# Patient Record
Sex: FEMALE | Race: WHITE | Employment: OTHER | URBAN - NONMETROPOLITAN AREA
[De-identification: names, ages, dates, MRNs, and addresses within clinical notes are randomized per-mention and may not be internally consistent; named-entity substitution may affect disease eponyms.]

---

## 2017-02-14 ENCOUNTER — OFFICE VISIT (OUTPATIENT)
Dept: FAMILY MEDICINE CLINIC | Age: 62
End: 2017-02-14

## 2017-02-14 VITALS
RESPIRATION RATE: 14 BRPM | TEMPERATURE: 97.7 F | WEIGHT: 157 LBS | OXYGEN SATURATION: 98 % | HEIGHT: 64 IN | BODY MASS INDEX: 26.8 KG/M2 | HEART RATE: 67 BPM | SYSTOLIC BLOOD PRESSURE: 100 MMHG | DIASTOLIC BLOOD PRESSURE: 64 MMHG

## 2017-02-14 DIAGNOSIS — R55 VASOVAGAL EPISODE: Primary | ICD-10-CM

## 2017-02-14 DIAGNOSIS — G43.909 MIGRAINE WITHOUT STATUS MIGRAINOSUS, NOT INTRACTABLE, UNSPECIFIED MIGRAINE TYPE: ICD-10-CM

## 2017-02-14 DIAGNOSIS — K59.03 DRUG-INDUCED CONSTIPATION: ICD-10-CM

## 2017-02-14 PROCEDURE — G8484 FLU IMMUNIZE NO ADMIN: HCPCS | Performed by: NURSE PRACTITIONER

## 2017-02-14 PROCEDURE — G8427 DOCREV CUR MEDS BY ELIG CLIN: HCPCS | Performed by: NURSE PRACTITIONER

## 2017-02-14 PROCEDURE — G8419 CALC BMI OUT NRM PARAM NOF/U: HCPCS | Performed by: NURSE PRACTITIONER

## 2017-02-14 PROCEDURE — 1036F TOBACCO NON-USER: CPT | Performed by: NURSE PRACTITIONER

## 2017-02-14 PROCEDURE — 3014F SCREEN MAMMO DOC REV: CPT | Performed by: NURSE PRACTITIONER

## 2017-02-14 PROCEDURE — 99213 OFFICE O/P EST LOW 20 MIN: CPT | Performed by: NURSE PRACTITIONER

## 2017-02-14 PROCEDURE — 3017F COLORECTAL CA SCREEN DOC REV: CPT | Performed by: NURSE PRACTITIONER

## 2017-02-14 RX ORDER — MAGNESIUM OXIDE 400 MG/1
400 TABLET ORAL DAILY
COMMUNITY
End: 2017-10-11

## 2017-02-14 RX ORDER — ACETAMINOPHEN 160 MG
TABLET,DISINTEGRATING ORAL
COMMUNITY
End: 2017-10-11

## 2017-02-14 RX ORDER — MEPERIDINE HYDROCHLORIDE 50 MG/1
TABLET ORAL
Refills: 0 | COMMUNITY
Start: 2017-02-01 | End: 2017-02-14

## 2017-02-14 ASSESSMENT — ENCOUNTER SYMPTOMS
ABDOMINAL PAIN: 1
CONSTIPATION: 1
BLOATING: 1

## 2017-04-03 ENCOUNTER — OFFICE VISIT (OUTPATIENT)
Dept: FAMILY MEDICINE CLINIC | Age: 62
End: 2017-04-03

## 2017-04-03 VITALS
WEIGHT: 158.4 LBS | HEART RATE: 76 BPM | HEIGHT: 64 IN | TEMPERATURE: 97.3 F | OXYGEN SATURATION: 98 % | SYSTOLIC BLOOD PRESSURE: 108 MMHG | DIASTOLIC BLOOD PRESSURE: 78 MMHG | BODY MASS INDEX: 27.04 KG/M2

## 2017-04-03 DIAGNOSIS — N30.01 ACUTE CYSTITIS WITH HEMATURIA: Primary | ICD-10-CM

## 2017-04-03 DIAGNOSIS — N30.01 ACUTE CYSTITIS WITH HEMATURIA: ICD-10-CM

## 2017-04-03 LAB
BILIRUBIN, POC: NORMAL
BLOOD URINE, POC: NORMAL
CLARITY, POC: CLEAR
COLOR, POC: NORMAL
GLUCOSE URINE, POC: NORMAL
KETONES, POC: NORMAL
LEUKOCYTE EST, POC: NORMAL
NITRITE, POC: NORMAL
PH, POC: 7.5
PROTEIN, POC: NORMAL
SPECIFIC GRAVITY, POC: 1
UROBILINOGEN, POC: 0.2

## 2017-04-03 PROCEDURE — 3014F SCREEN MAMMO DOC REV: CPT | Performed by: NURSE PRACTITIONER

## 2017-04-03 PROCEDURE — 1036F TOBACCO NON-USER: CPT | Performed by: NURSE PRACTITIONER

## 2017-04-03 PROCEDURE — 99213 OFFICE O/P EST LOW 20 MIN: CPT | Performed by: NURSE PRACTITIONER

## 2017-04-03 PROCEDURE — 81002 URINALYSIS NONAUTO W/O SCOPE: CPT | Performed by: NURSE PRACTITIONER

## 2017-04-03 PROCEDURE — G8428 CUR MEDS NOT DOCUMENT: HCPCS | Performed by: NURSE PRACTITIONER

## 2017-04-03 PROCEDURE — G8419 CALC BMI OUT NRM PARAM NOF/U: HCPCS | Performed by: NURSE PRACTITIONER

## 2017-04-03 PROCEDURE — 3017F COLORECTAL CA SCREEN DOC REV: CPT | Performed by: NURSE PRACTITIONER

## 2017-04-03 RX ORDER — SULFAMETHOXAZOLE AND TRIMETHOPRIM 800; 160 MG/1; MG/1
1 TABLET ORAL 2 TIMES DAILY
Qty: 20 TABLET | Refills: 0 | Status: SHIPPED | OUTPATIENT
Start: 2017-04-03 | End: 2017-04-13

## 2017-04-03 RX ORDER — PHENAZOPYRIDINE HYDROCHLORIDE 100 MG/1
100 TABLET, FILM COATED ORAL 3 TIMES DAILY PRN
Qty: 21 TABLET | Refills: 0 | Status: SHIPPED | OUTPATIENT
Start: 2017-04-03 | End: 2017-04-10

## 2017-04-03 ASSESSMENT — ENCOUNTER SYMPTOMS
VOMITING: 0
ALLERGIC/IMMUNOLOGIC NEGATIVE: 1
NAUSEA: 0
RESPIRATORY NEGATIVE: 1

## 2017-04-05 LAB
ORGANISM: ABNORMAL
URINE CULTURE, ROUTINE: ABNORMAL

## 2017-10-11 ENCOUNTER — TELEPHONE (OUTPATIENT)
Dept: OBGYN | Age: 62
End: 2017-10-11

## 2017-10-11 ENCOUNTER — OFFICE VISIT (OUTPATIENT)
Dept: OBGYN | Age: 62
End: 2017-10-11

## 2017-10-11 VITALS
HEIGHT: 64 IN | DIASTOLIC BLOOD PRESSURE: 84 MMHG | SYSTOLIC BLOOD PRESSURE: 124 MMHG | BODY MASS INDEX: 26.98 KG/M2 | WEIGHT: 158 LBS

## 2017-10-11 DIAGNOSIS — N76.1 CHRONIC VAGINITIS: ICD-10-CM

## 2017-10-11 DIAGNOSIS — N89.8 VAGINAL ITCHING: ICD-10-CM

## 2017-10-11 DIAGNOSIS — N89.8 VAGINAL DISCHARGE: Primary | ICD-10-CM

## 2017-10-11 PROCEDURE — 99203 OFFICE O/P NEW LOW 30 MIN: CPT | Performed by: OBSTETRICS & GYNECOLOGY

## 2017-10-11 PROCEDURE — 1036F TOBACCO NON-USER: CPT | Performed by: OBSTETRICS & GYNECOLOGY

## 2017-10-11 PROCEDURE — G8419 CALC BMI OUT NRM PARAM NOF/U: HCPCS | Performed by: OBSTETRICS & GYNECOLOGY

## 2017-10-11 PROCEDURE — G8484 FLU IMMUNIZE NO ADMIN: HCPCS | Performed by: OBSTETRICS & GYNECOLOGY

## 2017-10-11 PROCEDURE — 3014F SCREEN MAMMO DOC REV: CPT | Performed by: OBSTETRICS & GYNECOLOGY

## 2017-10-11 PROCEDURE — G8427 DOCREV CUR MEDS BY ELIG CLIN: HCPCS | Performed by: OBSTETRICS & GYNECOLOGY

## 2017-10-11 PROCEDURE — 3017F COLORECTAL CA SCREEN DOC REV: CPT | Performed by: OBSTETRICS & GYNECOLOGY

## 2017-10-11 RX ORDER — TOPIRAMATE 100 MG/1
300 TABLET, FILM COATED ORAL DAILY
COMMUNITY
End: 2020-04-07 | Stop reason: SDUPTHER

## 2017-10-11 RX ORDER — CLOBETASOL PROPIONATE 0.5 MG/G
CREAM TOPICAL
Refills: 1 | COMMUNITY
Start: 2017-08-30 | End: 2018-03-19 | Stop reason: ALTCHOICE

## 2017-10-11 RX ORDER — NITROFURANTOIN 25; 75 MG/1; MG/1
CAPSULE ORAL
Refills: 2 | COMMUNITY
Start: 2017-09-21 | End: 2018-03-19 | Stop reason: ALTCHOICE

## 2017-10-11 RX ORDER — ESTRADIOL 1 MG/1
TABLET ORAL
COMMUNITY
Start: 2017-10-05 | End: 2017-10-12

## 2017-10-11 RX ORDER — MEPERIDINE HYDROCHLORIDE 50 MG/1
TABLET ORAL
Refills: 0 | COMMUNITY
Start: 2017-08-30 | End: 2020-08-31 | Stop reason: SDUPTHER

## 2017-10-11 RX ORDER — OXYCODONE HYDROCHLORIDE AND ACETAMINOPHEN 5; 325 MG/1; MG/1
1 TABLET ORAL PRN
COMMUNITY
Start: 2017-05-03 | End: 2019-11-19

## 2017-10-11 RX ORDER — CLINDAMYCIN PHOSPHATE 20 MG/G
CREAM VAGINAL
Qty: 1 TUBE | Refills: 0 | Status: SHIPPED | OUTPATIENT
Start: 2017-10-11 | End: 2018-03-19 | Stop reason: ALTCHOICE

## 2017-10-11 NOTE — TELEPHONE ENCOUNTER
Patient said Dr. Linn Horvath wanted to know what hormone medication she was on.  Patient said it is Estradiol-Norethindron  0.5-0.1 mg   The label said it is the generic for Estrace

## 2017-10-11 NOTE — PROGRESS NOTES
SUBJECTIVE:   63 y.o.  female complains of vaginal discharge and itching on an off for the last 4 months after intercourse. She was given ABX by Dr. Mercado to take prior and after intercourse, pt states that only seems to help. Review of Systems:  General ROS: negative  Psychological ROS: negative  ENT ROS: negative  Endocrine ROS: negative  Respiratory ROS: no cough, shortness of breath, or wheezing  Cardiovascular ROS: no chest pain or dyspnea on exertion  Gastrointestinal ROS: no abdominal pain, change in bowel habits, or black or bloody stools  Genito-Urinary ROS: no dysuria, trouble voiding, or hematuria  Musculoskeletal ROS: negative  Neurological ROS: no TIA or stroke symptoms  Dermatological ROS: negative    OBJECTIVE:   /84   Ht 5' 4\" (1.626 m)   Wt 158 lb (71.7 kg)   BMI 27.12 kg/m²     Physical Exam:  CONSTITUTIONAL: She appears well nourished and developed   NEUROLOGIC: Alert and oriented to time, place and person  NECK: no thyroidmegaly  LUNGS: Clear to ascultation bilaterally  CVS: regular rate and rhythm  LYMPHATIC: No palpable lymph nodes  ABDOMEN: benign, soft, nontender, no masses. No liver or splenic organomegaly. No evidence of abdominal or inguinal hernia. No indication for occult blood testing  SKIN: normal texture and tone, no lesions  NEURO: normal tone, no hyperreflexia, 1+DTRs throughout    Pelvic Exam:   EFG: normal external genitalia  URETHRAL MEATUS: normal size, no diverticula   URETHRA: normal appearing without diverticula or lesions  BLADDER:  No masses or tenderness  VAGINA: normal rugae, no discharge   CERVIX: parous, no lesions  UTERUS: uterus is normal size, shape, consistency and nontender   ADNEXA: normal adnexa in size, nontender and no masses. PERINEUM: normal appearing without lesions or masses    ASSESSMENT:   1. Vaginal discharge  Miscellaneous sendout 3   2. Vaginal itching  Miscellaneous sendout 3   3.  Chronic vaginitis           PLAN:   Past medical,

## 2017-10-12 RX ORDER — ESTRADIOL AND NORETHINDRONE ACETATE .5; .1 MG/1; MG/1
TABLET ORAL DAILY
Qty: 28 TABLET | COMMUNITY
Start: 2017-10-12 | End: 2019-09-25

## 2017-10-18 ENCOUNTER — TELEPHONE (OUTPATIENT)
Dept: OBGYN | Age: 62
End: 2017-10-18

## 2017-10-18 DIAGNOSIS — N89.8 VAGINAL DISCHARGE: ICD-10-CM

## 2017-10-18 DIAGNOSIS — N89.8 VAGINAL ITCHING: ICD-10-CM

## 2017-10-18 NOTE — TELEPHONE ENCOUNTER
----- Message from Ariella Luong MD sent at 10/18/2017 11:49 AM EDT -----  PLEASE NOTIFY PATIENT OF RESULTS/PLEASE CALL CLINDESSE 2% TO PT PHARMACY

## 2018-01-09 ENCOUNTER — OFFICE VISIT (OUTPATIENT)
Dept: FAMILY MEDICINE CLINIC | Age: 63
End: 2018-01-09

## 2018-01-09 VITALS
HEIGHT: 64 IN | WEIGHT: 162 LBS | OXYGEN SATURATION: 98 % | TEMPERATURE: 98.2 F | BODY MASS INDEX: 27.66 KG/M2 | HEART RATE: 68 BPM | SYSTOLIC BLOOD PRESSURE: 122 MMHG | DIASTOLIC BLOOD PRESSURE: 78 MMHG

## 2018-01-09 DIAGNOSIS — R53.83 OTHER FATIGUE: ICD-10-CM

## 2018-01-09 DIAGNOSIS — J01.00 ACUTE MAXILLARY SINUSITIS, RECURRENCE NOT SPECIFIED: Primary | ICD-10-CM

## 2018-01-09 DIAGNOSIS — Z12.11 COLON CANCER SCREENING: ICD-10-CM

## 2018-01-09 LAB
ALBUMIN SERPL-MCNC: 4.3 G/DL (ref 3.9–4.9)
ALP BLD-CCNC: 72 U/L (ref 40–130)
ALT SERPL-CCNC: 10 U/L (ref 0–33)
ANION GAP SERPL CALCULATED.3IONS-SCNC: 13 MEQ/L (ref 7–13)
AST SERPL-CCNC: 17 U/L (ref 0–35)
BASOPHILS ABSOLUTE: 0.1 K/UL (ref 0–0.2)
BASOPHILS RELATIVE PERCENT: 1.2 %
BILIRUB SERPL-MCNC: 0.2 MG/DL (ref 0–1.2)
BUN BLDV-MCNC: 13 MG/DL (ref 8–23)
CALCIUM SERPL-MCNC: 9 MG/DL (ref 8.6–10.2)
CHLORIDE BLD-SCNC: 107 MEQ/L (ref 98–107)
CO2: 23 MEQ/L (ref 22–29)
CREAT SERPL-MCNC: 0.96 MG/DL (ref 0.5–0.9)
EOSINOPHILS ABSOLUTE: 0.2 K/UL (ref 0–0.7)
EOSINOPHILS RELATIVE PERCENT: 3.2 %
GFR AFRICAN AMERICAN: >60
GFR NON-AFRICAN AMERICAN: 59.2
GLOBULIN: 2.6 G/DL (ref 2.3–3.5)
GLUCOSE BLD-MCNC: 78 MG/DL (ref 74–109)
HCT VFR BLD CALC: 42.2 % (ref 37–47)
HEMOGLOBIN: 13.8 G/DL (ref 12–16)
LYMPHOCYTES ABSOLUTE: 1.2 K/UL (ref 1–4.8)
LYMPHOCYTES RELATIVE PERCENT: 22.8 %
MCH RBC QN AUTO: 32.1 PG (ref 27–31.3)
MCHC RBC AUTO-ENTMCNC: 32.7 % (ref 33–37)
MCV RBC AUTO: 98.3 FL (ref 82–100)
MONOCYTES ABSOLUTE: 0.6 K/UL (ref 0.2–0.8)
MONOCYTES RELATIVE PERCENT: 11.1 %
NEUTROPHILS ABSOLUTE: 3.2 K/UL (ref 1.4–6.5)
NEUTROPHILS RELATIVE PERCENT: 61.7 %
PDW BLD-RTO: 14.2 % (ref 11.5–14.5)
PLATELET # BLD: 282 K/UL (ref 130–400)
POTASSIUM SERPL-SCNC: 4.3 MEQ/L (ref 3.5–5.1)
RBC # BLD: 4.3 M/UL (ref 4.2–5.4)
SODIUM BLD-SCNC: 143 MEQ/L (ref 132–144)
TOTAL PROTEIN: 6.9 G/DL (ref 6.4–8.1)
WBC # BLD: 5.3 K/UL (ref 4.8–10.8)

## 2018-01-09 PROCEDURE — 3014F SCREEN MAMMO DOC REV: CPT | Performed by: NURSE PRACTITIONER

## 2018-01-09 PROCEDURE — 1036F TOBACCO NON-USER: CPT | Performed by: NURSE PRACTITIONER

## 2018-01-09 PROCEDURE — 3017F COLORECTAL CA SCREEN DOC REV: CPT | Performed by: NURSE PRACTITIONER

## 2018-01-09 PROCEDURE — G8484 FLU IMMUNIZE NO ADMIN: HCPCS | Performed by: NURSE PRACTITIONER

## 2018-01-09 PROCEDURE — G8419 CALC BMI OUT NRM PARAM NOF/U: HCPCS | Performed by: NURSE PRACTITIONER

## 2018-01-09 PROCEDURE — G8427 DOCREV CUR MEDS BY ELIG CLIN: HCPCS | Performed by: NURSE PRACTITIONER

## 2018-01-09 PROCEDURE — 99213 OFFICE O/P EST LOW 20 MIN: CPT | Performed by: NURSE PRACTITIONER

## 2018-01-09 RX ORDER — AMOXICILLIN AND CLAVULANATE POTASSIUM 875; 125 MG/1; MG/1
1 TABLET, FILM COATED ORAL 2 TIMES DAILY
Qty: 20 TABLET | Refills: 0 | Status: SHIPPED | OUTPATIENT
Start: 2018-01-09 | End: 2018-01-19

## 2018-01-09 ASSESSMENT — ENCOUNTER SYMPTOMS
RHINORRHEA: 1
VOMITING: 0
DIARRHEA: 1
SORE THROAT: 1
NAUSEA: 0
WHEEZING: 0
SINUS PAIN: 0
COUGH: 1

## 2018-03-19 ENCOUNTER — OFFICE VISIT (OUTPATIENT)
Dept: FAMILY MEDICINE CLINIC | Age: 63
End: 2018-03-19
Payer: COMMERCIAL

## 2018-03-19 VITALS
SYSTOLIC BLOOD PRESSURE: 120 MMHG | TEMPERATURE: 99 F | HEIGHT: 64 IN | DIASTOLIC BLOOD PRESSURE: 70 MMHG | OXYGEN SATURATION: 99 % | HEART RATE: 89 BPM | WEIGHT: 163.4 LBS | BODY MASS INDEX: 27.9 KG/M2

## 2018-03-19 DIAGNOSIS — K64.9 HEMORRHOIDS, UNSPECIFIED HEMORRHOID TYPE: ICD-10-CM

## 2018-03-19 DIAGNOSIS — K57.92 ACUTE DIVERTICULITIS: Primary | ICD-10-CM

## 2018-03-19 PROCEDURE — 99213 OFFICE O/P EST LOW 20 MIN: CPT | Performed by: NURSE PRACTITIONER

## 2018-03-19 PROCEDURE — G8484 FLU IMMUNIZE NO ADMIN: HCPCS | Performed by: NURSE PRACTITIONER

## 2018-03-19 PROCEDURE — G8419 CALC BMI OUT NRM PARAM NOF/U: HCPCS | Performed by: NURSE PRACTITIONER

## 2018-03-19 PROCEDURE — 3014F SCREEN MAMMO DOC REV: CPT | Performed by: NURSE PRACTITIONER

## 2018-03-19 PROCEDURE — 3017F COLORECTAL CA SCREEN DOC REV: CPT | Performed by: NURSE PRACTITIONER

## 2018-03-19 PROCEDURE — 1036F TOBACCO NON-USER: CPT | Performed by: NURSE PRACTITIONER

## 2018-03-19 PROCEDURE — G8427 DOCREV CUR MEDS BY ELIG CLIN: HCPCS | Performed by: NURSE PRACTITIONER

## 2018-03-19 RX ORDER — METRONIDAZOLE 500 MG/1
500 TABLET ORAL
COMMUNITY
Start: 2018-03-15 | End: 2018-03-23

## 2018-03-19 RX ORDER — HYOSCYAMINE SULFATE 0.12 MG/1
0.25 TABLET SUBLINGUAL
COMMUNITY
Start: 2018-03-15 | End: 2018-09-05 | Stop reason: ALTCHOICE

## 2018-03-19 RX ORDER — CIPROFLOXACIN 500 MG/1
500 TABLET, FILM COATED ORAL
COMMUNITY
Start: 2018-03-15 | End: 2018-03-26

## 2018-03-19 ASSESSMENT — ENCOUNTER SYMPTOMS
NAUSEA: 0
VOMITING: 0
ABDOMINAL PAIN: 1
ABDOMINAL DISTENTION: 1
BLOOD IN STOOL: 1
DIARRHEA: 1

## 2018-03-19 ASSESSMENT — PATIENT HEALTH QUESTIONNAIRE - PHQ9
2. FEELING DOWN, DEPRESSED OR HOPELESS: 0
SUM OF ALL RESPONSES TO PHQ9 QUESTIONS 1 & 2: 0
1. LITTLE INTEREST OR PLEASURE IN DOING THINGS: 0
SUM OF ALL RESPONSES TO PHQ QUESTIONS 1-9: 0

## 2018-03-19 NOTE — PROGRESS NOTES
Subjective  Chief Complaint   Patient presents with    Follow-Up from Hospital     pt states that she was seen in [facility] and was in ER for diverticulitis.  Discuss Medications     pt would like to further discuss medications given to her. one causing metal taste in mouth     Diverticulitis    Sinusitis     since 3/11/18       HPI     While in [address] started having fever, chills, diarrhea and blood with cramps for one day 3/14. Tried imodium. No success. Went to ER. Had CT that showed probable diverticulitis. Given cipro, flagyl, and hycosamine. Still having diarrheal urgency. Definitely doing better than previously. Denies recent fevers. Patient Active Problem List    Diagnosis Date Noted    Renal insufficiency     Migraine     Allergic reaction     Tinea unguium     Cervical spondylosis 03/04/2015    Cervical spinal stenosis 03/04/2015    Degenerative disc disease, cervical 03/04/2015    Radiculopathy affecting upper extremity 03/04/2015     Past Medical History:   Diagnosis Date    Allergic reaction     GERD (gastroesophageal reflux disease)     Migraine     Renal insufficiency     Tinea unguium      Past Surgical History:   Procedure Laterality Date    HEMORRHOID SURGERY      SHOULDER SURGERY Left 2000     No family history on file.   Social History     Social History    Marital status:      Spouse name: N/A    Number of children: N/A    Years of education: N/A     Social History Main Topics    Smoking status: Never Smoker    Smokeless tobacco: Never Used    Alcohol use No    Drug use: No    Sexual activity: Yes     Other Topics Concern    None     Social History Narrative    None     Current Outpatient Prescriptions on File Prior to Visit   Medication Sig Dispense Refill    Estradiol-Norethindrone Acet 0.5-0.1 MG TABS Take by mouth 28 tablet     SUMAtriptan Succinate 3 MG/0.5ML SOAJ Inject into the skin as needed       oxyCODONE-acetaminophen (PERCOCET) 5-325

## 2018-03-19 NOTE — PATIENT INSTRUCTIONS
Patient Education        Diverticulitis: Care Instructions  Your Care Instructions    Diverticulitis occurs when pouches form in the wall of the colon and become inflamed or infected. It can be very painful. Doctors aren't sure what causes diverticulitis. There is no proof that foods such as nuts, seeds, or berries cause it or make it worse. A low-fiber diet may cause the colon to work harder to push stool forward. Pouches may form because of this extra work. It may be hard to think about healthy eating while you're in pain. But as you recover, you might think about how you can use healthy eating for overall better health. Healthy eating may help you avoid future attacks. Follow-up care is a key part of your treatment and safety. Be sure to make and go to all appointments, and call your doctor if you are having problems. It's also a good idea to know your test results and keep a list of the medicines you take. How can you care for yourself at home? · Drink plenty of fluids, enough so that your urine is light yellow or clear like water. If you have kidney, heart, or liver disease and have to limit fluids, talk with your doctor before you increase the amount of fluids you drink. · Stick to liquids or a bland diet (plain rice, bananas, dry toast or crackers, applesauce) until you are feeling better. Then you can return to regular foods and gradually increase the amount of fiber in your diet. · Use a heating pad set on low on your belly to relieve mild cramps and pain. · Get extra rest until you are feeling better. · Be safe with medicines. Read and follow all instructions on the label. ¨ If the doctor gave you a prescription medicine for pain, take it as prescribed. ¨ If you are not taking a prescription pain medicine, ask your doctor if you can take an over-the-counter medicine. · If your doctor prescribed antibiotics, take them as directed. Do not stop taking them just because you feel better.  You need to take the full course of antibiotics. To prevent future attacks of diverticulitis  · Avoid constipation:  ¨ Include fruits, vegetables, beans, and whole grains in your diet each day. These foods are high in fiber. ¨ Drink plenty of fluids, enough so that your urine is light yellow or clear like water. If you have kidney, heart, or liver disease and have to limit fluids, talk with your doctor before you increase the amount of fluids you drink. ¨ Get some exercise every day. Build up slowly to 30 to 60 minutes a day on 5 or more days of the week. ¨ Take a fiber supplement, such as Citrucel or Metamucil, every day if needed. Read and follow all instructions on the label. ¨ Schedule time each day for a bowel movement. Having a daily routine may help. Take your time and do not strain when having a bowel movement. When should you call for help? Call your doctor now or seek immediate medical care if:  ? · You have a fever. ? · You are vomiting. ? · You have new or worse belly pain. ? · You cannot pass stools or gas. ? Watch closely for changes in your health, and be sure to contact your doctor if you have any problems. Where can you learn more? Go to https://[employer]. org and sign in to your [employer] account. Enter H901 in the [employer] box to learn more about \"Diverticulitis: Care Instructions. \"     If you do not have an account, please click on the \"Sign Up Now\" link. Current as of: May 12, 2017  Content Version: 11.5  © 9543-8146 [employer]wise, [employer]. Care instructions adapted under license by [facility] ([facility]). If you have questions about a medical condition or this instruction, always ask your healthcare professional. [place] [address] any warranty or liability for your use of this information.

## 2018-03-22 ENCOUNTER — TELEPHONE (OUTPATIENT)
Dept: FAMILY MEDICINE CLINIC | Age: 63
End: 2018-03-22

## 2018-03-22 NOTE — TELEPHONE ENCOUNTER
I called pt this morning about her FIT test not being returned yet and she stated that she is having a bout of her diverticulitis. Is it ok to complete it at this time or should she wait?

## 2018-03-28 ENCOUNTER — TELEPHONE (OUTPATIENT)
Dept: FAMILY MEDICINE CLINIC | Age: 63
End: 2018-03-28

## 2018-03-28 ENCOUNTER — HOSPITAL ENCOUNTER (EMERGENCY)
Age: 63
Discharge: HOME | End: 2018-03-28
Payer: COMMERCIAL

## 2018-03-28 ENCOUNTER — APPOINTMENT (OUTPATIENT)
Dept: CT IMAGING | Age: 63
End: 2018-03-28
Payer: COMMERCIAL

## 2018-03-28 VITALS
RESPIRATION RATE: 14 BRPM | DIASTOLIC BLOOD PRESSURE: 80 MMHG | HEIGHT: 64 IN | BODY MASS INDEX: 28.17 KG/M2 | OXYGEN SATURATION: 100 % | WEIGHT: 165 LBS | TEMPERATURE: 98.1 F | SYSTOLIC BLOOD PRESSURE: 119 MMHG | HEART RATE: 78 BPM

## 2018-03-28 DIAGNOSIS — K59.00 CONSTIPATION, UNSPECIFIED CONSTIPATION TYPE: Primary | ICD-10-CM

## 2018-03-28 LAB
ALBUMIN SERPL-MCNC: 4.5 G/DL (ref 3.9–4.9)
ALP BLD-CCNC: 83 U/L (ref 40–130)
ALT SERPL-CCNC: 43 U/L (ref 0–33)
ANION GAP SERPL CALCULATED.3IONS-SCNC: 15 MEQ/L (ref 7–13)
AST SERPL-CCNC: 36 U/L (ref 0–35)
BACTERIA: ABNORMAL /HPF
BASOPHILS ABSOLUTE: 0.1 K/UL (ref 0–0.2)
BASOPHILS RELATIVE PERCENT: 1.1 %
BILIRUB SERPL-MCNC: 0.2 MG/DL (ref 0–1.2)
BILIRUBIN URINE: NEGATIVE
BLOOD, URINE: NEGATIVE
BUN BLDV-MCNC: 14 MG/DL (ref 8–23)
CALCIUM SERPL-MCNC: 9.1 MG/DL (ref 8.6–10.2)
CHLORIDE BLD-SCNC: 105 MEQ/L (ref 98–107)
CLARITY: CLEAR
CO2: 21 MEQ/L (ref 22–29)
COLOR: YELLOW
CREAT SERPL-MCNC: 1.06 MG/DL (ref 0.5–0.9)
CRYSTALS, UA: ABNORMAL
EOSINOPHILS ABSOLUTE: 0.2 K/UL (ref 0–0.7)
EOSINOPHILS RELATIVE PERCENT: 2.1 %
EPITHELIAL CELLS, UA: ABNORMAL /HPF
GFR AFRICAN AMERICAN: >60
GFR NON-AFRICAN AMERICAN: 52.8
GLOBULIN: 2.8 G/DL (ref 2.3–3.5)
GLUCOSE BLD-MCNC: 91 MG/DL (ref 74–109)
GLUCOSE URINE: NEGATIVE MG/DL
HCT VFR BLD CALC: 41.6 % (ref 37–47)
HEMOGLOBIN: 14 G/DL (ref 12–16)
KETONES, URINE: NEGATIVE MG/DL
LEUKOCYTE ESTERASE, URINE: ABNORMAL
LYMPHOCYTES ABSOLUTE: 1.3 K/UL (ref 1–4.8)
LYMPHOCYTES RELATIVE PERCENT: 16 %
MCH RBC QN AUTO: 32.4 PG (ref 27–31.3)
MCHC RBC AUTO-ENTMCNC: 33.6 % (ref 33–37)
MCV RBC AUTO: 96.4 FL (ref 82–100)
MONOCYTES ABSOLUTE: 0.6 K/UL (ref 0.2–0.8)
MONOCYTES RELATIVE PERCENT: 8 %
NEUTROPHILS ABSOLUTE: 5.7 K/UL (ref 1.4–6.5)
NEUTROPHILS RELATIVE PERCENT: 72.8 %
NITRITE, URINE: NEGATIVE
PDW BLD-RTO: 13.4 % (ref 11.5–14.5)
PH UA: 5.5 (ref 5–9)
PLATELET # BLD: 349 K/UL (ref 130–400)
POC CREATININE WHOLE BLOOD: 1.2
POC CREATININE WHOLE BLOOD: NORMAL
POTASSIUM SERPL-SCNC: 4.3 MEQ/L (ref 3.5–5.1)
PROTEIN UA: NEGATIVE MG/DL
RBC # BLD: 4.32 M/UL (ref 4.2–5.4)
RBC UA: ABNORMAL /HPF (ref 0–2)
SODIUM BLD-SCNC: 141 MEQ/L (ref 132–144)
SPECIFIC GRAVITY UA: 1.03 (ref 1–1.03)
TOTAL PROTEIN: 7.3 G/DL (ref 6.4–8.1)
URINE REFLEX TO CULTURE: YES
UROBILINOGEN, URINE: 0.2 E.U./DL
WBC # BLD: 7.8 K/UL (ref 4.8–10.8)
WBC UA: ABNORMAL /HPF (ref 0–5)
YEAST: PRESENT

## 2018-03-28 PROCEDURE — 80053 COMPREHEN METABOLIC PANEL: CPT

## 2018-03-28 PROCEDURE — 96374 THER/PROPH/DIAG INJ IV PUSH: CPT

## 2018-03-28 PROCEDURE — 2580000003 HC RX 258: Performed by: NURSE PRACTITIONER

## 2018-03-28 PROCEDURE — 36415 COLL VENOUS BLD VENIPUNCTURE: CPT

## 2018-03-28 PROCEDURE — 85025 COMPLETE CBC W/AUTO DIFF WBC: CPT

## 2018-03-28 PROCEDURE — 6360000004 HC RX CONTRAST MEDICATION: Performed by: RADIOLOGY

## 2018-03-28 PROCEDURE — 6360000002 HC RX W HCPCS: Performed by: NURSE PRACTITIONER

## 2018-03-28 PROCEDURE — 74177 CT ABD & PELVIS W/CONTRAST: CPT

## 2018-03-28 PROCEDURE — 87086 URINE CULTURE/COLONY COUNT: CPT

## 2018-03-28 PROCEDURE — 99284 EMERGENCY DEPT VISIT MOD MDM: CPT

## 2018-03-28 PROCEDURE — 81001 URINALYSIS AUTO W/SCOPE: CPT

## 2018-03-28 PROCEDURE — 6370000000 HC RX 637 (ALT 250 FOR IP): Performed by: PHYSICIAN ASSISTANT

## 2018-03-28 RX ORDER — 0.9 % SODIUM CHLORIDE 0.9 %
1000 INTRAVENOUS SOLUTION INTRAVENOUS ONCE
Status: COMPLETED | OUTPATIENT
Start: 2018-03-28 | End: 2018-03-28

## 2018-03-28 RX ORDER — SODIUM CHLORIDE 0.9 % (FLUSH) 0.9 %
10 SYRINGE (ML) INJECTION ONCE
Status: DISCONTINUED | OUTPATIENT
Start: 2018-03-28 | End: 2018-03-28 | Stop reason: HOSPADM

## 2018-03-28 RX ORDER — KETOROLAC TROMETHAMINE 30 MG/ML
30 INJECTION, SOLUTION INTRAMUSCULAR; INTRAVENOUS ONCE
Status: COMPLETED | OUTPATIENT
Start: 2018-03-28 | End: 2018-03-28

## 2018-03-28 RX ORDER — ONDANSETRON 2 MG/ML
4 INJECTION INTRAMUSCULAR; INTRAVENOUS ONCE
Status: DISCONTINUED | OUTPATIENT
Start: 2018-03-28 | End: 2018-03-28 | Stop reason: HOSPADM

## 2018-03-28 RX ADMIN — KETOROLAC TROMETHAMINE 30 MG: 30 INJECTION, SOLUTION INTRAMUSCULAR; INTRAVENOUS at 18:38

## 2018-03-28 RX ADMIN — MAGESIUM CITRATE 296 ML: 1.75 LIQUID ORAL at 20:01

## 2018-03-28 RX ADMIN — IOPAMIDOL 100 ML: 755 INJECTION, SOLUTION INTRAVENOUS at 18:59

## 2018-03-28 RX ADMIN — SODIUM CHLORIDE 1000 ML: 9 INJECTION, SOLUTION INTRAVENOUS at 18:38

## 2018-03-28 ASSESSMENT — ENCOUNTER SYMPTOMS
ABDOMINAL PAIN: 1
NAUSEA: 0
VOMITING: 0
DIARRHEA: 1
COUGH: 0
SHORTNESS OF BREATH: 0

## 2018-03-28 ASSESSMENT — PAIN DESCRIPTION - DESCRIPTORS
DESCRIPTORS: ACHING
DESCRIPTORS: ACHING
DESCRIPTORS: CRAMPING

## 2018-03-28 ASSESSMENT — PAIN DESCRIPTION - PAIN TYPE
TYPE: ACUTE PAIN

## 2018-03-28 ASSESSMENT — PAIN DESCRIPTION - FREQUENCY
FREQUENCY: CONTINUOUS

## 2018-03-28 ASSESSMENT — PAIN SCALES - GENERAL
PAINLEVEL_OUTOF10: 6
PAINLEVEL_OUTOF10: 6
PAINLEVEL_OUTOF10: 3
PAINLEVEL_OUTOF10: 3

## 2018-03-28 ASSESSMENT — PAIN DESCRIPTION - ORIENTATION: ORIENTATION: LEFT

## 2018-03-28 ASSESSMENT — PAIN DESCRIPTION - LOCATION
LOCATION: ABDOMEN

## 2018-03-28 NOTE — TELEPHONE ENCOUNTER
Pt called she has finished her medications   Still having sx of diarrhea, cramping at times. The cramping is not as severe and its only on the one side  She is going about 6 times today. Any suggestions? Advised walk in if gets worse    [facility].

## 2018-03-30 LAB — URINE CULTURE, ROUTINE: NORMAL

## 2018-04-09 DIAGNOSIS — Z12.11 COLON CANCER SCREENING: ICD-10-CM

## 2018-04-09 LAB
CONTROL: NORMAL
HEMOCCULT STL QL: NORMAL

## 2018-04-09 PROCEDURE — 82274 ASSAY TEST FOR BLOOD FECAL: CPT | Performed by: NURSE PRACTITIONER

## 2018-04-19 ENCOUNTER — ANESTHESIA (OUTPATIENT)
Dept: ENDOSCOPY | Age: 63
End: 2018-04-19
Payer: COMMERCIAL

## 2018-04-19 ENCOUNTER — HOSPITAL ENCOUNTER (OUTPATIENT)
Age: 63
Setting detail: OUTPATIENT SURGERY
Discharge: HOME | End: 2018-04-19
Attending: SPECIALIST | Admitting: SPECIALIST
Payer: COMMERCIAL

## 2018-04-19 ENCOUNTER — ANESTHESIA EVENT (OUTPATIENT)
Dept: ENDOSCOPY | Age: 63
End: 2018-04-19
Payer: COMMERCIAL

## 2018-04-19 VITALS
DIASTOLIC BLOOD PRESSURE: 65 MMHG | HEIGHT: 64 IN | WEIGHT: 162 LBS | HEART RATE: 73 BPM | TEMPERATURE: 97.9 F | RESPIRATION RATE: 16 BRPM | OXYGEN SATURATION: 98 % | BODY MASS INDEX: 27.66 KG/M2 | SYSTOLIC BLOOD PRESSURE: 127 MMHG

## 2018-04-19 VITALS
DIASTOLIC BLOOD PRESSURE: 70 MMHG | OXYGEN SATURATION: 97 % | SYSTOLIC BLOOD PRESSURE: 102 MMHG | RESPIRATION RATE: 8 BRPM

## 2018-04-19 PROCEDURE — 6360000002 HC RX W HCPCS: Performed by: NURSE ANESTHETIST, CERTIFIED REGISTERED

## 2018-04-19 PROCEDURE — 3609027000 HC COLONOSCOPY: Performed by: SPECIALIST

## 2018-04-19 PROCEDURE — 7100000010 HC PHASE II RECOVERY - FIRST 15 MIN: Performed by: SPECIALIST

## 2018-04-19 PROCEDURE — 3700000001 HC ADD 15 MINUTES (ANESTHESIA): Performed by: SPECIALIST

## 2018-04-19 PROCEDURE — 3700000000 HC ANESTHESIA ATTENDED CARE: Performed by: SPECIALIST

## 2018-04-19 RX ORDER — SODIUM CHLORIDE 0.9 % (FLUSH) 0.9 %
10 SYRINGE (ML) INJECTION PRN
Status: CANCELLED | OUTPATIENT
Start: 2018-04-19

## 2018-04-19 RX ORDER — LIDOCAINE HYDROCHLORIDE 10 MG/ML
1 INJECTION, SOLUTION EPIDURAL; INFILTRATION; INTRACAUDAL; PERINEURAL
Status: CANCELLED | OUTPATIENT
Start: 2018-04-19 | End: 2018-04-19

## 2018-04-19 RX ORDER — SODIUM CHLORIDE 0.9 % (FLUSH) 0.9 %
10 SYRINGE (ML) INJECTION EVERY 12 HOURS SCHEDULED
Status: CANCELLED | OUTPATIENT
Start: 2018-04-19

## 2018-04-19 RX ORDER — SODIUM CHLORIDE 9 MG/ML
INJECTION, SOLUTION INTRAVENOUS CONTINUOUS
Status: CANCELLED | OUTPATIENT
Start: 2018-04-19

## 2018-04-19 RX ORDER — ONDANSETRON 2 MG/ML
4 INJECTION INTRAMUSCULAR; INTRAVENOUS
Status: DISCONTINUED | OUTPATIENT
Start: 2018-04-19 | End: 2018-04-19 | Stop reason: HOSPADM

## 2018-04-19 RX ORDER — PROPOFOL 10 MG/ML
INJECTION, EMULSION INTRAVENOUS PRN
Status: DISCONTINUED | OUTPATIENT
Start: 2018-04-19 | End: 2018-04-19 | Stop reason: SDUPTHER

## 2018-04-19 RX ADMIN — PROPOFOL 30 MG: 10 INJECTION, EMULSION INTRAVENOUS at 09:12

## 2018-04-19 RX ADMIN — PROPOFOL 20 MG: 10 INJECTION, EMULSION INTRAVENOUS at 09:16

## 2018-04-19 RX ADMIN — PROPOFOL 20 MG: 10 INJECTION, EMULSION INTRAVENOUS at 09:08

## 2018-04-19 RX ADMIN — PROPOFOL 20 MG: 10 INJECTION, EMULSION INTRAVENOUS at 09:20

## 2018-04-19 RX ADMIN — PROPOFOL 100 MG: 10 INJECTION, EMULSION INTRAVENOUS at 09:06

## 2018-04-19 RX ADMIN — PROPOFOL 20 MG: 10 INJECTION, EMULSION INTRAVENOUS at 09:18

## 2018-04-19 RX ADMIN — PROPOFOL 20 MG: 10 INJECTION, EMULSION INTRAVENOUS at 09:14

## 2018-04-19 RX ADMIN — PROPOFOL 20 MG: 10 INJECTION, EMULSION INTRAVENOUS at 09:10

## 2018-07-26 ENCOUNTER — OFFICE VISIT (OUTPATIENT)
Dept: OBGYN CLINIC | Age: 63
End: 2018-07-26
Payer: COMMERCIAL

## 2018-07-26 VITALS
HEIGHT: 64 IN | WEIGHT: 163 LBS | SYSTOLIC BLOOD PRESSURE: 120 MMHG | DIASTOLIC BLOOD PRESSURE: 70 MMHG | BODY MASS INDEX: 27.83 KG/M2

## 2018-07-26 DIAGNOSIS — N95.0 PMB (POSTMENOPAUSAL BLEEDING): Primary | ICD-10-CM

## 2018-07-26 DIAGNOSIS — Z12.31 SCREENING MAMMOGRAM, ENCOUNTER FOR: ICD-10-CM

## 2018-07-26 PROCEDURE — 99214 OFFICE O/P EST MOD 30 MIN: CPT | Performed by: OBSTETRICS & GYNECOLOGY

## 2018-07-26 PROCEDURE — 1036F TOBACCO NON-USER: CPT | Performed by: OBSTETRICS & GYNECOLOGY

## 2018-07-26 PROCEDURE — G8427 DOCREV CUR MEDS BY ELIG CLIN: HCPCS | Performed by: OBSTETRICS & GYNECOLOGY

## 2018-07-26 PROCEDURE — 3017F COLORECTAL CA SCREEN DOC REV: CPT | Performed by: OBSTETRICS & GYNECOLOGY

## 2018-07-26 PROCEDURE — G8419 CALC BMI OUT NRM PARAM NOF/U: HCPCS | Performed by: OBSTETRICS & GYNECOLOGY

## 2018-07-26 RX ORDER — DOCUSATE CALCIUM 240 MG
240 CAPSULE ORAL
Status: ON HOLD | COMMUNITY
End: 2018-09-26 | Stop reason: ALTCHOICE

## 2018-07-26 RX ORDER — TOPIRAMATE 100 MG/1
100 TABLET, FILM COATED ORAL
COMMUNITY
End: 2018-07-26

## 2018-07-26 ASSESSMENT — ENCOUNTER SYMPTOMS
WHEEZING: 0
ABDOMINAL PAIN: 0
CONSTIPATION: 0
COUGH: 0
DIARRHEA: 0
SHORTNESS OF BREATH: 0
BLOOD IN STOOL: 0

## 2018-07-26 NOTE — PROGRESS NOTES
History and Physical  [facility] [address]  P: [phone] / F: [phone]  Rm Tatum  2018              63 y.o. Chief Complaint   Patient presents with    Vaginal Bleeding     pt reports on  she had cramping and dark brown spotting that lasted 14 days. states bleeding stopped but she still had cramping. pt without cycle 3/2007. /70   Ht 5' 4\" (1.626 m)   Wt 163 lb (73.9 kg)   BMI 27.98 kg/m²   Alllergies:  Erythromycin and Sporanox [itraconazole]               Primary Care Physician: LISA Mcmanus - CNP    HPI : Rm Tatum 63 y.o.  female  Pt here today with c/o PMB for the last 14 days, pt states she has been having abdominal pain and cramping, bleeding its dark brown. Pt states that the bleeding has stopped as of today but is still having the cramping. Pt states she has not had a period in over 11 years. Risks, benefits and alternative therapies for treatment discussed. Pt elects pelvic sono, endosee/embx for further evaluation.  ALL?s answered     ________________________________________________________________________  Obstetric History       T0      L0     SAB0   TAB0   Ectopic0   Molar0   Multiple0   Live Births0       # Outcome Date GA Lbr Santiago/2nd Weight Sex Delivery Anes PTL Lv   2 Para            1 Para                 Past Medical History:   Diagnosis Date    Allergic reaction     Cancer ([address]     Cervical    Cerebral artery occlusion with cerebral infarction ([address]     at age 52    Diverticulitis     GERD (gastroesophageal reflux disease)     Migraine     Renal insufficiency     Tinea unguium                                                                    Past Surgical History:   Procedure Laterality Date    CARDIAC SURGERY      PFO surgery    HEMORRHOID SURGERY      [place] COLONOSCOPY FLX DX W/COLLJ SPEC WHEN PFRMD N/A 2018    COLONOSCOPY Negative for cough, shortness of breath and wheezing. Cardiovascular: Negative for chest pain, palpitations and leg swelling. Gastrointestinal: Negative for abdominal pain, blood in stool, constipation and diarrhea. Genitourinary: Positive for pelvic pain and vaginal bleeding. Negative for difficulty urinating, dysuria, flank pain and hematuria. Skin: Negative. Psychiatric/Behavioral: Negative. PHYSICAL Exam:    Constitutional:  Vitals:    07/26/18 0915   BP: 120/70   Weight: 163 lb (73.9 kg)   Height: 5' 4\" (1.626 m)       Physical Exam   Constitutional: She is oriented to person, place, and time. She appears well-developed and well-nourished. HENT:   Head: Normocephalic and atraumatic. Cardiovascular: Normal rate and regular rhythm. Abdominal: Soft. Normal appearance. There is no tenderness. Musculoskeletal: Normal range of motion. Neurological: She is alert and oriented to person, place, and time. Skin: Skin is warm and intact. No lesion noted. No erythema. Psychiatric: She has a normal mood and affect. Her behavior is normal. Judgment and thought content normal.     Pelvic Exam:   EFG: normal external genitalia  URETHRAL MEATUS: normal size, no diverticula   URETHRA: normal appearing without diverticula or lesions  BLADDER:  No masses or tenderness  VAGINA: normal rugae, no discharge   CERVIX: parous, no lesions  UTERUS: uterus is normal size, shape, consistency and nontender   ADNEXA: normal adnexa in size, nontender and no masses. PERINEUM: normal appearing without lesions or masses  ANUS: normal appearing without lesions or masses  RECTUM: UNREMARKABLE      ASSESSMENT:      63 y.o. Annual   Diagnosis Orders   1. PMB (postmenopausal bleeding)  Miscellaneous lab test #3    US PELVIS COMPLETE    US Non OB Transvaginal   2.  Screening mammogram, encounter for  KATARINA DIGITAL SCREEN W OR WO CAD BILATERAL                    Counseling Completed:          PLAN:      Orders Placed This Encounter   Procedures    US PELVIS COMPLETE     Standing Status:   Future     Standing Expiration Date:   7/26/2019    US Non OB Transvaginal     Standing Status:   Future     Standing Expiration Date:   7/26/2019    KATARINA DIGITAL SCREEN W OR WO CAD BILATERAL     Standing Status:   Future     Standing Expiration Date:   9/26/2019    Miscellaneous lab test #3     Genital cultures     Standing Status:   Future     Standing Expiration Date:   7/26/2019     No orders of the defined types were placed in this encounter. IOlvin, am scribing for, and in the presence of, Dr Dede Dewey. Electronically signed by: Olvin Dacosta 7/26/18 10:03 AM    I, Dr Dede Dewey, personally performed the services described in this documentation, as scribed by Olvin Dacosta in my presence, and it is both accurate and complete.  Electronically signed by: Dede Dewey MD

## 2018-07-31 ENCOUNTER — HOSPITAL ENCOUNTER (OUTPATIENT)
Dept: WOMENS IMAGING | Age: 63
Discharge: HOME | End: 2018-08-02
Payer: COMMERCIAL

## 2018-07-31 ENCOUNTER — HOSPITAL ENCOUNTER (OUTPATIENT)
Dept: ULTRASOUND IMAGING | Age: 63
Discharge: HOME | End: 2018-08-02
Payer: COMMERCIAL

## 2018-07-31 DIAGNOSIS — N95.0 PMB (POSTMENOPAUSAL BLEEDING): ICD-10-CM

## 2018-07-31 DIAGNOSIS — Z12.31 SCREENING MAMMOGRAM, ENCOUNTER FOR: ICD-10-CM

## 2018-07-31 PROCEDURE — 77067 SCR MAMMO BI INCL CAD: CPT

## 2018-07-31 PROCEDURE — 76856 US EXAM PELVIC COMPLETE: CPT

## 2018-07-31 PROCEDURE — 76830 TRANSVAGINAL US NON-OB: CPT

## 2018-08-02 DIAGNOSIS — N95.0 PMB (POSTMENOPAUSAL BLEEDING): ICD-10-CM

## 2018-08-08 ENCOUNTER — OFFICE VISIT (OUTPATIENT)
Dept: FAMILY MEDICINE CLINIC | Age: 63
End: 2018-08-08
Payer: COMMERCIAL

## 2018-08-08 VITALS
HEART RATE: 86 BPM | BODY MASS INDEX: 27.49 KG/M2 | DIASTOLIC BLOOD PRESSURE: 74 MMHG | WEIGHT: 161 LBS | HEIGHT: 64 IN | SYSTOLIC BLOOD PRESSURE: 112 MMHG | OXYGEN SATURATION: 99 % | TEMPERATURE: 98.3 F

## 2018-08-08 DIAGNOSIS — N30.01 ACUTE CYSTITIS WITH HEMATURIA: Primary | ICD-10-CM

## 2018-08-08 DIAGNOSIS — N30.01 ACUTE CYSTITIS WITH HEMATURIA: ICD-10-CM

## 2018-08-08 LAB
BILIRUBIN, POC: NORMAL
BLOOD URINE, POC: NORMAL
CLARITY, POC: CLEAR
COLOR, POC: YELLOW
GLUCOSE URINE, POC: NORMAL
KETONES, POC: NORMAL
LEUKOCYTE EST, POC: NORMAL
NITRITE, POC: NORMAL
PH, POC: 6
PROTEIN, POC: NORMAL
SPECIFIC GRAVITY, POC: 1.01
UROBILINOGEN, POC: 0.2

## 2018-08-08 PROCEDURE — 3017F COLORECTAL CA SCREEN DOC REV: CPT | Performed by: NURSE PRACTITIONER

## 2018-08-08 PROCEDURE — G8427 DOCREV CUR MEDS BY ELIG CLIN: HCPCS | Performed by: NURSE PRACTITIONER

## 2018-08-08 PROCEDURE — G8419 CALC BMI OUT NRM PARAM NOF/U: HCPCS | Performed by: NURSE PRACTITIONER

## 2018-08-08 PROCEDURE — 81002 URINALYSIS NONAUTO W/O SCOPE: CPT | Performed by: NURSE PRACTITIONER

## 2018-08-08 PROCEDURE — 99213 OFFICE O/P EST LOW 20 MIN: CPT | Performed by: NURSE PRACTITIONER

## 2018-08-08 PROCEDURE — 1036F TOBACCO NON-USER: CPT | Performed by: NURSE PRACTITIONER

## 2018-08-08 RX ORDER — NITROFURANTOIN 25; 75 MG/1; MG/1
100 CAPSULE ORAL 2 TIMES DAILY
Qty: 14 CAPSULE | Refills: 0 | Status: SHIPPED | OUTPATIENT
Start: 2018-08-08 | End: 2018-08-10 | Stop reason: SINTOL

## 2018-08-08 ASSESSMENT — ENCOUNTER SYMPTOMS
GASTROINTESTINAL NEGATIVE: 1
RESPIRATORY NEGATIVE: 1

## 2018-08-08 NOTE — PROGRESS NOTES
Cancer Father     Cancer Brother      Social History     Social History    Marital status:      Spouse name: N/A    Number of children: N/A    Years of education: N/A     Social History Main Topics    Smoking status: Never Smoker    Smokeless tobacco: Never Used    Alcohol use Yes      Comment: socially    Drug use: No    Sexual activity: Yes     Other Topics Concern    None     Social History Narrative    None     Current Outpatient Prescriptions on File Prior to Visit   Medication Sig Dispense Refill    docusate calcium (SURFAK) 240 MG capsule Take 240 mg by mouth      topiramate (TOPAMAX) 200 MG tablet Take 200 mg by mouth      Estradiol-Norethindrone Acet 0.5-0.1 MG TABS Take by mouth 28 tablet     SUMAtriptan Succinate 3 MG/0.5ML SOAJ Inject into the skin as needed       oxyCODONE-acetaminophen (PERCOCET) 5-325 MG per tablet Take 1 tablet by mouth as needed .  topiramate (TOPAMAX) 100 MG tablet Take 100 mg by mouth      meperidine (DEMEROL) 50 MG tablet TAKE 1 TABLET BY MOUTH DAILY AT TIME OF HEADACHE FOR 7 DAYS  0    Multiple Vitamins-Minerals (JONO-DAY 1000) TABS Take by mouth      Omeprazole-Sodium Bicarbonate (ZEGERID OTC PO) Take by mouth      ZOMIG 5 MG nasal solution once as needed       aspirin 81 MG EC tablet Take 81 mg by mouth daily.  Hyoscyamine Sulfate SL (LEVSIN/SL) 0.125 MG SUBL Take 0.25 mg by mouth       No current facility-administered medications on file prior to visit. Allergies   Allergen Reactions    Erythromycin Hives    Sporanox [Itraconazole] Hives       Review of Systems   Constitutional: Positive for chills. HENT: Negative. Respiratory: Negative. Cardiovascular: Negative. Gastrointestinal: Negative. Genitourinary: Positive for frequency, hematuria (one incidence) and urgency. Negative for flank pain. Musculoskeletal: Negative. Skin: Negative. Neurological: Negative. Psychiatric/Behavioral: Negative.

## 2018-08-10 ENCOUNTER — TELEPHONE (OUTPATIENT)
Dept: FAMILY MEDICINE CLINIC | Age: 63
End: 2018-08-10

## 2018-08-10 DIAGNOSIS — N30.00 ACUTE CYSTITIS WITHOUT HEMATURIA: Primary | ICD-10-CM

## 2018-08-10 LAB — URINE CULTURE, ROUTINE: NORMAL

## 2018-08-10 RX ORDER — SULFAMETHOXAZOLE AND TRIMETHOPRIM 800; 160 MG/1; MG/1
1 TABLET ORAL 2 TIMES DAILY
Qty: 14 TABLET | Refills: 0 | Status: SHIPPED | OUTPATIENT
Start: 2018-08-10 | End: 2018-08-17

## 2018-08-10 NOTE — TELEPHONE ENCOUNTER
Bactrim ordered to pharmacy. Please add macrobid to her allergy list.  The culture has not resulted yet so I don't know the exact sensitivity, but if the fever continues she may need to go into the walk in clinic over the weekend to be checked out again.

## 2018-08-10 NOTE — TELEPHONE ENCOUNTER
Pt calling states that the  Macrobid is making her nauseous, extremely bad headache dizzy . Still running a slight temp 100.9. Pt asking for this to be switched to something else? Pt uses [facility]. She is not taking anymore of the Macrobid.  Pt can be reached at [facility]

## 2018-08-15 ENCOUNTER — TELEPHONE (OUTPATIENT)
Dept: OBGYN CLINIC | Age: 63
End: 2018-08-15

## 2018-08-15 ENCOUNTER — PROCEDURE VISIT (OUTPATIENT)
Dept: OBGYN CLINIC | Age: 63
End: 2018-08-15
Payer: COMMERCIAL

## 2018-08-15 VITALS
DIASTOLIC BLOOD PRESSURE: 72 MMHG | WEIGHT: 162 LBS | HEIGHT: 64 IN | SYSTOLIC BLOOD PRESSURE: 124 MMHG | BODY MASS INDEX: 27.66 KG/M2

## 2018-08-15 DIAGNOSIS — R93.89 THICKENED ENDOMETRIUM: ICD-10-CM

## 2018-08-15 DIAGNOSIS — Z01.419 WELL WOMAN EXAM WITH ROUTINE GYNECOLOGICAL EXAM: ICD-10-CM

## 2018-08-15 DIAGNOSIS — N95.0 PMB (POSTMENOPAUSAL BLEEDING): Primary | ICD-10-CM

## 2018-08-15 DIAGNOSIS — Z11.51 SCREENING FOR HPV (HUMAN PAPILLOMAVIRUS): ICD-10-CM

## 2018-08-15 DIAGNOSIS — Z78.0 POSTMENOPAUSAL: ICD-10-CM

## 2018-08-15 PROCEDURE — G8427 DOCREV CUR MEDS BY ELIG CLIN: HCPCS | Performed by: OBSTETRICS & GYNECOLOGY

## 2018-08-15 PROCEDURE — 1036F TOBACCO NON-USER: CPT | Performed by: OBSTETRICS & GYNECOLOGY

## 2018-08-15 PROCEDURE — G8419 CALC BMI OUT NRM PARAM NOF/U: HCPCS | Performed by: OBSTETRICS & GYNECOLOGY

## 2018-08-15 PROCEDURE — 3017F COLORECTAL CA SCREEN DOC REV: CPT | Performed by: OBSTETRICS & GYNECOLOGY

## 2018-08-15 PROCEDURE — 58558 HYSTEROSCOPY BIOPSY: CPT | Performed by: OBSTETRICS & GYNECOLOGY

## 2018-08-15 PROCEDURE — 99213 OFFICE O/P EST LOW 20 MIN: CPT | Performed by: OBSTETRICS & GYNECOLOGY

## 2018-08-15 PROCEDURE — 99396 PREV VISIT EST AGE 40-64: CPT | Performed by: OBSTETRICS & GYNECOLOGY

## 2018-08-15 ASSESSMENT — PATIENT HEALTH QUESTIONNAIRE - PHQ9
SUM OF ALL RESPONSES TO PHQ QUESTIONS 1-9: 0
SUM OF ALL RESPONSES TO PHQ9 QUESTIONS 1 & 2: 0
1. LITTLE INTEREST OR PLEASURE IN DOING THINGS: 0
SUM OF ALL RESPONSES TO PHQ QUESTIONS 1-9: 0
2. FEELING DOWN, DEPRESSED OR HOPELESS: 0

## 2018-08-15 ASSESSMENT — ENCOUNTER SYMPTOMS
CONSTIPATION: 0
WHEEZING: 0
SHORTNESS OF BREATH: 0
DIARRHEA: 0
ABDOMINAL PAIN: 0
COUGH: 0
BLOOD IN STOOL: 0

## 2018-08-15 NOTE — PROGRESS NOTES
History and Physical  [facility] [address] [place]  P: [phone] / F: [phone]  Lizandro Rouse  8/15/2018              63 y.o. Chief Complaint   Patient presents with    Procedure     pt here for endosee/embx. consent signed. mortin taken. no allergies to betadine.  Student     ok     Annual Exam     last pap pt unsure        /64   Ht 5' 4\" (1.626 m)   Wt 162 lb (73.5 kg)   BMI 27.81 kg/m²   Alllergies:  Erythromycin; Macrobid [nitrofurantoin macrocrystal]; and Sporanox [itraconazole]               Primary Care Physician: LISA Lisa - CNP    HPI : Lizandro Rouse 63 y.o.  female  Pt here for results of pelvic SONO. Sono results reviewed:  NORMAL SIZE UTERUS. ENDOMETRIUM MEASURES 6.4 MM IN AP DIAMETER, MILDLY THICKENED, QUESTION ENDOMETRIAL NEOPLASM OR HYPERPLASIA.       2 FOCAL LESIONS WITHIN THE UTERUS, PROBABLE FIBROIDS.       NO FREE FLUID OR ABNORMAL ADNEXAL MASS. Pt with mildly thickened endometrium, Risks, benefits and alternative therapies for treatment discussed. Pt elects endosee/embx today in office. PROCEDURE NOTE:  Risks, benefits and alternative therapies for vaginal bleeding evaluation discussed. Pt agrees to [facility] with EMB and consent obtained. Pt advised of associated risks and complications. Pt gives verbal agreement to proceed with procedure.      Speculum was placed and the cervix was visualized. Betadine is applied if not allergic. The cervix was grasped with tenaculum. The uterus sounded to 9cm and the uterine Pipelle was used. Three passes were performed. Moderate tissue noted. The tissue was sent to pathology. Os finders utilized as needed for gentle dilation. The endosee hysteroscopic device is was not easily passed through the cervical os.  Pt with stenotic cervix, unable to visualize the uterine cavity.     Hysteroscopy is inadequate.      Findings: unable to completely pass into the uterus due to stenoic cervix. Plan: D&C, Hysteroscopy, pending results of EMBX. RTO in 2-3 weeks for results                  ________________________________________________________________________  Obstetric History       T0      L0     SAB0   TAB0   Ectopic0   Molar0   Multiple0   Live Births0       # Outcome Date GA Lbr Santiago/2nd Weight Sex Delivery Anes PTL Lv   2 Para            1 Para                 Past Medical History:   Diagnosis Date    Allergic reaction     [address]     Cervical    Cerebral artery occlusion with cerebral infarction ([facility]     at age 52    Diverticulitis     GERD (gastroesophageal reflux disease)     Migraine     Renal insufficiency     Tinea unguium                                                                    Past Surgical History:   Procedure Laterality Date    CARDIAC SURGERY      PFO surgery    HEMORRHOID SURGERY      [place] COLONOSCOPY FLX DX W/COLLJ SPEC WHEN PFRMD N/A 2018    COLONOSCOPY performed by Toni Ramey MD at [address] [place] [address]     Family History   Problem Relation Age of Onset    Cancer Father     Cancer Brother      Social History     Social History    Marital status:      Spouse name: N/A    Number of children: N/A    Years of education: N/A     Occupational History    Not on file.      Social History Main Topics    Smoking status: Never Smoker    Smokeless tobacco: Never Used    Alcohol use Yes      Comment: socially    Drug use: No    Sexual activity: Yes     Other Topics Concern    Not on file     Social History Narrative    No narrative on file         MEDICATIONS:  Current Outpatient Prescriptions on File Prior to Visit   Medication Sig Dispense Refill    sulfamethoxazole-trimethoprim (BACTRIM DS) 800-160 MG per tablet Take 1 tablet by mouth 2 times daily for 7 days 14 tablet 0    docusate calcium (SURFAK) 240 MG capsule Take 240 mg by mouth     

## 2018-08-21 DIAGNOSIS — Z01.419 WELL WOMAN EXAM WITH ROUTINE GYNECOLOGICAL EXAM: ICD-10-CM

## 2018-08-21 DIAGNOSIS — Z11.51 SCREENING FOR HPV (HUMAN PAPILLOMAVIRUS): ICD-10-CM

## 2018-09-05 ENCOUNTER — OFFICE VISIT (OUTPATIENT)
Dept: FAMILY MEDICINE CLINIC | Age: 63
End: 2018-09-05
Payer: COMMERCIAL

## 2018-09-05 VITALS
BODY MASS INDEX: 27.62 KG/M2 | WEIGHT: 161.8 LBS | HEART RATE: 69 BPM | DIASTOLIC BLOOD PRESSURE: 70 MMHG | HEIGHT: 64 IN | SYSTOLIC BLOOD PRESSURE: 115 MMHG | OXYGEN SATURATION: 98 %

## 2018-09-05 DIAGNOSIS — Z01.818 PRE-OP EXAM: ICD-10-CM

## 2018-09-05 DIAGNOSIS — N95.0 POST-MENOPAUSAL BLEEDING: ICD-10-CM

## 2018-09-05 DIAGNOSIS — R93.89 THICKENED ENDOMETRIUM: Primary | ICD-10-CM

## 2018-09-05 PROCEDURE — G8427 DOCREV CUR MEDS BY ELIG CLIN: HCPCS | Performed by: FAMILY MEDICINE

## 2018-09-05 PROCEDURE — 1036F TOBACCO NON-USER: CPT | Performed by: FAMILY MEDICINE

## 2018-09-05 PROCEDURE — G8419 CALC BMI OUT NRM PARAM NOF/U: HCPCS | Performed by: FAMILY MEDICINE

## 2018-09-05 PROCEDURE — 99213 OFFICE O/P EST LOW 20 MIN: CPT | Performed by: FAMILY MEDICINE

## 2018-09-05 PROCEDURE — 3017F COLORECTAL CA SCREEN DOC REV: CPT | Performed by: FAMILY MEDICINE

## 2018-09-05 NOTE — PROGRESS NOTES
Laterality Date    CARDIAC SURGERY      PFO surgery    HEMORRHOID SURGERY      [place] COLONOSCOPY FLX DX W/COLLJ SPEC WHEN PFRMD N/A 4/19/2018    COLONOSCOPY performed by Grecia Monk MD at [address] [address]     Family History   Problem Relation Age of Onset    Cancer Father     Cancer Brother      Social History     Social History    Marital status:      Spouse name: N/A    Number of children: N/A    Years of education: N/A     Social History Main Topics    Smoking status: Never Smoker    Smokeless tobacco: Never Used    Alcohol use Yes      Comment: socially    Drug use: No    Sexual activity: Yes     Other Topics Concern    None     Social History Narrative    None     Allergies   Allergen Reactions    Erythromycin Hives    Macrobid [Nitrofurantoin Macrocrystal]     Sporanox [Itraconazole] Hives       Review of Systems:   General ROS: negative for - chills, fatigue, fever, malaise, weight gain or weight loss  Respiratory ROS: no cough, shortness of breath, or wheezing  Cardiovascular ROS: no chest pain or dyspnea on exertion  Gastrointestinal ROS: no abdominal pain, change in bowel habits, or black or bloody stools  Genito-Urinary ROS:Recent spotting (PMB) no dysuria, trouble voiding  Musculoskeletal ROS: negative for - gait disturbance, joint pain or joint stiffness  Neurological ROS: history of migraines    In general patient otherwise reports feeling well. Physical Exam:  /70 (Site: Left Arm, Position: Sitting)   Pulse 69   Ht 5' 4\" (1.626 m)   Wt 161 lb 12.8 oz (73.4 kg)   SpO2 98%   Breastfeeding? No   BMI 27.77 kg/m²     Gen: Well, NAD, Alert, Oriented x 3   HEENT: EOMI, eyes clear, MMM  Skin: without rash or jaundice  Neck: no significant lymphadenopathy or thyromegaly  Lungs: CTA B w/out Rales/Wheezes/Rhonchi, Good respiratory effort   Heart: RRR, S1S2, w/out M/R/G, non-displaced PMI   Ext: No C/C/E Bilaterally.    Neuro: Neurovascularly intact w/ Sensory/Motor intact UE/LE Bilaterally. Lab Results   Component Value Date    WBC 7.8 03/28/2018    HGB 14.0 03/28/2018    HCT 41.6 03/28/2018     03/28/2018    CHOL 243 (H) 05/13/2016    TRIG 82 05/13/2016    HDL 81 (H) 05/13/2016    ALT 43 (H) 03/28/2018    AST 36 (H) 03/28/2018     03/28/2018    K 4.3 03/28/2018     03/28/2018    CREATININE 1.06 (H) 03/28/2018    BUN 14 03/28/2018    CO2 21 (L) 03/28/2018         A&P   Diagnosis Orders   1. Thickened endometrium     2. Pre-op exam     3.  Post-menopausal bleeding       Clear for surgery  PAT at [facility]  Avoid nsaids week of surgery    F/u as needed      Yaakov Landon MD

## 2018-09-07 ENCOUNTER — TELEPHONE (OUTPATIENT)
Dept: OBGYN CLINIC | Age: 63
End: 2018-09-07

## 2018-09-07 NOTE — TELEPHONE ENCOUNTER
lmom for patient to call office back. Please schedule with Dr. Lewis Ballesteros to discuss pap results.

## 2018-09-19 ENCOUNTER — OFFICE VISIT (OUTPATIENT)
Dept: OBGYN CLINIC | Age: 63
End: 2018-09-19
Payer: COMMERCIAL

## 2018-09-19 ENCOUNTER — HOSPITAL ENCOUNTER (OUTPATIENT)
Dept: PREADMISSION TESTING | Age: 63
Discharge: HOME | End: 2018-09-23
Payer: COMMERCIAL

## 2018-09-19 VITALS
BODY MASS INDEX: 28.24 KG/M2 | HEIGHT: 64 IN | DIASTOLIC BLOOD PRESSURE: 60 MMHG | SYSTOLIC BLOOD PRESSURE: 110 MMHG | WEIGHT: 165.4 LBS

## 2018-09-19 VITALS
HEART RATE: 66 BPM | TEMPERATURE: 98.1 F | SYSTOLIC BLOOD PRESSURE: 128 MMHG | BODY MASS INDEX: 28.07 KG/M2 | OXYGEN SATURATION: 96 % | WEIGHT: 164.4 LBS | HEIGHT: 64 IN | RESPIRATION RATE: 18 BRPM | DIASTOLIC BLOOD PRESSURE: 70 MMHG

## 2018-09-19 DIAGNOSIS — R93.89 THICKENED ENDOMETRIUM: ICD-10-CM

## 2018-09-19 DIAGNOSIS — N93.9 VAGINAL BLEEDING: ICD-10-CM

## 2018-09-19 DIAGNOSIS — N95.0 PMB (POSTMENOPAUSAL BLEEDING): ICD-10-CM

## 2018-09-19 DIAGNOSIS — N88.2 STENOSIS OF CERVIX: ICD-10-CM

## 2018-09-19 DIAGNOSIS — Z01.818 PREOPERATIVE EXAM FOR GYNECOLOGIC SURGERY: Primary | ICD-10-CM

## 2018-09-19 DIAGNOSIS — N88.2 CERVICAL STENOSIS (UTERINE CERVIX): ICD-10-CM

## 2018-09-19 LAB
ABO/RH: NORMAL
ALBUMIN SERPL-MCNC: 4.4 G/DL (ref 3.9–4.9)
ALP BLD-CCNC: 75 U/L (ref 40–130)
ALT SERPL-CCNC: 16 U/L (ref 0–33)
ANION GAP SERPL CALCULATED.3IONS-SCNC: 11 MEQ/L (ref 7–13)
ANTIBODY SCREEN: NORMAL
APTT: 25.4 SEC (ref 21.6–35.4)
AST SERPL-CCNC: 17 U/L (ref 0–35)
BASOPHILS ABSOLUTE: 0 K/UL (ref 0–0.2)
BASOPHILS RELATIVE PERCENT: 0.4 %
BILIRUB SERPL-MCNC: <0.2 MG/DL (ref 0–1.2)
BILIRUBIN URINE: NEGATIVE
BLOOD, URINE: NEGATIVE
BUN BLDV-MCNC: 15 MG/DL (ref 8–23)
CALCIUM SERPL-MCNC: 8.9 MG/DL (ref 8.6–10.2)
CHLORIDE BLD-SCNC: 111 MEQ/L (ref 98–107)
CLARITY: CLEAR
CO2: 21 MEQ/L (ref 22–29)
COLOR: YELLOW
CREAT SERPL-MCNC: 1.02 MG/DL (ref 0.5–0.9)
EKG ATRIAL RATE: 59 BPM
EKG P AXIS: 8 DEGREES
EKG P-R INTERVAL: 168 MS
EKG Q-T INTERVAL: 408 MS
EKG QRS DURATION: 88 MS
EKG QTC CALCULATION (BAZETT): 403 MS
EKG R AXIS: 31 DEGREES
EKG T AXIS: 28 DEGREES
EKG VENTRICULAR RATE: 59 BPM
EOSINOPHILS ABSOLUTE: 0.1 K/UL (ref 0–0.7)
EOSINOPHILS RELATIVE PERCENT: 1.9 %
GFR AFRICAN AMERICAN: >60
GFR NON-AFRICAN AMERICAN: 55.1
GLOBULIN: 2.5 G/DL (ref 2.3–3.5)
GLUCOSE BLD-MCNC: 97 MG/DL (ref 74–109)
GLUCOSE URINE: NEGATIVE MG/DL
HCT VFR BLD CALC: 40.1 % (ref 37–47)
HEMOGLOBIN: 13.3 G/DL (ref 12–16)
INR BLD: 1
KETONES, URINE: NEGATIVE MG/DL
LEUKOCYTE ESTERASE, URINE: NEGATIVE
LYMPHOCYTES ABSOLUTE: 1 K/UL (ref 1–4.8)
LYMPHOCYTES RELATIVE PERCENT: 20.3 %
MCH RBC QN AUTO: 32.7 PG (ref 27–31.3)
MCHC RBC AUTO-ENTMCNC: 33.2 % (ref 33–37)
MCV RBC AUTO: 98.3 FL (ref 82–100)
MONOCYTES ABSOLUTE: 0.3 K/UL (ref 0.2–0.8)
MONOCYTES RELATIVE PERCENT: 6.9 %
NEUTROPHILS ABSOLUTE: 3.3 K/UL (ref 1.4–6.5)
NEUTROPHILS RELATIVE PERCENT: 70.5 %
NITRITE, URINE: NEGATIVE
PDW BLD-RTO: 13.5 % (ref 11.5–14.5)
PH UA: 5.5 (ref 5–9)
PLATELET # BLD: 258 K/UL (ref 130–400)
POTASSIUM SERPL-SCNC: 3.7 MEQ/L (ref 3.5–5.1)
PROTEIN UA: NEGATIVE MG/DL
PROTHROMBIN TIME: 10.2 SEC (ref 9.6–12.3)
RBC # BLD: 4.08 M/UL (ref 4.2–5.4)
SODIUM BLD-SCNC: 143 MEQ/L (ref 132–144)
SPECIFIC GRAVITY UA: 1.02 (ref 1–1.03)
TOTAL PROTEIN: 6.9 G/DL (ref 6.4–8.1)
URINE REFLEX TO CULTURE: NORMAL
UROBILINOGEN, URINE: 0.2 E.U./DL
WBC # BLD: 4.7 K/UL (ref 4.8–10.8)

## 2018-09-19 PROCEDURE — 81003 URINALYSIS AUTO W/O SCOPE: CPT

## 2018-09-19 PROCEDURE — 99213 OFFICE O/P EST LOW 20 MIN: CPT | Performed by: OBSTETRICS & GYNECOLOGY

## 2018-09-19 PROCEDURE — 3017F COLORECTAL CA SCREEN DOC REV: CPT | Performed by: OBSTETRICS & GYNECOLOGY

## 2018-09-19 PROCEDURE — 85025 COMPLETE CBC W/AUTO DIFF WBC: CPT

## 2018-09-19 PROCEDURE — 80053 COMPREHEN METABOLIC PANEL: CPT

## 2018-09-19 PROCEDURE — 1036F TOBACCO NON-USER: CPT | Performed by: OBSTETRICS & GYNECOLOGY

## 2018-09-19 PROCEDURE — 86901 BLOOD TYPING SEROLOGIC RH(D): CPT

## 2018-09-19 PROCEDURE — 85610 PROTHROMBIN TIME: CPT

## 2018-09-19 PROCEDURE — G8419 CALC BMI OUT NRM PARAM NOF/U: HCPCS | Performed by: OBSTETRICS & GYNECOLOGY

## 2018-09-19 PROCEDURE — G8427 DOCREV CUR MEDS BY ELIG CLIN: HCPCS | Performed by: OBSTETRICS & GYNECOLOGY

## 2018-09-19 PROCEDURE — 86900 BLOOD TYPING SEROLOGIC ABO: CPT

## 2018-09-19 PROCEDURE — 85730 THROMBOPLASTIN TIME PARTIAL: CPT

## 2018-09-19 PROCEDURE — 86850 RBC ANTIBODY SCREEN: CPT

## 2018-09-19 PROCEDURE — 93005 ELECTROCARDIOGRAM TRACING: CPT

## 2018-09-19 RX ORDER — SODIUM CHLORIDE, SODIUM LACTATE, POTASSIUM CHLORIDE, CALCIUM CHLORIDE 600; 310; 30; 20 MG/100ML; MG/100ML; MG/100ML; MG/100ML
INJECTION, SOLUTION INTRAVENOUS CONTINUOUS
Status: CANCELLED | OUTPATIENT
Start: 2018-09-26

## 2018-09-19 RX ORDER — SODIUM CHLORIDE, SODIUM LACTATE, POTASSIUM CHLORIDE, CALCIUM CHLORIDE 600; 310; 30; 20 MG/100ML; MG/100ML; MG/100ML; MG/100ML
INJECTION, SOLUTION INTRAVENOUS ONCE
Status: CANCELLED | OUTPATIENT
Start: 2018-09-26 | End: 2018-09-26

## 2018-09-19 RX ORDER — SODIUM CHLORIDE 0.9 % (FLUSH) 0.9 %
10 SYRINGE (ML) INJECTION EVERY 12 HOURS SCHEDULED
Status: CANCELLED | OUTPATIENT
Start: 2018-09-26

## 2018-09-19 RX ORDER — LIDOCAINE HYDROCHLORIDE 10 MG/ML
1 INJECTION, SOLUTION EPIDURAL; INFILTRATION; INTRACAUDAL; PERINEURAL
Status: CANCELLED | OUTPATIENT
Start: 2018-09-26 | End: 2018-09-26

## 2018-09-19 RX ORDER — SODIUM CHLORIDE 0.9 % (FLUSH) 0.9 %
10 SYRINGE (ML) INJECTION PRN
Status: CANCELLED | OUTPATIENT
Start: 2018-09-26

## 2018-09-19 NOTE — PROGRESS NOTES
Platelets 605 847 - 124 K/uL    Neutrophils % 70.5 %    Lymphocytes % 20.3 %    Monocytes % 6.9 %    Eosinophils % 1.9 %    Basophils % 0.4 %    Neutrophils # 3.3 1.4 - 6.5 K/uL    Lymphocytes # 1.0 1.0 - 4.8 K/uL    Monocytes # 0.3 0.2 - 0.8 K/uL    Eosinophils # 0.1 0.0 - 0.7 K/uL    Basophils # 0.0 0.0 - 0.2 K/uL   Protime-INR   Result Value Ref Range    Protime 10.2 9.6 - 12.3 sec    INR 1.0    APTT   Result Value Ref Range    aPTT 25.4 21.6 - 35.4 sec   Comprehensive Metabolic Panel   Result Value Ref Range    Sodium 143 132 - 144 mEq/L    Potassium 3.7 3.5 - 5.1 mEq/L    Chloride 111 (H) 98 - 107 mEq/L    CO2 21 (L) 22 - 29 mEq/L    Anion Gap 11 7 - 13 mEq/L    Glucose 97 74 - 109 mg/dL    BUN 15 8 - 23 mg/dL    CREATININE 1.02 (H) 0.50 - 0.90 mg/dL    GFR Non-African American 55.1 (L) >60    GFR  >60.0 >60    Calcium 8.9 8.6 - 10.2 mg/dL    Total Protein 6.9 6.4 - 8.1 g/dL    Alb 4.4 3.9 - 4.9 g/dL    Total Bilirubin <0.2 0.0 - 1.2 mg/dL    Alkaline Phosphatase 75 40 - 130 U/L    ALT 16 0 - 33 U/L    AST 17 0 - 35 U/L    Globulin 2.5 2.3 - 3.5 g/dL   Urinalysis Reflex to Culture   Result Value Ref Range    Color, UA Yellow Straw/Yellow    Clarity, UA Clear Clear    Glucose, Ur Negative Negative mg/dL    Bilirubin Urine Negative Negative    Ketones, Urine Negative Negative mg/dL    Specific Gravity, UA 1.017 1.005 - 1.030    Blood, Urine Negative Negative    pH, UA 5.5 5.0 - 9.0    Protein, UA Negative Negative mg/dL    Urobilinogen, Urine 0.2 <2.0 E.U./dL    Nitrite, Urine Negative Negative    Leukocyte Esterase, Urine Negative Negative    Urine Reflex to Culture Not Indicated    EKG 12 Lead   Result Value Ref Range    Ventricular Rate 59 BPM    Atrial Rate 59 BPM    P-R Interval 168 ms    QRS Duration 88 ms    Q-T Interval 408 ms    QTc Calculation (Bazett) 403 ms    P Axis 8 degrees    R Axis 31 degrees    T Axis 28 degrees   TYPE AND SCREEN   Result Value Ref Range    ABO/Rh A POS described in this documentation, as scribed by Alisha Negrete in my presence, and it is both accurate and complete.  Electronically signed by: Arainna Peng MD 9/28/18 8:52 AM

## 2018-09-19 NOTE — PROGRESS NOTES
H&P completed on 9/5/2018 per Dr. Trino Sellers PCP, in [facility]  EKG on chart

## 2018-09-21 ENCOUNTER — TELEPHONE (OUTPATIENT)
Dept: OBGYN CLINIC | Age: 63
End: 2018-09-21

## 2018-09-21 PROCEDURE — 93010 ELECTROCARDIOGRAM REPORT: CPT | Performed by: INTERNAL MEDICINE

## 2018-09-21 NOTE — TELEPHONE ENCOUNTER
Pt's EKG came back abnormal \"Sinus bradycardia Otherwise normal ECG No previous ECGs available\". Please advise if pt needs cardiac clearance. Pt is scheduled on 9-26-18 for D&C, ablation and removal of IUL. Pt has medical clearance signed by Dr Meg Huff.

## 2018-09-26 ENCOUNTER — ANESTHESIA (OUTPATIENT)
Dept: OPERATING ROOM | Age: 63
End: 2018-09-26
Payer: COMMERCIAL

## 2018-09-26 ENCOUNTER — HOSPITAL ENCOUNTER (OUTPATIENT)
Age: 63
Setting detail: OUTPATIENT SURGERY
Discharge: HOME | End: 2018-09-26
Attending: OBSTETRICS & GYNECOLOGY | Admitting: OBSTETRICS & GYNECOLOGY
Payer: COMMERCIAL

## 2018-09-26 ENCOUNTER — ANESTHESIA EVENT (OUTPATIENT)
Dept: OPERATING ROOM | Age: 63
End: 2018-09-26
Payer: COMMERCIAL

## 2018-09-26 VITALS
TEMPERATURE: 97.7 F | DIASTOLIC BLOOD PRESSURE: 57 MMHG | OXYGEN SATURATION: 100 % | HEART RATE: 60 BPM | RESPIRATION RATE: 16 BRPM | SYSTOLIC BLOOD PRESSURE: 110 MMHG

## 2018-09-26 VITALS — SYSTOLIC BLOOD PRESSURE: 86 MMHG | DIASTOLIC BLOOD PRESSURE: 54 MMHG | TEMPERATURE: 95.4 F | OXYGEN SATURATION: 100 %

## 2018-09-26 PROCEDURE — 3700000001 HC ADD 15 MINUTES (ANESTHESIA): Performed by: OBSTETRICS & GYNECOLOGY

## 2018-09-26 PROCEDURE — 3600000014 HC SURGERY LEVEL 4 ADDTL 15MIN: Performed by: OBSTETRICS & GYNECOLOGY

## 2018-09-26 PROCEDURE — 2500000003 HC RX 250 WO HCPCS: Performed by: NURSE ANESTHETIST, CERTIFIED REGISTERED

## 2018-09-26 PROCEDURE — 6360000002 HC RX W HCPCS: Performed by: NURSE ANESTHETIST, CERTIFIED REGISTERED

## 2018-09-26 PROCEDURE — 6360000002 HC RX W HCPCS: Performed by: NURSE PRACTITIONER

## 2018-09-26 PROCEDURE — 7100000011 HC PHASE II RECOVERY - ADDTL 15 MIN: Performed by: OBSTETRICS & GYNECOLOGY

## 2018-09-26 PROCEDURE — 7100000000 HC PACU RECOVERY - FIRST 15 MIN: Performed by: OBSTETRICS & GYNECOLOGY

## 2018-09-26 PROCEDURE — 57522 CONIZATION OF CERVIX: CPT | Performed by: OBSTETRICS & GYNECOLOGY

## 2018-09-26 PROCEDURE — 88305 TISSUE EXAM BY PATHOLOGIST: CPT

## 2018-09-26 PROCEDURE — 88342 IMHCHEM/IMCYTCHM 1ST ANTB: CPT

## 2018-09-26 PROCEDURE — 2709999900 HC NON-CHARGEABLE SUPPLY: Performed by: OBSTETRICS & GYNECOLOGY

## 2018-09-26 PROCEDURE — 2580000003 HC RX 258: Performed by: ANESTHESIOLOGY

## 2018-09-26 PROCEDURE — 2580000003 HC RX 258: Performed by: OBSTETRICS & GYNECOLOGY

## 2018-09-26 PROCEDURE — 7100000001 HC PACU RECOVERY - ADDTL 15 MIN: Performed by: OBSTETRICS & GYNECOLOGY

## 2018-09-26 PROCEDURE — 99999 PR OFFICE/OUTPT VISIT,PROCEDURE ONLY: CPT | Performed by: OBSTETRICS & GYNECOLOGY

## 2018-09-26 PROCEDURE — 3700000000 HC ANESTHESIA ATTENDED CARE: Performed by: OBSTETRICS & GYNECOLOGY

## 2018-09-26 PROCEDURE — 3600000004 HC SURGERY LEVEL 4 BASE: Performed by: OBSTETRICS & GYNECOLOGY

## 2018-09-26 PROCEDURE — 6370000000 HC RX 637 (ALT 250 FOR IP): Performed by: ANESTHESIOLOGY

## 2018-09-26 PROCEDURE — 2580000003 HC RX 258: Performed by: NURSE ANESTHETIST, CERTIFIED REGISTERED

## 2018-09-26 PROCEDURE — 2500000003 HC RX 250 WO HCPCS: Performed by: OBSTETRICS & GYNECOLOGY

## 2018-09-26 PROCEDURE — 6370000000 HC RX 637 (ALT 250 FOR IP): Performed by: OBSTETRICS & GYNECOLOGY

## 2018-09-26 PROCEDURE — 7100000010 HC PHASE II RECOVERY - FIRST 15 MIN: Performed by: OBSTETRICS & GYNECOLOGY

## 2018-09-26 PROCEDURE — 6360000002 HC RX W HCPCS: Performed by: ANESTHESIOLOGY

## 2018-09-26 PROCEDURE — 2500000003 HC RX 250 WO HCPCS: Performed by: ANESTHESIOLOGY

## 2018-09-26 RX ORDER — SODIUM CHLORIDE 0.9 % (FLUSH) 0.9 %
10 SYRINGE (ML) INJECTION EVERY 12 HOURS SCHEDULED
Status: DISCONTINUED | OUTPATIENT
Start: 2018-09-26 | End: 2018-09-26 | Stop reason: HOSPADM

## 2018-09-26 RX ORDER — SODIUM CHLORIDE 0.9 % (FLUSH) 0.9 %
10 SYRINGE (ML) INJECTION EVERY 12 HOURS SCHEDULED
Status: CANCELLED | OUTPATIENT
Start: 2018-09-26

## 2018-09-26 RX ORDER — HYDROCODONE BITARTRATE AND ACETAMINOPHEN 5; 325 MG/1; MG/1
1 TABLET ORAL EVERY 4 HOURS PRN
Status: CANCELLED | OUTPATIENT
Start: 2018-09-26

## 2018-09-26 RX ORDER — IBUPROFEN 800 MG/1
800 TABLET ORAL EVERY 6 HOURS PRN
Qty: 40 TABLET | Refills: 1 | Status: SHIPPED | OUTPATIENT
Start: 2018-09-26 | End: 2019-11-19

## 2018-09-26 RX ORDER — METOCLOPRAMIDE HYDROCHLORIDE 5 MG/ML
10 INJECTION INTRAMUSCULAR; INTRAVENOUS
Status: DISCONTINUED | OUTPATIENT
Start: 2018-09-26 | End: 2018-09-26 | Stop reason: HOSPADM

## 2018-09-26 RX ORDER — SODIUM CHLORIDE, SODIUM LACTATE, POTASSIUM CHLORIDE, CALCIUM CHLORIDE 600; 310; 30; 20 MG/100ML; MG/100ML; MG/100ML; MG/100ML
INJECTION, SOLUTION INTRAVENOUS ONCE
Status: DISCONTINUED | OUTPATIENT
Start: 2018-09-26 | End: 2018-09-26 | Stop reason: HOSPADM

## 2018-09-26 RX ORDER — LIDOCAINE HYDROCHLORIDE AND EPINEPHRINE 10; 10 MG/ML; UG/ML
INJECTION, SOLUTION INFILTRATION; PERINEURAL PRN
Status: DISCONTINUED | OUTPATIENT
Start: 2018-09-26 | End: 2018-09-26 | Stop reason: HOSPADM

## 2018-09-26 RX ORDER — SODIUM CHLORIDE, SODIUM LACTATE, POTASSIUM CHLORIDE, CALCIUM CHLORIDE 600; 310; 30; 20 MG/100ML; MG/100ML; MG/100ML; MG/100ML
INJECTION, SOLUTION INTRAVENOUS CONTINUOUS
Status: CANCELLED | OUTPATIENT
Start: 2018-09-26

## 2018-09-26 RX ORDER — FERRIC SUBSULFATE 0.21 G/G
LIQUID TOPICAL PRN
Status: DISCONTINUED | OUTPATIENT
Start: 2018-09-26 | End: 2018-09-26 | Stop reason: HOSPADM

## 2018-09-26 RX ORDER — HYDROCODONE BITARTRATE AND ACETAMINOPHEN 5; 325 MG/1; MG/1
2 TABLET ORAL PRN
Status: COMPLETED | OUTPATIENT
Start: 2018-09-26 | End: 2018-09-26

## 2018-09-26 RX ORDER — MIDAZOLAM HYDROCHLORIDE 1 MG/ML
INJECTION INTRAMUSCULAR; INTRAVENOUS PRN
Status: DISCONTINUED | OUTPATIENT
Start: 2018-09-26 | End: 2018-09-26 | Stop reason: SDUPTHER

## 2018-09-26 RX ORDER — KETOROLAC TROMETHAMINE 30 MG/ML
30 INJECTION, SOLUTION INTRAMUSCULAR; INTRAVENOUS EVERY 6 HOURS
Status: CANCELLED | OUTPATIENT
Start: 2018-09-26 | End: 2018-09-28

## 2018-09-26 RX ORDER — LIDOCAINE HYDROCHLORIDE 10 MG/ML
1 INJECTION, SOLUTION EPIDURAL; INFILTRATION; INTRACAUDAL; PERINEURAL
Status: COMPLETED | OUTPATIENT
Start: 2018-09-26 | End: 2018-09-26

## 2018-09-26 RX ORDER — SODIUM CHLORIDE 0.9 % (FLUSH) 0.9 %
10 SYRINGE (ML) INJECTION PRN
Status: CANCELLED | OUTPATIENT
Start: 2018-09-26

## 2018-09-26 RX ORDER — FENTANYL CITRATE 50 UG/ML
INJECTION, SOLUTION INTRAMUSCULAR; INTRAVENOUS PRN
Status: DISCONTINUED | OUTPATIENT
Start: 2018-09-26 | End: 2018-09-26 | Stop reason: SDUPTHER

## 2018-09-26 RX ORDER — HYDROCODONE BITARTRATE AND ACETAMINOPHEN 5; 325 MG/1; MG/1
2 TABLET ORAL EVERY 4 HOURS PRN
Status: CANCELLED | OUTPATIENT
Start: 2018-09-26

## 2018-09-26 RX ORDER — ONDANSETRON 2 MG/ML
4 INJECTION INTRAMUSCULAR; INTRAVENOUS
Status: DISCONTINUED | OUTPATIENT
Start: 2018-09-26 | End: 2018-09-26 | Stop reason: HOSPADM

## 2018-09-26 RX ORDER — MEPERIDINE HYDROCHLORIDE 25 MG/ML
12.5 INJECTION INTRAMUSCULAR; INTRAVENOUS; SUBCUTANEOUS EVERY 5 MIN PRN
Status: DISCONTINUED | OUTPATIENT
Start: 2018-09-26 | End: 2018-09-26 | Stop reason: HOSPADM

## 2018-09-26 RX ORDER — DIPHENHYDRAMINE HYDROCHLORIDE 50 MG/ML
12.5 INJECTION INTRAMUSCULAR; INTRAVENOUS
Status: DISCONTINUED | OUTPATIENT
Start: 2018-09-26 | End: 2018-09-26 | Stop reason: HOSPADM

## 2018-09-26 RX ORDER — ONDANSETRON 2 MG/ML
4 INJECTION INTRAMUSCULAR; INTRAVENOUS EVERY 6 HOURS PRN
Status: CANCELLED | OUTPATIENT
Start: 2018-09-26

## 2018-09-26 RX ORDER — ACETIC ACID 5 %
LIQUID (ML) MISCELLANEOUS PRN
Status: DISCONTINUED | OUTPATIENT
Start: 2018-09-26 | End: 2018-09-26 | Stop reason: HOSPADM

## 2018-09-26 RX ORDER — FENTANYL CITRATE 50 UG/ML
50 INJECTION, SOLUTION INTRAMUSCULAR; INTRAVENOUS EVERY 10 MIN PRN
Status: DISCONTINUED | OUTPATIENT
Start: 2018-09-26 | End: 2018-09-26 | Stop reason: HOSPADM

## 2018-09-26 RX ORDER — MAGNESIUM HYDROXIDE 1200 MG/15ML
LIQUID ORAL CONTINUOUS PRN
Status: DISCONTINUED | OUTPATIENT
Start: 2018-09-26 | End: 2018-09-26 | Stop reason: HOSPADM

## 2018-09-26 RX ORDER — GLYCOPYRROLATE 1 MG/5 ML
SYRINGE (ML) INTRAVENOUS PRN
Status: DISCONTINUED | OUTPATIENT
Start: 2018-09-26 | End: 2018-09-26 | Stop reason: SDUPTHER

## 2018-09-26 RX ORDER — LIDOCAINE HYDROCHLORIDE 10 MG/ML
1 INJECTION, SOLUTION EPIDURAL; INFILTRATION; INTRACAUDAL; PERINEURAL
Status: CANCELLED | OUTPATIENT
Start: 2018-09-26 | End: 2018-09-26

## 2018-09-26 RX ORDER — HYDROCODONE BITARTRATE AND ACETAMINOPHEN 5; 325 MG/1; MG/1
1 TABLET ORAL PRN
Status: COMPLETED | OUTPATIENT
Start: 2018-09-26 | End: 2018-09-26

## 2018-09-26 RX ORDER — DEXAMETHASONE SODIUM PHOSPHATE 10 MG/ML
INJECTION INTRAMUSCULAR; INTRAVENOUS PRN
Status: DISCONTINUED | OUTPATIENT
Start: 2018-09-26 | End: 2018-09-26 | Stop reason: SDUPTHER

## 2018-09-26 RX ORDER — SODIUM CHLORIDE 0.9 % (FLUSH) 0.9 %
10 SYRINGE (ML) INJECTION PRN
Status: DISCONTINUED | OUTPATIENT
Start: 2018-09-26 | End: 2018-09-26 | Stop reason: HOSPADM

## 2018-09-26 RX ORDER — ONDANSETRON 2 MG/ML
INJECTION INTRAMUSCULAR; INTRAVENOUS PRN
Status: DISCONTINUED | OUTPATIENT
Start: 2018-09-26 | End: 2018-09-26 | Stop reason: SDUPTHER

## 2018-09-26 RX ORDER — DIPHENHYDRAMINE HYDROCHLORIDE 50 MG/ML
INJECTION INTRAMUSCULAR; INTRAVENOUS PRN
Status: DISCONTINUED | OUTPATIENT
Start: 2018-09-26 | End: 2018-09-26 | Stop reason: SDUPTHER

## 2018-09-26 RX ORDER — IBUPROFEN 400 MG/1
400 TABLET ORAL EVERY 6 HOURS PRN
Status: CANCELLED | OUTPATIENT
Start: 2018-09-26

## 2018-09-26 RX ORDER — SODIUM CHLORIDE, SODIUM LACTATE, POTASSIUM CHLORIDE, CALCIUM CHLORIDE 600; 310; 30; 20 MG/100ML; MG/100ML; MG/100ML; MG/100ML
INJECTION, SOLUTION INTRAVENOUS CONTINUOUS PRN
Status: DISCONTINUED | OUTPATIENT
Start: 2018-09-26 | End: 2018-09-26 | Stop reason: SDUPTHER

## 2018-09-26 RX ORDER — PROPOFOL 10 MG/ML
INJECTION, EMULSION INTRAVENOUS PRN
Status: DISCONTINUED | OUTPATIENT
Start: 2018-09-26 | End: 2018-09-26 | Stop reason: SDUPTHER

## 2018-09-26 RX ORDER — SODIUM CHLORIDE, SODIUM LACTATE, POTASSIUM CHLORIDE, CALCIUM CHLORIDE 600; 310; 30; 20 MG/100ML; MG/100ML; MG/100ML; MG/100ML
INJECTION, SOLUTION INTRAVENOUS CONTINUOUS
Status: DISCONTINUED | OUTPATIENT
Start: 2018-09-26 | End: 2018-09-26 | Stop reason: HOSPADM

## 2018-09-26 RX ORDER — LIDOCAINE HYDROCHLORIDE 20 MG/ML
INJECTION, SOLUTION INFILTRATION; PERINEURAL PRN
Status: DISCONTINUED | OUTPATIENT
Start: 2018-09-26 | End: 2018-09-26 | Stop reason: SDUPTHER

## 2018-09-26 RX ADMIN — FENTANYL CITRATE 25 MCG: 50 INJECTION, SOLUTION INTRAMUSCULAR; INTRAVENOUS at 12:08

## 2018-09-26 RX ADMIN — Medication 2 G: at 12:05

## 2018-09-26 RX ADMIN — PROPOFOL 100 MG: 10 INJECTION, EMULSION INTRAVENOUS at 12:08

## 2018-09-26 RX ADMIN — FENTANYL CITRATE 50 MCG: 50 INJECTION, SOLUTION INTRAMUSCULAR; INTRAVENOUS at 13:03

## 2018-09-26 RX ADMIN — DIPHENHYDRAMINE HYDROCHLORIDE 10 MG: 50 INJECTION, SOLUTION INTRAMUSCULAR; INTRAVENOUS at 12:33

## 2018-09-26 RX ADMIN — SODIUM CHLORIDE, POTASSIUM CHLORIDE, SODIUM LACTATE AND CALCIUM CHLORIDE: 600; 310; 30; 20 INJECTION, SOLUTION INTRAVENOUS at 11:10

## 2018-09-26 RX ADMIN — FENTANYL CITRATE 25 MCG: 50 INJECTION, SOLUTION INTRAMUSCULAR; INTRAVENOUS at 12:35

## 2018-09-26 RX ADMIN — DEXAMETHASONE SODIUM PHOSPHATE 5 MG: 10 INJECTION INTRAMUSCULAR; INTRAVENOUS at 12:19

## 2018-09-26 RX ADMIN — LIDOCAINE HYDROCHLORIDE 0.1 ML: 10 INJECTION, SOLUTION EPIDURAL; INFILTRATION; INTRACAUDAL; PERINEURAL at 11:45

## 2018-09-26 RX ADMIN — Medication 0.2 MG: at 12:29

## 2018-09-26 RX ADMIN — LIDOCAINE HYDROCHLORIDE 50 MG: 20 INJECTION, SOLUTION INFILTRATION; PERINEURAL at 12:08

## 2018-09-26 RX ADMIN — SODIUM CHLORIDE, POTASSIUM CHLORIDE, SODIUM LACTATE AND CALCIUM CHLORIDE: 600; 310; 30; 20 INJECTION, SOLUTION INTRAVENOUS at 12:40

## 2018-09-26 RX ADMIN — MIDAZOLAM HYDROCHLORIDE 2 MG: 1 INJECTION, SOLUTION INTRAMUSCULAR; INTRAVENOUS at 12:03

## 2018-09-26 RX ADMIN — HYDROMORPHONE HYDROCHLORIDE 0.5 MG: 1 INJECTION, SOLUTION INTRAMUSCULAR; INTRAVENOUS; SUBCUTANEOUS at 13:47

## 2018-09-26 RX ADMIN — ONDANSETRON HYDROCHLORIDE 4 MG: 2 INJECTION, SOLUTION INTRAMUSCULAR; INTRAVENOUS at 12:58

## 2018-09-26 RX ADMIN — HYDROMORPHONE HYDROCHLORIDE 0.5 MG: 1 INJECTION, SOLUTION INTRAMUSCULAR; INTRAVENOUS; SUBCUTANEOUS at 13:27

## 2018-09-26 RX ADMIN — HYDROMORPHONE HYDROCHLORIDE 0.5 MG: 1 INJECTION, SOLUTION INTRAMUSCULAR; INTRAVENOUS; SUBCUTANEOUS at 13:17

## 2018-09-26 RX ADMIN — SODIUM CHLORIDE, POTASSIUM CHLORIDE, SODIUM LACTATE AND CALCIUM CHLORIDE: 600; 310; 30; 20 INJECTION, SOLUTION INTRAVENOUS at 11:45

## 2018-09-26 RX ADMIN — HYDROMORPHONE HYDROCHLORIDE 0.5 MG: 1 INJECTION, SOLUTION INTRAMUSCULAR; INTRAVENOUS; SUBCUTANEOUS at 13:37

## 2018-09-26 RX ADMIN — HYDROCODONE BITARTRATE AND ACETAMINOPHEN 2 TABLET: 5; 325 TABLET ORAL at 14:58

## 2018-09-26 ASSESSMENT — PULMONARY FUNCTION TESTS
PIF_VALUE: 2
PIF_VALUE: 0
PIF_VALUE: 2
PIF_VALUE: 3
PIF_VALUE: 2
PIF_VALUE: 2
PIF_VALUE: 1
PIF_VALUE: 2
PIF_VALUE: 3
PIF_VALUE: 2
PIF_VALUE: 3
PIF_VALUE: 3
PIF_VALUE: 2
PIF_VALUE: 2
PIF_VALUE: 3
PIF_VALUE: 2
PIF_VALUE: 3
PIF_VALUE: 2
PIF_VALUE: 3
PIF_VALUE: 2
PIF_VALUE: 3
PIF_VALUE: 2
PIF_VALUE: 4
PIF_VALUE: 21
PIF_VALUE: 3
PIF_VALUE: 3
PIF_VALUE: 19
PIF_VALUE: 2
PIF_VALUE: 3
PIF_VALUE: 24
PIF_VALUE: 3
PIF_VALUE: 2
PIF_VALUE: 3
PIF_VALUE: 2
PIF_VALUE: 3
PIF_VALUE: 2
PIF_VALUE: 0
PIF_VALUE: 2
PIF_VALUE: 3
PIF_VALUE: 2
PIF_VALUE: 3
PIF_VALUE: 3
PIF_VALUE: 4
PIF_VALUE: 2
PIF_VALUE: 1
PIF_VALUE: 1
PIF_VALUE: 2
PIF_VALUE: 3

## 2018-09-26 ASSESSMENT — PAIN DESCRIPTION - DESCRIPTORS
DESCRIPTORS: CRAMPING

## 2018-09-26 ASSESSMENT — PAIN SCALES - GENERAL
PAINLEVEL_OUTOF10: 6
PAINLEVEL_OUTOF10: 9
PAINLEVEL_OUTOF10: 10
PAINLEVEL_OUTOF10: 7
PAINLEVEL_OUTOF10: 8
PAINLEVEL_OUTOF10: 9
PAINLEVEL_OUTOF10: 6
PAINLEVEL_OUTOF10: 6

## 2018-09-26 ASSESSMENT — PAIN DESCRIPTION - PAIN TYPE
TYPE: SURGICAL PAIN

## 2018-09-26 ASSESSMENT — PAIN DESCRIPTION - ONSET
ONSET: ON-GOING
ONSET: ON-GOING

## 2018-09-26 ASSESSMENT — PAIN DESCRIPTION - FREQUENCY
FREQUENCY: CONTINUOUS
FREQUENCY: CONTINUOUS

## 2018-09-26 ASSESSMENT — PAIN DESCRIPTION - LOCATION
LOCATION: PELVIS
LOCATION: PELVIS

## 2018-09-26 ASSESSMENT — PAIN DESCRIPTION - PROGRESSION
CLINICAL_PROGRESSION: GRADUALLY IMPROVING
CLINICAL_PROGRESSION: GRADUALLY WORSENING

## 2018-09-26 ASSESSMENT — PAIN - FUNCTIONAL ASSESSMENT: PAIN_FUNCTIONAL_ASSESSMENT: 0-10

## 2018-09-26 NOTE — ANESTHESIA PRE PROCEDURE
Department of Anesthesiology  Preprocedure Note       Name:  Radha Canales   Age:  66 y.o.  :  1955                                          MRN:  [id number]         Date:  2018      Surgeon: Devonte Fernandez):  Beauty Soulier, MD    Procedure: Procedure(s):  DILATATION AND CURETTAGE HYSTEROSCOPY REMOVAL OF IUL POSSIBLE ABLATION  LEEP    Medications prior to admission:   Prior to Admission medications    Medication Sig Start Date End Date Taking? Authorizing Provider   ibuprofen (ADVIL;MOTRIN) 800 MG tablet Take 1 tablet by mouth every 6 hours as needed for Pain 18  Yes Beauty Soulier, MD   docusate calcium (SURFAK) 240 MG capsule Take 240 mg by mouth    Historical Provider, MD   topiramate (TOPAMAX) 200 MG tablet Take 200 mg by mouth    Historical Provider, MD   Estradiol-Norethindrone Acet 0.5-0.1 MG TABS Take by mouth 10/12/17   Danielle Qureshi MD   SUMAtriptan Succinate 3 MG/0.5ML SOAJ Inject into the skin as needed     Historical Provider, MD   oxyCODONE-acetaminophen (PERCOCET) 5-325 MG per tablet Take 1 tablet by mouth as needed . 5/3/17   Historical Provider, MD   topiramate (TOPAMAX) 100 MG tablet Take 100 mg by mouth    Historical Provider, MD   meperidine (DEMEROL) 50 MG tablet TAKE 1 TABLET BY MOUTH DAILY AT TIME OF HEADACHE FOR 7 DAYS 17   Historical Provider, MD   Multiple Vitamins-Minerals (JONO-DAY 1000) TABS Take by mouth    Historical Provider, MD   Omeprazole-Sodium Bicarbonate (ZEGERID OTC PO) Take by mouth    Historical Provider, MD   ZOMIG 5 MG nasal solution once as needed  8/24/15   Historical Provider, MD   aspirin 81 MG EC tablet Take 81 mg by mouth daily.       Historical Provider, MD       Current medications:    Current Facility-Administered Medications   Medication Dose Route Frequency Provider Last Rate Last Dose    fentaNYL (SUBLIMAZE) injection 50 mcg  50 mcg Intravenous Q10 Min PRN Salo Mares MD        HYDROmorphone (DILAUDID) injection 0.5 mg  0.5 mg COLONOSCOPY performed by Fabi Montgomery MD at [facility] 2000       Social History:    Social History   Substance Use Topics    Smoking status: Never Smoker    Smokeless tobacco: Never Used    Alcohol use Yes      Comment: socially                                Counseling given: Not Answered      Vital Signs (Current): There were no vitals filed for this visit. BP Readings from Last 3 Encounters:   09/19/18 128/70   09/19/18 110/60   09/05/18 115/70       NPO Status:                                                                                 BMI:   Wt Readings from Last 3 Encounters:   09/19/18 164 lb 6.4 oz (74.6 kg)   09/19/18 165 lb 6.4 oz (75 kg)   09/05/18 161 lb 12.8 oz (73.4 kg)     There is no height or weight on file to calculate BMI.    CBC:   Lab Results   Component Value Date    WBC 4.7 09/19/2018    RBC 4.08 09/19/2018    HGB 13.3 09/19/2018    HCT 40.1 09/19/2018    MCV 98.3 09/19/2018    RDW 13.5 09/19/2018     09/19/2018       CMP:   Lab Results   Component Value Date     09/19/2018    K 3.7 09/19/2018     09/19/2018    CO2 21 09/19/2018    BUN 15 09/19/2018    CREATININE 1.02 09/19/2018    GFRAA >60.0 09/19/2018    LABGLOM 55.1 09/19/2018    GLUCOSE 97 09/19/2018    PROT 6.9 09/19/2018    CALCIUM 8.9 09/19/2018    BILITOT <0.2 09/19/2018    ALKPHOS 75 09/19/2018    AST 17 09/19/2018    ALT 16 09/19/2018       POC Tests: No results for input(s): POCGLU, POCNA, POCK, POCCL, POCBUN, POCHEMO, POCHCT in the last 72 hours.     Coags:   Lab Results   Component Value Date    PROTIME 10.2 09/19/2018    INR 1.0 09/19/2018    APTT 25.4 09/19/2018       HCG (If Applicable): No results found for: PREGTESTUR, PREGSERUM, HCG, HCGQUANT     ABGs: No results found for: PHART, PO2ART, UUW2STE, IST2RDQ, BEART, H3LGYQQY     Type & Screen (If Applicable):  No results found for: LABABO, [address]    Anesthesia

## 2018-10-02 NOTE — OP NOTE
needle and instrument counts  were correct x3. Recommendations: The patient will return for follow-up in 2-4 weeks. She will refrain from  intercourse, lifting, douching or tampons. She will be able to shower in 24  hours but will not bathe or submerge in any type of pool or hot tub. She  was instructed to report any increase in vaginal bleeding, odor, discharge  or temperatures. Disposition:  If pathology is depicts a non-invasive lesion. Recommendation will be for  cytologic evaluations every 6 months until two consecutive screens are  negative. Once two consecutive screens depict no abnormality, the patient  may return to annual evaluations and screening.         Jose Lee MD    D: 09/28/2018 11:30:05       T: 09/28/2018 19:44:03     [email or web address]  Job#: [id number]     Doc#: [id number]    CC:  MD Sarah Nugent NP

## 2018-10-15 ENCOUNTER — OFFICE VISIT (OUTPATIENT)
Dept: OBGYN CLINIC | Age: 63
End: 2018-10-15
Payer: COMMERCIAL

## 2018-10-15 VITALS
HEIGHT: 64 IN | OXYGEN SATURATION: 99 % | SYSTOLIC BLOOD PRESSURE: 120 MMHG | HEART RATE: 66 BPM | WEIGHT: 166.4 LBS | DIASTOLIC BLOOD PRESSURE: 78 MMHG | BODY MASS INDEX: 28.41 KG/M2 | TEMPERATURE: 97.5 F

## 2018-10-15 DIAGNOSIS — Z98.890 S/P LEEP: ICD-10-CM

## 2018-10-15 DIAGNOSIS — Z09 POSTOPERATIVE EXAMINATION: Primary | ICD-10-CM

## 2018-10-15 DIAGNOSIS — Z98.890 S/P D&C (STATUS POST DILATION AND CURETTAGE): ICD-10-CM

## 2018-10-15 PROCEDURE — G8427 DOCREV CUR MEDS BY ELIG CLIN: HCPCS | Performed by: OBSTETRICS & GYNECOLOGY

## 2018-10-15 PROCEDURE — 99214 OFFICE O/P EST MOD 30 MIN: CPT | Performed by: OBSTETRICS & GYNECOLOGY

## 2018-10-15 PROCEDURE — G8419 CALC BMI OUT NRM PARAM NOF/U: HCPCS | Performed by: OBSTETRICS & GYNECOLOGY

## 2018-10-15 PROCEDURE — 1036F TOBACCO NON-USER: CPT | Performed by: OBSTETRICS & GYNECOLOGY

## 2018-10-15 PROCEDURE — 3017F COLORECTAL CA SCREEN DOC REV: CPT | Performed by: OBSTETRICS & GYNECOLOGY

## 2018-10-15 PROCEDURE — G8484 FLU IMMUNIZE NO ADMIN: HCPCS | Performed by: OBSTETRICS & GYNECOLOGY

## 2018-10-15 ASSESSMENT — ENCOUNTER SYMPTOMS
COUGH: 0
DIARRHEA: 0
SHORTNESS OF BREATH: 0
WHEEZING: 0
ABDOMINAL PAIN: 0
BLOOD IN STOOL: 0
CONSTIPATION: 0

## 2018-10-15 NOTE — PROGRESS NOTES
PFO surgery    COLONOSCOPY  2018    HEMORRHOID SURGERY      MD COLONOSCOPY FLX DX W/COLLJ SPEC WHEN PFRMD N/A 4/19/2018    COLONOSCOPY performed by Kenzie Caputo MD at [place] [address] W/ADJ VAG,W/LOOP BX N/A 9/26/2018    LEEP performed by Ruddy Mayer MD at [address] [place] [address] N/A 9/26/2018    DILATATION AND CURETTAGE HYSTEROSCOPY performed by Ruddy Mayer MD at [facility] 2000     Family History   Problem Relation Age of Onset    Cancer Father         ear and head    High Blood Pressure Father     Cancer Brother     Other Mother         dementia    Other Sister         gout    Diabetes Brother     No Known Problems Son     No Known Problems Daughter      Social History     Social History    Marital status:      Spouse name: N/A    Number of children: N/A    Years of education: N/A     Occupational History    Not on file. Social History Main Topics    Smoking status: Never Smoker    Smokeless tobacco: Never Used    Alcohol use Yes      Comment: socially    Drug use: No    Sexual activity: Yes     Other Topics Concern    Not on file     Social History Narrative    No narrative on file         MEDICATIONS:  Current Outpatient Prescriptions on File Prior to Visit   Medication Sig Dispense Refill    ibuprofen (ADVIL;MOTRIN) 800 MG tablet Take 1 tablet by mouth every 6 hours as needed for Pain 40 tablet 1    topiramate (TOPAMAX) 200 MG tablet Take 200 mg by mouth      Estradiol-Norethindrone Acet 0.5-0.1 MG TABS Take by mouth 28 tablet     SUMAtriptan Succinate 3 MG/0.5ML SOAJ Inject into the skin as needed       oxyCODONE-acetaminophen (PERCOCET) 5-325 MG per tablet Take 1 tablet by mouth as needed .       topiramate (TOPAMAX) 100 MG tablet Take 100 mg by mouth      meperidine (DEMEROL) 50 MG tablet TAKE 1 TABLET BY MOUTH DAILY AT TIME OF HEADACHE FOR 7 DAYS  0    Multiple Vitamins-Minerals No erythema. Psychiatric: She has a normal mood and affect. Her behavior is normal. Judgment and thought content normal.       Incision: Healing well    Pelvic exam: Vulva and vagina appear normal. Bimanual exam reveals normal uterus and adnexa.       Results for orders placed or performed during the hospital encounter of 09/19/18   CBC Auto Differential   Result Value Ref Range    WBC 4.7 (L) 4.8 - 10.8 K/uL    RBC 4.08 (L) 4.20 - 5.40 M/uL    Hemoglobin 13.3 12.0 - 16.0 g/dL    Hematocrit 40.1 37.0 - 47.0 %    MCV 98.3 82.0 - 100.0 fL    MCH 32.7 (H) 27.0 - 31.3 pg    MCHC 33.2 33.0 - 37.0 %    RDW 13.5 11.5 - 14.5 %    Platelets 569 652 - 296 K/uL    Neutrophils % 70.5 %    Lymphocytes % 20.3 %    Monocytes % 6.9 %    Eosinophils % 1.9 %    Basophils % 0.4 %    Neutrophils # 3.3 1.4 - 6.5 K/uL    Lymphocytes # 1.0 1.0 - 4.8 K/uL    Monocytes # 0.3 0.2 - 0.8 K/uL    Eosinophils # 0.1 0.0 - 0.7 K/uL    Basophils # 0.0 0.0 - 0.2 K/uL   Protime-INR   Result Value Ref Range    Protime 10.2 9.6 - 12.3 sec    INR 1.0    APTT   Result Value Ref Range    aPTT 25.4 21.6 - 35.4 sec   Comprehensive Metabolic Panel   Result Value Ref Range    Sodium 143 132 - 144 mEq/L    Potassium 3.7 3.5 - 5.1 mEq/L    Chloride 111 (H) 98 - 107 mEq/L    CO2 21 (L) 22 - 29 mEq/L    Anion Gap 11 7 - 13 mEq/L    Glucose 97 74 - 109 mg/dL    BUN 15 8 - 23 mg/dL    CREATININE 1.02 (H) 0.50 - 0.90 mg/dL    GFR Non-African American 55.1 (L) >60    GFR  >60.0 >60    Calcium 8.9 8.6 - 10.2 mg/dL    Total Protein 6.9 6.4 - 8.1 g/dL    Alb 4.4 3.9 - 4.9 g/dL    Total Bilirubin <0.2 0.0 - 1.2 mg/dL    Alkaline Phosphatase 75 40 - 130 U/L    ALT 16 0 - 33 U/L    AST 17 0 - 35 U/L    Globulin 2.5 2.3 - 3.5 g/dL   Urinalysis Reflex to Culture   Result Value Ref Range    Color, UA Yellow Straw/Yellow    Clarity, UA Clear Clear    Glucose, Ur Negative Negative mg/dL    Bilirubin Urine Negative Negative    Ketones, Urine Negative Negative

## 2018-10-22 ENCOUNTER — OFFICE VISIT (OUTPATIENT)
Dept: FAMILY MEDICINE CLINIC | Age: 63
End: 2018-10-22
Payer: COMMERCIAL

## 2018-10-22 VITALS
BODY MASS INDEX: 27.96 KG/M2 | DIASTOLIC BLOOD PRESSURE: 80 MMHG | OXYGEN SATURATION: 96 % | WEIGHT: 163.8 LBS | HEART RATE: 64 BPM | HEIGHT: 64 IN | SYSTOLIC BLOOD PRESSURE: 118 MMHG

## 2018-10-22 DIAGNOSIS — N88.2 STENOSIS OF CERVIX: ICD-10-CM

## 2018-10-22 DIAGNOSIS — Z01.818 PRE-OP EXAM: ICD-10-CM

## 2018-10-22 DIAGNOSIS — N93.9 VAGINAL BLEEDING: ICD-10-CM

## 2018-10-22 DIAGNOSIS — N93.8 DUB (DYSFUNCTIONAL UTERINE BLEEDING): Primary | ICD-10-CM

## 2018-10-22 PROCEDURE — G8427 DOCREV CUR MEDS BY ELIG CLIN: HCPCS | Performed by: FAMILY MEDICINE

## 2018-10-22 PROCEDURE — G8419 CALC BMI OUT NRM PARAM NOF/U: HCPCS | Performed by: FAMILY MEDICINE

## 2018-10-22 PROCEDURE — 3017F COLORECTAL CA SCREEN DOC REV: CPT | Performed by: FAMILY MEDICINE

## 2018-10-22 PROCEDURE — 1036F TOBACCO NON-USER: CPT | Performed by: FAMILY MEDICINE

## 2018-10-22 PROCEDURE — G8484 FLU IMMUNIZE NO ADMIN: HCPCS | Performed by: FAMILY MEDICINE

## 2018-10-22 PROCEDURE — 99213 OFFICE O/P EST LOW 20 MIN: CPT | Performed by: FAMILY MEDICINE

## 2018-11-02 ENCOUNTER — HOSPITAL ENCOUNTER (OUTPATIENT)
Dept: PREADMISSION TESTING | Age: 63
Discharge: HOME | End: 2018-11-06
Payer: COMMERCIAL

## 2018-11-02 VITALS
DIASTOLIC BLOOD PRESSURE: 69 MMHG | TEMPERATURE: 98.4 F | WEIGHT: 163.6 LBS | SYSTOLIC BLOOD PRESSURE: 132 MMHG | HEART RATE: 68 BPM | HEIGHT: 64 IN | RESPIRATION RATE: 16 BRPM | OXYGEN SATURATION: 99 % | BODY MASS INDEX: 27.93 KG/M2

## 2018-11-02 LAB
ABO/RH: NORMAL
ANION GAP SERPL CALCULATED.3IONS-SCNC: 14 MEQ/L (ref 7–13)
ANTIBODY SCREEN: NORMAL
BUN BLDV-MCNC: 15 MG/DL (ref 8–23)
CALCIUM SERPL-MCNC: 9.2 MG/DL (ref 8.6–10.2)
CHLORIDE BLD-SCNC: 107 MEQ/L (ref 98–107)
CO2: 21 MEQ/L (ref 22–29)
CREAT SERPL-MCNC: 0.88 MG/DL (ref 0.5–0.9)
GFR AFRICAN AMERICAN: >60
GFR NON-AFRICAN AMERICAN: >60
GLUCOSE BLD-MCNC: 98 MG/DL (ref 74–109)
HCT VFR BLD CALC: 39.9 % (ref 37–47)
HEMOGLOBIN: 13.6 G/DL (ref 12–16)
MCH RBC QN AUTO: 33.2 PG (ref 27–31.3)
MCHC RBC AUTO-ENTMCNC: 34 % (ref 33–37)
MCV RBC AUTO: 97.4 FL (ref 82–100)
PDW BLD-RTO: 13.8 % (ref 11.5–14.5)
PLATELET # BLD: 309 K/UL (ref 130–400)
POTASSIUM SERPL-SCNC: 3.9 MEQ/L (ref 3.5–5.1)
RBC # BLD: 4.1 M/UL (ref 4.2–5.4)
SODIUM BLD-SCNC: 142 MEQ/L (ref 132–144)
WBC # BLD: 5.2 K/UL (ref 4.8–10.8)

## 2018-11-02 PROCEDURE — 80048 BASIC METABOLIC PNL TOTAL CA: CPT

## 2018-11-02 PROCEDURE — 86901 BLOOD TYPING SEROLOGIC RH(D): CPT

## 2018-11-02 PROCEDURE — 86850 RBC ANTIBODY SCREEN: CPT

## 2018-11-02 PROCEDURE — 86900 BLOOD TYPING SEROLOGIC ABO: CPT

## 2018-11-02 PROCEDURE — 85027 COMPLETE CBC AUTOMATED: CPT

## 2018-11-02 RX ORDER — SODIUM CHLORIDE, SODIUM LACTATE, POTASSIUM CHLORIDE, CALCIUM CHLORIDE 600; 310; 30; 20 MG/100ML; MG/100ML; MG/100ML; MG/100ML
INJECTION, SOLUTION INTRAVENOUS CONTINUOUS
Status: CANCELLED | OUTPATIENT
Start: 2018-11-07

## 2018-11-02 RX ORDER — CEFAZOLIN SODIUM 2 G/50ML
2 SOLUTION INTRAVENOUS ONCE
Status: CANCELLED | OUTPATIENT
Start: 2018-11-07

## 2018-11-02 RX ORDER — LIDOCAINE HYDROCHLORIDE 10 MG/ML
1 INJECTION, SOLUTION EPIDURAL; INFILTRATION; INTRACAUDAL; PERINEURAL
Status: CANCELLED | OUTPATIENT
Start: 2018-11-07 | End: 2018-11-07

## 2018-11-02 RX ORDER — SODIUM CHLORIDE 0.9 % (FLUSH) 0.9 %
10 SYRINGE (ML) INJECTION EVERY 12 HOURS SCHEDULED
Status: CANCELLED | OUTPATIENT
Start: 2018-11-07

## 2018-11-02 RX ORDER — SODIUM CHLORIDE 0.9 % (FLUSH) 0.9 %
10 SYRINGE (ML) INJECTION PRN
Status: CANCELLED | OUTPATIENT
Start: 2018-11-07

## 2018-11-02 NOTE — PROGRESS NOTES
EKG done 9/19/2018 & on Epic. Last appt with [facility] 2007.   H & PE done pre-op by PCP dated 10/22/2018 & on Epic / addendum : abdomen soft, non tender, BS +

## 2018-11-06 ENCOUNTER — OFFICE VISIT (OUTPATIENT)
Dept: OBGYN CLINIC | Age: 63
End: 2018-11-06
Payer: COMMERCIAL

## 2018-11-06 VITALS
SYSTOLIC BLOOD PRESSURE: 104 MMHG | BODY MASS INDEX: 28.17 KG/M2 | DIASTOLIC BLOOD PRESSURE: 62 MMHG | WEIGHT: 165 LBS | HEART RATE: 72 BPM | HEIGHT: 64 IN

## 2018-11-06 DIAGNOSIS — N88.2 CERVICAL STENOSIS (UTERINE CERVIX): ICD-10-CM

## 2018-11-06 DIAGNOSIS — Z98.890 S/P LEEP: Primary | ICD-10-CM

## 2018-11-06 DIAGNOSIS — N95.0 PMB (POSTMENOPAUSAL BLEEDING): ICD-10-CM

## 2018-11-06 DIAGNOSIS — R93.89 THICKENED ENDOMETRIUM: ICD-10-CM

## 2018-11-06 PROCEDURE — 3017F COLORECTAL CA SCREEN DOC REV: CPT | Performed by: OBSTETRICS & GYNECOLOGY

## 2018-11-06 PROCEDURE — G8484 FLU IMMUNIZE NO ADMIN: HCPCS | Performed by: OBSTETRICS & GYNECOLOGY

## 2018-11-06 PROCEDURE — G8419 CALC BMI OUT NRM PARAM NOF/U: HCPCS | Performed by: OBSTETRICS & GYNECOLOGY

## 2018-11-06 PROCEDURE — G8427 DOCREV CUR MEDS BY ELIG CLIN: HCPCS | Performed by: OBSTETRICS & GYNECOLOGY

## 2018-11-06 PROCEDURE — 1036F TOBACCO NON-USER: CPT | Performed by: OBSTETRICS & GYNECOLOGY

## 2018-11-06 PROCEDURE — 99213 OFFICE O/P EST LOW 20 MIN: CPT | Performed by: OBSTETRICS & GYNECOLOGY

## 2018-11-06 RX ORDER — POLYETHYLENE GLYCOL 3350 17 G/17G
17 POWDER, FOR SOLUTION ORAL 2 TIMES DAILY PRN
Qty: 1020 G | Refills: 1 | Status: SHIPPED | OUTPATIENT
Start: 2018-11-06 | End: 2018-12-06

## 2018-11-06 NOTE — PROGRESS NOTES
Jorge Gates is a 66 y.o. female who presents here today for complaints of PMB. Since 2018. Cannot assess cavity adequately. Abnormal cervical pathology. Transfer from Dr Emily Crews.        Vitals:  /62 (Site: Right Upper Arm, Position: Sitting, Cuff Size: Medium Adult)   Pulse 72   Ht 5' 4\" (1.626 m)   Wt 165 lb (74.8 kg)   LMP 2008   BMI 28.32 kg/m²   Allergies:  Erythromycin; Seasonal; Sporanox [itraconazole]; and Macrobid [nitrofurantoin macrocrystal]  Past Medical History:   Diagnosis Date    Allergic reaction     Arthritis     Cancer ([facility]     Cervical, dx by Dr. Sonia Rolon    Cerebral artery occlusion with cerebral infarction ([facility]     at age 52 / sx as a migraine with slurred speech & visual disturbance    Diverticulitis     GERD (gastroesophageal reflux disease)     History of conization of cervix     Migraine     Renal insufficiency     Tinea unguium      Past Surgical History:   Procedure Laterality Date    CARDIAC SURGERY      PFO surgery in age 48s    COLONOSCOPY  2018    HEMORRHOID SURGERY  2017    [place] COLONOSCOPY FLX DX W/COLLJ SPEC WHEN PFRMD N/A 2018    COLONOSCOPY performed by Rhonda Pearson MD at [place] [address] W/ADJ VAG,W/LOOP BX N/A 2018    LEEP performed by Lili Hoffmann MD at [address] [place] [address] N/A 2018    DILATATION AND CURETTAGE HYSTEROSCOPY performed by Lili Hoffmann MD at [facility] 2000    RCR     OB History      Para Term  AB Living    2 2            SAB TAB Ectopic Molar Multiple Live Births                       Family History   Problem Relation Age of Onset    Cancer Father         ear and head    High Blood Pressure Father     Cancer Brother         gall bladder cancer    Other Mother         dementia    High Blood Pressure Mother     Other Sister         gout    Diabetes Brother     No Known Problems Son     No Known Problems

## 2018-11-07 ENCOUNTER — ANESTHESIA (OUTPATIENT)
Dept: OPERATING ROOM | Age: 63
End: 2018-11-07
Payer: COMMERCIAL

## 2018-11-07 ENCOUNTER — APPOINTMENT (OUTPATIENT)
Dept: GENERAL RADIOLOGY | Age: 63
End: 2018-11-07
Payer: COMMERCIAL

## 2018-11-07 ENCOUNTER — ANESTHESIA EVENT (OUTPATIENT)
Dept: OPERATING ROOM | Age: 63
End: 2018-11-07
Payer: COMMERCIAL

## 2018-11-07 ENCOUNTER — HOSPITAL ENCOUNTER (EMERGENCY)
Age: 63
Discharge: HOME | End: 2018-11-07
Attending: STUDENT IN AN ORGANIZED HEALTH CARE EDUCATION/TRAINING PROGRAM
Payer: COMMERCIAL

## 2018-11-07 ENCOUNTER — APPOINTMENT (OUTPATIENT)
Dept: CT IMAGING | Age: 63
End: 2018-11-07
Payer: COMMERCIAL

## 2018-11-07 ENCOUNTER — HOSPITAL ENCOUNTER (OUTPATIENT)
Age: 63
Setting detail: SURGERY ADMIT
Discharge: HOME | End: 2018-11-07
Attending: OBSTETRICS & GYNECOLOGY | Admitting: OBSTETRICS & GYNECOLOGY
Payer: COMMERCIAL

## 2018-11-07 VITALS
RESPIRATION RATE: 16 BRPM | HEIGHT: 64 IN | BODY MASS INDEX: 27.66 KG/M2 | OXYGEN SATURATION: 99 % | TEMPERATURE: 97.9 F | SYSTOLIC BLOOD PRESSURE: 119 MMHG | DIASTOLIC BLOOD PRESSURE: 92 MMHG | HEART RATE: 72 BPM | WEIGHT: 162 LBS

## 2018-11-07 VITALS
BODY MASS INDEX: 27.83 KG/M2 | OXYGEN SATURATION: 99 % | HEART RATE: 70 BPM | WEIGHT: 163 LBS | HEIGHT: 64 IN | SYSTOLIC BLOOD PRESSURE: 132 MMHG | DIASTOLIC BLOOD PRESSURE: 71 MMHG | TEMPERATURE: 98.1 F | RESPIRATION RATE: 16 BRPM

## 2018-11-07 DIAGNOSIS — S01.81XA FACIAL LACERATION, INITIAL ENCOUNTER: ICD-10-CM

## 2018-11-07 DIAGNOSIS — S00.81XA FACIAL ABRASION, INITIAL ENCOUNTER: ICD-10-CM

## 2018-11-07 DIAGNOSIS — R55 SYNCOPE AND COLLAPSE: Primary | ICD-10-CM

## 2018-11-07 LAB
ALBUMIN SERPL-MCNC: 4.3 G/DL (ref 3.9–4.9)
ALP BLD-CCNC: 112 U/L (ref 40–130)
ALT SERPL-CCNC: 19 U/L (ref 0–33)
ANION GAP SERPL CALCULATED.3IONS-SCNC: 13 MEQ/L (ref 7–13)
APTT: 25.9 SEC (ref 21.6–35.4)
AST SERPL-CCNC: 29 U/L (ref 0–35)
BASOPHILS ABSOLUTE: 0 K/UL (ref 0–0.2)
BASOPHILS RELATIVE PERCENT: 0.4 %
BILIRUB SERPL-MCNC: 0.3 MG/DL (ref 0–1.2)
BUN BLDV-MCNC: 14 MG/DL (ref 8–23)
CALCIUM SERPL-MCNC: 9.2 MG/DL (ref 8.6–10.2)
CHLORIDE BLD-SCNC: 107 MEQ/L (ref 98–107)
CK MB: 6 NG/ML (ref 0–3.8)
CO2: 18 MEQ/L (ref 22–29)
CREAT SERPL-MCNC: 0.92 MG/DL (ref 0.5–0.9)
CREATINE KINASE-MB INDEX: 1.5 % (ref 0–3.5)
D DIMER: 0.6 MG/L FEU (ref 0–0.5)
EKG ATRIAL RATE: 64 BPM
EKG P AXIS: 12 DEGREES
EKG P-R INTERVAL: 168 MS
EKG Q-T INTERVAL: 406 MS
EKG QRS DURATION: 74 MS
EKG QTC CALCULATION (BAZETT): 418 MS
EKG R AXIS: 24 DEGREES
EKG T AXIS: 34 DEGREES
EKG VENTRICULAR RATE: 64 BPM
EOSINOPHILS ABSOLUTE: 0 K/UL (ref 0–0.7)
EOSINOPHILS RELATIVE PERCENT: 0.4 %
ETHANOL PERCENT: NORMAL G/DL
ETHANOL: <10 MG/DL (ref 0–0.08)
GFR AFRICAN AMERICAN: >60
GFR NON-AFRICAN AMERICAN: >60
GLOBULIN: 3 G/DL (ref 2.3–3.5)
GLUCOSE BLD-MCNC: 76 MG/DL (ref 60–115)
GLUCOSE BLD-MCNC: 96 MG/DL (ref 74–109)
HCT VFR BLD CALC: 42.9 % (ref 37–47)
HEMOGLOBIN: 14.8 G/DL (ref 12–16)
INR BLD: 1
LACTIC ACID: 1.2 MMOL/L (ref 0.5–2.2)
LYMPHOCYTES ABSOLUTE: 0.5 K/UL (ref 1–4.8)
LYMPHOCYTES RELATIVE PERCENT: 6.5 %
MAGNESIUM: 2 MG/DL (ref 1.7–2.3)
MCH RBC QN AUTO: 33.2 PG (ref 27–31.3)
MCHC RBC AUTO-ENTMCNC: 34.4 % (ref 33–37)
MCV RBC AUTO: 96.4 FL (ref 82–100)
MONOCYTES ABSOLUTE: 0.7 K/UL (ref 0.2–0.8)
MONOCYTES RELATIVE PERCENT: 8.7 %
NEUTROPHILS ABSOLUTE: 7 K/UL (ref 1.4–6.5)
NEUTROPHILS RELATIVE PERCENT: 84 %
PDW BLD-RTO: 13.4 % (ref 11.5–14.5)
PERFORMED ON: NORMAL
PLATELET # BLD: 269 K/UL (ref 130–400)
POTASSIUM SERPL-SCNC: 4.2 MEQ/L (ref 3.5–5.1)
PROTHROMBIN TIME: 10.7 SEC (ref 9.6–12.3)
RBC # BLD: 4.45 M/UL (ref 4.2–5.4)
SODIUM BLD-SCNC: 138 MEQ/L (ref 132–144)
TOTAL CK: 396 U/L (ref 0–170)
TOTAL PROTEIN: 7.3 G/DL (ref 6.4–8.1)
TROPONIN: <0.01 NG/ML (ref 0–0.01)
TSH SERPL DL<=0.05 MIU/L-ACNC: 3.03 UIU/ML (ref 0.27–4.2)
WBC # BLD: 8.3 K/UL (ref 4.8–10.8)

## 2018-11-07 PROCEDURE — 6370000000 HC RX 637 (ALT 250 FOR IP): Performed by: STUDENT IN AN ORGANIZED HEALTH CARE EDUCATION/TRAINING PROGRAM

## 2018-11-07 PROCEDURE — 83735 ASSAY OF MAGNESIUM: CPT

## 2018-11-07 PROCEDURE — 36415 COLL VENOUS BLD VENIPUNCTURE: CPT

## 2018-11-07 PROCEDURE — 82550 ASSAY OF CK (CPK): CPT

## 2018-11-07 PROCEDURE — 70450 CT HEAD/BRAIN W/O DYE: CPT

## 2018-11-07 PROCEDURE — 82553 CREATINE MB FRACTION: CPT

## 2018-11-07 PROCEDURE — 12011 RPR F/E/E/N/L/M 2.5 CM/<: CPT

## 2018-11-07 PROCEDURE — 72125 CT NECK SPINE W/O DYE: CPT

## 2018-11-07 PROCEDURE — 85730 THROMBOPLASTIN TIME PARTIAL: CPT

## 2018-11-07 PROCEDURE — 85379 FIBRIN DEGRADATION QUANT: CPT

## 2018-11-07 PROCEDURE — 93005 ELECTROCARDIOGRAM TRACING: CPT

## 2018-11-07 PROCEDURE — 90715 TDAP VACCINE 7 YRS/> IM: CPT | Performed by: STUDENT IN AN ORGANIZED HEALTH CARE EDUCATION/TRAINING PROGRAM

## 2018-11-07 PROCEDURE — 84443 ASSAY THYROID STIM HORMONE: CPT

## 2018-11-07 PROCEDURE — 6360000004 HC RX CONTRAST MEDICATION: Performed by: STUDENT IN AN ORGANIZED HEALTH CARE EDUCATION/TRAINING PROGRAM

## 2018-11-07 PROCEDURE — 2720000010 HC SURG SUPPLY STERILE: Performed by: OBSTETRICS & GYNECOLOGY

## 2018-11-07 PROCEDURE — 85025 COMPLETE CBC W/AUTO DIFF WBC: CPT

## 2018-11-07 PROCEDURE — 71275 CT ANGIOGRAPHY CHEST: CPT

## 2018-11-07 PROCEDURE — G0480 DRUG TEST DEF 1-7 CLASSES: HCPCS

## 2018-11-07 PROCEDURE — 80053 COMPREHEN METABOLIC PANEL: CPT

## 2018-11-07 PROCEDURE — 99284 EMERGENCY DEPT VISIT MOD MDM: CPT

## 2018-11-07 PROCEDURE — 6360000002 HC RX W HCPCS: Performed by: STUDENT IN AN ORGANIZED HEALTH CARE EDUCATION/TRAINING PROGRAM

## 2018-11-07 PROCEDURE — 71046 X-RAY EXAM CHEST 2 VIEWS: CPT

## 2018-11-07 PROCEDURE — 90471 IMMUNIZATION ADMIN: CPT | Performed by: STUDENT IN AN ORGANIZED HEALTH CARE EDUCATION/TRAINING PROGRAM

## 2018-11-07 PROCEDURE — 84484 ASSAY OF TROPONIN QUANT: CPT

## 2018-11-07 PROCEDURE — 2709999900 HC NON-CHARGEABLE SUPPLY: Performed by: OBSTETRICS & GYNECOLOGY

## 2018-11-07 PROCEDURE — 83605 ASSAY OF LACTIC ACID: CPT

## 2018-11-07 PROCEDURE — 85610 PROTHROMBIN TIME: CPT

## 2018-11-07 RX ORDER — DIAPER,BRIEF,INFANT-TODD,DISP
EACH MISCELLANEOUS ONCE
Status: COMPLETED | OUTPATIENT
Start: 2018-11-07 | End: 2018-11-07

## 2018-11-07 RX ORDER — MELOXICAM 7.5 MG/1
7.5 TABLET ORAL DAILY
Qty: 7 TABLET | Refills: 3 | Status: ON HOLD | OUTPATIENT
Start: 2018-11-07 | End: 2018-12-19

## 2018-11-07 RX ORDER — SODIUM CHLORIDE 0.9 % (FLUSH) 0.9 %
10 SYRINGE (ML) INJECTION EVERY 12 HOURS SCHEDULED
Status: DISCONTINUED | OUTPATIENT
Start: 2018-11-07 | End: 2018-11-07 | Stop reason: HOSPADM

## 2018-11-07 RX ORDER — LIDOCAINE HYDROCHLORIDE 10 MG/ML
1 INJECTION, SOLUTION EPIDURAL; INFILTRATION; INTRACAUDAL; PERINEURAL
Status: DISCONTINUED | OUTPATIENT
Start: 2018-11-07 | End: 2018-11-07 | Stop reason: HOSPADM

## 2018-11-07 RX ORDER — CEFAZOLIN SODIUM 2 G/50ML
2 SOLUTION INTRAVENOUS ONCE
Status: DISCONTINUED | OUTPATIENT
Start: 2018-11-07 | End: 2018-11-07 | Stop reason: HOSPADM

## 2018-11-07 RX ORDER — SODIUM CHLORIDE 0.9 % (FLUSH) 0.9 %
10 SYRINGE (ML) INJECTION PRN
Status: DISCONTINUED | OUTPATIENT
Start: 2018-11-07 | End: 2018-11-07 | Stop reason: HOSPADM

## 2018-11-07 RX ORDER — SODIUM CHLORIDE, SODIUM LACTATE, POTASSIUM CHLORIDE, CALCIUM CHLORIDE 600; 310; 30; 20 MG/100ML; MG/100ML; MG/100ML; MG/100ML
INJECTION, SOLUTION INTRAVENOUS CONTINUOUS
Status: DISCONTINUED | OUTPATIENT
Start: 2018-11-07 | End: 2018-11-07 | Stop reason: HOSPADM

## 2018-11-07 RX ADMIN — Medication 3 ML: at 10:59

## 2018-11-07 RX ADMIN — IOPAMIDOL 100 ML: 755 INJECTION, SOLUTION INTRAVENOUS at 12:35

## 2018-11-07 RX ADMIN — BACITRACIN ZINC 1 G: 500 OINTMENT TOPICAL at 14:15

## 2018-11-07 RX ADMIN — TETANUS TOXOID, REDUCED DIPHTHERIA TOXOID AND ACELLULAR PERTUSSIS VACCINE, ADSORBED 0.5 ML: 5; 2.5; 8; 8; 2.5 SUSPENSION INTRAMUSCULAR at 14:15

## 2018-11-07 RX ADMIN — LIDOCAINE HYDROCHLORIDE: 20 JELLY TOPICAL at 11:02

## 2018-11-07 ASSESSMENT — ENCOUNTER SYMPTOMS
SINUS PRESSURE: 0
SHORTNESS OF BREATH: 0
DIARRHEA: 0
BACK PAIN: 0
ABDOMINAL PAIN: 0
CHEST TIGHTNESS: 0
VOMITING: 0
TROUBLE SWALLOWING: 0
COUGH: 0

## 2018-11-07 ASSESSMENT — PAIN - FUNCTIONAL ASSESSMENT: PAIN_FUNCTIONAL_ASSESSMENT: 0-10

## 2018-11-07 NOTE — ED TRIAGE NOTES
Pt was on the toilet when she passed out pt reports that she was having a BM when this event took place pt has notable abrasion to her face denies headache at this time pt alert x4 RR even non labored denies any vision loss at this time
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
## 2018-11-07 NOTE — ANESTHESIA PRE PROCEDURE
0.9 % injection 10 mL  10 mL Intravenous PRN LISA Delgadillo CNP        ceFAZolin (ANCEF) 2 g in dextrose 3 % 50 mL IVPB (duplex)  2 g Intravenous Once LISA Delgadillo CNP        lactated ringers infusion   Intravenous Continuous LISA Delgadillo CNP           Allergies:     Allergies   Allergen Reactions    Erythromycin Hives    Seasonal Other (See Comments)     Pollen causes sinus sx    Sporanox [Itraconazole] Hives    Macrobid [Nitrofurantoin Macrocrystal] Other (See Comments)     Migraine, vomiting, fever/chills       Problem List:    Patient Active Problem List   Diagnosis Code    Cervical spondylosis M47.812    Cervical spinal stenosis M48.02    Degenerative disc disease, cervical M50.30    Radiculopathy affecting upper extremity M54.10    Tinea unguium B35.1    Migraine G43.909    Allergic reaction T78.40XA    Renal insufficiency N28.9    Vaginal bleeding N93.9    Stenosis of cervix N88.2    DUB (dysfunctional uterine bleeding) N93.8    S/P LEEP Z98.890    PMB (postmenopausal bleeding) N95.0    Thickened endometrium R93.89    Cervical stenosis (uterine cervix) N88.2       Past Medical History:        Diagnosis Date    Allergic reaction     Arthritis     Cancer (HCC)     Cervical, dx by Dr. Yanez Class    Cerebral artery occlusion with cerebral infarction ([facility]     at age 52 / sx as a migraine with slurred speech & visual disturbance    Diverticulitis     GERD (gastroesophageal reflux disease)     History of conization of cervix     Migraine     Renal insufficiency     Tinea unguium        Past Surgical History:        Procedure Laterality Date    CARDIAC SURGERY      PFO surgery in age 48s    COLONOSCOPY  2018    HEMORRHOID SURGERY  2017    [place] COLONOSCOPY FLX DX W/COLLJ SPEC WHEN PFRMD N/A 4/19/2018    COLONOSCOPY performed by Mary Valentin MD at [facility] W/ADJ VAG,W/LOOP BX N/A 9/26/2018    LEEP performed by Eliezer Parks MD at [address] [place] [address] N/A 9/26/2018    DILATATION AND CURETTAGE HYSTEROSCOPY performed by Eliezer Parks MD at [facility] 2000    R       Social History:    Social History   Substance Use Topics    Smoking status: Never Smoker    Smokeless tobacco: Never Used    Alcohol use Yes      Comment: socially                                Counseling given: Not Answered      Vital Signs (Current):   Vitals:    11/07/18 0847   BP: 132/71   Pulse: 70   Resp: 16   Temp: 98.1 °F (36.7 °C)   TempSrc: Temporal   SpO2: 99%   Weight: 163 lb (73.9 kg)   Height: 5' 4\" (1.626 m)                                              BP Readings from Last 3 Encounters:   11/07/18 132/71   11/02/18 132/69   11/06/18 104/62       NPO Status: Time of last liquid consumption: 2200                        Time of last solid consumption: 1930                        Date of last liquid consumption: 11/06/18                        Date of last solid food consumption: 11/05/18    BMI:   Wt Readings from Last 3 Encounters:   11/07/18 163 lb (73.9 kg)   11/02/18 163 lb 9.6 oz (74.2 kg)   11/06/18 165 lb (74.8 kg)     Body mass index is 27.98 kg/m². CBC:   Lab Results   Component Value Date    WBC 5.2 11/02/2018    RBC 4.10 11/02/2018    HGB 13.6 11/02/2018    HCT 39.9 11/02/2018    MCV 97.4 11/02/2018    RDW 13.8 11/02/2018     11/02/2018       CMP:   Lab Results   Component Value Date     11/02/2018    K 3.9 11/02/2018     11/02/2018    CO2 21 11/02/2018    BUN 15 11/02/2018    CREATININE 0.88 11/02/2018    GFRAA >60.0 11/02/2018    LABGLOM >60.0 11/02/2018    GLUCOSE 98 11/02/2018    PROT 6.9 09/19/2018    CALCIUM 9.2 11/02/2018    BILITOT <0.2 09/19/2018    ALKPHOS 75 09/19/2018    AST 17 09/19/2018    ALT 16 09/19/2018       POC Tests: No results for input(s): POCGLU, POCNA, POCK, POCCL, POCBUN, POCHEMO, POCHCT in the last 72 hours.     Coags:   Lab

## 2018-11-08 PROCEDURE — 93010 ELECTROCARDIOGRAM REPORT: CPT | Performed by: INTERNAL MEDICINE

## 2018-11-16 ENCOUNTER — OFFICE VISIT (OUTPATIENT)
Dept: OBGYN CLINIC | Age: 63
End: 2018-11-16
Payer: COMMERCIAL

## 2018-11-16 VITALS
WEIGHT: 163 LBS | HEART RATE: 68 BPM | SYSTOLIC BLOOD PRESSURE: 112 MMHG | BODY MASS INDEX: 27.83 KG/M2 | DIASTOLIC BLOOD PRESSURE: 64 MMHG | HEIGHT: 64 IN

## 2018-11-16 DIAGNOSIS — R93.89 THICKENED ENDOMETRIUM: ICD-10-CM

## 2018-11-16 DIAGNOSIS — Z98.890 S/P LEEP: Primary | ICD-10-CM

## 2018-11-16 DIAGNOSIS — N95.0 PMB (POSTMENOPAUSAL BLEEDING): ICD-10-CM

## 2018-11-16 PROCEDURE — G8419 CALC BMI OUT NRM PARAM NOF/U: HCPCS | Performed by: OBSTETRICS & GYNECOLOGY

## 2018-11-16 PROCEDURE — G8484 FLU IMMUNIZE NO ADMIN: HCPCS | Performed by: OBSTETRICS & GYNECOLOGY

## 2018-11-16 PROCEDURE — 1036F TOBACCO NON-USER: CPT | Performed by: OBSTETRICS & GYNECOLOGY

## 2018-11-16 PROCEDURE — 99213 OFFICE O/P EST LOW 20 MIN: CPT | Performed by: OBSTETRICS & GYNECOLOGY

## 2018-11-16 PROCEDURE — G8427 DOCREV CUR MEDS BY ELIG CLIN: HCPCS | Performed by: OBSTETRICS & GYNECOLOGY

## 2018-11-16 PROCEDURE — 3017F COLORECTAL CA SCREEN DOC REV: CPT | Performed by: OBSTETRICS & GYNECOLOGY

## 2018-11-16 NOTE — PROGRESS NOTES
Ela Lopez is a 66 y.o. female who presents here today for complaints of PMB. Since 2018. Cannot assess cavity adequately. Abnormal cervical pathology. Transfer from Dr Han Sales.        Vitals:  /64 (Site: Right Upper Arm, Position: Sitting, Cuff Size: Medium Adult)   Pulse 68   Ht 5' 4\" (1.626 m)   Wt 163 lb (73.9 kg)   LMP 2008   BMI 27.98 kg/m²   Allergies:  Erythromycin; Seasonal; Sporanox [itraconazole]; and Macrobid [nitrofurantoin macrocrystal]  Past Medical History:   Diagnosis Date    Allergic reaction     Arthritis     Cancer ([facility]     Cervical, dx by Dr. Anni Law Cerebral artery occlusion with cerebral infarction ([facility] [facility]     at age 52 / sx as a migraine with slurred speech & visual disturbance    Diverticulitis     GERD (gastroesophageal reflux disease)     History of conization of cervix     Migraine     Renal insufficiency     Tinea unguium      Past Surgical History:   Procedure Laterality Date    CARDIAC SURGERY      PFO surgery in age 48s    COLONOSCOPY  2018    HEMORRHOID SURGERY  2017    VA COLONOSCOPY FLX DX W/COLLJ SPEC WHEN PFRMD N/A 2018    COLONOSCOPY performed by Alona Dey MD at [place] [address] W/ADJ VAG,W/LOOP BX N/A 2018    LEEP performed by Jared Thurman MD at [address] [place] [address] N/A 2018    DILATATION AND CURETTAGE HYSTEROSCOPY performed by Jared Thurman MD at [facility]    RCR     OB History      Para Term  AB Living    2 2            SAB TAB Ectopic Molar Multiple Live Births                       Family History   Problem Relation Age of Onset    Cancer Father         ear and head    High Blood Pressure Father     Cancer Brother         gall bladder cancer    Other Mother         dementia    High Blood Pressure Mother     Other Sister         gout    Diabetes Brother     No Known Problems Son     No Known Problems Daughter      Social History     Social History    Marital status:      Spouse name: N/A    Number of children: N/A    Years of education: N/A     Occupational History    Not on file. Social History Main Topics    Smoking status: Never Smoker    Smokeless tobacco: Never Used    Alcohol use Yes      Comment: socially    Drug use: No    Sexual activity: Yes     Other Topics Concern    Not on file     Social History Narrative    No narrative on file       Contraceptive method:  none    Patient's medications, allergies, past medical, surgical, social and family histories were reviewed and updated as appropriate. Review of Systems  As per chief complaint   All other systems reviewed and are negative. Physical Exam:  Vitals:  /64 (Site: Right Upper Arm, Position: Sitting, Cuff Size: Medium Adult)   Pulse 68   Ht 5' 4\" (1.626 m)   Wt 163 lb (73.9 kg)   LMP 09/19/2008   BMI 27.98 kg/m²   Lungs: CTAB   Heart : Regular S1/S2, no M/R/G  Abdomen: Soft , NT, ND , + BS   Pelvic exam : deferred    Assessment:      Diagnosis Orders   1. S/P LEEP     2. PMB (postmenopausal bleeding)     3. Thickened endometrium         Plan:     TLH with BSO . Discussed infection , bleeding, injury of internal organs. No orders of the defined types were placed in this encounter. No orders of the defined types were placed in this encounter. Follow Up:  Return for scheduled for surgery.         Bryn Gill MD

## 2018-12-17 ENCOUNTER — HOSPITAL ENCOUNTER (OUTPATIENT)
Dept: PREADMISSION TESTING | Age: 63
Discharge: HOME | End: 2018-12-21
Payer: COMMERCIAL

## 2018-12-17 VITALS
SYSTOLIC BLOOD PRESSURE: 131 MMHG | TEMPERATURE: 98.2 F | HEART RATE: 61 BPM | DIASTOLIC BLOOD PRESSURE: 74 MMHG | BODY MASS INDEX: 27.66 KG/M2 | HEIGHT: 64 IN | RESPIRATION RATE: 16 BRPM | OXYGEN SATURATION: 100 % | WEIGHT: 162 LBS

## 2018-12-17 LAB
ANION GAP SERPL CALCULATED.3IONS-SCNC: 13 MEQ/L (ref 7–13)
BUN BLDV-MCNC: 18 MG/DL (ref 8–23)
CALCIUM SERPL-MCNC: 9.3 MG/DL (ref 8.6–10.2)
CHLORIDE BLD-SCNC: 104 MEQ/L (ref 98–107)
CO2: 22 MEQ/L (ref 22–29)
CREAT SERPL-MCNC: 1.01 MG/DL (ref 0.5–0.9)
GFR AFRICAN AMERICAN: >60
GFR NON-AFRICAN AMERICAN: 55.7
GLUCOSE BLD-MCNC: 89 MG/DL (ref 74–109)
HCT VFR BLD CALC: 40.6 % (ref 37–47)
HEMOGLOBIN: 13.8 G/DL (ref 12–16)
MCH RBC QN AUTO: 32.7 PG (ref 27–31.3)
MCHC RBC AUTO-ENTMCNC: 34.1 % (ref 33–37)
MCV RBC AUTO: 95.9 FL (ref 82–100)
PDW BLD-RTO: 12.9 % (ref 11.5–14.5)
PLATELET # BLD: 288 K/UL (ref 130–400)
POTASSIUM SERPL-SCNC: 3.7 MEQ/L (ref 3.5–5.1)
RBC # BLD: 4.23 M/UL (ref 4.2–5.4)
SODIUM BLD-SCNC: 139 MEQ/L (ref 132–144)
WBC # BLD: 4.7 K/UL (ref 4.8–10.8)

## 2018-12-17 PROCEDURE — 80048 BASIC METABOLIC PNL TOTAL CA: CPT

## 2018-12-17 PROCEDURE — 86901 BLOOD TYPING SEROLOGIC RH(D): CPT

## 2018-12-17 PROCEDURE — 86900 BLOOD TYPING SEROLOGIC ABO: CPT

## 2018-12-17 PROCEDURE — 86850 RBC ANTIBODY SCREEN: CPT

## 2018-12-17 PROCEDURE — 85027 COMPLETE CBC AUTOMATED: CPT

## 2018-12-17 RX ORDER — SODIUM CHLORIDE, SODIUM LACTATE, POTASSIUM CHLORIDE, CALCIUM CHLORIDE 600; 310; 30; 20 MG/100ML; MG/100ML; MG/100ML; MG/100ML
INJECTION, SOLUTION INTRAVENOUS CONTINUOUS
Status: CANCELLED | OUTPATIENT
Start: 2018-12-19

## 2018-12-17 RX ORDER — CEFAZOLIN SODIUM 2 G/50ML
2 SOLUTION INTRAVENOUS ONCE
Status: CANCELLED | OUTPATIENT
Start: 2018-12-19

## 2018-12-17 RX ORDER — SODIUM CHLORIDE 0.9 % (FLUSH) 0.9 %
10 SYRINGE (ML) INJECTION PRN
Status: CANCELLED | OUTPATIENT
Start: 2018-12-19

## 2018-12-17 RX ORDER — SODIUM CHLORIDE 0.9 % (FLUSH) 0.9 %
10 SYRINGE (ML) INJECTION EVERY 12 HOURS SCHEDULED
Status: CANCELLED | OUTPATIENT
Start: 2018-12-19

## 2018-12-17 RX ORDER — LIDOCAINE HYDROCHLORIDE 10 MG/ML
1 INJECTION, SOLUTION EPIDURAL; INFILTRATION; INTRACAUDAL; PERINEURAL
Status: CANCELLED | OUTPATIENT
Start: 2018-12-19 | End: 2018-12-19

## 2018-12-17 ASSESSMENT — ENCOUNTER SYMPTOMS
SHORTNESS OF BREATH: 0
SORE THROAT: 0
NAUSEA: 0
COUGH: 0
WHEEZING: 0
STRIDOR: 0
BACK PAIN: 0
CONSTIPATION: 0
EYES NEGATIVE: 1
DIARRHEA: 0

## 2018-12-18 LAB
ABO/RH: NORMAL
ANTIBODY SCREEN: NORMAL

## 2018-12-19 ENCOUNTER — ANESTHESIA EVENT (OUTPATIENT)
Dept: OPERATING ROOM | Age: 63
End: 2018-12-19
Payer: COMMERCIAL

## 2018-12-19 ENCOUNTER — ANESTHESIA (OUTPATIENT)
Dept: OPERATING ROOM | Age: 63
End: 2018-12-19
Payer: COMMERCIAL

## 2018-12-19 ENCOUNTER — HOSPITAL ENCOUNTER (OUTPATIENT)
Age: 63
Setting detail: OUTPATIENT SURGERY
Discharge: HOME | End: 2018-12-19
Attending: OBSTETRICS & GYNECOLOGY | Admitting: OBSTETRICS & GYNECOLOGY
Payer: COMMERCIAL

## 2018-12-19 VITALS
HEART RATE: 72 BPM | TEMPERATURE: 97 F | SYSTOLIC BLOOD PRESSURE: 113 MMHG | DIASTOLIC BLOOD PRESSURE: 63 MMHG | HEIGHT: 64 IN | RESPIRATION RATE: 16 BRPM | BODY MASS INDEX: 27.66 KG/M2 | WEIGHT: 162 LBS | OXYGEN SATURATION: 100 %

## 2018-12-19 VITALS — DIASTOLIC BLOOD PRESSURE: 65 MMHG | OXYGEN SATURATION: 100 % | SYSTOLIC BLOOD PRESSURE: 118 MMHG | TEMPERATURE: 95 F

## 2018-12-19 DIAGNOSIS — G89.18 POSTOPERATIVE PAIN: Primary | ICD-10-CM

## 2018-12-19 PROCEDURE — 6360000002 HC RX W HCPCS: Performed by: NURSE PRACTITIONER

## 2018-12-19 PROCEDURE — 88307 TISSUE EXAM BY PATHOLOGIST: CPT

## 2018-12-19 PROCEDURE — 58571 TLH W/T/O 250 G OR LESS: CPT | Performed by: OBSTETRICS & GYNECOLOGY

## 2018-12-19 PROCEDURE — 2580000003 HC RX 258: Performed by: NURSE PRACTITIONER

## 2018-12-19 PROCEDURE — 6360000002 HC RX W HCPCS: Performed by: NURSE ANESTHETIST, CERTIFIED REGISTERED

## 2018-12-19 PROCEDURE — 6360000002 HC RX W HCPCS: Performed by: OBSTETRICS & GYNECOLOGY

## 2018-12-19 PROCEDURE — 2500000003 HC RX 250 WO HCPCS: Performed by: NURSE ANESTHETIST, CERTIFIED REGISTERED

## 2018-12-19 PROCEDURE — 2500000003 HC RX 250 WO HCPCS: Performed by: OBSTETRICS & GYNECOLOGY

## 2018-12-19 PROCEDURE — 3600000014 HC SURGERY LEVEL 4 ADDTL 15MIN: Performed by: OBSTETRICS & GYNECOLOGY

## 2018-12-19 PROCEDURE — 6360000002 HC RX W HCPCS: Performed by: ANESTHESIOLOGY

## 2018-12-19 PROCEDURE — S0028 INJECTION, FAMOTIDINE, 20 MG: HCPCS | Performed by: OBSTETRICS & GYNECOLOGY

## 2018-12-19 PROCEDURE — 3700000001 HC ADD 15 MINUTES (ANESTHESIA): Performed by: OBSTETRICS & GYNECOLOGY

## 2018-12-19 PROCEDURE — 2580000003 HC RX 258: Performed by: OBSTETRICS & GYNECOLOGY

## 2018-12-19 PROCEDURE — 3700000000 HC ANESTHESIA ATTENDED CARE: Performed by: OBSTETRICS & GYNECOLOGY

## 2018-12-19 PROCEDURE — 3600000004 HC SURGERY LEVEL 4 BASE: Performed by: OBSTETRICS & GYNECOLOGY

## 2018-12-19 PROCEDURE — 6370000000 HC RX 637 (ALT 250 FOR IP)

## 2018-12-19 PROCEDURE — 7100000000 HC PACU RECOVERY - FIRST 15 MIN: Performed by: OBSTETRICS & GYNECOLOGY

## 2018-12-19 PROCEDURE — 2709999900 HC NON-CHARGEABLE SUPPLY: Performed by: OBSTETRICS & GYNECOLOGY

## 2018-12-19 PROCEDURE — 7100000001 HC PACU RECOVERY - ADDTL 15 MIN: Performed by: OBSTETRICS & GYNECOLOGY

## 2018-12-19 PROCEDURE — 7100000011 HC PHASE II RECOVERY - ADDTL 15 MIN: Performed by: OBSTETRICS & GYNECOLOGY

## 2018-12-19 PROCEDURE — 6370000000 HC RX 637 (ALT 250 FOR IP): Performed by: OBSTETRICS & GYNECOLOGY

## 2018-12-19 PROCEDURE — 7100000010 HC PHASE II RECOVERY - FIRST 15 MIN: Performed by: OBSTETRICS & GYNECOLOGY

## 2018-12-19 PROCEDURE — 2720000010 HC SURG SUPPLY STERILE: Performed by: OBSTETRICS & GYNECOLOGY

## 2018-12-19 RX ORDER — DIPHENHYDRAMINE HYDROCHLORIDE 50 MG/ML
12.5 INJECTION INTRAMUSCULAR; INTRAVENOUS
Status: DISCONTINUED | OUTPATIENT
Start: 2018-12-19 | End: 2018-12-19 | Stop reason: HOSPADM

## 2018-12-19 RX ORDER — BUPIVACAINE HYDROCHLORIDE AND EPINEPHRINE 5; 5 MG/ML; UG/ML
INJECTION, SOLUTION EPIDURAL; INTRACAUDAL; PERINEURAL PRN
Status: DISCONTINUED | OUTPATIENT
Start: 2018-12-19 | End: 2018-12-19 | Stop reason: HOSPADM

## 2018-12-19 RX ORDER — OXYCODONE HYDROCHLORIDE AND ACETAMINOPHEN 5; 325 MG/1; MG/1
1 TABLET ORAL EVERY 6 HOURS PRN
Qty: 20 TABLET | Refills: 0 | Status: SHIPPED | OUTPATIENT
Start: 2018-12-19 | End: 2018-12-26

## 2018-12-19 RX ORDER — HYDROCODONE BITARTRATE AND ACETAMINOPHEN 5; 325 MG/1; MG/1
1 TABLET ORAL PRN
Status: DISCONTINUED | OUTPATIENT
Start: 2018-12-19 | End: 2018-12-19 | Stop reason: HOSPADM

## 2018-12-19 RX ORDER — OXYCODONE HYDROCHLORIDE AND ACETAMINOPHEN 5; 325 MG/1; MG/1
1 TABLET ORAL EVERY 4 HOURS PRN
Status: DISCONTINUED | OUTPATIENT
Start: 2018-12-19 | End: 2018-12-19 | Stop reason: HOSPADM

## 2018-12-19 RX ORDER — HYDROCODONE BITARTRATE AND ACETAMINOPHEN 5; 325 MG/1; MG/1
2 TABLET ORAL PRN
Status: DISCONTINUED | OUTPATIENT
Start: 2018-12-19 | End: 2018-12-19 | Stop reason: HOSPADM

## 2018-12-19 RX ORDER — SODIUM CHLORIDE 0.9 % (FLUSH) 0.9 %
10 SYRINGE (ML) INJECTION PRN
Status: DISCONTINUED | OUTPATIENT
Start: 2018-12-19 | End: 2018-12-19 | Stop reason: HOSPADM

## 2018-12-19 RX ORDER — PROPOFOL 10 MG/ML
INJECTION, EMULSION INTRAVENOUS PRN
Status: DISCONTINUED | OUTPATIENT
Start: 2018-12-19 | End: 2018-12-19 | Stop reason: SDUPTHER

## 2018-12-19 RX ORDER — ROCURONIUM BROMIDE 10 MG/ML
INJECTION, SOLUTION INTRAVENOUS PRN
Status: DISCONTINUED | OUTPATIENT
Start: 2018-12-19 | End: 2018-12-19 | Stop reason: SDUPTHER

## 2018-12-19 RX ORDER — ACETAMINOPHEN 500 MG
1000 TABLET ORAL EVERY 6 HOURS PRN
Qty: 60 TABLET | Refills: 1 | Status: SHIPPED | OUTPATIENT
Start: 2018-12-19 | End: 2021-11-30

## 2018-12-19 RX ORDER — SODIUM CHLORIDE, SODIUM LACTATE, POTASSIUM CHLORIDE, CALCIUM CHLORIDE 600; 310; 30; 20 MG/100ML; MG/100ML; MG/100ML; MG/100ML
INJECTION, SOLUTION INTRAVENOUS CONTINUOUS
Status: DISCONTINUED | OUTPATIENT
Start: 2018-12-19 | End: 2018-12-19 | Stop reason: HOSPADM

## 2018-12-19 RX ORDER — ONDANSETRON 2 MG/ML
4 INJECTION INTRAMUSCULAR; INTRAVENOUS EVERY 6 HOURS PRN
Status: DISCONTINUED | OUTPATIENT
Start: 2018-12-19 | End: 2018-12-19 | Stop reason: HOSPADM

## 2018-12-19 RX ORDER — MAGNESIUM HYDROXIDE 1200 MG/15ML
LIQUID ORAL PRN
Status: DISCONTINUED | OUTPATIENT
Start: 2018-12-19 | End: 2018-12-19 | Stop reason: HOSPADM

## 2018-12-19 RX ORDER — ONDANSETRON 2 MG/ML
4 INJECTION INTRAMUSCULAR; INTRAVENOUS
Status: COMPLETED | OUTPATIENT
Start: 2018-12-19 | End: 2018-12-19

## 2018-12-19 RX ORDER — ACETAMINOPHEN 325 MG/1
650 TABLET ORAL EVERY 4 HOURS PRN
Status: DISCONTINUED | OUTPATIENT
Start: 2018-12-19 | End: 2018-12-19 | Stop reason: HOSPADM

## 2018-12-19 RX ORDER — CEFAZOLIN SODIUM 2 G/50ML
2 SOLUTION INTRAVENOUS ONCE
Status: COMPLETED | OUTPATIENT
Start: 2018-12-19 | End: 2018-12-19

## 2018-12-19 RX ORDER — KETOROLAC TROMETHAMINE 30 MG/ML
30 INJECTION, SOLUTION INTRAMUSCULAR; INTRAVENOUS EVERY 6 HOURS
Status: DISCONTINUED | OUTPATIENT
Start: 2018-12-19 | End: 2018-12-19 | Stop reason: HOSPADM

## 2018-12-19 RX ORDER — SIMETHICONE 80 MG
80 TABLET,CHEWABLE ORAL 4 TIMES DAILY PRN
Qty: 180 TABLET | Refills: 1 | Status: SHIPPED | OUTPATIENT
Start: 2018-12-19 | End: 2019-11-19

## 2018-12-19 RX ORDER — DOCUSATE SODIUM 100 MG/1
100 CAPSULE, LIQUID FILLED ORAL 2 TIMES DAILY
Status: DISCONTINUED | OUTPATIENT
Start: 2018-12-19 | End: 2018-12-19 | Stop reason: HOSPADM

## 2018-12-19 RX ORDER — FENTANYL CITRATE 50 UG/ML
INJECTION, SOLUTION INTRAMUSCULAR; INTRAVENOUS PRN
Status: DISCONTINUED | OUTPATIENT
Start: 2018-12-19 | End: 2018-12-19 | Stop reason: SDUPTHER

## 2018-12-19 RX ORDER — SODIUM CHLORIDE 0.9 % (FLUSH) 0.9 %
10 SYRINGE (ML) INJECTION EVERY 12 HOURS SCHEDULED
Status: DISCONTINUED | OUTPATIENT
Start: 2018-12-19 | End: 2018-12-19 | Stop reason: HOSPADM

## 2018-12-19 RX ORDER — FENTANYL CITRATE 50 UG/ML
50 INJECTION, SOLUTION INTRAMUSCULAR; INTRAVENOUS EVERY 10 MIN PRN
Status: DISCONTINUED | OUTPATIENT
Start: 2018-12-19 | End: 2018-12-19 | Stop reason: HOSPADM

## 2018-12-19 RX ORDER — LIDOCAINE HYDROCHLORIDE 10 MG/ML
1 INJECTION, SOLUTION EPIDURAL; INFILTRATION; INTRACAUDAL; PERINEURAL
Status: DISCONTINUED | OUTPATIENT
Start: 2018-12-19 | End: 2018-12-19 | Stop reason: HOSPADM

## 2018-12-19 RX ORDER — MAGNESIUM HYDROXIDE 1200 MG/15ML
LIQUID ORAL CONTINUOUS PRN
Status: DISCONTINUED | OUTPATIENT
Start: 2018-12-19 | End: 2018-12-19 | Stop reason: HOSPADM

## 2018-12-19 RX ORDER — IBUPROFEN 600 MG/1
600 TABLET ORAL EVERY 6 HOURS PRN
Qty: 60 TABLET | Refills: 1 | Status: SHIPPED | OUTPATIENT
Start: 2018-12-19 | End: 2019-11-19

## 2018-12-19 RX ORDER — DOCUSATE SODIUM 100 MG/1
100 CAPSULE, LIQUID FILLED ORAL 2 TIMES DAILY PRN
Qty: 60 CAPSULE | Refills: 2 | Status: SHIPPED | OUTPATIENT
Start: 2018-12-19 | End: 2019-11-19

## 2018-12-19 RX ORDER — ONDANSETRON 2 MG/ML
INJECTION INTRAMUSCULAR; INTRAVENOUS PRN
Status: DISCONTINUED | OUTPATIENT
Start: 2018-12-19 | End: 2018-12-19 | Stop reason: SDUPTHER

## 2018-12-19 RX ORDER — DEXAMETHASONE SODIUM PHOSPHATE 10 MG/ML
INJECTION INTRAMUSCULAR; INTRAVENOUS PRN
Status: DISCONTINUED | OUTPATIENT
Start: 2018-12-19 | End: 2018-12-19 | Stop reason: SDUPTHER

## 2018-12-19 RX ORDER — METOCLOPRAMIDE HYDROCHLORIDE 5 MG/ML
10 INJECTION INTRAMUSCULAR; INTRAVENOUS
Status: COMPLETED | OUTPATIENT
Start: 2018-12-19 | End: 2018-12-19

## 2018-12-19 RX ORDER — LIDOCAINE HYDROCHLORIDE 20 MG/ML
INJECTION, SOLUTION INFILTRATION; PERINEURAL PRN
Status: DISCONTINUED | OUTPATIENT
Start: 2018-12-19 | End: 2018-12-19 | Stop reason: SDUPTHER

## 2018-12-19 RX ORDER — OXYCODONE HYDROCHLORIDE AND ACETAMINOPHEN 5; 325 MG/1; MG/1
2 TABLET ORAL EVERY 4 HOURS PRN
Status: DISCONTINUED | OUTPATIENT
Start: 2018-12-19 | End: 2018-12-19 | Stop reason: HOSPADM

## 2018-12-19 RX ORDER — POLYETHYLENE GLYCOL 3350 17 G/17G
17 POWDER, FOR SOLUTION ORAL 2 TIMES DAILY PRN
Qty: 1020 G | Refills: 1 | Status: SHIPPED | OUTPATIENT
Start: 2018-12-19 | End: 2019-01-18

## 2018-12-19 RX ORDER — MIDAZOLAM HYDROCHLORIDE 1 MG/ML
INJECTION INTRAMUSCULAR; INTRAVENOUS PRN
Status: DISCONTINUED | OUTPATIENT
Start: 2018-12-19 | End: 2018-12-19 | Stop reason: SDUPTHER

## 2018-12-19 RX ORDER — MEPERIDINE HYDROCHLORIDE 25 MG/ML
12.5 INJECTION INTRAMUSCULAR; INTRAVENOUS; SUBCUTANEOUS EVERY 5 MIN PRN
Status: DISCONTINUED | OUTPATIENT
Start: 2018-12-19 | End: 2018-12-19 | Stop reason: HOSPADM

## 2018-12-19 RX ADMIN — KETOROLAC TROMETHAMINE 30 MG: 30 INJECTION, SOLUTION INTRAMUSCULAR at 13:10

## 2018-12-19 RX ADMIN — ROCURONIUM BROMIDE 10 MG: 10 SOLUTION INTRAVENOUS at 12:04

## 2018-12-19 RX ADMIN — SUGAMMADEX 150 MG: 100 INJECTION, SOLUTION INTRAVENOUS at 12:22

## 2018-12-19 RX ADMIN — ROCURONIUM BROMIDE 20 MG: 10 SOLUTION INTRAVENOUS at 11:30

## 2018-12-19 RX ADMIN — HYDROMORPHONE HYDROCHLORIDE 0.4 MG: 1 INJECTION, SOLUTION INTRAMUSCULAR; INTRAVENOUS; SUBCUTANEOUS at 12:21

## 2018-12-19 RX ADMIN — FENTANYL CITRATE 50 MCG: 50 INJECTION, SOLUTION INTRAMUSCULAR; INTRAVENOUS at 11:53

## 2018-12-19 RX ADMIN — METOCLOPRAMIDE 10 MG: 5 INJECTION, SOLUTION INTRAMUSCULAR; INTRAVENOUS at 16:44

## 2018-12-19 RX ADMIN — FLUORESCEIN SODIUM 200 MG: 100 INJECTION INTRAVENOUS at 12:14

## 2018-12-19 RX ADMIN — ROCURONIUM BROMIDE 50 MG: 10 SOLUTION INTRAVENOUS at 10:55

## 2018-12-19 RX ADMIN — HYDROMORPHONE HYDROCHLORIDE 0.4 MG: 1 INJECTION, SOLUTION INTRAMUSCULAR; INTRAVENOUS; SUBCUTANEOUS at 12:31

## 2018-12-19 RX ADMIN — ONDANSETRON 4 MG: 2 INJECTION INTRAMUSCULAR; INTRAVENOUS at 14:30

## 2018-12-19 RX ADMIN — HYDROMORPHONE HYDROCHLORIDE 0.5 MG: 1 INJECTION, SOLUTION INTRAMUSCULAR; INTRAVENOUS; SUBCUTANEOUS at 13:37

## 2018-12-19 RX ADMIN — LIDOCAINE HYDROCHLORIDE 60 MG: 20 INJECTION, SOLUTION INFILTRATION; PERINEURAL at 10:55

## 2018-12-19 RX ADMIN — HYDROMORPHONE HYDROCHLORIDE 0.2 MG: 1 INJECTION, SOLUTION INTRAMUSCULAR; INTRAVENOUS; SUBCUTANEOUS at 12:22

## 2018-12-19 RX ADMIN — FENTANYL CITRATE 50 MCG: 50 INJECTION, SOLUTION INTRAMUSCULAR; INTRAVENOUS at 12:55

## 2018-12-19 RX ADMIN — FAMOTIDINE 20 MG: 10 INJECTION, SOLUTION INTRAVENOUS at 13:10

## 2018-12-19 RX ADMIN — DEXAMETHASONE SODIUM PHOSPHATE 10 MG: 10 INJECTION INTRAMUSCULAR; INTRAVENOUS at 11:08

## 2018-12-19 RX ADMIN — MIDAZOLAM HYDROCHLORIDE 2 MG: 1 INJECTION, SOLUTION INTRAMUSCULAR; INTRAVENOUS at 10:49

## 2018-12-19 RX ADMIN — CEFAZOLIN SODIUM 2 G: 2 SOLUTION INTRAVENOUS at 10:59

## 2018-12-19 RX ADMIN — SODIUM CHLORIDE, POTASSIUM CHLORIDE, SODIUM LACTATE AND CALCIUM CHLORIDE 1000 ML: 600; 310; 30; 20 INJECTION, SOLUTION INTRAVENOUS at 10:20

## 2018-12-19 RX ADMIN — SODIUM CHLORIDE, POTASSIUM CHLORIDE, SODIUM LACTATE AND CALCIUM CHLORIDE: 600; 310; 30; 20 INJECTION, SOLUTION INTRAVENOUS at 13:39

## 2018-12-19 RX ADMIN — ONDANSETRON 4 MG: 2 INJECTION INTRAMUSCULAR; INTRAVENOUS at 12:18

## 2018-12-19 RX ADMIN — FENTANYL CITRATE 50 MCG: 50 INJECTION, SOLUTION INTRAMUSCULAR; INTRAVENOUS at 13:06

## 2018-12-19 RX ADMIN — SODIUM CHLORIDE, POTASSIUM CHLORIDE, SODIUM LACTATE AND CALCIUM CHLORIDE: 600; 310; 30; 20 INJECTION, SOLUTION INTRAVENOUS at 11:41

## 2018-12-19 RX ADMIN — FENTANYL CITRATE 50 MCG: 50 INJECTION, SOLUTION INTRAMUSCULAR; INTRAVENOUS at 10:55

## 2018-12-19 RX ADMIN — PROPOFOL 150 MG: 10 INJECTION, EMULSION INTRAVENOUS at 10:55

## 2018-12-19 ASSESSMENT — PULMONARY FUNCTION TESTS
PIF_VALUE: 11
PIF_VALUE: 2
PIF_VALUE: 0
PIF_VALUE: 19
PIF_VALUE: 21
PIF_VALUE: 17
PIF_VALUE: 17
PIF_VALUE: 18
PIF_VALUE: 13
PIF_VALUE: 12
PIF_VALUE: 17
PIF_VALUE: 15
PIF_VALUE: 2
PIF_VALUE: 15
PIF_VALUE: 12
PIF_VALUE: 2
PIF_VALUE: 13
PIF_VALUE: 12
PIF_VALUE: 17
PIF_VALUE: 2
PIF_VALUE: 16
PIF_VALUE: 2
PIF_VALUE: 17
PIF_VALUE: 2
PIF_VALUE: 21
PIF_VALUE: 18
PIF_VALUE: 22
PIF_VALUE: 2
PIF_VALUE: 12
PIF_VALUE: 12
PIF_VALUE: 21
PIF_VALUE: 2
PIF_VALUE: 17
PIF_VALUE: 17
PIF_VALUE: 13
PIF_VALUE: 12
PIF_VALUE: 12
PIF_VALUE: 44
PIF_VALUE: 1
PIF_VALUE: 17
PIF_VALUE: 11
PIF_VALUE: 11
PIF_VALUE: 16
PIF_VALUE: 0
PIF_VALUE: 2
PIF_VALUE: 18
PIF_VALUE: 21
PIF_VALUE: 16
PIF_VALUE: 18
PIF_VALUE: 12
PIF_VALUE: 17
PIF_VALUE: 14
PIF_VALUE: 12
PIF_VALUE: 17
PIF_VALUE: 19
PIF_VALUE: 17
PIF_VALUE: 19
PIF_VALUE: 1
PIF_VALUE: 12
PIF_VALUE: 2
PIF_VALUE: 17
PIF_VALUE: 2
PIF_VALUE: 36
PIF_VALUE: 12
PIF_VALUE: 18
PIF_VALUE: 19
PIF_VALUE: 19
PIF_VALUE: 18
PIF_VALUE: 12
PIF_VALUE: 12
PIF_VALUE: 17
PIF_VALUE: 17
PIF_VALUE: 13
PIF_VALUE: 12
PIF_VALUE: 21
PIF_VALUE: 20
PIF_VALUE: 16
PIF_VALUE: 18
PIF_VALUE: 2
PIF_VALUE: 15
PIF_VALUE: 17
PIF_VALUE: 19
PIF_VALUE: 16
PIF_VALUE: 13
PIF_VALUE: 14
PIF_VALUE: 14
PIF_VALUE: 17
PIF_VALUE: 12
PIF_VALUE: 12
PIF_VALUE: 6
PIF_VALUE: 18
PIF_VALUE: 12
PIF_VALUE: 1
PIF_VALUE: 17
PIF_VALUE: 19
PIF_VALUE: 12
PIF_VALUE: 16
PIF_VALUE: 12
PIF_VALUE: 12
PIF_VALUE: 20
PIF_VALUE: 1
PIF_VALUE: 20
PIF_VALUE: 17

## 2018-12-19 ASSESSMENT — PAIN DESCRIPTION - PAIN TYPE: TYPE: SURGICAL PAIN

## 2018-12-19 ASSESSMENT — PAIN SCALES - GENERAL
PAINLEVEL_OUTOF10: 7
PAINLEVEL_OUTOF10: 8
PAINLEVEL_OUTOF10: 10
PAINLEVEL_OUTOF10: 6
PAINLEVEL_OUTOF10: 5
PAINLEVEL_OUTOF10: 8

## 2018-12-19 ASSESSMENT — PAIN DESCRIPTION - LOCATION: LOCATION: ABDOMEN

## 2018-12-19 NOTE — H&P (VIEW-ONLY)
Nurse Practitioner History and Physical      CHIEF COMPLAINT:  \"Post menopausal bleeding with cramping\"    HISTORY OF PRESENT ILLNESS:      The patient is a 66 y.o. female with significant past medical history of post menopausal bleeding with cramping. Sx began in June 2018 with bleeding for a couple weeks, subsided, then bled x 2 months, and has been intermittent since. Menarche age 17. LMP 2008  P 2 G 2 A 0. Both vaginal deliveries with no complications. Pt has a constant left lower quad pain.   Has had a sonogram.    Past Medical History:        Diagnosis Date    Allergic reaction     Arthritis     Cancer ([facility] [address]     Cervical, dx by Dr. Jay Garnica Cerebral artery occlusion with cerebral infarction ([facility] [address] [facility]     at age 52 / sx as a migraine with slurred speech & visual disturbance    Diverticulitis     GERD (gastroesophageal reflux disease)     History of conization of cervix     Migraine     Renal insufficiency     Tinea unguium      Past Surgical History:    Past Surgical History:   Procedure Laterality Date    CARDIAC SURGERY      PFO surgery in age 48s    COLONOSCOPY  2018    HEMORRHOID SURGERY  2017    UT COLONOSCOPY FLX DX W/COLLJ SPEC WHEN PFRMD N/A 4/19/2018    COLONOSCOPY performed by Briana Wren MD at [place] [address] W/ADJ VAG,W/LOOP BX N/A 9/26/2018    LEEP performed by Pretty Jay MD at [address] [place] [address] N/A 9/26/2018    DILATATION AND CURETTAGE [address]OSCOPY [address]         Medications Prior to Admission:    Current Outpatient Prescriptions   Medication Sig Dispense Refill    meloxicam (MOBIC) 7.5 MG tablet Take 1 tablet by mouth daily 7 tablet 3    ibuprofen (ADVIL;MOTRIN) 800 MG tablet Take 1 tablet by mouth every 6 hours as needed for Pain 40 tablet 1    Estradiol-Norethindrone Acet 0.5-0.1 MG TABS Take by mouth daily  28 tablet     SUMAtriptan Succinate 3 MG/0.5ML SOAJ Inject into the skin as needed       oxyCODONE-acetaminophen (PERCOCET) 5-325 MG per tablet Take 1 tablet by mouth as needed .  topiramate (TOPAMAX) 100 MG tablet Take 100 mg by mouth      meperidine (DEMEROL) 50 MG tablet TAKE 1 TABLET BY MOUTH DAILY AT TIME OF HEADACHE FOR 7 DAYS  0    Multiple Vitamins-Minerals (JONO-DAY 1000) TABS Take by mouth      ZOMIG 5 MG nasal solution once as needed       aspirin 81 MG EC tablet Take 81 mg by mouth daily. No current facility-administered medications for this encounter. Allergies:  Erythromycin; Seasonal; Sporanox [itraconazole]; and Macrobid [nitrofurantoin macrocrystal]    Social History:   Social History     Social History    Marital status:      Spouse name: N/A    Number of children: N/A    Years of education: N/A     Occupational History    Not on file. Social History Main Topics    Smoking status: Never Smoker    Smokeless tobacco: Never Used    Alcohol use Yes      Comment: socially    Drug use: No    Sexual activity: Yes     Other Topics Concern    Not on file     Social History Narrative    No narrative on file       Family History:   Family History   Problem Relation Age of Onset    Cancer Father         ear and head    High Blood Pressure Father     Cancer Brother         gall bladder cancer    Other Mother         dementia    High Blood Pressure Mother     Other Sister         gout    Diabetes Brother     No Known Problems Son     No Known Problems Daughter        Review of Systems   Constitutional: Negative. HENT: Positive for tinnitus. Negative for sore throat. Permanent bridges x 2  Constant tinnitus   Eyes: Negative. Respiratory: Negative for cough, shortness of breath, wheezing and stridor. Cardiovascular: Negative for chest pain and palpitations. Gastrointestinal: Negative for constipation, diarrhea and nausea.    Genitourinary: Negative for dysuria and

## 2018-12-19 NOTE — DISCHARGE INSTR - COC
Continuity of Care Form    Patient Name: Sherita Roblero   :  1955  MRN:  [id number]    Admit date:  2018  Discharge date:  ***    Code Status Order: No Order   Advance Directives:   [facility] Documentation     Date/Time Healthcare Directive Type of Healthcare Directive Copy in [facility] [address] Agent's Name Healthcare Agent's Phone Number    [phone]  No, patient does not have an advance directive for healthcare treatment -- -- -- -- --          Admitting Physician:  Monika Ndiaye MD  PCP: Cloyde Ahumada, APRN - CNP    Discharging Nurse: [facility] Unit/Room#: [facility] [address] Unit Phone Number: ***    Emergency Contact:   Extended Emergency Contact Information  Primary Emergency Contact: Maksim Dwyer  Address: [address] [place] [address] Phone: [phone]  Work Phone: [phone]  Mobile Phone: [phone]  Relation: Spouse  Secondary Emergency Contact: [address] Phone: [phone]  Work Phone: [phone]  Mobile Phone: [phone]  Relation: Child    Past Surgical History:  Past Surgical History:   Procedure Laterality Date    CARDIAC SURGERY      PFO surgery in age 48s    COLONOSCOPY  2018    HEMORRHOID SURGERY  2017    ME COLONOSCOPY FLX DX W/COLLJ SPEC WHEN PFRMD N/A 2018    COLONOSCOPY performed by Remington Meza MD at [place] [address] W/ADJ VAG,W/LOOP BX N/A 2018    LEEP performed by Baldemar Bro MD at [address] [place] [address] 2018    DILATATION AND CURETTAGE HYSTEROSCOPY performed by Baldemar Bro MD at [address]       Immunization History:   Immunization History   Administered Date(s) Administered    Influenza Virus Vaccine 11/10/2015    Tdap (Boostrix, Adacel) 2018       Active Problems:  Patient Active Problem List   Diagnosis Code    Cervical spondylosis M47.812    Cervical spinal stenosis M48.02    Degenerative disc disease, cervical M50.30    Radiculopathy affecting upper extremity M54.10    Tinea unguium B35.1    Migraine G43.909    Allergic reaction T78.40XA    Renal insufficiency N28.9    Vaginal bleeding N93.9    Stenosis of cervix N88.2    DUB (dysfunctional uterine bleeding) N93.8    S/P LEEP Z98.890    PMB (postmenopausal bleeding) N95.0    Thickened endometrium R93.89    Cervical stenosis (uterine cervix) N88.2       Isolation/Infection:   Isolation          No Isolation            Nurse Assessment:  Last Vital Signs: /65   Pulse 62   Temp 97.6 °F (36.4 °C) (Temporal)   Resp 16   Ht 5' 4\" (1.626 m)   Wt 162 lb (73.5 kg)   LMP 2008   SpO2 100%   BMI 27.81 kg/m²     Last documented pain score (0-10 scale):    Last Weight:   Wt Readings from Last 1 Encounters:   18 162 lb (73.5 kg)     Mental Status:  {IP PT MENTAL STATUS:}    IV Access:  { SIMONE IV ACCESS:367819630}    Nursing Mobility/ADLs:  Walking   {P DME AOSH:897617633}  Transfer  {P DME MLBO:398939022}  Bathing  {P DME LJEY:302906867}  Dressing  {CHP DME KHLL:468772129}  Toileting  {P DME TWJE:444360661}  Feeding  {[facility] DME GHFQ:706586386}  Med Admin  {P DME RUVB:123502505}  Med Delivery   { SIMONE MED Delivery:747145074}    Wound Care Documentation and Therapy:  Incision 18 Vagina (Active)   Number of days: 84        Elimination:  Continence:   · Bowel: {YES / TK:16729}  · Bladder: {YES / KAYLA:58143}  Urinary Catheter: {Urinary Catheter:241032093}   Colostomy/Ileostomy/Ileal Conduit: {YES / Y}       Date of Last BM: ***    Intake/Output Summary (Last 24 hours) at 18 1226  Last data filed at 18 1222   Gross per 24 hour   Intake             1700 ml   Output              250 ml   Net             1450 ml     No intake/output data recorded.     Safety Concerns:     Hunter Rashid Update Admission H&P: {CHP DME [facility] in [facility]:330137603}    PHYSICIAN SIGNATURE:  {Esignature:924235301}

## 2018-12-22 NOTE — OP NOTE
[address]                      [address], [address] [facility]                                OPERATIVE REPORT    PATIENT NAME: Filomena Alves                  :        1955  MED REC NO:   [id number]                            ROOM:  ACCOUNT NO:   [de-identified]                           ADMIT DATE: 2018  PROVIDER:     Oma Rosales MD    DATE OF PROCEDURE:  2018    PREOPERATIVE DIAGNOSIS:  Postmenopausal bleeding. POSTOPERATIVE DIAGNOSIS:  Postmenopausal bleeding. OPERATION PERFORMED:  Total laparoscopic hysterectomy with bilateral  salpingo-oophorectomy. SURGEON:  Oma Rosales MD.    ASSISTANT:  Surgical staff. ANESTHESIA:  General.    ESTIMATED BLOOD LOSS:  Less than 10 mL. COMPLICATIONS:  None. SPECIMEN:  Uterus and bilateral tubes and ovaries. FINDINGS:  Normal ovaries. OPERATIVE PROCEDURE:  The patient was taken to the operating room; and  after adequate general anesthesia had been established, she was scrubbed  and draped in the usual manner for laparoscopic procedure. Attention first was towards the vagina. A weighted vaginal retractor  was placed. The anterior lip of the cervix was grasped with a  single-tooth tenaculum. The cervix was dilated to approximately 21 mm  of dilation after which the [facility] manipulator was inserted into the  uterus. The VCare balloon was inflated with 7 mL of air. Then, the  VCare cup was applied snug to the cervix. After fixing the [facility] manipulator in place, the attention was towards  the abdomen. A Veress needle was inserted through the umbilicus after  which the abdomen was inflated with 15 mmHg of CO2 gas. Then, a  subumbilical 5-mm incision was made through which a 5-mm trocar was  inserted. Then, the patient was placed in the steep Trendelenburg  position.   On the left side, 12 cm lateral to midline and slightly below  the level of the umbilicus, a 22-FU incision was made

## 2019-01-09 ENCOUNTER — OFFICE VISIT (OUTPATIENT)
Dept: OBGYN CLINIC | Age: 64
End: 2019-01-09

## 2019-01-09 VITALS
HEIGHT: 64 IN | BODY MASS INDEX: 27.81 KG/M2 | DIASTOLIC BLOOD PRESSURE: 68 MMHG | HEART RATE: 84 BPM | SYSTOLIC BLOOD PRESSURE: 110 MMHG

## 2019-01-09 DIAGNOSIS — Z09 POSTOP CHECK: Primary | ICD-10-CM

## 2019-01-09 PROCEDURE — 99024 POSTOP FOLLOW-UP VISIT: CPT | Performed by: OBSTETRICS & GYNECOLOGY

## 2019-01-18 ENCOUNTER — TELEPHONE (OUTPATIENT)
Dept: OBGYN CLINIC | Age: 64
End: 2019-01-18

## 2019-01-18 DIAGNOSIS — Z90.79 S/P TOTAL HYSTERECTOMY AND BSO (BILATERAL SALPINGO-OOPHORECTOMY): ICD-10-CM

## 2019-01-18 DIAGNOSIS — Z90.710 S/P TOTAL HYSTERECTOMY AND BSO (BILATERAL SALPINGO-OOPHORECTOMY): ICD-10-CM

## 2019-01-18 DIAGNOSIS — R10.2 PELVIC PAIN: Primary | ICD-10-CM

## 2019-01-18 DIAGNOSIS — Z90.722 S/P TOTAL HYSTERECTOMY AND BSO (BILATERAL SALPINGO-OOPHORECTOMY): ICD-10-CM

## 2019-01-31 ENCOUNTER — HOSPITAL ENCOUNTER (OUTPATIENT)
Dept: ULTRASOUND IMAGING | Age: 64
Discharge: HOME | End: 2019-02-02
Payer: COMMERCIAL

## 2019-01-31 DIAGNOSIS — Z90.722 S/P TOTAL HYSTERECTOMY AND BSO (BILATERAL SALPINGO-OOPHORECTOMY): ICD-10-CM

## 2019-01-31 DIAGNOSIS — Z90.710 S/P TOTAL HYSTERECTOMY AND BSO (BILATERAL SALPINGO-OOPHORECTOMY): ICD-10-CM

## 2019-01-31 DIAGNOSIS — R10.2 PELVIC PAIN: ICD-10-CM

## 2019-01-31 DIAGNOSIS — Z90.79 S/P TOTAL HYSTERECTOMY AND BSO (BILATERAL SALPINGO-OOPHORECTOMY): ICD-10-CM

## 2019-01-31 PROCEDURE — 76830 TRANSVAGINAL US NON-OB: CPT

## 2019-01-31 PROCEDURE — 76856 US EXAM PELVIC COMPLETE: CPT

## 2019-02-08 PROBLEM — Z09 POSTOP CHECK: Status: RESOLVED | Noted: 2019-01-09 | Resolved: 2019-02-08

## 2019-02-13 ENCOUNTER — OFFICE VISIT (OUTPATIENT)
Dept: OBGYN CLINIC | Age: 64
End: 2019-02-13

## 2019-02-13 VITALS
SYSTOLIC BLOOD PRESSURE: 104 MMHG | WEIGHT: 164 LBS | BODY MASS INDEX: 28 KG/M2 | HEIGHT: 64 IN | HEART RATE: 68 BPM | DIASTOLIC BLOOD PRESSURE: 76 MMHG

## 2019-02-13 DIAGNOSIS — Z09 POSTOP CHECK: Primary | ICD-10-CM

## 2019-02-13 PROCEDURE — 1036F TOBACCO NON-USER: CPT | Performed by: OBSTETRICS & GYNECOLOGY

## 2019-02-13 PROCEDURE — G8427 DOCREV CUR MEDS BY ELIG CLIN: HCPCS | Performed by: OBSTETRICS & GYNECOLOGY

## 2019-02-13 PROCEDURE — G8484 FLU IMMUNIZE NO ADMIN: HCPCS | Performed by: OBSTETRICS & GYNECOLOGY

## 2019-02-13 PROCEDURE — 99024 POSTOP FOLLOW-UP VISIT: CPT | Performed by: OBSTETRICS & GYNECOLOGY

## 2019-02-13 PROCEDURE — G8419 CALC BMI OUT NRM PARAM NOF/U: HCPCS | Performed by: OBSTETRICS & GYNECOLOGY

## 2019-02-13 PROCEDURE — 3017F COLORECTAL CA SCREEN DOC REV: CPT | Performed by: OBSTETRICS & GYNECOLOGY

## 2019-03-18 PROBLEM — Z09 POSTOP CHECK: Status: ACTIVE | Noted: 2019-03-18

## 2019-04-17 PROBLEM — Z09 POSTOP CHECK: Status: RESOLVED | Noted: 2019-03-18 | Resolved: 2019-04-17

## 2019-08-20 ENCOUNTER — TELEPHONE (OUTPATIENT)
Dept: OBGYN CLINIC | Age: 64
End: 2019-08-20

## 2019-08-30 ENCOUNTER — OFFICE VISIT (OUTPATIENT)
Dept: OBGYN CLINIC | Age: 64
End: 2019-08-30
Payer: COMMERCIAL

## 2019-08-30 VITALS
HEART RATE: 84 BPM | WEIGHT: 164 LBS | DIASTOLIC BLOOD PRESSURE: 78 MMHG | BODY MASS INDEX: 28 KG/M2 | HEIGHT: 64 IN | SYSTOLIC BLOOD PRESSURE: 104 MMHG

## 2019-08-30 DIAGNOSIS — F32.0 CURRENT MILD EPISODE OF MAJOR DEPRESSIVE DISORDER WITHOUT PRIOR EPISODE (HCC): Primary | ICD-10-CM

## 2019-08-30 DIAGNOSIS — R23.2 HOT FLASHES: ICD-10-CM

## 2019-08-30 PROCEDURE — 1036F TOBACCO NON-USER: CPT | Performed by: OBSTETRICS & GYNECOLOGY

## 2019-08-30 PROCEDURE — G8419 CALC BMI OUT NRM PARAM NOF/U: HCPCS | Performed by: OBSTETRICS & GYNECOLOGY

## 2019-08-30 PROCEDURE — G8427 DOCREV CUR MEDS BY ELIG CLIN: HCPCS | Performed by: OBSTETRICS & GYNECOLOGY

## 2019-08-30 PROCEDURE — 99214 OFFICE O/P EST MOD 30 MIN: CPT | Performed by: OBSTETRICS & GYNECOLOGY

## 2019-08-30 PROCEDURE — 3017F COLORECTAL CA SCREEN DOC REV: CPT | Performed by: OBSTETRICS & GYNECOLOGY

## 2019-08-30 RX ORDER — PAROXETINE HYDROCHLORIDE 20 MG/1
20 TABLET, FILM COATED ORAL EVERY MORNING
Qty: 30 TABLET | Refills: 3 | Status: SHIPPED | OUTPATIENT
Start: 2019-08-30 | End: 2019-10-30 | Stop reason: SDUPTHER

## 2019-09-25 ENCOUNTER — OFFICE VISIT (OUTPATIENT)
Dept: OBGYN CLINIC | Age: 64
End: 2019-09-25
Payer: COMMERCIAL

## 2019-09-25 VITALS
HEIGHT: 64 IN | BODY MASS INDEX: 28 KG/M2 | WEIGHT: 164 LBS | SYSTOLIC BLOOD PRESSURE: 106 MMHG | HEART RATE: 84 BPM | DIASTOLIC BLOOD PRESSURE: 70 MMHG

## 2019-09-25 DIAGNOSIS — N95.1 HOT FLASHES DUE TO MENOPAUSE: ICD-10-CM

## 2019-09-25 DIAGNOSIS — Z78.0 MENOPAUSE: Primary | ICD-10-CM

## 2019-09-25 PROCEDURE — G8419 CALC BMI OUT NRM PARAM NOF/U: HCPCS | Performed by: OBSTETRICS & GYNECOLOGY

## 2019-09-25 PROCEDURE — G8427 DOCREV CUR MEDS BY ELIG CLIN: HCPCS | Performed by: OBSTETRICS & GYNECOLOGY

## 2019-09-25 PROCEDURE — 99214 OFFICE O/P EST MOD 30 MIN: CPT | Performed by: OBSTETRICS & GYNECOLOGY

## 2019-09-25 PROCEDURE — 3017F COLORECTAL CA SCREEN DOC REV: CPT | Performed by: OBSTETRICS & GYNECOLOGY

## 2019-09-25 PROCEDURE — 1036F TOBACCO NON-USER: CPT | Performed by: OBSTETRICS & GYNECOLOGY

## 2019-09-25 RX ORDER — CLONIDINE 0.1 MG/24H
1 PATCH, EXTENDED RELEASE TRANSDERMAL
Qty: 4 PATCH | Refills: 1 | Status: SHIPPED | OUTPATIENT
Start: 2019-09-25 | End: 2019-11-19

## 2019-09-25 NOTE — PROGRESS NOTES
Medications:     cloNIDine (CATAPRES-TTS-1) 0.1 MG/24HR PTWK, Place 1 patch onto the skin every 7 days, Disp: 4 patch, Rfl: 1    estradiol (CLIMARA) 0.1 MG/24HR, Place 1 patch onto the skin once a week, Disp: 12 patch, Rfl: 4    PARoxetine (PAXIL) 20 MG tablet, Take 1 tablet by mouth every morning, Disp: 30 tablet, Rfl: 3    ibuprofen (ADVIL;MOTRIN) 600 MG tablet, Take 1 tablet by mouth every 6 hours as needed for Pain, Disp: 60 tablet, Rfl: 1    acetaminophen (APAP EXTRA STRENGTH) 500 MG tablet, Take 2 tablets by mouth every 6 hours as needed for Pain, Disp: 60 tablet, Rfl: 1    simethicone (MYLICON) 80 MG chewable tablet, Take 1 tablet by mouth 4 times daily as needed for Flatulence, Disp: 180 tablet, Rfl: 1    docusate sodium (COLACE) 100 MG capsule, Take 1 capsule by mouth 2 times daily as needed for Constipation, Disp: 60 capsule, Rfl: 2    Erenumab-aooe (AIMOVIG SC), Inject into the skin every 30 days, Disp: , Rfl:     ibuprofen (ADVIL;MOTRIN) 800 MG tablet, Take 1 tablet by mouth every 6 hours as needed for Pain, Disp: 40 tablet, Rfl: 1    SUMAtriptan Succinate 3 MG/0.5ML SOAJ, Inject into the skin as needed , Disp: , Rfl:     oxyCODONE-acetaminophen (PERCOCET) 5-325 MG per tablet, Take 1 tablet by mouth as needed . , Disp: , Rfl:     topiramate (TOPAMAX) 100 MG tablet, Take 300 mg by mouth daily , Disp: , Rfl:     meperidine (DEMEROL) 50 MG tablet, TAKE 1 TABLET BY MOUTH DAILY AT TIME OF HEADACHE FOR 7 DAYS, Disp: , Rfl: 0    ZOMIG 5 MG nasal solution, once as needed , Disp: , Rfl:     aspirin 81 MG EC tablet, Take 81 mg by mouth daily. , Disp: , Rfl:     Review of Systems  Review of Systems    All other systems reviewed and are negative.   Hot flashes , mood swings, anxiety   Physical exam :   General Appearance: alert and oriented to person, placeand time, well-developed and well-nourished, in no acute distress  Pulmonary/Chest:clear to auscultation bilaterally- no wheezes, rales or rhonchi,

## 2019-09-29 NOTE — PROGRESS NOTES
2    Para   2    Term                AB        Living           SAB        TAB        Ectopic        Molar        Multiple        Live Births                  Family History   Problem Relation Age of Onset   Jennifer Pham Cancer Father         ear and head    High Blood Pressure Father     Cancer Brother         gall bladder cancer    Other Mother         dementia    High Blood Pressure Mother     Other Sister         gout    Diabetes Brother     No Known Problems Son     No Known Problems Daughter      Social History     Socioeconomic History    Marital status:      Spouse name: Not on file    Number of children: Not on file    Years of education: Not on file    Highest education level: Not on file   Occupational History    Not on file   Social Needs    Financial resource strain: Not on file    Food insecurity:     Worry: Not on file     Inability: Not on file    Transportation needs:     Medical: Not on file     Non-medical: Not on file   Tobacco Use    Smoking status: Never Smoker    Smokeless tobacco: Never Used   Substance and Sexual Activity    Alcohol use: Yes     Comment: socially    Drug use: No    Sexual activity: Yes   Lifestyle    Physical activity:     Days per week: Not on file     Minutes per session: Not on file    Stress: Not on file   Relationships    Social connections:     Talks on phone: Not on file     Gets together: Not on file     Attends [employer] service: Not on file     Active member of club or organization: Not on file     Attends meetings of clubs or organizations: Not on file     Relationship status: Not on file    Intimate partner violence:     Fear of current or ex partner: Not on file     Emotionally abused: Not on file     Physically abused: Not on file     Forced sexual activity: Not on file   Other Topics Concern    Not on file   Social History Narrative    Not on file         Patient's medications,allergies, past medical, surgical, social distress  Pulmonary/Chest:clear to auscultation bilaterally- no wheezes, rales or rhonchi, normal air movement,no respiratory distress  Cardiovascular: normal rate, normal S1 and S2, no gallops,intact distal pulses and no carotid bruits  Abdomen: soft, non-tender  Pelvic:deferred    Assessment:      Diagnosis Orders   1. Current mild episode of major depressive disorder without prior episode ([address]     2. Hot flashes         Was medication effective in resolving symptomsfor patient ? yes - not satisfied  Plan: To start antidepressant   foloow up in 2 weeks   Continue HRT   Counseling on Hormonal Based Menopausal Management:    The patient was seen and counseled on all forms of hormonal and non-hormonal based medical management of her menopausal symptoms. She is aware that hormonal based medications may increase her risk of developing a blood clot which may increase her morbidity and or mortality. She was counseled on alternate non hormonal based medication options. We discussed that smoking and any hormonal based medication may increase the patients risks of developing these life threatening blood clots. All patients are encouraged to stop smoking at the time of hormonal-based medication counseling. Cessation programs were reviewed. The life threatening side effect profile was reviewed in detail this includes but is not limited to shortness of breath, chest pain, severe or persistent headaches, or calf pain. If any of these occur the patient has been instructed to stop using her hormonal based medication, notify the office, and go to the emergency department or call 911. The patient denied any personal history of blood clots in her leg, lung, or heart and denied any family history of stroke, TIA, sudden cardiac death < 36 y.o.,pulmonary embolism, or deep venous thrombosis. No orders of the defined types were placed in this encounter.     Orders Placed This Encounter   Medications    PARoxetine (PAXIL) 20 MG tablet     Sig: Take 1 tablet by mouth every morning     Dispense:  30 tablet     Refill:  3     Patient was seen with total face to face time of 15 minutes. More than 50% of this visit was counseling and education regarding The primary encounter diagnosis was Current mild episode of major depressive disorder without prior episode ([facility] A diagnosis of Hot flashes was also pertinent to this visit. and Discuss Medications (Climara patch. )   as well as  counseling on preventative health maintenance follow-up. Follow up:  Return in about 4 weeks (around 9/27/2019) for medication assessment.         Liane Ngo MD

## 2019-10-21 ENCOUNTER — OFFICE VISIT (OUTPATIENT)
Dept: NEUROLOGY | Age: 64
End: 2019-10-21
Payer: COMMERCIAL

## 2019-10-21 VITALS
HEART RATE: 76 BPM | WEIGHT: 164.5 LBS | SYSTOLIC BLOOD PRESSURE: 104 MMHG | BODY MASS INDEX: 28.09 KG/M2 | DIASTOLIC BLOOD PRESSURE: 69 MMHG | HEIGHT: 64 IN

## 2019-10-21 DIAGNOSIS — M54.2 CERVICALGIA: ICD-10-CM

## 2019-10-21 DIAGNOSIS — G43.111 INTRACTABLE MIGRAINE WITH AURA WITH STATUS MIGRAINOSUS: Primary | ICD-10-CM

## 2019-10-21 PROCEDURE — 99214 OFFICE O/P EST MOD 30 MIN: CPT | Performed by: PSYCHIATRY & NEUROLOGY

## 2019-10-21 RX ORDER — ERENUMAB-AOOE 140 MG/ML
INJECTION, SOLUTION SUBCUTANEOUS
Refills: 3 | COMMUNITY
Start: 2019-10-15 | End: 2019-10-21

## 2019-10-21 ASSESSMENT — ENCOUNTER SYMPTOMS
BACK PAIN: 0
TROUBLE SWALLOWING: 0
SHORTNESS OF BREATH: 0
NAUSEA: 0
PHOTOPHOBIA: 0
CHOKING: 0
VOMITING: 0

## 2019-10-23 ENCOUNTER — OFFICE VISIT (OUTPATIENT)
Dept: OBGYN CLINIC | Age: 64
End: 2019-10-23
Payer: COMMERCIAL

## 2019-10-23 VITALS
BODY MASS INDEX: 28 KG/M2 | HEIGHT: 64 IN | SYSTOLIC BLOOD PRESSURE: 102 MMHG | HEART RATE: 84 BPM | WEIGHT: 164 LBS | DIASTOLIC BLOOD PRESSURE: 64 MMHG

## 2019-10-23 DIAGNOSIS — R42 DIZZINESS: Primary | ICD-10-CM

## 2019-10-23 DIAGNOSIS — R42 DIZZINESS: ICD-10-CM

## 2019-10-23 LAB
ALBUMIN SERPL-MCNC: 4.2 G/DL (ref 3.5–4.6)
ALP BLD-CCNC: 92 U/L (ref 40–130)
ALT SERPL-CCNC: 16 U/L (ref 0–33)
ANION GAP SERPL CALCULATED.3IONS-SCNC: 15 MEQ/L (ref 9–15)
AST SERPL-CCNC: 18 U/L (ref 0–35)
BASOPHILS ABSOLUTE: 0 K/UL (ref 0–0.2)
BASOPHILS RELATIVE PERCENT: 1 %
BILIRUB SERPL-MCNC: <0.2 MG/DL (ref 0.2–0.7)
BUN BLDV-MCNC: 16 MG/DL (ref 8–23)
CALCIUM SERPL-MCNC: 9 MG/DL (ref 8.5–9.9)
CHLORIDE BLD-SCNC: 106 MEQ/L (ref 95–107)
CO2: 23 MEQ/L (ref 20–31)
CREAT SERPL-MCNC: 1.09 MG/DL (ref 0.5–0.9)
EOSINOPHILS ABSOLUTE: 0.1 K/UL (ref 0–0.7)
EOSINOPHILS RELATIVE PERCENT: 3.4 %
GFR AFRICAN AMERICAN: >60
GFR NON-AFRICAN AMERICAN: 50.8
GLOBULIN: 3 G/DL (ref 2.3–3.5)
GLUCOSE BLD-MCNC: 84 MG/DL (ref 70–99)
HCT VFR BLD CALC: 42.2 % (ref 37–47)
HEMOGLOBIN: 13.7 G/DL (ref 12–16)
LYMPHOCYTES ABSOLUTE: 1 K/UL (ref 1–4.8)
LYMPHOCYTES RELATIVE PERCENT: 22.8 %
MCH RBC QN AUTO: 32.4 PG (ref 27–31.3)
MCHC RBC AUTO-ENTMCNC: 32.5 % (ref 33–37)
MCV RBC AUTO: 99.8 FL (ref 82–100)
MONOCYTES ABSOLUTE: 0.4 K/UL (ref 0.2–0.8)
MONOCYTES RELATIVE PERCENT: 8.1 %
NEUTROPHILS ABSOLUTE: 2.8 K/UL (ref 1.4–6.5)
NEUTROPHILS RELATIVE PERCENT: 64.7 %
PDW BLD-RTO: 13.5 % (ref 11.5–14.5)
PLATELET # BLD: 281 K/UL (ref 130–400)
POTASSIUM SERPL-SCNC: 4.1 MEQ/L (ref 3.4–4.9)
RBC # BLD: 4.23 M/UL (ref 4.2–5.4)
SODIUM BLD-SCNC: 144 MEQ/L (ref 135–144)
TOTAL PROTEIN: 7.2 G/DL (ref 6.3–8)
WBC # BLD: 4.3 K/UL (ref 4.8–10.8)

## 2019-10-23 PROCEDURE — 99213 OFFICE O/P EST LOW 20 MIN: CPT | Performed by: OBSTETRICS & GYNECOLOGY

## 2019-10-23 PROCEDURE — 3017F COLORECTAL CA SCREEN DOC REV: CPT | Performed by: OBSTETRICS & GYNECOLOGY

## 2019-10-23 PROCEDURE — 1036F TOBACCO NON-USER: CPT | Performed by: OBSTETRICS & GYNECOLOGY

## 2019-10-23 PROCEDURE — G8484 FLU IMMUNIZE NO ADMIN: HCPCS | Performed by: OBSTETRICS & GYNECOLOGY

## 2019-10-23 PROCEDURE — G8427 DOCREV CUR MEDS BY ELIG CLIN: HCPCS | Performed by: OBSTETRICS & GYNECOLOGY

## 2019-10-23 PROCEDURE — G8419 CALC BMI OUT NRM PARAM NOF/U: HCPCS | Performed by: OBSTETRICS & GYNECOLOGY

## 2019-10-30 ENCOUNTER — OFFICE VISIT (OUTPATIENT)
Dept: OBGYN CLINIC | Age: 64
End: 2019-10-30
Payer: COMMERCIAL

## 2019-10-30 VITALS
BODY MASS INDEX: 28.68 KG/M2 | HEIGHT: 64 IN | HEART RATE: 76 BPM | SYSTOLIC BLOOD PRESSURE: 94 MMHG | DIASTOLIC BLOOD PRESSURE: 64 MMHG | WEIGHT: 168 LBS

## 2019-10-30 DIAGNOSIS — N39.41 URGE URINARY INCONTINENCE: Primary | ICD-10-CM

## 2019-10-30 DIAGNOSIS — T88.7XXA MEDICATION SIDE EFFECT: ICD-10-CM

## 2019-10-30 PROCEDURE — G8484 FLU IMMUNIZE NO ADMIN: HCPCS | Performed by: OBSTETRICS & GYNECOLOGY

## 2019-10-30 PROCEDURE — 3017F COLORECTAL CA SCREEN DOC REV: CPT | Performed by: OBSTETRICS & GYNECOLOGY

## 2019-10-30 PROCEDURE — 1036F TOBACCO NON-USER: CPT | Performed by: OBSTETRICS & GYNECOLOGY

## 2019-10-30 PROCEDURE — 99214 OFFICE O/P EST MOD 30 MIN: CPT | Performed by: OBSTETRICS & GYNECOLOGY

## 2019-10-30 PROCEDURE — G8428 CUR MEDS NOT DOCUMENT: HCPCS | Performed by: OBSTETRICS & GYNECOLOGY

## 2019-10-30 PROCEDURE — G8419 CALC BMI OUT NRM PARAM NOF/U: HCPCS | Performed by: OBSTETRICS & GYNECOLOGY

## 2019-10-30 RX ORDER — PAROXETINE HYDROCHLORIDE 20 MG/1
20 TABLET, FILM COATED ORAL EVERY MORNING
Qty: 30 TABLET | Refills: 3 | Status: SHIPPED | OUTPATIENT
Start: 2019-10-30 | End: 2020-03-02 | Stop reason: SDUPTHER

## 2019-10-30 RX ORDER — OXYBUTYNIN CHLORIDE 5 MG/1
5 TABLET, EXTENDED RELEASE ORAL DAILY
Qty: 15 TABLET | Refills: 0 | Status: SHIPPED | OUTPATIENT
Start: 2019-10-30 | End: 2019-11-19 | Stop reason: SDUPTHER

## 2019-11-19 ENCOUNTER — OFFICE VISIT (OUTPATIENT)
Dept: OBGYN CLINIC | Age: 64
End: 2019-11-19
Payer: COMMERCIAL

## 2019-11-19 VITALS
SYSTOLIC BLOOD PRESSURE: 104 MMHG | WEIGHT: 169 LBS | HEIGHT: 64 IN | HEART RATE: 64 BPM | BODY MASS INDEX: 28.85 KG/M2 | DIASTOLIC BLOOD PRESSURE: 68 MMHG

## 2019-11-19 DIAGNOSIS — N39.41 URGE URINARY INCONTINENCE: Primary | ICD-10-CM

## 2019-11-19 PROCEDURE — 1036F TOBACCO NON-USER: CPT | Performed by: OBSTETRICS & GYNECOLOGY

## 2019-11-19 PROCEDURE — 99213 OFFICE O/P EST LOW 20 MIN: CPT | Performed by: OBSTETRICS & GYNECOLOGY

## 2019-11-19 PROCEDURE — 3017F COLORECTAL CA SCREEN DOC REV: CPT | Performed by: OBSTETRICS & GYNECOLOGY

## 2019-11-19 PROCEDURE — G8484 FLU IMMUNIZE NO ADMIN: HCPCS | Performed by: OBSTETRICS & GYNECOLOGY

## 2019-11-19 PROCEDURE — G8427 DOCREV CUR MEDS BY ELIG CLIN: HCPCS | Performed by: OBSTETRICS & GYNECOLOGY

## 2019-11-19 PROCEDURE — G8419 CALC BMI OUT NRM PARAM NOF/U: HCPCS | Performed by: OBSTETRICS & GYNECOLOGY

## 2019-11-19 RX ORDER — OXYBUTYNIN CHLORIDE 5 MG/1
5 TABLET, EXTENDED RELEASE ORAL DAILY
Qty: 30 TABLET | Refills: 6 | Status: SHIPPED | OUTPATIENT
Start: 2019-11-19 | End: 2020-02-20 | Stop reason: ALTCHOICE

## 2020-01-07 ENCOUNTER — OFFICE VISIT (OUTPATIENT)
Dept: FAMILY MEDICINE CLINIC | Age: 65
End: 2020-01-07
Payer: COMMERCIAL

## 2020-01-07 VITALS
TEMPERATURE: 97.4 F | OXYGEN SATURATION: 98 % | HEART RATE: 65 BPM | WEIGHT: 170.2 LBS | DIASTOLIC BLOOD PRESSURE: 64 MMHG | HEIGHT: 64 IN | SYSTOLIC BLOOD PRESSURE: 112 MMHG | BODY MASS INDEX: 29.06 KG/M2

## 2020-01-07 DIAGNOSIS — N30.01 ACUTE CYSTITIS WITH HEMATURIA: ICD-10-CM

## 2020-01-07 LAB
BILIRUBIN, POC: NORMAL
BLOOD URINE, POC: NORMAL
CLARITY, POC: NORMAL
COLOR, POC: NORMAL
GLUCOSE URINE, POC: NORMAL
KETONES, POC: NORMAL
LEUKOCYTE EST, POC: NORMAL
NITRITE, POC: NORMAL
PH, POC: 6
PROTEIN, POC: NORMAL
SPECIFIC GRAVITY, POC: 1.01
UROBILINOGEN, POC: 0.2

## 2020-01-07 PROCEDURE — 3017F COLORECTAL CA SCREEN DOC REV: CPT | Performed by: NURSE PRACTITIONER

## 2020-01-07 PROCEDURE — 99213 OFFICE O/P EST LOW 20 MIN: CPT | Performed by: NURSE PRACTITIONER

## 2020-01-07 PROCEDURE — 81002 URINALYSIS NONAUTO W/O SCOPE: CPT | Performed by: NURSE PRACTITIONER

## 2020-01-07 PROCEDURE — G8427 DOCREV CUR MEDS BY ELIG CLIN: HCPCS | Performed by: NURSE PRACTITIONER

## 2020-01-07 PROCEDURE — G8419 CALC BMI OUT NRM PARAM NOF/U: HCPCS | Performed by: NURSE PRACTITIONER

## 2020-01-07 PROCEDURE — 1036F TOBACCO NON-USER: CPT | Performed by: NURSE PRACTITIONER

## 2020-01-07 PROCEDURE — G8484 FLU IMMUNIZE NO ADMIN: HCPCS | Performed by: NURSE PRACTITIONER

## 2020-01-07 RX ORDER — SULFAMETHOXAZOLE AND TRIMETHOPRIM 800; 160 MG/1; MG/1
1 TABLET ORAL 2 TIMES DAILY
Qty: 14 TABLET | Refills: 0 | Status: SHIPPED | OUTPATIENT
Start: 2020-01-07 | End: 2020-01-14

## 2020-01-07 SDOH — ECONOMIC STABILITY: FOOD INSECURITY: WITHIN THE PAST 12 MONTHS, YOU WORRIED THAT YOUR FOOD WOULD RUN OUT BEFORE YOU GOT MONEY TO BUY MORE.: NEVER TRUE

## 2020-01-07 SDOH — ECONOMIC STABILITY: INCOME INSECURITY: HOW HARD IS IT FOR YOU TO PAY FOR THE VERY BASICS LIKE FOOD, HOUSING, MEDICAL CARE, AND HEATING?: NOT HARD AT ALL

## 2020-01-07 SDOH — ECONOMIC STABILITY: FOOD INSECURITY: WITHIN THE PAST 12 MONTHS, THE FOOD YOU BOUGHT JUST DIDN'T LAST AND YOU DIDN'T HAVE MONEY TO GET MORE.: NEVER TRUE

## 2020-01-07 SDOH — ECONOMIC STABILITY: TRANSPORTATION INSECURITY
IN THE PAST 12 MONTHS, HAS THE LACK OF TRANSPORTATION KEPT YOU FROM MEDICAL APPOINTMENTS OR FROM GETTING MEDICATIONS?: NO

## 2020-01-07 SDOH — ECONOMIC STABILITY: TRANSPORTATION INSECURITY
IN THE PAST 12 MONTHS, HAS LACK OF TRANSPORTATION KEPT YOU FROM MEETINGS, WORK, OR FROM GETTING THINGS NEEDED FOR DAILY LIVING?: NO

## 2020-01-07 ASSESSMENT — PATIENT HEALTH QUESTIONNAIRE - PHQ9
SUM OF ALL RESPONSES TO PHQ QUESTIONS 1-9: 0
SUM OF ALL RESPONSES TO PHQ QUESTIONS 1-9: 0
1. LITTLE INTEREST OR PLEASURE IN DOING THINGS: 0
SUM OF ALL RESPONSES TO PHQ9 QUESTIONS 1 & 2: 0
2. FEELING DOWN, DEPRESSED OR HOPELESS: 0

## 2020-01-07 ASSESSMENT — ENCOUNTER SYMPTOMS: BACK PAIN: 1

## 2020-01-07 NOTE — PROGRESS NOTES
COLONOSCOPY performed by Boni Lopez MD at [facility] 9 W/ADJ VAG,W/LOOP BX N/A 9/26/2018    LEEP performed by Chucky Singh MD at [address] [place] [address] N/A 9/26/2018    DILATATION AND CURETTAGE HYSTEROSCOPY performed by Chucky Singh MD at [facility] 2000    RCR     Family History   Problem Relation Age of Onset    Cancer Father         ear and head    High Blood Pressure Father     Cancer Brother         gall bladder cancer    Other Mother         dementia    High Blood Pressure Mother     Other Sister         gout    Diabetes Brother     No Known Problems Son     No Known Problems Daughter      Social History     Socioeconomic History    Marital status:      Spouse name: None    Number of children: None    Years of education: None    Highest education level: None   Occupational History    None   Social Needs    Financial resource strain: Not hard at all   Chacha-Raghu insecurity:     Worry: Never true     Inability: Never true    Transportation needs:     Medical: No     Non-medical: No   Tobacco Use    Smoking status: Never Smoker    Smokeless tobacco: Never Used   Substance and Sexual Activity    Alcohol use: Yes     Comment: socially    Drug use: No    Sexual activity: Yes   Lifestyle    Physical activity:     Days per week: None     Minutes per session: None    Stress: None   Relationships    Social connections:     Talks on phone: None     Gets together: None     Attends Adventism service: None     Active member of club or organization: None     Attends meetings of clubs or organizations: None     Relationship status: None    Intimate partner violence:     Fear of current or ex partner: None     Emotionally abused: None     Physically abused: None     Forced sexual activity: None   Other Topics Concern    None   Social History Narrative    None     Current Outpatient Medications on File Prior to Visit   Medication Sig Dispense Refill    PARoxetine (PAXIL) 20 MG tablet Take 1 tablet by mouth every morning 30 tablet 3    estradiol (CLIMARA) 0.1 MG/24HR Place 1 patch onto the skin once a week 12 patch 4    Erenumab-aooe (AIMOVIG SC) Inject into the skin every 30 days      SUMAtriptan Succinate 3 MG/0.5ML SOAJ Inject into the skin as needed       topiramate (TOPAMAX) 100 MG tablet Take 300 mg by mouth daily       ZOMIG 5 MG nasal solution once as needed       aspirin 81 MG EC tablet Take 81 mg by mouth daily.  oxybutynin (DITROPAN-XL) 5 MG extended release tablet Take 1 tablet by mouth daily (Patient not taking: Reported on 1/7/2020) 30 tablet 6    acetaminophen (APAP EXTRA STRENGTH) 500 MG tablet Take 2 tablets by mouth every 6 hours as needed for Pain (Patient not taking: Reported on 1/7/2020) 60 tablet 1    meperidine (DEMEROL) 50 MG tablet TAKE 1 TABLET BY MOUTH DAILY AT TIME OF HEADACHE FOR 7 DAYS  0     No current facility-administered medications on file prior to visit. Allergies   Allergen Reactions    Erythromycin Hives    Seasonal Other (See Comments)     Pollen causes sinus sx    Sporanox [Itraconazole] Hives    Macrobid [Nitrofurantoin Macrocrystal] Other (See Comments)     Migraine, vomiting, fever/chills       Review of Systems   Constitutional: Negative for fatigue and fever. Genitourinary: Positive for flank pain, frequency and urgency. Musculoskeletal: Positive for back pain. Objective  Vitals:    01/07/20 1321   BP: 112/64   Pulse: 65   Temp: 97.4 °F (36.3 °C)   SpO2: 98%   Weight: 170 lb 3.2 oz (77.2 kg)   Height: 5' 4\" (1.626 m)     Physical Exam  Vitals signs and nursing note reviewed. Constitutional:       Appearance: Normal appearance. She is normal weight. HENT:      Head: Normocephalic. Mouth/Throat:      Mouth: Mucous membranes are moist.   Cardiovascular:      Rate and Rhythm: Normal rate and regular rhythm.       Pulses: Normal pulses. Heart sounds: Normal heart sounds. Pulmonary:      Effort: Pulmonary effort is normal.      Breath sounds: Normal breath sounds. Abdominal:      Palpations: Abdomen is soft. Tenderness: There is tenderness (mild in lower abdomen). Skin:     General: Skin is warm. Neurological:      General: No focal deficit present. Mental Status: She is alert and oriented to person, place, and time. Mental status is at baseline. Psychiatric:         Mood and Affect: Mood normal.         Behavior: Behavior normal.         Thought Content: Thought content normal.         Judgment: Judgment normal.         Assessment & Plan     Diagnosis Orders   1. Acute cystitis with hematuria  Urine Culture    sulfamethoxazole-trimethoprim (BACTRIM DS) 800-160 MG per tablet   2. Urinary frequency  POCT Urinalysis no Micro       Orders Placed This Encounter   Procedures    Urine Culture     Standing Status:   Future     Standing Expiration Date:   1/7/2021     Order Specific Question:   Specify (ex-cath, midstream, cysto, etc)? Answer:   midstream    POCT Urinalysis no Micro       Orders Placed This Encounter   Medications    sulfamethoxazole-trimethoprim (BACTRIM DS) 800-160 MG per tablet     Sig: Take 1 tablet by mouth 2 times daily for 7 days     Dispense:  14 tablet     Refill:  0     Side effects, adverse effects of the medication prescribed today, as well as treatment plan/ rationale and result expectations have been discussed with the patient who expresses understanding and desires to proceed. Close follow up to evaluate treatment results and for coordination of care. I have reviewed the patient's medical history in detail and updated the computerized patient record. As always, patient is advised that if symptoms worsen in any way they will proceed to the nearest emergency room. Gus best.      Aurora Cruz, LISA - CNP

## 2020-01-09 LAB — URINE CULTURE, ROUTINE: NORMAL

## 2020-01-31 ENCOUNTER — TELEPHONE (OUTPATIENT)
Dept: OBGYN CLINIC | Age: 65
End: 2020-01-31

## 2020-01-31 RX ORDER — OXYBUTYNIN CHLORIDE 10 MG/1
10 TABLET, EXTENDED RELEASE ORAL DAILY
Qty: 30 TABLET | Refills: 1 | Status: SHIPPED | OUTPATIENT
Start: 2020-01-31 | End: 2020-03-02 | Stop reason: SDUPTHER

## 2020-01-31 RX ORDER — OXYBUTYNIN CHLORIDE 10 MG/1
10 TABLET, EXTENDED RELEASE ORAL DAILY
Qty: 30 TABLET | Refills: 1 | Status: SHIPPED | OUTPATIENT
Start: 2020-01-31 | End: 2020-01-31 | Stop reason: SDUPTHER

## 2020-01-31 NOTE — TELEPHONE ENCOUNTER
Pt is calling stating the oxybutinin 5 mg is not working like it used to. She is starting to get the sudden urges again and wants to know if she can have the medication dose increased. She is currently on vacation and has 19 pills left. Pt would need this to go to [facility] in [employer] in [address]  [id number] [facility]

## 2020-02-20 ENCOUNTER — OFFICE VISIT (OUTPATIENT)
Dept: FAMILY MEDICINE CLINIC | Age: 65
End: 2020-02-20
Payer: COMMERCIAL

## 2020-02-20 VITALS
TEMPERATURE: 97.5 F | WEIGHT: 168.2 LBS | HEART RATE: 66 BPM | HEIGHT: 64 IN | DIASTOLIC BLOOD PRESSURE: 76 MMHG | SYSTOLIC BLOOD PRESSURE: 122 MMHG | OXYGEN SATURATION: 98 % | BODY MASS INDEX: 28.71 KG/M2

## 2020-02-20 PROCEDURE — G8419 CALC BMI OUT NRM PARAM NOF/U: HCPCS | Performed by: NURSE PRACTITIONER

## 2020-02-20 PROCEDURE — 1036F TOBACCO NON-USER: CPT | Performed by: NURSE PRACTITIONER

## 2020-02-20 PROCEDURE — G8484 FLU IMMUNIZE NO ADMIN: HCPCS | Performed by: NURSE PRACTITIONER

## 2020-02-20 PROCEDURE — 3017F COLORECTAL CA SCREEN DOC REV: CPT | Performed by: NURSE PRACTITIONER

## 2020-02-20 PROCEDURE — G8427 DOCREV CUR MEDS BY ELIG CLIN: HCPCS | Performed by: NURSE PRACTITIONER

## 2020-02-20 PROCEDURE — 99213 OFFICE O/P EST LOW 20 MIN: CPT | Performed by: NURSE PRACTITIONER

## 2020-02-20 RX ORDER — AMOXICILLIN AND CLAVULANATE POTASSIUM 875; 125 MG/1; MG/1
1 TABLET, FILM COATED ORAL 2 TIMES DAILY
Qty: 20 TABLET | Refills: 0 | Status: SHIPPED | OUTPATIENT
Start: 2020-02-20 | End: 2020-03-01

## 2020-02-20 RX ORDER — BENZONATATE 200 MG/1
200 CAPSULE ORAL 3 TIMES DAILY PRN
Qty: 21 CAPSULE | Refills: 0 | Status: SHIPPED | OUTPATIENT
Start: 2020-02-20 | End: 2020-02-27

## 2020-02-20 ASSESSMENT — ENCOUNTER SYMPTOMS
COUGH: 1
CONSTIPATION: 1
SINUS PAIN: 1
WHEEZING: 0

## 2020-02-28 RX ORDER — ESTRADIOL 0.1 MG/D
PATCH TRANSDERMAL
Qty: 12 PATCH | Refills: 4 | Status: SHIPPED | OUTPATIENT
Start: 2020-02-28 | End: 2021-03-22

## 2020-03-02 RX ORDER — PAROXETINE HYDROCHLORIDE 20 MG/1
20 TABLET, FILM COATED ORAL EVERY MORNING
Qty: 90 TABLET | Refills: 3 | Status: SHIPPED | OUTPATIENT
Start: 2020-03-02 | End: 2020-08-12

## 2020-03-03 RX ORDER — OXYBUTYNIN CHLORIDE 10 MG/1
10 TABLET, EXTENDED RELEASE ORAL DAILY
Qty: 90 TABLET | Refills: 1 | Status: SHIPPED | OUTPATIENT
Start: 2020-03-03 | End: 2020-08-12 | Stop reason: SDUPTHER

## 2020-04-07 RX ORDER — TOPIRAMATE 100 MG/1
300 TABLET, FILM COATED ORAL DAILY
Qty: 270 TABLET | Refills: 3 | Status: SHIPPED | OUTPATIENT
Start: 2020-04-07 | End: 2020-08-31 | Stop reason: SDUPTHER

## 2020-05-13 ENCOUNTER — TELEPHONE (OUTPATIENT)
Dept: NEUROLOGY | Age: 65
End: 2020-05-13

## 2020-05-18 ENCOUNTER — OFFICE VISIT (OUTPATIENT)
Dept: FAMILY MEDICINE CLINIC | Age: 65
End: 2020-05-18
Payer: COMMERCIAL

## 2020-05-18 VITALS
WEIGHT: 164 LBS | SYSTOLIC BLOOD PRESSURE: 118 MMHG | TEMPERATURE: 98.2 F | DIASTOLIC BLOOD PRESSURE: 74 MMHG | OXYGEN SATURATION: 99 % | HEIGHT: 64 IN | HEART RATE: 72 BPM | BODY MASS INDEX: 28 KG/M2

## 2020-05-18 DIAGNOSIS — L65.9 HAIR THINNING: ICD-10-CM

## 2020-05-18 DIAGNOSIS — R53.83 FATIGUE, UNSPECIFIED TYPE: ICD-10-CM

## 2020-05-18 LAB — TSH SERPL DL<=0.05 MIU/L-ACNC: 1.76 UIU/ML (ref 0.44–3.86)

## 2020-05-18 PROCEDURE — G8427 DOCREV CUR MEDS BY ELIG CLIN: HCPCS | Performed by: NURSE PRACTITIONER

## 2020-05-18 PROCEDURE — 1036F TOBACCO NON-USER: CPT | Performed by: NURSE PRACTITIONER

## 2020-05-18 PROCEDURE — G8419 CALC BMI OUT NRM PARAM NOF/U: HCPCS | Performed by: NURSE PRACTITIONER

## 2020-05-18 PROCEDURE — 3017F COLORECTAL CA SCREEN DOC REV: CPT | Performed by: NURSE PRACTITIONER

## 2020-05-18 PROCEDURE — 99213 OFFICE O/P EST LOW 20 MIN: CPT | Performed by: NURSE PRACTITIONER

## 2020-05-18 RX ORDER — HYDROXYZINE HYDROCHLORIDE 25 MG/1
25 TABLET, FILM COATED ORAL 4 TIMES DAILY PRN
Qty: 40 TABLET | Refills: 0 | Status: SHIPPED | OUTPATIENT
Start: 2020-05-18 | End: 2020-05-28

## 2020-05-18 RX ORDER — METHYLPREDNISOLONE 4 MG/1
TABLET ORAL
Qty: 1 KIT | Refills: 0 | Status: SHIPPED | OUTPATIENT
Start: 2020-05-18 | End: 2020-05-24

## 2020-05-18 ASSESSMENT — ENCOUNTER SYMPTOMS
ABDOMINAL PAIN: 0
EYE DISCHARGE: 1
SORE THROAT: 0
SHORTNESS OF BREATH: 0
FACIAL SWELLING: 1
TROUBLE SWALLOWING: 0
EYE ITCHING: 1

## 2020-05-27 ENCOUNTER — OFFICE VISIT (OUTPATIENT)
Dept: FAMILY MEDICINE CLINIC | Age: 65
End: 2020-05-27
Payer: COMMERCIAL

## 2020-05-27 VITALS
DIASTOLIC BLOOD PRESSURE: 70 MMHG | TEMPERATURE: 98.2 F | HEART RATE: 74 BPM | HEIGHT: 64 IN | RESPIRATION RATE: 12 BRPM | WEIGHT: 165 LBS | BODY MASS INDEX: 28.17 KG/M2 | SYSTOLIC BLOOD PRESSURE: 114 MMHG

## 2020-05-27 PROCEDURE — 99213 OFFICE O/P EST LOW 20 MIN: CPT | Performed by: NURSE PRACTITIONER

## 2020-05-27 PROCEDURE — G8427 DOCREV CUR MEDS BY ELIG CLIN: HCPCS | Performed by: NURSE PRACTITIONER

## 2020-05-27 PROCEDURE — 3017F COLORECTAL CA SCREEN DOC REV: CPT | Performed by: NURSE PRACTITIONER

## 2020-05-27 PROCEDURE — G8419 CALC BMI OUT NRM PARAM NOF/U: HCPCS | Performed by: NURSE PRACTITIONER

## 2020-05-27 PROCEDURE — 1036F TOBACCO NON-USER: CPT | Performed by: NURSE PRACTITIONER

## 2020-05-27 RX ORDER — VALACYCLOVIR HYDROCHLORIDE 1 G/1
1000 TABLET, FILM COATED ORAL 3 TIMES DAILY
Qty: 21 TABLET | Refills: 0 | Status: SHIPPED | OUTPATIENT
Start: 2020-05-27 | End: 2020-06-03

## 2020-05-27 ASSESSMENT — ENCOUNTER SYMPTOMS
EYE DISCHARGE: 1
COUGH: 0
EYE PAIN: 1
WHEEZING: 0
RHINORRHEA: 0
SHORTNESS OF BREATH: 0
EYE ITCHING: 1
SINUS PAIN: 0

## 2020-05-27 NOTE — PROGRESS NOTES
Subjective  Chief Complaint   Patient presents with    Eye Problem     1 week follow up       HPI     Pt reports for follow up of eye problem. Pt was seen on 5/18 for eye left eye pain and swelling. Pt has been taking atarax and medrol dose pack for the problem. She continue to have some pain, itching, and sensitivity around the left eye. Pt reports some skin breakdown on left side of nose below the eye. Pt denies any changes in vision.     Patient Active Problem List    Diagnosis Date Noted    Menopause 09/25/2019    Hot flashes due to menopause 09/25/2019    S/P LEEP 11/06/2018    PMB (postmenopausal bleeding) 11/06/2018    Thickened endometrium 11/06/2018    Cervical stenosis (uterine cervix) 11/06/2018    DUB (dysfunctional uterine bleeding)     Vaginal bleeding 09/19/2018    Stenosis of cervix 09/19/2018    Renal insufficiency     Migraine     Allergic reaction     Tinea unguium     Cervical spondylosis 03/04/2015    Cervical spinal stenosis 03/04/2015    Degenerative disc disease, cervical 03/04/2015    Radiculopathy affecting upper extremity 03/04/2015     Past Medical History:   Diagnosis Date    Allergic reaction     Arthritis     Cancer (HCC)     Cervical, dx by Dr. Vy Frost Cerebral artery occlusion with cerebral infarction ([facility] [address]     at age 52 / sx as a migraine with slurred speech & visual disturbance    Diverticulitis     GERD (gastroesophageal reflux disease)     History of conization of cervix     Migraine     Renal insufficiency     Tinea unguium      Past Surgical History:   Procedure Laterality Date    CARDIAC SURGERY      PFO surgery in age 48s    COLONOSCOPY  2018    HEMORRHOID SURGERY  2017    HYSTERECTOMY VAGINAL N/A 12/19/2018    TLH WITH BSO performed by Daquan Adkins MD at [address] [place] [address] DX W/PINA Roman 1978 PFRMD N/A 4/19/2018    COLONOSCOPY performed by Marlyn Eisenmenger, MD at [facility] 9 W/ADJ VAG,W/LOOP Comments: Skin breakdown/small open lesions with drainage. Left side of nose below left eye. Eyes:      General:         Left eye: Discharge present. Extraocular Movements: Extraocular movements intact. Right eye: Normal extraocular motion. Left eye: Normal extraocular motion. Conjunctiva/sclera:      Right eye: Right conjunctiva is not injected. Left eye: Left conjunctiva is not injected. Pupils: Pupils are equal, round, and reactive to light. Visual Fields: Right eye visual fields normal and left eye visual fields normal.   Cardiovascular:      Rate and Rhythm: Normal rate and regular rhythm. Pulses: Normal pulses. Heart sounds: Normal heart sounds. Pulmonary:      Effort: Pulmonary effort is normal.      Breath sounds: Normal breath sounds. Neurological:      Mental Status: She is alert. Assessment & Plan     Diagnosis Orders   1. Herpes zoster without complication  valACYclovir (VALTREX) 1 g tablet        No orders of the defined types were placed in this encounter. Orders Placed This Encounter   Medications    valACYclovir (VALTREX) 1 g tablet     Sig: Take 1 tablet by mouth 3 times daily for 7 days     Dispense:  21 tablet     Refill:  0     Side effects, adverse effects of the medication prescribed today, as well as treatment plan/ rationale and result expectations have been discussed with the patient who expresses understanding and desires to proceed. Close follow up to evaluate treatment results and for coordination of care. I have reviewed the patient's medical history in detail and updated the computerized patient record. As always, patient is advised that if symptoms worsen in any way they will proceed to the nearest emergency room. Pt going to see eye  this afternoon for evaluation due to potential shingles infection. She is to let me know if not continuing to improve.     Chhaya Sigala, APRN - CNP

## 2020-06-16 RX ORDER — ERENUMAB-AOOE 140 MG/ML
INJECTION, SOLUTION SUBCUTANEOUS
Qty: 3 ML | Refills: 0 | Status: SHIPPED | OUTPATIENT
Start: 2020-06-16 | End: 2020-06-17 | Stop reason: SDUPTHER

## 2020-06-17 RX ORDER — ERENUMAB-AOOE 140 MG/ML
140 INJECTION, SOLUTION SUBCUTANEOUS
Qty: 1 PEN | Refills: 3 | Status: SHIPPED | OUTPATIENT
Start: 2020-06-17 | End: 2020-09-28

## 2020-08-12 RX ORDER — PAROXETINE HYDROCHLORIDE 20 MG/1
TABLET, FILM COATED ORAL
Qty: 30 TABLET | Refills: 3 | Status: SHIPPED | OUTPATIENT
Start: 2020-08-12 | End: 2020-08-12 | Stop reason: SDUPTHER

## 2020-08-12 RX ORDER — OXYBUTYNIN CHLORIDE 10 MG/1
10 TABLET, EXTENDED RELEASE ORAL DAILY
Qty: 90 TABLET | Refills: 1 | Status: SHIPPED | OUTPATIENT
Start: 2020-08-12 | End: 2021-01-05

## 2020-08-12 RX ORDER — PAROXETINE HYDROCHLORIDE 20 MG/1
20 TABLET, FILM COATED ORAL DAILY
Qty: 90 TABLET | Refills: 1 | Status: SHIPPED | OUTPATIENT
Start: 2020-08-12 | End: 2021-01-05

## 2020-08-31 ENCOUNTER — OFFICE VISIT (OUTPATIENT)
Dept: NEUROLOGY | Age: 65
End: 2020-08-31
Payer: COMMERCIAL

## 2020-08-31 VITALS
HEART RATE: 74 BPM | WEIGHT: 168.2 LBS | BODY MASS INDEX: 28.87 KG/M2 | DIASTOLIC BLOOD PRESSURE: 67 MMHG | SYSTOLIC BLOOD PRESSURE: 113 MMHG

## 2020-08-31 PROBLEM — C53.9 CERVICAL CANCER (HCC): Status: ACTIVE | Noted: 2017-04-28

## 2020-08-31 PROBLEM — G43.909 MIGRAINE: Status: ACTIVE | Noted: 2017-04-28

## 2020-08-31 PROBLEM — Q21.12 PFO (PATENT FORAMEN OVALE): Status: ACTIVE | Noted: 2017-04-28

## 2020-08-31 PROBLEM — I63.9 CEREBROVASCULAR ACCIDENT (HCC): Status: ACTIVE | Noted: 2017-04-28

## 2020-08-31 PROCEDURE — 1036F TOBACCO NON-USER: CPT | Performed by: PSYCHIATRY & NEUROLOGY

## 2020-08-31 PROCEDURE — G8419 CALC BMI OUT NRM PARAM NOF/U: HCPCS | Performed by: PSYCHIATRY & NEUROLOGY

## 2020-08-31 PROCEDURE — 99214 OFFICE O/P EST MOD 30 MIN: CPT | Performed by: PSYCHIATRY & NEUROLOGY

## 2020-08-31 PROCEDURE — 3017F COLORECTAL CA SCREEN DOC REV: CPT | Performed by: PSYCHIATRY & NEUROLOGY

## 2020-08-31 PROCEDURE — G8427 DOCREV CUR MEDS BY ELIG CLIN: HCPCS | Performed by: PSYCHIATRY & NEUROLOGY

## 2020-08-31 RX ORDER — OLOPATADINE HYDROCHLORIDE 2 MG/ML
SOLUTION/ DROPS OPHTHALMIC
COMMUNITY
Start: 2020-05-27 | End: 2022-05-10 | Stop reason: ALTCHOICE

## 2020-08-31 RX ORDER — TOPIRAMATE 100 MG/1
300 TABLET, FILM COATED ORAL DAILY
Qty: 270 TABLET | Refills: 3 | Status: SHIPPED | OUTPATIENT
Start: 2020-08-31 | End: 2021-06-23 | Stop reason: SDUPTHER

## 2020-08-31 RX ORDER — MEPERIDINE HYDROCHLORIDE 50 MG/1
TABLET ORAL
Qty: 7 TABLET | Refills: 0 | Status: SHIPPED | OUTPATIENT
Start: 2020-08-31 | End: 2020-09-30

## 2020-08-31 ASSESSMENT — ENCOUNTER SYMPTOMS
CHOKING: 0
NAUSEA: 0
VOMITING: 0
SHORTNESS OF BREATH: 0
COLOR CHANGE: 0
PHOTOPHOBIA: 0
BACK PAIN: 0
TROUBLE SWALLOWING: 0

## 2020-08-31 NOTE — PROGRESS NOTES
 [place] HYSTEROSCOPY,W/ENDOMETRIAL ABLATION N/A 9/26/2018    DILATATION AND CURETTAGE HYSTEROSCOPY performed by Desiree Anderson MD at [facility] [address]    R     Social History     Socioeconomic History    Marital status:      Spouse name: Not on file    Number of children: Not on file    Years of education: Not on file    Highest education level: Not on file   Occupational History    Not on file   Social Needs    Financial resource strain: Not hard at all    Food insecurity     Worry: Never true     Inability: Never true   [employer] needs     Medical: No     Non-medical: No   Tobacco Use    Smoking status: Never Smoker    Smokeless tobacco: Never Used   Substance and Sexual Activity    Alcohol use: Yes     Comment: socially    Drug use: No    Sexual activity: Yes   Lifestyle    Physical activity     Days per week: Not on file     Minutes per session: Not on file    Stress: Not on file   Relationships    Social connections     Talks on phone: Not on file     Gets together: Not on file     Attends Confucianist service: Not on file     Active member of club or organization: Not on file     Attends meetings of clubs or organizations: Not on file     Relationship status: Not on file    Intimate partner violence     Fear of current or ex partner: Not on file     Emotionally abused: Not on file     Physically abused: Not on file     Forced sexual activity: Not on file   Other Topics Concern    Not on file   Social History Narrative    Not on file     Family History   Problem Relation Age of Onset    Cancer Father         ear and head    High Blood Pressure Father     Cancer Brother         gall bladder cancer    Other Mother         dementia    High Blood Pressure Mother     Other Sister         gout    Diabetes Brother     No Known Problems Son     No Known Problems Daughter      Allergies   Allergen Reactions    Erythromycin Hives    Seasonal Other (See Comments)     Pollen causes sinus sx    Sporanox [Itraconazole] Hives    Macrobid [Nitrofurantoin Macrocrystal] Other (See Comments)     Migraine, vomiting, fever/chills       Current Outpatient Medications   Medication Sig Dispense Refill    topiramate (TOPAMAX) 100 MG tablet Take 3 tablets by mouth daily 270 tablet 3    meperidine (DEMEROL) 50 MG tablet TAKE 1 TABLET BY MOUTH DAILY AT TIME OF HEADACHE FOR 7 DAYS 7 tablet 0    oxybutynin (DITROPAN XL) 10 MG extended release tablet Take 1 tablet by mouth daily 90 tablet 1    PARoxetine (PAXIL) 20 MG tablet Take 1 tablet by mouth daily 90 tablet 1    CLIMARA 0.1 MG/24HR APPLY 1 PATCH TRANSDERMALLYONTO THE SKIN ONCE A WEEK 12 patch 4    acetaminophen (APAP EXTRA STRENGTH) 500 MG tablet Take 2 tablets by mouth every 6 hours as needed for Pain 60 tablet 1    Erenumab-aooe (AIMOVIG SC) Inject into the skin every 30 days      SUMAtriptan Succinate 3 MG/0.5ML SOAJ Inject into the skin as needed       ZOMIG 5 MG nasal solution once as needed       aspirin 81 MG EC tablet Take 81 mg by mouth daily.  olopatadine (PATADAY) 0.2 % SOLN ophthalmic solution INSTILL 1 DROP INTO BOTH EYES EVERY DAY      Erenumab-aooe (AIMOVIG) 140 MG/ML SOAJ Inject 140 mg into the skin every 30 days 1 pen 3     No current facility-administered medications for this visit. Review of Systems   Constitutional: Negative for fever. HENT: Negative for ear pain, tinnitus and trouble swallowing. Eyes: Negative for photophobia and visual disturbance. Respiratory: Negative for choking and shortness of breath. Cardiovascular: Negative for chest pain and palpitations. Gastrointestinal: Negative for nausea and vomiting. Musculoskeletal: Negative for back pain, gait problem, joint swelling, myalgias, neck pain and neck stiffness. Skin: Negative for color change. Allergic/Immunologic: Negative for food allergies.    Neurological: Negative for dizziness, tremors, HYSTERECTOMY. Us Pelvis Complete    Result Date: 2/1/2019  US PELVIS COMPLETE, US NON OB TRANSVAGINAL : 1/31/2019 CLINICAL HISTORY: R10.2 Pelvic pain ICD10. TLH and BSO 12/19/2018. COMPARISON: CT abdomen and pelvis 3/20/2018. TECHNIQUE: Transabdominal imaging was obtained to evaluate the entirety of the pelvis. Transvaginal scanning was performed for detailed analysis of the uterus and ovaries. FINDINGS: Since the previous CT, there has been previous hysterectomy and oophorectomy. There is no free fluid, abnormal adnexal masses, or other findings of concern identified. NEGATIVE PELVIC ULTRASOUND AFTER PREVIOUS HYSTERECTOMY. Lab Results   Component Value Date    WBC 4.3 10/23/2019    RBC 4.23 10/23/2019    HGB 13.7 10/23/2019    HCT 42.2 10/23/2019    MCV 99.8 10/23/2019    MCH 32.4 10/23/2019    MCHC 32.5 10/23/2019    RDW 13.5 10/23/2019     10/23/2019     Lab Results   Component Value Date     10/23/2019    K 4.1 10/23/2019     10/23/2019    CO2 23 10/23/2019    BUN 16 10/23/2019    CREATININE 1.09 10/23/2019    GFRAA >60.0 10/23/2019    LABGLOM 50.8 10/23/2019    GLUCOSE 84 10/23/2019    PROT 7.2 10/23/2019    LABALBU 4.2 10/23/2019    CALCIUM 9.0 10/23/2019    BILITOT <0.2 10/23/2019    ALKPHOS 92 10/23/2019    AST 18 10/23/2019    ALT 16 10/23/2019     Lab Results   Component Value Date    PROTIME 10.7 11/07/2018    INR 1.0 11/07/2018     Lab Results   Component Value Date    TSH 1.760 05/18/2020     Lab Results   Component Value Date    TRIG 82 05/13/2016    HDL 81 05/13/2016    LDLCALC 146 05/13/2016     Lab Results   Component Value Date    ETOH <10 11/07/2018     No results found for: LITHIUM, DILFRTOT, VALPROATE    Assessment:       Diagnosis Orders   1. Intractable migraine with aura with status migrainosus  meperidine (DEMEROL) 50 MG tablet   2. Cervical spondylosis     3.  PFO (patent foramen ovale)     Intractable  migraine headaches which have not responded to multiple medications. For now she is on topiramate 300 mg daily she was on 600 mg we were able to decrease this once she started on Aimovig. Her headache frequency has improved considerably though she still breakthrough status migrainous on many occasions at that time she uses Zomig sumatriptan and what ever helps though this causes side effects. She does respond very well to Demerol which I am not quite sure is still available in the market if not we may have to do Dilaudid or Stadol. I recommended that we try not take as this is long-acting and may prevent a status migrainous. Patient had a PFO which was treated but did not help her migraine headaches. This was many years ago and we do not have any records of the same    [place] are checked      Plan:      No orders of the defined types were placed in this encounter. Orders Placed This Encounter   Medications    topiramate (TOPAMAX) 100 MG tablet     Sig: Take 3 tablets by mouth daily     Dispense:  270 tablet     Refill:  3    meperidine (DEMEROL) 50 MG tablet     Sig: TAKE 1 TABLET BY MOUTH DAILY AT TIME OF HEADACHE FOR 7 DAYS     Dispense:  7 tablet     Refill:  0     Reduce doses taken as pain becomes manageable       No follow-ups on file.       Bernie Long MD

## 2020-09-01 RX ORDER — RIMEGEPANT SULFATE 75 MG/75MG
75 TABLET, ORALLY DISINTEGRATING ORAL DAILY PRN
Qty: 8 TABLET | Refills: 5 | OUTPATIENT
Start: 2020-09-01 | End: 2020-10-01

## 2020-09-10 ENCOUNTER — TELEPHONE (OUTPATIENT)
Dept: NEUROLOGY | Age: 65
End: 2020-09-10

## 2020-09-10 RX ORDER — RIMEGEPANT SULFATE 75 MG/75MG
75 TABLET, ORALLY DISINTEGRATING ORAL PRN
Qty: 2 TABLET | Refills: 0 | COMMUNITY
Start: 2020-09-10

## 2020-09-10 RX ORDER — HYDROMORPHONE HYDROCHLORIDE 2 MG/1
2 TABLET ORAL DAILY
Qty: 3 TABLET | Refills: 0 | Status: SHIPPED | OUTPATIENT
Start: 2020-09-10 | End: 2020-12-08 | Stop reason: SDUPTHER

## 2020-09-10 NOTE — TELEPHONE ENCOUNTER
[facility] and they are unable to fill her prescription. She would like to know if there is anything else that can be tried. Please advise. Thanks.

## 2020-09-28 RX ORDER — ERENUMAB-AOOE 140 MG/ML
INJECTION, SOLUTION SUBCUTANEOUS
Qty: 1 ML | Refills: 6 | Status: SHIPPED | OUTPATIENT
Start: 2020-09-28 | End: 2021-03-01 | Stop reason: SDUPTHER

## 2020-12-09 RX ORDER — HYDROMORPHONE HYDROCHLORIDE 2 MG/1
2 TABLET ORAL DAILY
Qty: 3 TABLET | Refills: 0 | Status: SHIPPED | OUTPATIENT
Start: 2020-12-09 | End: 2021-03-20 | Stop reason: SDUPTHER

## 2021-01-05 RX ORDER — OXYBUTYNIN CHLORIDE 10 MG/1
TABLET, EXTENDED RELEASE ORAL
Qty: 90 TABLET | Refills: 1 | Status: SHIPPED | OUTPATIENT
Start: 2021-01-05 | End: 2021-06-01

## 2021-01-05 RX ORDER — PAROXETINE HYDROCHLORIDE 20 MG/1
TABLET, FILM COATED ORAL
Qty: 90 TABLET | Refills: 1 | Status: SHIPPED | OUTPATIENT
Start: 2021-01-05 | End: 2021-06-08

## 2021-01-08 ENCOUNTER — TELEPHONE (OUTPATIENT)
Dept: FAMILY MEDICINE CLINIC | Age: 66
End: 2021-01-08

## 2021-02-11 ENCOUNTER — HOSPITAL ENCOUNTER (OUTPATIENT)
Dept: WOMENS IMAGING | Age: 66
Discharge: HOME | End: 2021-02-13
Payer: COMMERCIAL

## 2021-02-11 DIAGNOSIS — Z12.31 ENCOUNTER FOR SCREENING MAMMOGRAM FOR MALIGNANT NEOPLASM OF BREAST: ICD-10-CM

## 2021-02-11 PROCEDURE — 77067 SCR MAMMO BI INCL CAD: CPT

## 2021-02-18 ENCOUNTER — OFFICE VISIT (OUTPATIENT)
Dept: FAMILY MEDICINE CLINIC | Age: 66
End: 2021-02-18
Payer: COMMERCIAL

## 2021-02-18 VITALS
TEMPERATURE: 98.1 F | SYSTOLIC BLOOD PRESSURE: 118 MMHG | BODY MASS INDEX: 26.46 KG/M2 | HEIGHT: 64 IN | HEART RATE: 76 BPM | OXYGEN SATURATION: 98 % | DIASTOLIC BLOOD PRESSURE: 76 MMHG | WEIGHT: 155 LBS

## 2021-02-18 DIAGNOSIS — Z23 NEED FOR INFLUENZA VACCINATION: ICD-10-CM

## 2021-02-18 DIAGNOSIS — N28.9 RENAL INSUFFICIENCY: Primary | ICD-10-CM

## 2021-02-18 DIAGNOSIS — Z13.220 LIPID SCREENING: ICD-10-CM

## 2021-02-18 PROCEDURE — 1036F TOBACCO NON-USER: CPT | Performed by: NURSE PRACTITIONER

## 2021-02-18 PROCEDURE — G8482 FLU IMMUNIZE ORDER/ADMIN: HCPCS | Performed by: NURSE PRACTITIONER

## 2021-02-18 PROCEDURE — 90471 IMMUNIZATION ADMIN: CPT | Performed by: NURSE PRACTITIONER

## 2021-02-18 PROCEDURE — G8419 CALC BMI OUT NRM PARAM NOF/U: HCPCS | Performed by: NURSE PRACTITIONER

## 2021-02-18 PROCEDURE — 90688 IIV4 VACCINE SPLT 0.5 ML IM: CPT | Performed by: NURSE PRACTITIONER

## 2021-02-18 PROCEDURE — 99213 OFFICE O/P EST LOW 20 MIN: CPT | Performed by: NURSE PRACTITIONER

## 2021-02-18 PROCEDURE — 3017F COLORECTAL CA SCREEN DOC REV: CPT | Performed by: NURSE PRACTITIONER

## 2021-02-18 PROCEDURE — G8427 DOCREV CUR MEDS BY ELIG CLIN: HCPCS | Performed by: NURSE PRACTITIONER

## 2021-02-18 RX ORDER — GABAPENTIN 300 MG/1
TABLET, FILM COATED ORAL 2 TIMES DAILY
COMMUNITY
End: 2022-05-10

## 2021-02-18 ASSESSMENT — PATIENT HEALTH QUESTIONNAIRE - PHQ9
SUM OF ALL RESPONSES TO PHQ QUESTIONS 1-9: 0
SUM OF ALL RESPONSES TO PHQ9 QUESTIONS 1 & 2: 0

## 2021-02-18 ASSESSMENT — ENCOUNTER SYMPTOMS
COUGH: 0
SHORTNESS OF BREATH: 0

## 2021-02-18 NOTE — PROGRESS NOTES
[address] W/ADJ VAG,W/LOOP BX N/A 9/26/2018    LEEP performed by Diya Pope MD at [address] N/A 9/26/2018    DILATATION AND CURETTAGE HYSTEROSCOPY performed by Diya Pope MD at [facility] 2000    RCR     Family History   Problem Relation Age of Onset    Cancer Father         ear and head    High Blood Pressure Father     Cancer Brother         gall bladder cancer    Other Mother         dementia    High Blood Pressure Mother     Other Sister         gout    Diabetes Brother     No Known Problems Son     No Known Problems Daughter      Social History     Socioeconomic History    Marital status:      Spouse name: None    Number of children: None    Years of education: None    Highest education level: None   Occupational History    None   Social Needs    Financial resource strain: Not hard at all   [employer] insecurity     Worry: Never true     Inability: Never true    Transportation needs     Medical: No     Non-medical: No   Tobacco Use    Smoking status: Never Smoker    Smokeless tobacco: Never Used   Substance and Sexual Activity    Alcohol use: Yes     Comment: socially    Drug use: No    Sexual activity: Yes   Lifestyle    Physical activity     Days per week: None     Minutes per session: None    Stress: None   Relationships    Social connections     Talks on phone: None     Gets together: None     Attends Scientology service: None     Active member of club or organization: None     Attends meetings of clubs or organizations: None     Relationship status: None    Intimate partner violence     Fear of current or ex partner: None     Emotionally abused: None     Physically abused: None     Forced sexual activity: None   Other Topics Concern    None   Social History Narrative    None     Current Outpatient Medications on File Prior to Visit   Medication Sig Dispense Refill    Gabapentin, Once-Daily, (GRALISE) 300 MG TABS Take by mouth 2 times daily.  oxybutynin (DITROPAN-XL) 10 MG extended release tablet TAKE 1 TABLET DAILY 90 tablet 1    PARoxetine (PAXIL) 20 MG tablet TAKE 1 TABLET DAILY 90 tablet 1    AIMOVIG 140 MG/ML SOAJ INJECT 140MG(1 PEN) UNDER THE SKIN ONCE MONTHLY 1 mL 6    Rimegepant Sulfate (NURTEC) 75 MG TBDP Take 75 mg by mouth as needed (take at the onset of migraine) 2 samples given  LOT: [id number]  EXP: 11/21 2 tablet 0    topiramate (TOPAMAX) 100 MG tablet Take 3 tablets by mouth daily 270 tablet 3    CLIMARA 0.1 MG/24HR APPLY 1 PATCH TRANSDERMALLYONTO THE SKIN ONCE A WEEK 12 patch 4    acetaminophen (APAP EXTRA STRENGTH) 500 MG tablet Take 2 tablets by mouth every 6 hours as needed for Pain 60 tablet 1    SUMAtriptan Succinate 3 MG/0.5ML SOAJ Inject into the skin as needed       ZOMIG 5 MG nasal solution once as needed       aspirin 81 MG EC tablet Take 81 mg by mouth daily.  olopatadine (PATADAY) 0.2 % SOLN ophthalmic solution INSTILL 1 DROP INTO BOTH EYES EVERY DAY       No current facility-administered medications on file prior to visit. Allergies   Allergen Reactions    Erythromycin Hives    Seasonal Other (See Comments)     Pollen causes sinus sx    Sporanox [Itraconazole] Hives    Macrobid [Nitrofurantoin Macrocrystal] Other (See Comments)     Migraine, vomiting, fever/chills       Review of Systems   Constitutional: Negative for fatigue. Respiratory: Negative for cough and shortness of breath. Cardiovascular: Negative for chest pain. Objective  Vitals:    02/18/21 1622   BP: 118/76   Site: Left Upper Arm   Position: Sitting   Cuff Size: Medium Adult   Pulse: 76   Temp: 98.1 °F (36.7 °C)   SpO2: 98%   Weight: 155 lb (70.3 kg)   Height: 5' 4\" (1.626 m)     Physical Exam  Vitals signs and nursing note reviewed. Constitutional:       Appearance: Normal appearance. She is normal weight. HENT:      Head: Normocephalic. Mouth/Throat:      Mouth: Mucous membranes are moist.   Eyes:      Pupils: Pupils are equal, round, and reactive to light. Cardiovascular:      Rate and Rhythm: Normal rate and regular rhythm. Pulses: Normal pulses. Heart sounds: Normal heart sounds. Pulmonary:      Effort: Pulmonary effort is normal.      Breath sounds: Normal breath sounds. Skin:     General: Skin is warm. Neurological:      General: No focal deficit present. Mental Status: She is alert and oriented to person, place, and time. Mental status is at baseline. Psychiatric:         Mood and Affect: Mood normal.         Behavior: Behavior normal.         Thought Content: Thought content normal.         Judgment: Judgment normal.       Assessment & Plan     Diagnosis Orders   1. Renal insufficiency  Comprehensive Metabolic Panel   2. Need for influenza vaccination  INFLUENZA, QUADV, 3 YRS AND OLDER, IM, MDV, 0.5ML (Binnie Mast)   3. Lipid screening  Lipid Panel       Orders Placed This Encounter   Procedures    INFLUENZA, QUADV, 3 YRS AND OLDER, IM, MDV, 0.5ML (AFLURIA QUADV)    Lipid Panel     Standing Status:   Future     Standing Expiration Date:   2/18/2022     Order Specific Question:   Is Patient Fasting?/# of Hours     Answer:   12    Comprehensive Metabolic Panel     Standing Status:   Future     Standing Expiration Date:   2/18/2022       No orders of the defined types were placed in this encounter. Side effects, adverse effects of the medication prescribed today, as well as treatment plan/ rationale and result expectations have been discussed with the patient who expresses understanding and desires to proceed. Close follow up to evaluate treatment results and for coordination of care. I have reviewed the patient's medical history in detail and updated the computerized patient record. As always, patient is advised that if symptoms worsen in any way they will proceed to the nearest emergency room. FU prn 6 mos.     Jolanta Flores, APRN - CNP

## 2021-02-18 NOTE — PROGRESS NOTES
After obtaining consent, and per orders of Dr. Gerardo Villalta, injection of influenxa given in Left deltoid by Tisha Carbajal. Patient instructed to remain in clinic for 20 minutes afterwards, and to report any adverse reaction to me immediately. Pt tolerated well. Vaccine Information Sheet, \"Influenza - Inactivated\"  given to Yan Horvath, or parent/legal guardian of  Yan Horvath and verbalized understanding. Patient responses:    Have you ever had a reaction to a flu vaccine? No  Are you able to eat eggs without adverse effects? Yes  Do you have any current illness? No  Have you ever had Guillian Galion Syndrome? No    Flu vaccine given per order. Please see immunization tab.

## 2021-02-24 PROBLEM — G43.111 INTRACTABLE MIGRAINE WITH AURA WITH STATUS MIGRAINOSUS: Status: ACTIVE | Noted: 2017-04-28

## 2021-03-01 ENCOUNTER — OFFICE VISIT (OUTPATIENT)
Dept: NEUROLOGY | Age: 66
End: 2021-03-01
Payer: COMMERCIAL

## 2021-03-01 VITALS
BODY MASS INDEX: 27.64 KG/M2 | DIASTOLIC BLOOD PRESSURE: 68 MMHG | WEIGHT: 161 LBS | HEART RATE: 72 BPM | SYSTOLIC BLOOD PRESSURE: 126 MMHG

## 2021-03-01 DIAGNOSIS — I63.512 CEREBROVASCULAR ACCIDENT (CVA) DUE TO STENOSIS OF LEFT MIDDLE CEREBRAL ARTERY (HCC): ICD-10-CM

## 2021-03-01 DIAGNOSIS — M48.02 CERVICAL SPINAL STENOSIS: ICD-10-CM

## 2021-03-01 DIAGNOSIS — G43.111 INTRACTABLE MIGRAINE WITH AURA WITH STATUS MIGRAINOSUS: Primary | ICD-10-CM

## 2021-03-01 PROCEDURE — 99213 OFFICE O/P EST LOW 20 MIN: CPT | Performed by: PSYCHIATRY & NEUROLOGY

## 2021-03-01 PROCEDURE — G8419 CALC BMI OUT NRM PARAM NOF/U: HCPCS | Performed by: PSYCHIATRY & NEUROLOGY

## 2021-03-01 PROCEDURE — 1036F TOBACCO NON-USER: CPT | Performed by: PSYCHIATRY & NEUROLOGY

## 2021-03-01 PROCEDURE — 3017F COLORECTAL CA SCREEN DOC REV: CPT | Performed by: PSYCHIATRY & NEUROLOGY

## 2021-03-01 PROCEDURE — G8427 DOCREV CUR MEDS BY ELIG CLIN: HCPCS | Performed by: PSYCHIATRY & NEUROLOGY

## 2021-03-01 PROCEDURE — G8482 FLU IMMUNIZE ORDER/ADMIN: HCPCS | Performed by: PSYCHIATRY & NEUROLOGY

## 2021-03-01 ASSESSMENT — ENCOUNTER SYMPTOMS
VOMITING: 0
BACK PAIN: 0
CHOKING: 0
SHORTNESS OF BREATH: 0
PHOTOPHOBIA: 0
NAUSEA: 0
COLOR CHANGE: 0
TROUBLE SWALLOWING: 0

## 2021-03-01 NOTE — PROGRESS NOTES
Subjective:      Patient ID: Christine Benitez is a 63 y.o. female who presents today for:  Chief Complaint   Patient presents with    6 Month Follow-Up     Pt states that she is doing okay. She says she is doing good with the migraines, she says in the last 3 months shes had about one migraines total.        HPI 77-year-old right-handed female history of intractable migraine headaches. Patient has had status migrainous and not responded to current line of treatments. She then went into Aimovig. She used to require Demerol to prevent emergency room visits. She does take Zomig as and when required. Patient had a PFO which was fixed but did not fix her migraine headaches  She is doing very well otherwise. Patient has not had any big breakthrough headaches. We decrease her topiramate to 300 mg daily and she is not any breakthrough headaches. She has no side effects of Aimovig.   This is the best she has been    Past Medical History:   Diagnosis Date    Allergic reaction     Arthritis     Cancer ([facility] [address]     Cervical, dx by Dr. Ruth Storey Cerebral artery occlusion with cerebral infarction [facility])     at age 52 / sx as a migraine with slurred speech & visual disturbance    Diverticulitis     GERD (gastroesophageal reflux disease)     History of conization of cervix     Migraine     Renal insufficiency     Tinea unguium      Past Surgical History:   Procedure Laterality Date    CARDIAC SURGERY      PFO surgery in age 48s    COLONOSCOPY  2018    HEMORRHOID SURGERY  2017    HYSTERECTOMY VAGINAL N/A 12/19/2018    TLH WITH BSO performed by Hoang Jorge MD at [address] [place] [address] FLX DX W/PINA [address] 1978 PFRMD N/A 4/19/2018    COLONOSCOPY performed by iNkky Garcia MD at [place] [address] W/ADJ VAG,W/LOOP BX N/A 9/26/2018    LEEP performed by Eber James MD at [facility] N/A 9/26/2018    DILATATION AND CURETTAGE HYSTEROSCOPY performed by Chapin Dover MD at [facility] 2000    [facility]     Social History     Socioeconomic History    Marital status:      Spouse name: Not on file    Number of children: Not on file    Years of education: Not on file    Highest education level: Not on file   Occupational History    Not on file   Social Needs    Financial resource strain: Not hard at all    Food insecurity     Worry: Never true     Inability: Never true   [employer] needs     Medical: No     Non-medical: No   Tobacco Use    Smoking status: Never Smoker    Smokeless tobacco: Never Used   Substance and Sexual Activity    Alcohol use: Yes     Comment: socially    Drug use: No    Sexual activity: Yes   Lifestyle    Physical activity     Days per week: Not on file     Minutes per session: Not on file    Stress: Not on file   Relationships    Social connections     Talks on phone: Not on file     Gets together: Not on file     Attends Sikhism service: Not on file     Active member of club or organization: Not on file     Attends meetings of clubs or organizations: Not on file     Relationship status: Not on file    Intimate partner violence     Fear of current or ex partner: Not on file     Emotionally abused: Not on file     Physically abused: Not on file     Forced sexual activity: Not on file   Other Topics Concern    Not on file   Social History Narrative    Not on file     Family History   Problem Relation Age of Onset    Cancer Father         ear and head    High Blood Pressure Father     Cancer Brother         gall bladder cancer    Other Mother         dementia    High Blood Pressure Mother     Other Sister         gout    Diabetes Brother     No Known Problems Son     No Known Problems Daughter      Allergies   Allergen Reactions    Erythromycin Hives    Seasonal Other (See Comments)     Pollen causes sinus sx    Sporanox [Itraconazole] Hives    Macrobid [Nitrofurantoin Macrocrystal] Other (See Comments)     Migraine, vomiting, fever/chills       Current Outpatient Medications   Medication Sig Dispense Refill    Gabapentin, Once-Daily, (GRALISE) 300 MG TABS Take by mouth 2 times daily.  oxybutynin (DITROPAN-XL) 10 MG extended release tablet TAKE 1 TABLET DAILY 90 tablet 1    PARoxetine (PAXIL) 20 MG tablet TAKE 1 TABLET DAILY 90 tablet 1    AIMOVIG 140 MG/ML SOAJ INJECT 140MG(1 PEN) UNDER THE SKIN ONCE MONTHLY 1 mL 6    Rimegepant Sulfate (NURTEC) 75 MG TBDP Take 75 mg by mouth as needed (take at the onset of migraine) 2 samples given  LOT: [id number]  EXP: 11/21 2 tablet 0    olopatadine (PATADAY) 0.2 % SOLN ophthalmic solution INSTILL 1 DROP INTO BOTH EYES EVERY DAY      CLIMARA 0.1 MG/24HR APPLY 1 PATCH TRANSDERMALLYONTO THE SKIN ONCE A WEEK 12 patch 4    acetaminophen (APAP EXTRA STRENGTH) 500 MG tablet Take 2 tablets by mouth every 6 hours as needed for Pain 60 tablet 1    SUMAtriptan Succinate 3 MG/0.5ML SOAJ Inject into the skin as needed       ZOMIG 5 MG nasal solution once as needed       aspirin 81 MG EC tablet Take 81 mg by mouth daily.  topiramate (TOPAMAX) 100 MG tablet Take 3 tablets by mouth daily 270 tablet 3     No current facility-administered medications for this visit. Review of Systems   Constitutional: Negative for fever. HENT: Negative for ear pain, tinnitus and trouble swallowing. Eyes: Negative for photophobia and visual disturbance. Respiratory: Negative for choking and shortness of breath. Cardiovascular: Negative for chest pain and palpitations. Gastrointestinal: Negative for nausea and vomiting. Musculoskeletal: Negative for back pain, gait problem, joint swelling, myalgias, neck pain and neck stiffness. Skin: Negative for color change. Allergic/Immunologic: Negative for food allergies. Neurological: Positive for headaches.  Negative for dizziness, tremors, seizures, syncope, facial asymmetry, speech difficulty, weakness, light-headedness and numbness. Psychiatric/Behavioral: Negative for behavioral problems, confusion, hallucinations and sleep disturbance. Objective:   /68 (Site: Left Upper Arm, Position: Sitting, Cuff Size: Medium Adult)   Pulse 72   Wt 161 lb (73 kg)   LMP 09/19/2008   BMI 27.64 kg/m²     Physical Exam  Vitals signs reviewed. Eyes:      Pupils: Pupils are equal, round, and reactive to light. Neck:      Musculoskeletal: Normal range of motion. Cardiovascular:      Rate and Rhythm: Normal rate and regular rhythm. Heart sounds: No murmur. Pulmonary:      Effort: Pulmonary effort is normal.      Breath sounds: Normal breath sounds. Abdominal:      General: Bowel sounds are normal.   Musculoskeletal: Normal range of motion. Skin:     General: Skin is warm. Neurological:      Mental Status: She is alert and oriented to person, place, and time. Cranial Nerves: No cranial nerve deficit. Sensory: No sensory deficit. Motor: No abnormal muscle tone. Coordination: Coordination normal.      Deep Tendon Reflexes: Reflexes are normal and symmetric. Babinski sign absent on the right side. Babinski sign absent on the left side. Psychiatric:         Mood and Affect: Mood normal.         [facility] Digital Screen W Or Wo Cad Bilateral    Result Date: 2/11/2021  COMPARISON: October 4, 2016; July 31, 2018 HISTORY: Z12.31 Encounter for screening mammogram for malignant neoplasm of breast ICD10 TECHNIQUE: KATARINA DIGITAL SCREEN W OR WO CAD BILATERAL with CAD, the CAD images were reviewed by the radiologist and used in the interpretation. Three-D mammography with tomosynthesis was also performed and was reviewed and used in the interpretation. FINDINGS: The breasts contain heterogeneously dense fibroglandular tissue. This can obscure small lesions. No suspicious microcalcifications, neodensity or architectural distortion is seen. The examination is stable.      Recommend annual mammographic follow-up, routine physicals as recommended and any palpable abnormality be managed based on findings from clinical examination. BI-RADS 2: BENIGN FINDINGS. Board Certified Radiologists. Accredited by the ACR and FDA. MAMMOGRAPHY IS VERY IMPORTANT TO YOUR HEALTH. THE AMERICAN CANCER SOCIETY GUIDELINES RECOMMEND THAT WOMEN 36YEARS OF AGE AND OLDER SHOULD HAVE A MAMMOGRAM EVERY YEAR. A REMINDER LETTER WILL BE SENT AT THE APPROPRIATE TIME. Lab Results   Component Value Date    WBC 4.3 10/23/2019    RBC 4.23 10/23/2019    HGB 13.7 10/23/2019    HCT 42.2 10/23/2019    MCV 99.8 10/23/2019    MCH 32.4 10/23/2019    MCHC 32.5 10/23/2019    RDW 13.5 10/23/2019     10/23/2019     Lab Results   Component Value Date     10/23/2019    K 4.1 10/23/2019     10/23/2019    CO2 23 10/23/2019    BUN 16 10/23/2019    CREATININE 1.09 10/23/2019    GFRAA >60.0 10/23/2019    LABGLOM 50.8 10/23/2019    GLUCOSE 84 10/23/2019    PROT 7.2 10/23/2019    LABALBU 4.2 10/23/2019    CALCIUM 9.0 10/23/2019    BILITOT <0.2 10/23/2019    ALKPHOS 92 10/23/2019    AST 18 10/23/2019    ALT 16 10/23/2019     Lab Results   Component Value Date    PROTIME 10.7 11/07/2018    INR 1.0 11/07/2018     Lab Results   Component Value Date    TSH 1.760 05/18/2020     Lab Results   Component Value Date    TRIG 82 05/13/2016    HDL 81 05/13/2016    LDLCALC 146 05/13/2016     Lab Results   Component Value Date    ETOH <10 11/07/2018     No results found for: LITHIUM, DILFRTOT, VALPROATE    Assessment:       Diagnosis Orders   1. Intractable migraine with aura with status migrainosus     2. Cerebrovascular accident (CVA) due to stenosis of left middle cerebral artery (HCC)     3. Cervical spinal stenosis     Intractable migraine headaches which had not responded to any medical line of treatment.   Patient was on topiramate 600 mg daily and then restarted on Aimovig her headaches improved considerably she is on a keto diet as well.  She has not any major breakthrough headache. When she had headaches we had to treat her with Demerol to avoid emergency room visit she is not any emergency room visit last 2 years. Patient had a PFO which was treated but did not help her migraine headaches. Patient has not any side effects of the medication. Patient is now on topiramate 300 mg and doing better. We will see her in 6 months and earlier if any concerns      Plan:      No orders of the defined types were placed in this encounter. No orders of the defined types were placed in this encounter. No follow-ups on file.       Huber Lorenzo MD

## 2021-03-07 RX ORDER — ERENUMAB-AOOE 140 MG/ML
INJECTION, SOLUTION SUBCUTANEOUS
Qty: 1 ML | Refills: 6 | Status: SHIPPED | OUTPATIENT
Start: 2021-03-07 | End: 2021-06-11 | Stop reason: SDUPTHER

## 2021-03-09 DIAGNOSIS — N28.9 RENAL INSUFFICIENCY: ICD-10-CM

## 2021-03-09 LAB
ALBUMIN SERPL-MCNC: 4.1 G/DL (ref 3.5–4.6)
ALP BLD-CCNC: 77 U/L (ref 40–130)
ALT SERPL-CCNC: 10 U/L (ref 0–33)
ANION GAP SERPL CALCULATED.3IONS-SCNC: 14 MEQ/L (ref 9–15)
AST SERPL-CCNC: 17 U/L (ref 0–35)
BILIRUB SERPL-MCNC: 0.3 MG/DL (ref 0.2–0.7)
BUN BLDV-MCNC: 20 MG/DL (ref 8–23)
CALCIUM SERPL-MCNC: 9.1 MG/DL (ref 8.5–9.9)
CHLORIDE BLD-SCNC: 108 MEQ/L (ref 95–107)
CHOLESTEROL, TOTAL: 217 MG/DL (ref 0–199)
CO2: 19 MEQ/L (ref 20–31)
CREAT SERPL-MCNC: 0.99 MG/DL (ref 0.5–0.9)
GFR AFRICAN AMERICAN: >60
GFR NON-AFRICAN AMERICAN: 56.6
GLOBULIN: 2.5 G/DL (ref 2.3–3.5)
GLUCOSE BLD-MCNC: 85 MG/DL (ref 70–99)
HDLC SERPL-MCNC: 75 MG/DL (ref 40–59)
LDL CHOLESTEROL CALCULATED: 131 MG/DL (ref 0–129)
POTASSIUM SERPL-SCNC: 4.1 MEQ/L (ref 3.4–4.9)
SODIUM BLD-SCNC: 141 MEQ/L (ref 135–144)
TOTAL PROTEIN: 6.6 G/DL (ref 6.3–8)
TRIGL SERPL-MCNC: 55 MG/DL (ref 0–150)

## 2021-03-19 DIAGNOSIS — G43.901 STATUS MIGRAINOSUS: ICD-10-CM

## 2021-03-20 RX ORDER — HYDROMORPHONE HYDROCHLORIDE 2 MG/1
2 TABLET ORAL DAILY
Qty: 3 TABLET | Refills: 0 | Status: SHIPPED | OUTPATIENT
Start: 2021-03-20 | End: 2021-08-30 | Stop reason: SDUPTHER

## 2021-03-22 RX ORDER — ESTRADIOL 0.1 MG/D
PATCH TRANSDERMAL
Qty: 12 PATCH | Refills: 4 | Status: SHIPPED | OUTPATIENT
Start: 2021-03-22 | End: 2022-02-28

## 2021-06-01 RX ORDER — OXYBUTYNIN CHLORIDE 10 MG/1
TABLET, EXTENDED RELEASE ORAL
Qty: 90 TABLET | Refills: 1 | Status: SHIPPED | OUTPATIENT
Start: 2021-06-01 | End: 2021-10-27

## 2021-06-11 RX ORDER — ERENUMAB-AOOE 140 MG/ML
INJECTION, SOLUTION SUBCUTANEOUS
Qty: 1 ML | Refills: 6 | Status: SHIPPED | OUTPATIENT
Start: 2021-06-11 | End: 2021-06-23 | Stop reason: SDUPTHER

## 2021-06-16 ENCOUNTER — OFFICE VISIT (OUTPATIENT)
Dept: FAMILY MEDICINE CLINIC | Age: 66
End: 2021-06-16
Payer: COMMERCIAL

## 2021-06-16 VITALS
WEIGHT: 160 LBS | SYSTOLIC BLOOD PRESSURE: 92 MMHG | OXYGEN SATURATION: 97 % | DIASTOLIC BLOOD PRESSURE: 62 MMHG | HEART RATE: 77 BPM | BODY MASS INDEX: 27.31 KG/M2 | HEIGHT: 64 IN

## 2021-06-16 DIAGNOSIS — R55 SYNCOPE, UNSPECIFIED SYNCOPE TYPE: Primary | ICD-10-CM

## 2021-06-16 PROCEDURE — 1036F TOBACCO NON-USER: CPT | Performed by: NURSE PRACTITIONER

## 2021-06-16 PROCEDURE — 3017F COLORECTAL CA SCREEN DOC REV: CPT | Performed by: NURSE PRACTITIONER

## 2021-06-16 PROCEDURE — 99214 OFFICE O/P EST MOD 30 MIN: CPT | Performed by: NURSE PRACTITIONER

## 2021-06-16 PROCEDURE — G8427 DOCREV CUR MEDS BY ELIG CLIN: HCPCS | Performed by: NURSE PRACTITIONER

## 2021-06-16 PROCEDURE — G8419 CALC BMI OUT NRM PARAM NOF/U: HCPCS | Performed by: NURSE PRACTITIONER

## 2021-06-16 SDOH — ECONOMIC STABILITY: FOOD INSECURITY: WITHIN THE PAST 12 MONTHS, THE FOOD YOU BOUGHT JUST DIDN'T LAST AND YOU DIDN'T HAVE MONEY TO GET MORE.: NEVER TRUE

## 2021-06-16 SDOH — ECONOMIC STABILITY: FOOD INSECURITY: WITHIN THE PAST 12 MONTHS, YOU WORRIED THAT YOUR FOOD WOULD RUN OUT BEFORE YOU GOT MONEY TO BUY MORE.: NEVER TRUE

## 2021-06-16 ASSESSMENT — ENCOUNTER SYMPTOMS
SHORTNESS OF BREATH: 0
COUGH: 0

## 2021-06-16 ASSESSMENT — SOCIAL DETERMINANTS OF HEALTH (SDOH): HOW HARD IS IT FOR YOU TO PAY FOR THE VERY BASICS LIKE FOOD, HOUSING, MEDICAL CARE, AND HEATING?: NOT HARD AT ALL

## 2021-06-16 NOTE — PROGRESS NOTES
Subjective  Chief Complaint   Patient presents with    Other     pt states she passed out last friday twice, half hour apart. pt states she has been doing the keto diet since jan. HPI     2 episodes of syncope recently while she out with her sister  Was at the bar. Fainted. Was ok and then did it again about 20 minutes later. Has been doing keto diet recently. Feels that it had been awhile since she had eaten prior to the incident. Has lost about 20 lbs.      Patient Active Problem List    Diagnosis Date Noted    Menopause 09/25/2019    Hot flashes due to menopause 09/25/2019    S/P LEEP 11/06/2018    PMB (postmenopausal bleeding) 11/06/2018    Thickened endometrium 11/06/2018    Cervical stenosis (uterine cervix) 11/06/2018    DUB (dysfunctional uterine bleeding)     Vaginal bleeding 09/19/2018    Stenosis of cervix 09/19/2018    Intractable migraine with aura with status migrainosus 04/28/2017    Cerebrovascular accident ([address] 04/28/2017    Cervical cancer ([address] 04/28/2017    PFO (patent foramen ovale) 04/28/2017    Renal insufficiency     Allergic reaction     Tinea unguium     Cervical spondylosis 03/04/2015    Cervical spinal stenosis 03/04/2015    Degenerative disc disease, cervical 03/04/2015    Radiculopathy affecting upper extremity 03/04/2015     Past Medical History:   Diagnosis Date    Allergic reaction     Arthritis     Cancer ([address]     Cervical, dx by Dr. Poonam Baxter Cerebral artery occlusion with cerebral infarction ([address]     at age 52 / sx as a migraine with slurred speech & visual disturbance    Diverticulitis     GERD (gastroesophageal reflux disease)     History of conization of cervix     Migraine     Renal insufficiency     Tinea unguium      Past Surgical History:   Procedure Laterality Date    CARDIAC SURGERY      PFO surgery in age 48s    COLONOSCOPY  2018    HEMORRHOID SURGERY  2017    HYSTERECTOMY VAGINAL N/A 12/19/2018    TLH WITH BSO performed by [facility] Friends and Family:     Attends Orthodoxy Services:     Active Member of Clubs or Organizations:     Attends Club or Organization Meetings:     Marital Status:    Intimate Partner Violence:     Fear of Current or Ex-Partner:     Emotionally Abused:     Physically Abused:     Sexually Abused:      Current Outpatient Medications on File Prior to Visit   Medication Sig Dispense Refill    Erenumab-aooe (AIMOVIG) 140 MG/ML SOAJ INJECT 140MG(1 PEN) UNDER THE SKIN ONCE MONTHLY 1 mL 6    PARoxetine (PAXIL) 20 MG tablet TAKE 1 TABLET DAILY 90 tablet 0    oxybutynin (DITROPAN-XL) 10 MG extended release tablet TAKE 1 TABLET DAILY 90 tablet 1    CLIMARA 0.1 MG/24HR APPLY 1 PATCH TRANSDERMALLYONTO THE SKIN ONCE A WEEK 12 patch 4    Gabapentin, Once-Daily, (GRALISE) 300 MG TABS Take by mouth 2 times daily.  Rimegepant Sulfate (NURTEC) 75 MG TBDP Take 75 mg by mouth as needed (take at the onset of migraine) 2 samples given  LOT: [id number]  EXP: 11/21 2 tablet 0    olopatadine (PATADAY) 0.2 % SOLN ophthalmic solution INSTILL 1 DROP INTO BOTH EYES EVERY DAY      topiramate (TOPAMAX) 100 MG tablet Take 3 tablets by mouth daily 270 tablet 3    acetaminophen (APAP EXTRA STRENGTH) 500 MG tablet Take 2 tablets by mouth every 6 hours as needed for Pain 60 tablet 1    SUMAtriptan Succinate 3 MG/0.5ML SOAJ Inject into the skin as needed       ZOMIG 5 MG nasal solution once as needed       aspirin 81 MG EC tablet Take 81 mg by mouth daily. No current facility-administered medications on file prior to visit. Allergies   Allergen Reactions    Erythromycin Hives    Seasonal Other (See Comments)     Pollen causes sinus sx    Sporanox [Itraconazole] Hives    Macrobid [Nitrofurantoin Macrocrystal] Other (See Comments)     Migraine, vomiting, fever/chills       Review of Systems   Constitutional: Negative for fatigue. Respiratory: Negative for cough and shortness of breath. prescribed today, as well as treatment plan/ rationale and result expectations have been discussed with the patient who expresses understanding and desires to proceed. Close follow up to evaluate treatment results and for coordination of care. I have reviewed the patient's medical history in detail and updated the computerized patient record. As always, patient is advised that if symptoms worsen in any way they will proceed to the nearest emergency room. Fu in 2 weeks with BP readings.     Rodolfo Mcwilliams, APRN - CNP

## 2021-06-17 DIAGNOSIS — R55 SYNCOPE, UNSPECIFIED SYNCOPE TYPE: ICD-10-CM

## 2021-06-17 LAB
ALBUMIN SERPL-MCNC: 4.3 G/DL (ref 3.5–4.6)
ALP BLD-CCNC: 73 U/L (ref 40–130)
ALT SERPL-CCNC: 7 U/L (ref 0–33)
ANION GAP SERPL CALCULATED.3IONS-SCNC: 10 MEQ/L (ref 9–15)
AST SERPL-CCNC: 12 U/L (ref 0–35)
BASOPHILS ABSOLUTE: 0 K/UL (ref 0–0.2)
BASOPHILS RELATIVE PERCENT: 0.6 %
BILIRUB SERPL-MCNC: 0.3 MG/DL (ref 0.2–0.7)
BUN BLDV-MCNC: 23 MG/DL (ref 8–23)
CALCIUM SERPL-MCNC: 9.6 MG/DL (ref 8.5–9.9)
CHLORIDE BLD-SCNC: 106 MEQ/L (ref 95–107)
CO2: 22 MEQ/L (ref 20–31)
CREAT SERPL-MCNC: 0.9 MG/DL (ref 0.5–0.9)
EOSINOPHILS ABSOLUTE: 0.1 K/UL (ref 0–0.7)
EOSINOPHILS RELATIVE PERCENT: 1.4 %
GFR AFRICAN AMERICAN: >60
GFR NON-AFRICAN AMERICAN: >60
GLOBULIN: 2.5 G/DL (ref 2.3–3.5)
GLUCOSE BLD-MCNC: 84 MG/DL (ref 70–99)
HCT VFR BLD CALC: 39 % (ref 37–47)
HEMOGLOBIN: 13.1 G/DL (ref 12–16)
LYMPHOCYTES ABSOLUTE: 1.4 K/UL (ref 1–4.8)
LYMPHOCYTES RELATIVE PERCENT: 21.7 %
MCH RBC QN AUTO: 33 PG (ref 27–31.3)
MCHC RBC AUTO-ENTMCNC: 33.7 % (ref 33–37)
MCV RBC AUTO: 97.9 FL (ref 82–100)
MONOCYTES ABSOLUTE: 0.5 K/UL (ref 0.2–0.8)
MONOCYTES RELATIVE PERCENT: 7 %
NEUTROPHILS ABSOLUTE: 4.5 K/UL (ref 1.4–6.5)
NEUTROPHILS RELATIVE PERCENT: 69.3 %
PDW BLD-RTO: 13.4 % (ref 11.5–14.5)
PLATELET # BLD: 318 K/UL (ref 130–400)
POTASSIUM SERPL-SCNC: 5 MEQ/L (ref 3.4–4.9)
RBC # BLD: 3.98 M/UL (ref 4.2–5.4)
SODIUM BLD-SCNC: 138 MEQ/L (ref 135–144)
TOTAL PROTEIN: 6.8 G/DL (ref 6.3–8)
WBC # BLD: 6.4 K/UL (ref 4.8–10.8)

## 2021-06-23 DIAGNOSIS — D64.9 ANEMIA, UNSPECIFIED TYPE: Primary | ICD-10-CM

## 2021-06-23 RX ORDER — TOPIRAMATE 100 MG/1
300 TABLET, FILM COATED ORAL DAILY
Qty: 270 TABLET | Refills: 3 | Status: SHIPPED | OUTPATIENT
Start: 2021-06-23 | End: 2021-08-30 | Stop reason: SDUPTHER

## 2021-06-23 RX ORDER — ERENUMAB-AOOE 140 MG/ML
INJECTION, SOLUTION SUBCUTANEOUS
Qty: 1 ML | Refills: 6 | Status: SHIPPED | OUTPATIENT
Start: 2021-06-23 | End: 2022-04-19

## 2021-06-23 NOTE — TELEPHONE ENCOUNTER
Patient is requesting medication refill.  Please approve or deny this request.    Rx requested:  Requested Prescriptions     Pending Prescriptions Disp Refills    Erenumab-aooe (AIMOVIG) 140 MG/ML SOAJ 1 mL 6     Sig: INJECT 140MG(1 PEN) UNDER THE SKIN ONCE MONTHLY    topiramate (TOPAMAX) 100 MG tablet 270 tablet 3     Sig: Take 3 tablets by mouth daily         Last Office Visit:   3/1/2021      Next Visit Date:  Future Appointments   Date Time Provider Janette Tan   6/30/2021 10:15 AM LISA Prabhakar - CNP [facility]   8/30/2021  3:15 PM Vanda Bolaños MD [facility]

## 2021-06-24 DIAGNOSIS — D64.9 ANEMIA, UNSPECIFIED TYPE: ICD-10-CM

## 2021-06-24 LAB
FOLATE: 4.7 NG/ML (ref 7.3–26.1)
IRON SATURATION: 23 % (ref 11–46)
IRON: 48 UG/DL (ref 37–145)
TOTAL IRON BINDING CAPACITY: 212 UG/DL (ref 178–450)
VITAMIN B-12: 319 PG/ML (ref 232–1245)

## 2021-06-28 DIAGNOSIS — E53.8 FOLIC ACID DEFICIENCY: Primary | ICD-10-CM

## 2021-06-28 RX ORDER — FOLIC ACID 1 MG/1
1 TABLET ORAL DAILY
Qty: 30 TABLET | Refills: 5 | Status: SHIPPED | OUTPATIENT
Start: 2021-06-28 | End: 2022-01-12

## 2021-06-30 ENCOUNTER — OFFICE VISIT (OUTPATIENT)
Dept: FAMILY MEDICINE CLINIC | Age: 66
End: 2021-06-30
Payer: COMMERCIAL

## 2021-06-30 VITALS
WEIGHT: 150 LBS | DIASTOLIC BLOOD PRESSURE: 62 MMHG | HEART RATE: 70 BPM | BODY MASS INDEX: 25.61 KG/M2 | OXYGEN SATURATION: 98 % | HEIGHT: 64 IN | SYSTOLIC BLOOD PRESSURE: 94 MMHG

## 2021-06-30 DIAGNOSIS — I95.9 HYPOTENSION, UNSPECIFIED HYPOTENSION TYPE: ICD-10-CM

## 2021-06-30 DIAGNOSIS — R55 SYNCOPE, UNSPECIFIED SYNCOPE TYPE: Primary | ICD-10-CM

## 2021-06-30 DIAGNOSIS — E53.8 FOLIC ACID DEFICIENCY: ICD-10-CM

## 2021-06-30 PROCEDURE — 3017F COLORECTAL CA SCREEN DOC REV: CPT | Performed by: NURSE PRACTITIONER

## 2021-06-30 PROCEDURE — G8419 CALC BMI OUT NRM PARAM NOF/U: HCPCS | Performed by: NURSE PRACTITIONER

## 2021-06-30 PROCEDURE — 99213 OFFICE O/P EST LOW 20 MIN: CPT | Performed by: NURSE PRACTITIONER

## 2021-06-30 PROCEDURE — 1036F TOBACCO NON-USER: CPT | Performed by: NURSE PRACTITIONER

## 2021-06-30 PROCEDURE — G8427 DOCREV CUR MEDS BY ELIG CLIN: HCPCS | Performed by: NURSE PRACTITIONER

## 2021-06-30 ASSESSMENT — ENCOUNTER SYMPTOMS
COUGH: 0
DIARRHEA: 0
SHORTNESS OF BREATH: 0
CONSTIPATION: 0

## 2021-06-30 NOTE — PROGRESS NOTES
Subjective  Chief Complaint   Patient presents with    Follow-up       HPI    Here for follow up of syncope episode. Has been checking BP since last visits. Readings having been low. Drinks water persistently. Eats minimal processed foods. Overall feels well. No recent syncope or dizziness.       Lab Results   Component Value Date    WBC 6.4 06/17/2021    HGB 13.1 06/17/2021    HCT 39.0 06/17/2021     06/17/2021    CHOL 217 (H) 03/09/2021    TRIG 55 03/09/2021    HDL 75 (H) 03/09/2021    ALT 7 06/17/2021    AST 12 06/17/2021     06/17/2021    K 5.0 (H) 06/17/2021     06/17/2021    CREATININE 0.90 06/17/2021    BUN 23 06/17/2021    CO2 22 06/17/2021    TSH 1.760 05/18/2020    INR 1.0 11/07/2018           Patient Active Problem List    Diagnosis Date Noted    Menopause 09/25/2019    Hot flashes due to menopause 09/25/2019    S/P LEEP 11/06/2018    PMB (postmenopausal bleeding) 11/06/2018    Thickened endometrium 11/06/2018    Cervical stenosis (uterine cervix) 11/06/2018    DUB (dysfunctional uterine bleeding)     Vaginal bleeding 09/19/2018    Stenosis of cervix 09/19/2018    Intractable migraine with aura with status migrainosus 04/28/2017    [facility]brovascular accident ([facility] [address] 04/28/2017    Cervical cancer ([facility] [address] 04/28/2017    PFO (patent foramen ovale) 04/28/2017    Renal insufficiency     Allergic reaction     Tinea unguium     Cervical spondylosis 03/04/2015    Cervical spinal stenosis 03/04/2015    Degenerative disc disease, cervical 03/04/2015    Radiculopathy affecting upper extremity 03/04/2015     Past Medical History:   Diagnosis Date    Allergic reaction     Arthritis     Cancer ([address]     Cervical, dx by Dr. Mauricio Christy Cerebral artery occlusion with cerebral infarction ([facility] [address]     at age 52 / sx as a migraine with slurred speech & visual disturbance    Diverticulitis     GERD (gastroesophageal reflux disease)     History of conization of cervix     Migraine  Renal insufficiency     Tinea unguium      Past Surgical History:   Procedure Laterality Date    CARDIAC SURGERY      PFO surgery in age 48s    COLONOSCOPY  2018    HEMORRHOID SURGERY  2017    HYSTERECTOMY VAGINAL N/A 12/19/2018    TLH WITH BSO performed by Henderson Klinefelter, MD at [address] [facility] FLX DX W/COLLJ [address] 1976 PFRMD N/A 4/19/2018    COLONOSCOPY performed by Vesta Bedolla MD at [place] [address] W/ADJ VAG,W/LOOP BX N/A 9/26/2018    LEEP performed by Jonas Lilly MD at [facility] N/A 9/26/2018    DILATATION AND CURETTAGE HYSTEROSCOPY performed by Jonas Lilly MD at [place] [address]    RCR     Family History   Problem Relation Age of Onset    Cancer Father         ear and head    High Blood Pressure Father     Cancer Brother         gall bladder cancer    Other Mother         dementia    High Blood Pressure Mother     Other Sister         gout    Diabetes Brother     No Known Problems Son     No Known Problems Daughter      Social History     Socioeconomic History    Marital status:      Spouse name: None    Number of children: None    Years of education: None    Highest education level: None   Occupational History    None   Tobacco Use    Smoking status: Never Smoker    Smokeless tobacco: Never Used   Vaping Use    Vaping Use: Never used   Substance and Sexual Activity    Alcohol use: Yes     Comment: socially    Drug use: No    Sexual activity: Yes   Other Topics Concern    None   Social History Narrative    None     Social Determinants of Health     Financial Resource Strain: Low Risk     Difficulty of Paying Living Expenses: Not hard at all   Food Insecurity: No Food Insecurity    Worried About [facility] in the Last Year: Never true    Shamir of Food in the Last Year: Never true   Transportation Needs:     Lack of Transportation (Medical):      Lack of Transportation (Non-Medical):    Physical Activity:     Days of Exercise per Week:     Minutes of Exercise per Session:    Stress:     Feeling of Stress :    Social Connections:     Frequency of Communication with Friends and Family:     Frequency of Social Gatherings with Friends and Family:     Attends Taoist Services:     Active Member of Clubs or Organizations:     Attends Club or Organization Meetings:     Marital Status:    Intimate Partner Violence:     Fear of Current or Ex-Partner:     Emotionally Abused:     Physically Abused:     Sexually Abused:      Current Outpatient Medications on File Prior to Visit   Medication Sig Dispense Refill    folic acid (FOLVITE) 1 MG tablet Take 1 tablet by mouth daily 30 tablet 5    Erenumab-aooe (AIMOVIG) 140 MG/ML SOAJ INJECT 140MG(1 PEN) UNDER THE SKIN ONCE MONTHLY 1 mL 6    topiramate (TOPAMAX) 100 MG tablet Take 3 tablets by mouth daily 270 tablet 3    PARoxetine (PAXIL) 20 MG tablet TAKE 1 TABLET DAILY 90 tablet 0    oxybutynin (DITROPAN-XL) 10 MG extended release tablet TAKE 1 TABLET DAILY 90 tablet 1    CLIMARA 0.1 MG/24HR APPLY 1 PATCH TRANSDERMALLYONTO THE SKIN ONCE A WEEK 12 patch 4    Gabapentin, Once-Daily, (GRALISE) 300 MG TABS Take by mouth 2 times daily.  Rimegepant Sulfate (NURTEC) 75 MG TBDP Take 75 mg by mouth as needed (take at the onset of migraine) 2 samples given  LOT: [id number]  EXP: 11/21 2 tablet 0    olopatadine (PATADAY) 0.2 % SOLN ophthalmic solution INSTILL 1 DROP INTO BOTH EYES EVERY DAY      acetaminophen (APAP EXTRA STRENGTH) 500 MG tablet Take 2 tablets by mouth every 6 hours as needed for Pain 60 tablet 1    SUMAtriptan Succinate 3 MG/0.5ML SOAJ Inject into the skin as needed       ZOMIG 5 MG nasal solution once as needed       aspirin 81 MG EC tablet Take 81 mg by mouth daily. No current facility-administered medications on file prior to visit.      Allergies   Allergen Reactions    Erythromycin Hives    Seasonal Other (See Comments)     Pollen causes sinus sx    Sporanox [Itraconazole] Hives    Macrobid [Nitrofurantoin Macrocrystal] Other (See Comments)     Migraine, vomiting, fever/chills       Review of Systems   Constitutional: Negative for fatigue. Respiratory: Negative for cough and shortness of breath. Cardiovascular: Negative for chest pain. Gastrointestinal: Negative for constipation and diarrhea. Neurological: Positive for syncope. Objective  Vitals:    06/30/21 1019   BP: 94/62   Pulse: 70   SpO2: 98%   Weight: 150 lb (68 kg)   Height: 5' 4\" (1.626 m)     Physical Exam  Vitals and nursing note reviewed. Constitutional:       Appearance: Normal appearance. She is normal weight. HENT:      Head: Normocephalic. Mouth/Throat:      Mouth: Mucous membranes are moist.      Pharynx: Oropharynx is clear. Eyes:      Extraocular Movements: Extraocular movements intact. Conjunctiva/sclera: Conjunctivae normal.      Pupils: Pupils are equal, round, and reactive to light. Cardiovascular:      Rate and Rhythm: Normal rate and regular rhythm. Pulses: Normal pulses. Heart sounds: Normal heart sounds. Pulmonary:      Effort: Pulmonary effort is normal.      Breath sounds: Normal breath sounds. Skin:     General: Skin is warm. Neurological:      General: No focal deficit present. Mental Status: She is alert and oriented to person, place, and time. Mental status is at baseline. Psychiatric:         Mood and Affect: Mood normal.         Behavior: Behavior normal.         Thought Content: Thought content normal.         Judgment: Judgment normal.       Assessment & Plan     Diagnosis Orders   1. Syncope, unspecified syncope type     2. Folic acid deficiency     3. Hypotension, unspecified hypotension type         Pt instructed on small increase of Na in diet for BP. Will start folate supplement.      Will fu with me with any other episodes or near

## 2021-08-20 RX ORDER — PAROXETINE HYDROCHLORIDE 20 MG/1
20 TABLET, FILM COATED ORAL DAILY
Qty: 90 TABLET | Refills: 3 | Status: SHIPPED | OUTPATIENT
Start: 2021-08-20 | End: 2022-06-07

## 2021-08-30 ENCOUNTER — OFFICE VISIT (OUTPATIENT)
Dept: NEUROLOGY | Age: 66
End: 2021-08-30
Payer: COMMERCIAL

## 2021-08-30 VITALS
WEIGHT: 149.8 LBS | DIASTOLIC BLOOD PRESSURE: 76 MMHG | HEART RATE: 64 BPM | OXYGEN SATURATION: 94 % | BODY MASS INDEX: 25.71 KG/M2 | SYSTOLIC BLOOD PRESSURE: 122 MMHG

## 2021-08-30 DIAGNOSIS — G43.901 STATUS MIGRAINOSUS: Primary | ICD-10-CM

## 2021-08-30 DIAGNOSIS — I63.512 CEREBROVASCULAR ACCIDENT (CVA) DUE TO STENOSIS OF LEFT MIDDLE CEREBRAL ARTERY (HCC): ICD-10-CM

## 2021-08-30 DIAGNOSIS — G43.111 INTRACTABLE MIGRAINE WITH AURA WITH STATUS MIGRAINOSUS: ICD-10-CM

## 2021-08-30 DIAGNOSIS — M48.02 CERVICAL SPINAL STENOSIS: ICD-10-CM

## 2021-08-30 DIAGNOSIS — M54.10 RADICULOPATHY AFFECTING UPPER EXTREMITY: ICD-10-CM

## 2021-08-30 DIAGNOSIS — Q21.12 PFO (PATENT FORAMEN OVALE): ICD-10-CM

## 2021-08-30 PROBLEM — N88.2 CERVICAL STENOSIS (UTERINE CERVIX): Status: RESOLVED | Noted: 2018-11-06 | Resolved: 2021-08-30

## 2021-08-30 PROCEDURE — 1036F TOBACCO NON-USER: CPT | Performed by: PSYCHIATRY & NEUROLOGY

## 2021-08-30 PROCEDURE — 3017F COLORECTAL CA SCREEN DOC REV: CPT | Performed by: PSYCHIATRY & NEUROLOGY

## 2021-08-30 PROCEDURE — 99214 OFFICE O/P EST MOD 30 MIN: CPT | Performed by: PSYCHIATRY & NEUROLOGY

## 2021-08-30 PROCEDURE — G8427 DOCREV CUR MEDS BY ELIG CLIN: HCPCS | Performed by: PSYCHIATRY & NEUROLOGY

## 2021-08-30 PROCEDURE — G8419 CALC BMI OUT NRM PARAM NOF/U: HCPCS | Performed by: PSYCHIATRY & NEUROLOGY

## 2021-08-30 RX ORDER — TOPIRAMATE 100 MG/1
300 TABLET, FILM COATED ORAL DAILY
Qty: 270 TABLET | Refills: 3 | Status: SHIPPED | OUTPATIENT
Start: 2021-08-30 | End: 2022-06-16

## 2021-08-30 RX ORDER — HYDROMORPHONE HYDROCHLORIDE 2 MG/1
2 TABLET ORAL DAILY
Qty: 3 TABLET | Refills: 0 | Status: SHIPPED | OUTPATIENT
Start: 2021-08-30 | End: 2022-01-03 | Stop reason: SDUPTHER

## 2021-08-30 ASSESSMENT — ENCOUNTER SYMPTOMS
VOMITING: 0
CHOKING: 0
NAUSEA: 0
PHOTOPHOBIA: 0
TROUBLE SWALLOWING: 0
SHORTNESS OF BREATH: 0
BACK PAIN: 0
COLOR CHANGE: 0

## 2021-08-30 NOTE — PROGRESS NOTES
Subjective:      Patient ID: Alma Roblero is a 63 y.o. female who presents today for:  Chief Complaint   Patient presents with    6 Month Follow-Up     Pt states her migraines been okay she states when it rains or her allergies start that it affects the migraines. Pt states she hasnt had any this month and her migraines are seasonal.        HPI 63 right-handed female history of intractable migraine headaches. Patient occasionally still has migraine we use Dilaudid when this occurs and she then prevents going to the emergency room. She has been tried on multiple medications and all settled down with topiramate and Aimovig and this has been the best for her so far. Patient had a PFO which was fixed but did not help her migraine headaches. She was on Demerol for breakthrough headaches which has been tried on every medication for migraine headaches in the past.  Patient has very infrequent headaches but does not respond to Nurtec. She uses Imitrex injection she has to take a few doses at the time she has cluster migraines and may last several days and therefore the use of Dilaudid. This keeps her out of emergency room and she responds to this there is no red flags of use of pain medications in her.   The best she has been    Past Medical History:   Diagnosis Date    Allergic reaction     Arthritis     Cancer ([address]     Cervical, dx by Dr. Saud Caballero Cerebral artery occlusion with cerebral infarction [facility])     at age 52 / sx as a migraine with slurred speech & visual disturbance    Diverticulitis     GERD (gastroesophageal reflux disease)     History of conization of cervix     Migraine     Renal insufficiency     Tinea unguium      Past Surgical History:   Procedure Laterality Date    CARDIAC SURGERY      PFO surgery in age 48s    COLONOSCOPY  2018    HEMORRHOID SURGERY  2017    HYSTERECTOMY VAGINAL N/A 12/19/2018    TLH WITH BSO performed by Tari Mckeon MD at [address] [place] [address] DX W/COLLJ SPEC WHEN PFRMD N/A 4/19/2018    COLONOSCOPY performed by Gen Robles MD at [facility] W/ADJ VAG,W/LOOP BX N/A 9/26/2018    LEEP performed by Sharifa Anguiano MD at [address] [place] [address] N/A 9/26/2018    DILATATION AND CURETTAGE HYSTEROSCOPY performed by Sharifa Anguiano MD at [address]     Social History     Socioeconomic History    Marital status:      Spouse name: Not on file    Number of children: Not on file    Years of education: Not on file    Highest education level: Not on file   Occupational History    Not on file   Tobacco Use    Smoking status: Never Smoker    Smokeless tobacco: Never Used   Vaping Use    Vaping Use: Never used   Substance and Sexual Activity    Alcohol use: Yes     Comment: socially    Drug use: No    Sexual activity: Yes   Other Topics Concern    Not on file   Social History Narrative    Not on file     Social Determinants of Health     Financial Resource Strain: Low Risk     Difficulty of Paying Living Expenses: Not hard at all   Food Insecurity: No Food Insecurity    Worried About [facility] in the Last Year: Never true    [facility] in the Last Year: Never true   Transportation Needs:     Lack of Transportation (Medical):      Lack of Transportation (Non-Medical):    Physical Activity:     Days of Exercise per Week:     Minutes of Exercise per Session:    Stress:     Feeling of Stress :    Social Connections:     Frequency of Communication with Friends and Family:     Frequency of Social Gatherings with Friends and Family:     Attends [employer] Services:     Active Member of Clubs or Organizations:     Attends Club or Organization Meetings:     Marital Status:    Intimate Partner Violence:     Fear of Current or Ex-Partner:     Emotionally Abused:     Physically Abused:     Sexually Abused:      Family History   Problem Relation Age of Onset    Cancer Father         ear and head    High Blood Pressure Father     Cancer Brother         gall bladder cancer    Other Mother         dementia    High Blood Pressure Mother     Other Sister         gout    Diabetes Brother     No Known Problems Son     No Known Problems Daughter      Allergies   Allergen Reactions    Erythromycin Hives    Seasonal Other (See Comments)     Pollen causes sinus sx    Sporanox [Itraconazole] Hives    Macrobid [Nitrofurantoin Macrocrystal] Other (See Comments)     Migraine, vomiting, fever/chills       Current Outpatient Medications   Medication Sig Dispense Refill    PARoxetine (PAXIL) 20 MG tablet Take 1 tablet by mouth daily 90 tablet 3    folic acid (FOLVITE) 1 MG tablet Take 1 tablet by mouth daily 30 tablet 5    Erenumab-aooe (AIMOVIG) 140 MG/ML SOAJ INJECT 140MG(1 PEN) UNDER THE SKIN ONCE MONTHLY 1 mL 6    topiramate (TOPAMAX) 100 MG tablet Take 3 tablets by mouth daily 270 tablet 3    oxybutynin (DITROPAN-XL) 10 MG extended release tablet TAKE 1 TABLET DAILY 90 tablet 1    CLIMARA 0.1 MG/24HR APPLY 1 PATCH TRANSDERMALLYONTO THE SKIN ONCE A WEEK 12 patch 4    Gabapentin, Once-Daily, (GRALISE) 300 MG TABS Take by mouth 2 times daily.  Rimegepant Sulfate (NURTEC) 75 MG TBDP Take 75 mg by mouth as needed (take at the onset of migraine) 2 samples given  LOT: [id number]  EXP: 11/21 2 tablet 0    olopatadine (PATADAY) 0.2 % SOLN ophthalmic solution INSTILL 1 DROP INTO BOTH EYES EVERY DAY      acetaminophen (APAP EXTRA STRENGTH) 500 MG tablet Take 2 tablets by mouth every 6 hours as needed for Pain 60 tablet 1    SUMAtriptan Succinate 3 MG/0.5ML SOAJ Inject into the skin as needed       ZOMIG 5 MG nasal solution once as needed       aspirin 81 MG EC tablet Take 81 mg by mouth daily. No current facility-administered medications for this visit. Review of Systems   Constitutional: Negative for fever.    HENT: Negative BILATERAL    Result Date: 2/11/2021  COMPARISON: October 4, 2016; July 31, 2018 HISTORY: Z12.31 Encounter for screening mammogram for malignant neoplasm of breast ICD10 TECHNIQUE: KATARINA DIGITAL SCREEN W OR WO CAD BILATERAL with CAD, the CAD images were reviewed by the radiologist and used in the interpretation. Three-D mammography with tomosynthesis was also performed and was reviewed and used in the interpretation. FINDINGS: The breasts contain heterogeneously dense fibroglandular tissue. This can obscure small lesions. No suspicious microcalcifications, neodensity or architectural distortion is seen. The examination is stable. Recommend annual mammographic follow-up, routine physicals as recommended and any palpable abnormality be managed based on findings from clinical examination. BI-RADS 2: BENIGN FINDINGS. Board Certified Radiologists. Accredited by the ACR and FDA. MAMMOGRAPHY IS VERY IMPORTANT TO YOUR HEALTH. THE AMERICAN CANCER SOCIETY GUIDELINES RECOMMEND THAT WOMEN 36YEARS OF AGE AND OLDER SHOULD HAVE A MAMMOGRAM EVERY YEAR. A REMINDER LETTER WILL BE SENT AT THE APPROPRIATE TIME.        Lab Results   Component Value Date    WBC 6.4 06/17/2021    RBC 3.98 06/17/2021    HGB 13.1 06/17/2021    HCT 39.0 06/17/2021    MCV 97.9 06/17/2021    MCH 33.0 06/17/2021    MCHC 33.7 06/17/2021    RDW 13.4 06/17/2021     06/17/2021     Lab Results   Component Value Date     06/17/2021    K 5.0 06/17/2021     06/17/2021    CO2 22 06/17/2021    BUN 23 06/17/2021    CREATININE 0.90 06/17/2021    GFRAA >60.0 06/17/2021    LABGLOM >60.0 06/17/2021    GLUCOSE 84 06/17/2021    PROT 6.8 06/17/2021    LABALBU 4.3 06/17/2021    CALCIUM 9.6 06/17/2021    BILITOT 0.3 06/17/2021    ALKPHOS 73 06/17/2021    AST 12 06/17/2021    ALT 7 06/17/2021     Lab Results   Component Value Date    PROTIME 10.7 11/07/2018    INR 1.0 11/07/2018     Lab Results   Component Value Date    TSH 1.760 05/18/2020    MIQVKDYS43 319 06/24/2021    FOLATE 4.7 06/24/2021    IRON 48 06/24/2021    TIBC 212 06/24/2021     Lab Results   Component Value Date    TRIG 55 03/09/2021    HDL 75 03/09/2021    LDLCALC 131 03/09/2021     Lab Results   Component Value Date    ETOH <10 11/07/2018     No results found for: LITHIUM, DILFRTOT, VALPROATE    Assessment:       Diagnosis Orders   1. Status migrainosus  HYDROmorphone (DILAUDID) 2 MG tablet   2. Radiculopathy affecting upper extremity     3. Intractable migraine with aura with status migrainosus     4. Cerebrovascular accident (CVA) due to stenosis of left middle cerebral artery ([address]     5. PFO (patent foramen ovale)     6. Cervical spinal stenosis       That is migrainous with cluster migraines. Patient had considerable headaches in the past which we were not able to control with any medication we tried on every medication available for migraine headaches we then started on Aimovig and this is helped her to some degree she still breakthrough few headaches every year and this may last several days. We therefore give her Dilaudid so she does not have to go to the emergency room we have done this for the last 15 years there is no red flags and this helps. Patient also has sumatriptan if needed for she tries dose and then she does the Dilaudid. She has no side effects of the medication. She had a PFO fixed which did not help her migraine headaches. For now she is not responding to Nurtec we can discontinue this. Should stay on the Zomig nasal spray Imitrex injections and continuation of Aimovig and Dilaudid when needed. Plan:      No orders of the defined types were placed in this encounter. No orders of the defined types were placed in this encounter. No follow-ups on file.       Brandon Gamboa MD

## 2021-11-18 ENCOUNTER — TELEPHONE (OUTPATIENT)
Dept: NEUROLOGY | Age: 66
End: 2021-11-18

## 2021-11-18 NOTE — TELEPHONE ENCOUNTER
Patients insurance is no longer covering aimovig, they will cover emgality or ajovy. Which one do you want to switch her to? Patient was concerned about mg of different medications    Also let patient know that all 3 of these injection for migraines work in similar ways and no matter what the mg she is still going to have migraine reduction . They all work slightly different    You can let her know this is how they all work    Emgality   is a humanized monoclonal antibody that binds to calcitonin gene-related peptide (CGRP) ligand and blocks its binding to the receptor. ajovy  When CGRP binds to its receptor, it results in a series of events that contributes to inflammation associated with migraine. AJOVY targets the CGRP molecule, and is believed to block the series of events. Debbie Mercury is proven to help prevent monthly migraine days by blocking the interaction between CGRP and the CGRP receptor.

## 2021-11-22 NOTE — TELEPHONE ENCOUNTER
Patient wants to know If she should switch to Ajovy or Emgality. Please see Dana's message below.   Thanks  Lucina Handy

## 2021-11-29 RX ORDER — FREMANEZUMAB-VFRM 225 MG/1.5ML
1.5 INJECTION SUBCUTANEOUS
Qty: 1.5 ML | Refills: 5 | Status: SHIPPED | OUTPATIENT
Start: 2021-11-29 | End: 2022-05-20

## 2021-11-29 NOTE — PROGRESS NOTES
amoxicillin-clavulanate (AUGMENTIN) 875-125 MG per tablet Take 1 tablet by mouth 2 times daily for 10 days Yes Eyad Salas DO   Fremanezumab-vfrm (AJOVY) 225 MG/1.5ML SOAJ Inject 1.5 mLs into the skin every 30 days Yes Hillary Bates MD   oxybutynin (DITROPAN-XL) 10 MG extended release tablet TAKE 1 TABLET DAILY Yes Perla Pichardo MD   topiramate (TOPAMAX) 100 MG tablet Take 3 tablets by mouth daily Yes Hillary Bates MD   PARoxetine (PAXIL) 20 MG tablet Take 1 tablet by mouth daily Yes LISA Sandoval CNJAMAICA   folic acid (FOLVITE) 1 MG tablet Take 1 tablet by mouth daily Yes LISA Zhou - CNP   Erenumab-aooe (AIMOVIG) 140 MG/ML SOAJ INJECT 140MG(1 PEN) UNDER THE SKIN ONCE MONTHLY Yes Marcial Malone MD   CLIMARA 0.1 MG/24HR APPLY 1 PATCH TRANSDERMALLYONTO THE SKIN ONCE A WEEK Yes Perla Pichardo MD   Gabapentin, Once-Daily, (GRALISE) 300 MG TABS Take by mouth 2 times daily. Yes Historical Provider, MD   Rimegepant Sulfate (NURTEC) 75 MG TBDP Take 75 mg by mouth as needed (take at the onset of migraine) 2 samples given  LOT: [id number]  EXP: 11/21 Yes Marcial Malone MD   olopatadine (PATADAY) 0.2 % SOLN ophthalmic solution INSTILL 1 DROP INTO BOTH EYES EVERY DAY Yes Historical Provider, MD   SUMAtriptan Succinate 3 MG/0.5ML SOAJ Inject into the skin as needed  Yes Historical Provider, MD   ZOMIG 5 MG nasal solution once as needed  Yes Historical Provider, MD   aspirin 81 MG EC tablet Take 81 mg by mouth daily.    Yes Historical Provider, MD        Medications Discontinued During This Encounter   Medication Reason    acetaminophen (APAP EXTRA STRENGTH) 500 MG tablet Patient Choice       Allergies   Allergen Reactions    Erythromycin Hives    Seasonal Other (See Comments)     Pollen causes sinus sx    Sporanox [Itraconazole] Hives    Macrobid [Nitrofurantoin Macrocrystal] Other (See Comments)     Migraine, vomiting, fever/chills       Past Medical History:   Diagnosis Date    Allergic reaction  Arthritis     [facility] [address] [address]     Cervical, dx by Dr. Perkins Left Cerebral artery occlusion with cerebral infarction [facility])     at age 52 / sx as a migraine with slurred speech & visual disturbance    Diverticulitis     GERD (gastroesophageal reflux disease)     History of conization of cervix     Migraine     Renal insufficiency     Tinea unguium        Past Surgical History:   Procedure Laterality Date    CARDIAC SURGERY      PFO surgery in age 48s    COLONOSCOPY  2018    HEMORRHOID SURGERY  2017    HYSTERECTOMY VAGINAL N/A 12/19/2018    TLH WITH BSO performed by Donita Bruce MD at [facility] [place] DX W/COLLJ [address] WHEN PFRMD N/A 4/19/2018    COLONOSCOPY performed by Diamond Kelly MD at [place] [address] W/ADJ VAG,W/LOOP BX N/A 9/26/2018    LEEP performed by Lake Talamantes MD at [address] [place] [address] N/A 9/26/2018    DILATATION AND CURETTAGE HYSTEROSCOPY performed by [place] Talamantes MD at [address]       Social History     Socioeconomic History    Marital status:      Spouse name: Not on file    Number of children: Not on file    Years of education: Not on file    Highest education level: Not on file   Occupational History    Not on file   Tobacco Use    Smoking status: Never Smoker    Smokeless tobacco: Never Used   Vaping Use    Vaping Use: Never used   Substance and Sexual Activity    Alcohol use: Yes     Comment: socially    Drug use: No    Sexual activity: Yes   Other Topics Concern    Not on file   Social History Narrative    Not on file     Social Determinants of Health     Financial Resource Strain: Low Risk     Difficulty of Paying Living Expenses: Not hard at all   Food Insecurity: No Food Insecurity    Worried About Running Out of Food in the Last Year: Never true    Shamir of Food in the Last Year: Never true   Transportation Needs:     Lack of Transportation (Medical): Not on file    Lack of Transportation (Non-Medical): Not on file   Physical Activity:     Days of Exercise per Week: Not on file    Minutes of Exercise per Session: Not on file   Stress:     Feeling of Stress : Not on file   Social Connections:     Frequency of Communication with Friends and Family: Not on file    Frequency of Social Gatherings with Friends and Family: Not on file    Attends [employer] Services: Not on file    Active Member of [address] [place] [address] [employer] or Organizations: Not on file    Attends Club or Organization Meetings: Not on file    Marital Status: Not on file   Intimate Partner Violence:     Fear of Current or Ex-Partner: Not on file    Emotionally Abused: Not on file    Physically Abused: Not on file    Sexually Abused: Not on file   Housing Stability:     Unable to Pay for Housing in the Last Year: Not on file    Number of [place] in the Last Year: Not on file    Unstable Housing in the Last Year: Not on file        Family History   Problem Relation Age of Onset    Cancer Father         ear and head    High Blood Pressure Father     Cancer Brother         gall bladder cancer    Other Mother         dementia    High Blood Pressure Mother     Other Sister         gout    Diabetes Brother     No Known Problems Son     No Known Problems Daughter        Vitals:    11/30/21 0951   BP: 114/68   Pulse: 70   Temp: 98 °F (36.7 °C)   SpO2: 97%   Weight: 150 lb (68 kg)   Height: 5' 4\" (1.626 m)       Estimated body mass index is 25.75 kg/m² as calculated from the following:    Height as of this encounter: 5' 4\" (1.626 m). Weight as of this encounter: 150 lb (68 kg). No results for input(s): WBC, RBC, HGB, HCT, MCV, MCH, MCHC, RDW, PLT, MPV in the last 72 hours. No results for input(s): NA, K, CL, CO2, BUN, CREATININE, GLUCOSE, CALCIUM, PROT, LABALBU, BILITOT, ALKPHOS, AST, ALT in the last 72 hours. No results found for: LABA1C    No results found.      Physical Exam  Vitals reviewed. Constitutional:       Appearance: Normal appearance. She is normal weight. HENT:      Head: Normocephalic and atraumatic. Jaw: Tenderness (left jawline) present. No pain on movement. Right Ear: Tympanic membrane, ear canal and external ear normal. There is no impacted cerumen. Left Ear: Tympanic membrane, ear canal and external ear normal. There is no impacted cerumen. Nose: Nose normal. No congestion or rhinorrhea. Mouth/Throat:      Mouth: Mucous membranes are moist.      Dentition: Dental tenderness present. No dental caries or dental abscesses. Pharynx: No oropharyngeal exudate or posterior oropharyngeal erythema. Eyes:      Extraocular Movements: Extraocular movements intact. Conjunctiva/sclera: Conjunctivae normal.   Cardiovascular:      Rate and Rhythm: Normal rate and regular rhythm. Pulses: Normal pulses. Heart sounds: Normal heart sounds. Pulmonary:      Effort: Pulmonary effort is normal.      Breath sounds: Normal breath sounds. No wheezing or rales. Skin:     General: Skin is warm. Capillary Refill: Capillary refill takes less than 2 seconds. Findings: No rash (erythematous scale macule at the arch of the right eyebrow). Neurological:      Mental Status: She is alert. Psychiatric:         Mood and Affect: Mood normal.         Thought Content: Thought content normal.         Judgment: Judgment normal.         ASSESSMENT/PLAN:  1. Chest pain, unspecified type  -EKG without significant ST-T wave abnormalities or abnormal rhythm. Unchanged from EKG 2018 records  -Patient had similar symptoms while taking cholesterol medications in the past and is wondering if it is related to one of her medications  -If symptoms persist or worsen consider stress test    - EKG 12 lead; Future  - EKG 12 lead    2.  Dental infection  -Left ear, jaw and tooth pain appear to be from dental infection without abscess  -We will start

## 2021-11-30 ENCOUNTER — OFFICE VISIT (OUTPATIENT)
Dept: FAMILY MEDICINE CLINIC | Age: 66
End: 2021-11-30
Payer: COMMERCIAL

## 2021-11-30 VITALS
BODY MASS INDEX: 25.61 KG/M2 | WEIGHT: 150 LBS | HEART RATE: 70 BPM | DIASTOLIC BLOOD PRESSURE: 68 MMHG | HEIGHT: 64 IN | OXYGEN SATURATION: 97 % | SYSTOLIC BLOOD PRESSURE: 114 MMHG | TEMPERATURE: 98 F

## 2021-11-30 DIAGNOSIS — R07.9 CHEST PAIN, UNSPECIFIED TYPE: Primary | ICD-10-CM

## 2021-11-30 DIAGNOSIS — K04.7 DENTAL INFECTION: ICD-10-CM

## 2021-11-30 DIAGNOSIS — B02.9 HERPES ZOSTER WITHOUT COMPLICATION: ICD-10-CM

## 2021-11-30 PROCEDURE — G8484 FLU IMMUNIZE NO ADMIN: HCPCS | Performed by: STUDENT IN AN ORGANIZED HEALTH CARE EDUCATION/TRAINING PROGRAM

## 2021-11-30 PROCEDURE — G8419 CALC BMI OUT NRM PARAM NOF/U: HCPCS | Performed by: STUDENT IN AN ORGANIZED HEALTH CARE EDUCATION/TRAINING PROGRAM

## 2021-11-30 PROCEDURE — G8427 DOCREV CUR MEDS BY ELIG CLIN: HCPCS | Performed by: STUDENT IN AN ORGANIZED HEALTH CARE EDUCATION/TRAINING PROGRAM

## 2021-11-30 PROCEDURE — 99214 OFFICE O/P EST MOD 30 MIN: CPT | Performed by: STUDENT IN AN ORGANIZED HEALTH CARE EDUCATION/TRAINING PROGRAM

## 2021-11-30 PROCEDURE — 3017F COLORECTAL CA SCREEN DOC REV: CPT | Performed by: STUDENT IN AN ORGANIZED HEALTH CARE EDUCATION/TRAINING PROGRAM

## 2021-11-30 PROCEDURE — 93000 ELECTROCARDIOGRAM COMPLETE: CPT | Performed by: STUDENT IN AN ORGANIZED HEALTH CARE EDUCATION/TRAINING PROGRAM

## 2021-11-30 PROCEDURE — 1036F TOBACCO NON-USER: CPT | Performed by: STUDENT IN AN ORGANIZED HEALTH CARE EDUCATION/TRAINING PROGRAM

## 2021-11-30 RX ORDER — AMOXICILLIN AND CLAVULANATE POTASSIUM 875; 125 MG/1; MG/1
1 TABLET, FILM COATED ORAL 2 TIMES DAILY
Qty: 20 TABLET | Refills: 0 | Status: SHIPPED | OUTPATIENT
Start: 2021-11-30 | End: 2021-12-10

## 2021-11-30 ASSESSMENT — ENCOUNTER SYMPTOMS
CHEST TIGHTNESS: 0
SORE THROAT: 0
VOMITING: 0
SHORTNESS OF BREATH: 0
ABDOMINAL PAIN: 0
COUGH: 0
SINUS PRESSURE: 0

## 2022-01-03 DIAGNOSIS — G43.901 STATUS MIGRAINOSUS: ICD-10-CM

## 2022-01-03 RX ORDER — HYDROMORPHONE HYDROCHLORIDE 2 MG/1
2 TABLET ORAL DAILY
Qty: 3 TABLET | Refills: 0 | Status: SHIPPED | OUTPATIENT
Start: 2022-01-03 | End: 2022-01-06

## 2022-01-12 ENCOUNTER — OFFICE VISIT (OUTPATIENT)
Dept: FAMILY MEDICINE CLINIC | Age: 67
End: 2022-01-12
Payer: COMMERCIAL

## 2022-01-12 VITALS
WEIGHT: 148 LBS | TEMPERATURE: 98.4 F | HEART RATE: 66 BPM | DIASTOLIC BLOOD PRESSURE: 64 MMHG | OXYGEN SATURATION: 97 % | HEIGHT: 64 IN | BODY MASS INDEX: 25.27 KG/M2 | SYSTOLIC BLOOD PRESSURE: 120 MMHG

## 2022-01-12 DIAGNOSIS — R05.9 COUGH: ICD-10-CM

## 2022-01-12 DIAGNOSIS — Z23 NEED FOR IMMUNIZATION AGAINST INFLUENZA: ICD-10-CM

## 2022-01-12 DIAGNOSIS — N28.9 RENAL INSUFFICIENCY: ICD-10-CM

## 2022-01-12 DIAGNOSIS — E53.8 FOLIC ACID DEFICIENCY: Primary | ICD-10-CM

## 2022-01-12 PROCEDURE — 90471 IMMUNIZATION ADMIN: CPT | Performed by: NURSE PRACTITIONER

## 2022-01-12 PROCEDURE — 90674 CCIIV4 VAC NO PRSV 0.5 ML IM: CPT | Performed by: NURSE PRACTITIONER

## 2022-01-12 PROCEDURE — 99214 OFFICE O/P EST MOD 30 MIN: CPT | Performed by: NURSE PRACTITIONER

## 2022-01-12 RX ORDER — FOLIC ACID 1 MG/1
1 TABLET ORAL DAILY
Qty: 30 TABLET | Refills: 5 | Status: SHIPPED | OUTPATIENT
Start: 2022-01-12

## 2022-01-12 RX ORDER — BENZONATATE 100 MG/1
100 CAPSULE ORAL 3 TIMES DAILY PRN
Qty: 21 CAPSULE | Refills: 0 | Status: SHIPPED | OUTPATIENT
Start: 2022-01-12 | End: 2022-01-19

## 2022-01-12 ASSESSMENT — ENCOUNTER SYMPTOMS
SHORTNESS OF BREATH: 0
COUGH: 1

## 2022-01-12 NOTE — PROGRESS NOTES
performed by Joselyn Duarte MD at [facility] W/ADJ VAG,W/LOOP BX N/A 9/26/2018    LEEP performed by Miguelina Nix MD at [facility] N/A 9/26/2018    DILATATION AND CURETTAGE HYSTEROSCOPY performed by Miguelina Nix MD at [address] 2000    RCR     Family History   Problem Relation Age of Onset    Cancer Father         ear and head    High Blood Pressure Father     Cancer Brother         gall bladder cancer    Other Mother         dementia    High Blood Pressure Mother     Other Sister         gout    Diabetes Brother     No Known Problems Son     No Known Problems Daughter      Social History     Socioeconomic History    Marital status:      Spouse name: None    Number of children: None    Years of education: None    Highest education level: None   Occupational History    None   Tobacco Use    Smoking status: Never Smoker    Smokeless tobacco: Never Used   Vaping Use    Vaping Use: Never used   Substance and Sexual Activity    Alcohol use: Yes     Comment: socially    Drug use: No    Sexual activity: Yes   Other Topics Concern    None   Social History Narrative    None     Social Determinants of Health     Financial Resource Strain: Low Risk     Difficulty of Paying Living Expenses: Not hard at all   Food Insecurity: No Food Insecurity    Worried About Running Out of Food in the Last Year: Never true    Shamir of Food in the Last Year: Never true   Transportation Needs:     Lack of Transportation (Medical): Not on file    Lack of Transportation (Non-Medical):  Not on file   Physical Activity:     Days of Exercise per Week: Not on file    Minutes of Exercise per Session: Not on file   Stress:     Feeling of Stress : Not on file   Social Connections:     Frequency of Communication with Friends and Family: Not on file    Frequency of Social Gatherings with Friends and Family: Not on file    Attends Yazidism Services: Not on file    Active Member of Clubs or Organizations: Not on file    Attends Club or Organization Meetings: Not on file    Marital Status: Not on file   Intimate Partner Violence:     Fear of Current or Ex-Partner: Not on file    Emotionally Abused: Not on file    Physically Abused: Not on file    Sexually Abused: Not on file   Housing Stability:     Unable to Pay for Housing in the Last Year: Not on file    Number of [place] in the Last Year: Not on file    Unstable Housing in the Last Year: Not on file     Current Outpatient Medications on File Prior to Visit   Medication Sig Dispense Refill    Fremanezumab-vfrm (AJOVY) 225 MG/1.5ML SOAJ Inject 1.5 mLs into the skin every 30 days 1.5 mL 5    oxybutynin (DITROPAN-XL) 10 MG extended release tablet TAKE 1 TABLET DAILY 90 tablet 3    PARoxetine (PAXIL) 20 MG tablet Take 1 tablet by mouth daily 90 tablet 3    Erenumab-aooe (AIMOVIG) 140 MG/ML SOAJ INJECT 140MG(1 PEN) UNDER THE SKIN ONCE MONTHLY 1 mL 6    CLIMARA 0.1 MG/24HR APPLY 1 PATCH TRANSDERMALLYONTO THE SKIN ONCE A WEEK 12 patch 4    Gabapentin, Once-Daily, (GRALISE) 300 MG TABS Take by mouth 2 times daily.  Rimegepant Sulfate (NURTEC) 75 MG TBDP Take 75 mg by mouth as needed (take at the onset of migraine) 2 samples given  LOT: [id number]  EXP: 11/21 2 tablet 0    olopatadine (PATADAY) 0.2 % SOLN ophthalmic solution INSTILL 1 DROP INTO BOTH EYES EVERY DAY      SUMAtriptan Succinate 3 MG/0.5ML SOAJ Inject into the skin as needed       ZOMIG 5 MG nasal solution once as needed       aspirin 81 MG EC tablet Take 81 mg by mouth daily.  topiramate (TOPAMAX) 100 MG tablet Take 3 tablets by mouth daily 270 tablet 3     No current facility-administered medications on file prior to visit.      Allergies   Allergen Reactions    Erythromycin Hives    Seasonal Other (See Comments)     Pollen causes sinus sx    Sporanox [Itraconazole] Hives    Macrobid [Nitrofurantoin Macrocrystal] Other (See Comments)     Migraine, vomiting, fever/chills       Review of Systems   Constitutional: Negative for fatigue and fever. HENT: Negative for congestion. Respiratory: Positive for cough. Negative for shortness of breath. Cardiovascular: Negative for chest pain. Objective  Vitals:    01/12/22 0920   BP: 120/64   Pulse: 66   Temp: 98.4 °F (36.9 °C)   SpO2: 97%   Weight: 148 lb (67.1 kg)   Height: 5' 4\" (1.626 m)     Physical Exam  Vitals and nursing note reviewed. Constitutional:       Appearance: Normal appearance. She is normal weight. HENT:      Head: Normocephalic. Nose: Nose normal.      Mouth/Throat:      Mouth: Mucous membranes are moist.      Pharynx: Oropharynx is clear. Eyes:      Extraocular Movements: Extraocular movements intact. Conjunctiva/sclera: Conjunctivae normal.      Pupils: Pupils are equal, round, and reactive to light. Cardiovascular:      Rate and Rhythm: Normal rate and regular rhythm. Pulses: Normal pulses. Heart sounds: Normal heart sounds. Pulmonary:      Effort: Pulmonary effort is normal.      Breath sounds: Normal breath sounds. Skin:     General: Skin is warm. Neurological:      General: No focal deficit present. Mental Status: She is alert and oriented to person, place, and time. Mental status is at baseline. Psychiatric:         Mood and Affect: Mood normal.         Behavior: Behavior normal.         Thought Content: Thought content normal.         Judgment: Judgment normal.         Assessment & Plan     Diagnosis Orders   1. Folic acid deficiency  folic acid (FOLVITE) 1 MG tablet    Vitamin B12 & Folate   2. Cough  benzonatate (TESSALON) 100 MG capsule   3. Need for immunization against influenza  INFLUENZA, MDCK QUADV, 2 YRS AND OLDER, IM, PF, PREFILL SYR OR SDV, 0.5ML (FLUCELVAX QUADV, PF)   4.  Renal insufficiency         Orders Placed This Encounter   Procedures    INFLUENZA,

## 2022-01-24 DIAGNOSIS — E53.8 FOLIC ACID DEFICIENCY: ICD-10-CM

## 2022-01-26 LAB
FOLATE: 17.4 NG/ML
VITAMIN B-12: 303 PG/ML (ref 232–1245)

## 2022-02-28 ENCOUNTER — OFFICE VISIT (OUTPATIENT)
Dept: NEUROLOGY | Age: 67
End: 2022-02-28
Payer: COMMERCIAL

## 2022-02-28 VITALS
DIASTOLIC BLOOD PRESSURE: 82 MMHG | BODY MASS INDEX: 25.23 KG/M2 | SYSTOLIC BLOOD PRESSURE: 120 MMHG | WEIGHT: 147 LBS | HEART RATE: 65 BPM

## 2022-02-28 DIAGNOSIS — M48.02 CERVICAL SPINAL STENOSIS: ICD-10-CM

## 2022-02-28 DIAGNOSIS — G43.111 INTRACTABLE MIGRAINE WITH AURA WITH STATUS MIGRAINOSUS: Primary | ICD-10-CM

## 2022-02-28 DIAGNOSIS — I63.512 CEREBROVASCULAR ACCIDENT (CVA) DUE TO STENOSIS OF LEFT MIDDLE CEREBRAL ARTERY (HCC): ICD-10-CM

## 2022-02-28 DIAGNOSIS — M50.30 DEGENERATIVE DISC DISEASE, CERVICAL: ICD-10-CM

## 2022-02-28 DIAGNOSIS — Q21.12 PFO (PATENT FORAMEN OVALE): ICD-10-CM

## 2022-02-28 PROCEDURE — 99214 OFFICE O/P EST MOD 30 MIN: CPT | Performed by: PSYCHIATRY & NEUROLOGY

## 2022-02-28 RX ORDER — ESTRADIOL 0.1 MG/D
PATCH TRANSDERMAL
Qty: 12 PATCH | Refills: 4 | Status: SHIPPED | OUTPATIENT
Start: 2022-02-28

## 2022-02-28 ASSESSMENT — ENCOUNTER SYMPTOMS
TROUBLE SWALLOWING: 0
VOMITING: 0
COLOR CHANGE: 0
NAUSEA: 0
CHOKING: 0
BACK PAIN: 0
SHORTNESS OF BREATH: 0
PHOTOPHOBIA: 0

## 2022-02-28 NOTE — PROGRESS NOTES
Subjective:      Patient ID: Ladan Washington is a 61 y.o. female who presents today for:  Chief Complaint   Patient presents with    6 Month Follow-Up     pt states shes doing okay. HPI 61 right-handed female history of intractable migraine headaches and continues on topiramate for me arms daily and she is on Ajovy. Patient occasionally still has migraines and has Dilaudid as and when required to prevent emergency room visit. She had a PFO which was fixed which took quite did not help her migraine headaches. We tried her on Nurtec to which she had not responded she still uses Imitrex as and when required and this is when she has cluster headaches. At that time she requires Dilaudid. There is no emergency room visits and there is no red flags. Patient was on [address] doing well though insurance did not cover this so we change her to [address]   She is only had a few minor headaches    Past Medical History:   Diagnosis Date    Allergic reaction     Arthritis     Cancer ([facility] [address]     Cervical, dx by Dr. Denise Gomes Cerebral artery occlusion with cerebral infarction [facility])     at age 52 / sx as a migraine with slurred speech & visual disturbance    Diverticulitis     GERD (gastroesophageal reflux disease)     History of conization of cervix     Migraine     Renal insufficiency     Tinea unguium      Past Surgical History:   Procedure Laterality Date    CARDIAC SURGERY      PFO surgery in age 48s    COLONOSCOPY  2018    HEMORRHOID SURGERY  2017    HYSTERECTOMY VAGINAL N/A 12/19/2018    TLH WITH BSO performed by Julien Krueger MD at [facility] FLX DX W/COLLJ Jessie 1978 PFRMD N/A 4/19/2018    COLONOSCOPY performed by Clarke Dahl MD at [place] [address] W/ADJ VAG,W/LOOP BX N/A 9/26/2018    LEEP performed by Benigno Haque MD at [facility] N/A 9/26/2018    DILATATION AND CURETTAGE HYSTEROSCOPY performed by Benigno Haque MD at [facility] SHOULDER SURGERY Left 2000    [facility]     Social History     Socioeconomic History    Marital status:      Spouse name: Not on file    Number of children: Not on file    Years of education: Not on file    Highest education level: Not on file   Occupational History    Not on file   Tobacco Use    Smoking status: Never Smoker    Smokeless tobacco: Never Used   Vaping Use    Vaping Use: Never used   Substance and Sexual Activity    Alcohol use: Yes     Comment: socially    Drug use: No    Sexual activity: Yes   Other Topics Concern    Not on file   Social History Narrative    Not on file     Social Determinants of Health     Financial Resource Strain: Low Risk     Difficulty of Paying Living Expenses: Not hard at all   Food Insecurity: No Food Insecurity    Worried About [address] in the Last Year: Never true    [address] [facility] in the Last Year: Never true   Transportation Needs:     Lack of Transportation (Medical): Not on file    Lack of Transportation (Non-Medical):  Not on file   Physical Activity:     Days of Exercise per Week: Not on file    Minutes of Exercise per Session: Not on file   Stress:     Feeling of Stress : Not on file   Social Connections:     Frequency of Communication with Friends and Family: Not on file    Frequency of Social Gatherings with Friends and Family: Not on file    Attends Catholic Services: Not on file    Active Member of [address] [place] [address] or Organizations: Not on file    Attends Club or Organization Meetings: Not on file    Marital Status: Not on file   Intimate Partner Violence:     Fear of Current or Ex-Partner: Not on file    Emotionally Abused: Not on file    Physically Abused: Not on file    Sexually Abused: Not on file   Housing Stability:     Unable to Pay for Housing in the Last Year: Not on file    Number of [place] in the Last Year: Not on file    Unstable Housing in the Last Year: Not on file     Family History Problem Relation Age of Onset    Cancer Father         ear and head    High Blood Pressure Father     Cancer Brother         gall bladder cancer    Other Mother         dementia    High Blood Pressure Mother     Other Sister         gout    Diabetes Brother     No Known Problems Son     No Known Problems Daughter      Allergies   Allergen Reactions    Erythromycin Hives    Seasonal Other (See Comments)     Pollen causes sinus sx    Sporanox [Itraconazole] Hives    Macrobid [Nitrofurantoin Macrocrystal] Other (See Comments)     Migraine, vomiting, fever/chills       Current Outpatient Medications   Medication Sig Dispense Refill    CLIMARA 0.1 MG/24HR APPLY 1 PATCH TRANSDERMALLYONTO THE SKIN ONCE A WEEK 12 patch 4    folic acid (FOLVITE) 1 MG tablet Take 1 tablet by mouth daily 30 tablet 5    Fremanezumab-vfrm (AJOVY) 225 MG/1.5ML SOAJ Inject 1.5 mLs into the skin every 30 days 1.5 mL 5    oxybutynin (DITROPAN-XL) 10 MG extended release tablet TAKE 1 TABLET DAILY 90 tablet 3    topiramate (TOPAMAX) 100 MG tablet Take 3 tablets by mouth daily 270 tablet 3    PARoxetine (PAXIL) 20 MG tablet Take 1 tablet by mouth daily 90 tablet 3    Erenumab-aooe (AIMOVIG) 140 MG/ML SOAJ INJECT 140MG(1 PEN) UNDER THE SKIN ONCE MONTHLY 1 mL 6    Gabapentin, Once-Daily, (GRALISE) 300 MG TABS Take by mouth 2 times daily.  Rimegepant Sulfate (NURTEC) 75 MG TBDP Take 75 mg by mouth as needed (take at the onset of migraine) 2 samples given  LOT: [id number]  EXP: 11/21 2 tablet 0    olopatadine (PATADAY) 0.2 % SOLN ophthalmic solution INSTILL 1 DROP INTO BOTH EYES EVERY DAY      SUMAtriptan Succinate 3 MG/0.5ML SOAJ Inject into the skin as needed       ZOMIG 5 MG nasal solution once as needed       aspirin 81 MG EC tablet Take 81 mg by mouth daily. No current facility-administered medications for this visit. Review of Systems   Constitutional: Negative for fever.    HENT: Negative for ear pain, tinnitus and trouble swallowing. Eyes: Negative for photophobia and visual disturbance. Respiratory: Negative for choking and shortness of breath. Cardiovascular: Negative for chest pain and palpitations. Gastrointestinal: Negative for nausea and vomiting. Musculoskeletal: Negative for back pain, gait problem, joint swelling, myalgias, neck pain and neck stiffness. Skin: Negative for color change. Allergic/Immunologic: Negative for food allergies. Neurological: Positive for headaches. Negative for dizziness, tremors, seizures, syncope, facial asymmetry, speech difficulty, weakness, light-headedness and numbness. Psychiatric/Behavioral: Negative for behavioral problems, confusion, hallucinations and sleep disturbance. Objective:   /82 (Site: Left Upper Arm, Position: Sitting, Cuff Size: Medium Adult)   Pulse 65   Wt 147 lb (66.7 kg)   LMP 09/19/2008   BMI 25.23 kg/m²     Physical Exam  Vitals reviewed. Eyes:      Pupils: Pupils are equal, round, and reactive to light. Cardiovascular:      Rate and Rhythm: Normal rate and regular rhythm. Heart sounds: No murmur heard. Pulmonary:      Effort: Pulmonary effort is normal.      Breath sounds: Normal breath sounds. Abdominal:      General: Bowel sounds are normal.   Musculoskeletal:         General: Normal range of motion. Cervical back: Normal range of motion. Skin:     General: Skin is warm. Neurological:      Mental Status: She is alert and oriented to person, place, and time. Cranial Nerves: No cranial nerve deficit. Sensory: No sensory deficit. Motor: No abnormal muscle tone. Coordination: Coordination normal.      Deep Tendon Reflexes: Reflexes are normal and symmetric. Babinski sign absent on the right side. Babinski sign absent on the left side.    Psychiatric:         Mood and Affect: Mood normal.         KATARINA DIGITAL SCREEN W OR WO CAD BILATERAL    Result Date: 2/11/2021  COMPARISON: October 4, 2016; July 31, 2018 HISTORY: Z12.31 Encounter for screening mammogram for malignant neoplasm of breast ICD10 TECHNIQUE: KATARINA DIGITAL SCREEN W OR WO CAD BILATERAL with CAD, the CAD images were reviewed by the radiologist and used in the interpretation. Three-D mammography with tomosynthesis was also performed and was reviewed and used in the interpretation. FINDINGS: The breasts contain heterogeneously dense fibroglandular tissue. This can obscure small lesions. No suspicious microcalcifications, neodensity or architectural distortion is seen. The examination is stable. Recommend annual mammographic follow-up, routine physicals as recommended and any palpable abnormality be managed based on findings from clinical examination. BI-RADS 2: BENIGN FINDINGS. Board Certified Radiologists. Accredited by the ACR and FDA. MAMMOGRAPHY IS VERY IMPORTANT TO YOUR HEALTH. THE AMERICAN CANCER SOCIETY GUIDELINES RECOMMEND THAT WOMEN 36YEARS OF AGE AND OLDER SHOULD HAVE A MAMMOGRAM EVERY YEAR. A REMINDER LETTER WILL BE SENT AT THE APPROPRIATE TIME.        Lab Results   Component Value Date    WBC 6.4 06/17/2021    RBC 3.98 06/17/2021    HGB 13.1 06/17/2021    HCT 39.0 06/17/2021    MCV 97.9 06/17/2021    MCH 33.0 06/17/2021    MCHC 33.7 06/17/2021    RDW 13.4 06/17/2021     06/17/2021     Lab Results   Component Value Date     06/17/2021    K 5.0 06/17/2021     06/17/2021    CO2 22 06/17/2021    BUN 23 06/17/2021    CREATININE 0.90 06/17/2021    GFRAA >60.0 06/17/2021    LABGLOM >60.0 06/17/2021    GLUCOSE 84 06/17/2021    PROT 6.8 06/17/2021    LABALBU 4.3 06/17/2021    CALCIUM 9.6 06/17/2021    BILITOT 0.3 06/17/2021    ALKPHOS 73 06/17/2021    AST 12 06/17/2021    ALT 7 06/17/2021     Lab Results   Component Value Date    PROTIME 10.7 11/07/2018    INR 1.0 11/07/2018     Lab Results   Component Value Date    TSH 1.760 05/18/2020    [id number] 303 01/24/2022    FOLATE 17.4 01/24/2022    IRON 48 06/24/2021    TIBC 212 06/24/2021     Lab Results   Component Value Date    TRIG 55 03/09/2021    HDL 75 03/09/2021    LDLCALC 131 03/09/2021     Lab Results   Component Value Date    ETOH <10 11/07/2018     No results found for: LITHIUM, DILFRTOT, VALPROATE    Assessment:       Diagnosis Orders   1. Intractable migraine with aura with status migrainosus     2. Cerebrovascular accident (CVA) due to stenosis of left middle cerebral artery ([address]     3. PFO (patent foramen ovale)     4. Cervical spinal stenosis     5. Degenerative disc disease, cervical       Intractable migraine headaches which had not responded to multiple medications that were tried she is on topiramate 20 mg she still has to use 300 mg she was on 60 mg in the past.  She has cervicalgia responsive to gabapentin. She had a cerebral event in the past and we found that she had a PFO which was fixed. Her migraines though did not improve with fixation of the PFO. She then continued to have clusters. Once she started on Aimovig she had better response though now she is changed to Ajovy by insurance requirements she is still doing well still very few headache she does not take aspirin and required which only sometimes helps. For now given that she is not any major clusters we will keep her on the same medication though we are aware that when she has a cluster she requires Dilaudid for a few days and this keeps her away from the emergency room. There is not any red flags of use of this medication in her. Plan:      No orders of the defined types were placed in this encounter. No orders of the defined types were placed in this encounter. No follow-ups on file.       Lei Acevedo MD

## 2022-04-19 ENCOUNTER — HOSPITAL ENCOUNTER (OUTPATIENT)
Dept: CT IMAGING | Age: 67
Discharge: HOME | End: 2022-04-21
Payer: COMMERCIAL

## 2022-04-19 ENCOUNTER — OFFICE VISIT (OUTPATIENT)
Dept: FAMILY MEDICINE CLINIC | Age: 67
End: 2022-04-19
Payer: COMMERCIAL

## 2022-04-19 VITALS
WEIGHT: 146.6 LBS | TEMPERATURE: 97.7 F | SYSTOLIC BLOOD PRESSURE: 120 MMHG | OXYGEN SATURATION: 98 % | BODY MASS INDEX: 25.03 KG/M2 | DIASTOLIC BLOOD PRESSURE: 72 MMHG | HEIGHT: 64 IN | HEART RATE: 75 BPM

## 2022-04-19 DIAGNOSIS — R10.9 ABDOMINAL PAIN, UNSPECIFIED ABDOMINAL LOCATION: Primary | ICD-10-CM

## 2022-04-19 DIAGNOSIS — K62.5 RECTAL BLEEDING: ICD-10-CM

## 2022-04-19 DIAGNOSIS — R10.9 ABDOMINAL PAIN, UNSPECIFIED ABDOMINAL LOCATION: ICD-10-CM

## 2022-04-19 PROCEDURE — 99213 OFFICE O/P EST LOW 20 MIN: CPT | Performed by: STUDENT IN AN ORGANIZED HEALTH CARE EDUCATION/TRAINING PROGRAM

## 2022-04-19 PROCEDURE — 6360000004 HC RX CONTRAST MEDICATION: Performed by: STUDENT IN AN ORGANIZED HEALTH CARE EDUCATION/TRAINING PROGRAM

## 2022-04-19 PROCEDURE — 2500000003 HC RX 250 WO HCPCS: Performed by: STUDENT IN AN ORGANIZED HEALTH CARE EDUCATION/TRAINING PROGRAM

## 2022-04-19 PROCEDURE — 74177 CT ABD & PELVIS W/CONTRAST: CPT

## 2022-04-19 RX ORDER — SODIUM CHLORIDE 0.9 % (FLUSH) 0.9 %
10 SYRINGE (ML) INJECTION 2 TIMES DAILY
Status: DISCONTINUED | OUTPATIENT
Start: 2022-04-19 | End: 2022-04-22 | Stop reason: HOSPADM

## 2022-04-19 RX ORDER — HYDROCORTISONE ACETATE 25 MG/1
25 SUPPOSITORY RECTAL 2 TIMES DAILY
Qty: 12 SUPPOSITORY | Refills: 0 | Status: CANCELLED | OUTPATIENT
Start: 2022-04-19

## 2022-04-19 RX ADMIN — IOPAMIDOL 100 ML: 612 INJECTION, SOLUTION INTRAVENOUS at 17:06

## 2022-04-19 RX ADMIN — BARIUM SULFATE 450 ML: 20 SUSPENSION ORAL at 17:06

## 2022-04-19 ASSESSMENT — ENCOUNTER SYMPTOMS
NAUSEA: 0
ABDOMINAL PAIN: 0
DIARRHEA: 0
COUGH: 0
SORE THROAT: 0
SHORTNESS OF BREATH: 0
VOMITING: 0
ANAL BLEEDING: 1
SINUS PRESSURE: 0

## 2022-04-19 ASSESSMENT — PATIENT HEALTH QUESTIONNAIRE - PHQ9
SUM OF ALL RESPONSES TO PHQ QUESTIONS 1-9: 0
SUM OF ALL RESPONSES TO PHQ9 QUESTIONS 1 & 2: 0
SUM OF ALL RESPONSES TO PHQ QUESTIONS 1-9: 0
SUM OF ALL RESPONSES TO PHQ QUESTIONS 1-9: 0
1. LITTLE INTEREST OR PLEASURE IN DOING THINGS: 0
SUM OF ALL RESPONSES TO PHQ QUESTIONS 1-9: 0
2. FEELING DOWN, DEPRESSED OR HOPELESS: 0

## 2022-04-19 NOTE — PROGRESS NOTES
2022    Francisco Dunn (:  1955) is a 61 y.o. female, here for evaluation of the following medical concerns:  Chief Complaint   Patient presents with    Rectal Bleeding     x2 days      HPI  Rectal bleeding  Noticed blood on the towel after she showered yesterday  Since then has had some bleeding with passing gas and BMs  Hx diverticulitis many years ago (2018 - treated with cipro and flagyl)  Hx hemorrhoids - external hemorrhoids - she checked them and they are not bleeding    Does have abdominal pain/pressure in the lower abdomen  No fevers or chills  No diarrhea or constipation  No pain with BMs    Review of Systems   Constitutional: Negative for chills and fever. HENT: Negative for congestion, sinus pressure and sore throat. Respiratory: Negative for cough and shortness of breath. Cardiovascular: Negative for chest pain and palpitations. Gastrointestinal: Positive for anal bleeding. Negative for abdominal pain, diarrhea, nausea and vomiting. Musculoskeletal: Negative for arthralgias and myalgias. Skin: Negative for rash and wound. Neurological: Negative for speech difficulty and light-headedness. Psychiatric/Behavioral: Negative for suicidal ideas. The patient is not nervous/anxious. Prior to Visit Medications    Medication Sig Taking?  Authorizing Provider   CLIMARA 0.1 MG/24HR APPLY 1 PATCH TRANSDERMALLYONTO THE SKIN ONCE A WEEK Yes Perla Pichardo MD   folic acid (FOLVITE) 1 MG tablet Take 1 tablet by mouth daily Yes Mark North APRN - CNP   Fremanezumab-vfrm (AJOVY) 225 MG/1.5ML SOAJ Inject 1.5 mLs into the skin every 30 days Yes Leslie Fuentes MD   oxybutynin (DITROPAN-XL) 10 MG extended release tablet TAKE 1 TABLET DAILY Yes Perla Pichardo MD   topiramate (TOPAMAX) 100 MG tablet Take 3 tablets by mouth daily Yes Leslie Fuentes MD   PARoxetine (PAXIL) 20 MG tablet Take 1 tablet by mouth daily Yes LISA Shaw - AZAEL   Gabapentin, Once-Daily, (GRALISE) 300 MG TABS Take by mouth 2 times daily. Yes Historical Provider, MD   Rimegepant Sulfate (NURTEC) 75 MG TBDP Take 75 mg by mouth as needed (take at the onset of migraine) 2 samples given  LOT: [id number]  EXP: 11/21 Yes Marcial Leslie MD   olopatadine (PATADAY) 0.2 % SOLN ophthalmic solution INSTILL 1 DROP INTO BOTH EYES EVERY DAY Yes Historical Provider, MD   SUMAtriptan Succinate 3 MG/0.5ML SOAJ Inject into the skin as needed  Yes Historical Provider, MD   ZOMIG 5 MG nasal solution once as needed  Yes Historical Provider, MD   aspirin 81 MG EC tablet Take 81 mg by mouth daily.    Yes Historical Provider, MD        Medications Discontinued During This Encounter   Medication Reason    Erenumab-aooe (AIMOVIG) 140 MG/ML SOAJ Patient Choice       Allergies   Allergen Reactions    Erythromycin Hives    Seasonal Other (See Comments)     Pollen causes sinus sx    Sporanox [Itraconazole] Hives    Macrobid [Nitrofurantoin Macrocrystal] Other (See Comments)     Migraine, vomiting, fever/chills       Past Medical History:   Diagnosis Date    Allergic reaction     Arthritis     Cancer ([facility] [address]     Cervical, dx by Dr. Mary Krueger    Cerebral artery occlusion with cerebral infarction ([facility] [address]     at age 52 / sx as a migraine with slurred speech & visual disturbance    Diverticulitis     GERD (gastroesophageal reflux disease)     History of conization of cervix     Migraine     Renal insufficiency     Tinea unguium        Past Surgical History:   Procedure Laterality Date    CARDIAC SURGERY      PFO surgery in age 48s    COLONOSCOPY  2018    HEMORRHOID SURGERY  2017    HYSTERECTOMY VAGINAL N/A 12/19/2018    TLH WITH BSO performed by Natividad Farias MD at [address] [place] [address] FLX DX W/COLLDILAN [address] 1976 PFRMD N/A 4/19/2018    COLONOSCOPY performed by Thang Leblanc MD at [place] [address] W/ADJ VAG,W/LOOP BX N/A 9/26/2018    LEEP performed by Verónica Parker MD at [address] [place] [address] HYSTEROSCOPY,W/ENDOMETRIAL ABLATION N/A 9/26/2018    DILATATION AND CURETTAGE HYSTEROSCOPY performed by Arnoldo Smith MD at [address]       Social History     Socioeconomic History    Marital status:      Spouse name: Not on file    Number of children: Not on file    Years of education: Not on file    Highest education level: Not on file   Occupational History    Not on file   Tobacco Use    Smoking status: Never Smoker    Smokeless tobacco: Never Used   Vaping Use    Vaping Use: Never used   Substance and Sexual Activity    Alcohol use: Yes     Comment: socially    Drug use: No    Sexual activity: Yes   Other Topics Concern    Not on file   Social History Narrative    Not on file     Social Determinants of Health     Financial Resource Strain: Low Risk     Difficulty of Paying Living Expenses: Not hard at all   Food Insecurity: No Food Insecurity    Worried About [address] in the Last Year: Never true    [facility] in the Last Year: Never true   Transportation Needs:     Lack of Transportation (Medical): Not on file    Lack of Transportation (Non-Medical):  Not on file   Physical Activity:     Days of Exercise per Week: Not on file    Minutes of Exercise per Session: Not on file   Stress:     Feeling of Stress : Not on file   Social Connections:     Frequency of Communication with Friends and Family: Not on file    Frequency of Social Gatherings with Friends and Family: Not on file    Attends Bahai Services: Not on file    Active Member of Clubs or Organizations: Not on file    Attends Club or Organization Meetings: Not on file    Marital Status: Not on file   Intimate Partner Violence:     Fear of Current or Ex-Partner: Not on file    Emotionally Abused: Not on file    Physically Abused: Not on file    Sexually Abused: Not on file   Housing Stability:     Unable to Pay for Housing in the Last Year: Not on file    Number of Places Lived in the Last Year: Not on file    Unstable Housing in the Last Year: Not on file        Family History   Problem Relation Age of Onset    Cancer Father         ear and head    High Blood Pressure Father     Cancer Brother         gall bladder cancer    Other Mother         dementia    High Blood Pressure Mother     Other Sister         gout    Diabetes Brother     No Known Problems Son     No Known Problems Daughter        Vitals:    04/19/22 1401   BP: 120/72   Pulse: 75   Temp: 97.7 °F (36.5 °C)   SpO2: 98%   Weight: 146 lb 9.6 oz (66.5 kg)   Height: 5' 4\" (1.626 m)       Estimated body mass index is 25.16 kg/m² as calculated from the following:    Height as of this encounter: 5' 4\" (1.626 m). Weight as of this encounter: 146 lb 9.6 oz (66.5 kg). No results for input(s): WBC, RBC, HGB, HCT, MCV, MCH, MCHC, RDW, PLT, MPV in the last 72 hours. No results for input(s): NA, K, CL, CO2, BUN, CREATININE, GLUCOSE, CALCIUM, PROT, LABALBU, BILITOT, ALKPHOS, AST, ALT in the last 72 hours. No results found for: LABA1C    No results found. Physical Exam  Constitutional:       General: She is not in acute distress. Appearance: Normal appearance. HENT:      Head: Normocephalic and atraumatic. Eyes:      Extraocular Movements: Extraocular movements intact. Conjunctiva/sclera: Conjunctivae normal.   Musculoskeletal:         General: No deformity. Normal range of motion. Skin:     Findings: No lesion or rash. Neurological:      General: No focal deficit present. Mental Status: She is alert. Mental status is at baseline. Psychiatric:         Mood and Affect: Mood normal.         Behavior: Behavior normal.         Thought Content: Thought content normal.         ASSESSMENT/PLAN:  1.  Abdominal pain, unspecified abdominal location  Possible diverticulitis flare up  Will get CT abd/pelvis    - CT ABDOMEN PELVIS W IV CONTRAST Additional Contrast? Radiologist Recommendation; Future    2. Rectal bleeding  - CT ABDOMEN PELVIS W IV CONTRAST Additional Contrast? Radiologist Recommendation; Future      Medications Discontinued During This Encounter   Medication Reason    Erenumab-aooe (AIMOVIG) 140 MG/ML SOAJ Patient Choice       ---------------------------------------------------------------------  Side effects, adverse effects of the medication prescribed today, as well as treatment plan/ rationale and result expectations have been discussed with the patient who expresses understanding and desires to proceed. Close follow up to evaluate treatment results and for coordination of care. I have reviewed the patient's medical history in detail and updated the computerized patient record. As always, patient is advised that if symptoms worsen in any way they will proceed to the nearest emergency room. --------------------------------------------------------------------    No follow-ups on file. An  electronic signature was used to authenticate this note.     --Cara Liriano DO on 4/19/2022 at 2:29 PM

## 2022-04-20 DIAGNOSIS — K92.1 HEMATOCHEZIA: Primary | ICD-10-CM

## 2022-04-20 LAB
GFR AFRICAN AMERICAN: >60
GFR NON-AFRICAN AMERICAN: 56
PERFORMED ON: ABNORMAL
POC CREATININE: 1 MG/DL (ref 0.6–1.2)
POC SAMPLE TYPE: ABNORMAL

## 2022-04-20 NOTE — RESULT ENCOUNTER NOTE
Please let pt know her CT abdomen was normal except a large amount of stool in the colon. I would recommend she follow up with GI doctor for further assessment. Referral can be placed if needed.  Thank you

## 2022-05-10 ENCOUNTER — OFFICE VISIT (OUTPATIENT)
Dept: GASTROENTEROLOGY | Age: 67
End: 2022-05-10
Payer: COMMERCIAL

## 2022-05-10 VITALS
WEIGHT: 148 LBS | DIASTOLIC BLOOD PRESSURE: 66 MMHG | SYSTOLIC BLOOD PRESSURE: 114 MMHG | OXYGEN SATURATION: 100 % | HEART RATE: 73 BPM | BODY MASS INDEX: 25.27 KG/M2 | HEIGHT: 64 IN

## 2022-05-10 DIAGNOSIS — K62.5 RECTAL BLEEDING: Primary | ICD-10-CM

## 2022-05-10 PROCEDURE — 99203 OFFICE O/P NEW LOW 30 MIN: CPT | Performed by: SPECIALIST

## 2022-05-10 RX ORDER — SODIUM, POTASSIUM,MAG SULFATES 17.5-3.13G
SOLUTION, RECONSTITUTED, ORAL ORAL
Qty: 354 ML | Refills: 0 | Status: SHIPPED | OUTPATIENT
Start: 2022-05-10 | End: 2022-07-06 | Stop reason: ALTCHOICE

## 2022-05-10 ASSESSMENT — ENCOUNTER SYMPTOMS
RECTAL PAIN: 0
BLOOD IN STOOL: 1
NAUSEA: 0
ABDOMINAL DISTENTION: 0
RESPIRATORY NEGATIVE: 1
DIARRHEA: 0
EYES NEGATIVE: 1
CONSTIPATION: 0
ANAL BLEEDING: 0
ABDOMINAL PAIN: 1
VOMITING: 0

## 2022-05-10 NOTE — PROGRESS NOTES
Gastroenterology Clinic Visit    Madelin Mg  [id number]  Chief Complaint   Patient presents with    New Patient    Hematochezia     last colonoscopy 04/2018    Constipation    Diarrhea    Nausea       HPI: 61 y.o. female presents to the clinic with history of intermittent rectal bleeding since last 4 to 6 weeks. Stool consistency varies and also has urgency for BM at times, also reports seeing mucus in the stool, has crampy pain in the lower abdomen. Reports excess flatulence. Noticed change in stool caliber, occasional nausea but no vomiting, no history of fever or chills, patient has been on a keto diet to lose weight. History does not smoke drinks alcohol occasionally, history negative for colorectal cancer, other had diverticulosis. Surgical history includes hysterectomy, patient had a colonoscopy by me in April 2018 which showed diverticulosis and a benign colon polyp,    Review of Systems   Constitutional: Negative. HENT: Negative. Eyes: Negative. Respiratory: Negative. Cardiovascular: Negative. No cardiac issues   Gastrointestinal: Positive for abdominal pain and blood in stool. Negative for abdominal distention, anal bleeding, constipation, diarrhea, nausea, rectal pain and vomiting. Altered bowel habits   Endocrine: Negative. Genitourinary: Negative. Musculoskeletal: Positive for arthralgias. Skin: Negative. Allergic/Immunologic: Negative for food allergies. Neurological: Negative. History of CVA with almost full recovery with mild memory loss. Hematological: Negative. Psychiatric/Behavioral: Negative.          Past Medical History:   Diagnosis Date    Allergic reaction     Arthritis     Cancer ([address]     Cervical, dx by Dr. Crystal Bee Cerebral artery occlusion with cerebral infarction [facility])     at age 52 / sx as a migraine with slurred speech & visual disturbance    Diverticulitis     GERD (gastroesophageal reflux disease)     History of conization of cervix     Migraine     Renal insufficiency     Tinea unguium       Past Surgical History:   Procedure Laterality Date    CARDIAC SURGERY      PFO surgery in age 48s    COLONOSCOPY  2018    HEMORRHOID SURGERY  2017    HYSTERECTOMY VAGINAL N/A 12/19/2018    TLH WITH BSO performed by Joey Peters MD at [address] [place] [address] FLX DX W/COLLJ [address] 1976 PFRMD N/A 4/19/2018    COLONOSCOPY performed by Chanell Mancera MD at [place] [address] W/ADJ VAG,W/LOOP BX N/A 9/26/2018    LEEP performed by Eh Matthews MD at [address] [place] [address] N/A 9/26/2018    DILATATION AND CURETTAGE HYSTEROSCOPY performed by Eh Matthews MD [address]     Current Outpatient Medications on File Prior to Visit   Medication Sig Dispense Refill    CLIMARA 0.1 MG/24HR APPLY 1 PATCH TRANSDERMALLYONTO THE SKIN ONCE A WEEK 12 patch 4    folic acid (FOLVITE) 1 MG tablet Take 1 tablet by mouth daily 30 tablet 5    Fremanezumab-vfrm (AJOVY) 225 MG/1.5ML SOAJ Inject 1.5 mLs into the skin every 30 days 1.5 mL 5    oxybutynin (DITROPAN-XL) 10 MG extended release tablet TAKE 1 TABLET DAILY 90 tablet 3    topiramate (TOPAMAX) 100 MG tablet Take 3 tablets by mouth daily 270 tablet 3    PARoxetine (PAXIL) 20 MG tablet Take 1 tablet by mouth daily 90 tablet 3    Rimegepant Sulfate (NURTEC) 75 MG TBDP Take 75 mg by mouth as needed (take at the onset of migraine) 2 samples given  LOT: [id number]  EXP: 11/21 2 tablet 0    SUMAtriptan Succinate 3 MG/0.5ML SOAJ Inject into the skin as needed       ZOMIG 5 MG nasal solution once as needed       aspirin 81 MG EC tablet Take 81 mg by mouth daily. No current facility-administered medications on file prior to visit.      Family History   Problem Relation Age of Onset    Cancer Father         ear and head    High Blood Pressure Father     Cancer Brother         gall bladder cancer  Other Mother         dementia    High Blood Pressure Mother     Other Sister         gout    Diabetes Brother     No Known Problems Son     No Known Problems Daughter       Social History     Socioeconomic History    Marital status:      Spouse name: None    Number of children: None    Years of education: None    Highest education level: None   Occupational History    None   Tobacco Use    Smoking status: Never Smoker    Smokeless tobacco: Never Used   Vaping Use    Vaping Use: Never used   Substance and Sexual Activity    Alcohol use: Yes     Comment: socially    Drug use: No    Sexual activity: Yes   Other Topics Concern    None   Social History Narrative    None     Social Determinants of Health     Financial Resource Strain: Low Risk     Difficulty of Paying Living Expenses: Not hard at all   Food Insecurity: No Food Insecurity    Worried About Running Out of Food in the Last Year: Never true    Shamir of Food in the Last Year: Never true   Transportation Needs:     Lack of Transportation (Medical): Not on file    Lack of Transportation (Non-Medical):  Not on file   Physical Activity:     Days of Exercise per Week: Not on file    Minutes of Exercise per Session: Not on file   Stress:     Feeling of Stress : Not on file   Social Connections:     Frequency of Communication with Friends and Family: Not on file    Frequency of Social Gatherings with Friends and Family: Not on file    Attends Yazdanism Services: Not on file    Active Member of Clubs or Organizations: Not on file    Attends Club or Organization Meetings: Not on file    Marital Status: Not on file   Intimate Partner Violence:     Fear of Current or Ex-Partner: Not on file    Emotionally Abused: Not on file    Physically Abused: Not on file    Sexually Abused: Not on file   Housing Stability:     Unable to Pay for Housing in the Last Year: Not on file    Number of [place] in the Last Year: Not on file    Unstable Housing in the Last Year: Not on file       Blood pressure 114/66, pulse 73, height 5' 4\" (1.626 m), weight 148 lb (67.1 kg), last menstrual period 09/19/2008, SpO2 100 %, not currently breastfeeding. Physical Exam  Constitutional:       Appearance: She is well-developed. HENT:      Head: Normocephalic and atraumatic. Eyes:      Conjunctiva/sclera: Conjunctivae normal.      Pupils: Pupils are equal, round, and reactive to light. Cardiovascular:      Rate and Rhythm: Normal rate. Pulmonary:      Effort: Pulmonary effort is normal.   Abdominal:      General: Bowel sounds are normal.      Palpations: Abdomen is soft. Comments: Soft mild tenderness noted in either lower quadrant area no palpable mass   Musculoskeletal:         General: Normal range of motion. Cervical back: Normal range of motion. Skin:     General: Skin is warm. Neurological:      Mental Status: She is alert. Laboratory, Pathology, Radiology reviewed indetail with relevant important investigations summarized below:  Lab Results   Component Value Date    WBC 6.4 06/17/2021    HGB 13.1 06/17/2021    HCT 39.0 06/17/2021    MCV 97.9 06/17/2021     06/17/2021     Lab Results   Component Value Date    ALT 7 06/17/2021    AST 12 06/17/2021    ALKPHOS 73 06/17/2021    BILITOT 0.3 06/17/2021       CT ABDOMEN PELVIS W IV CONTRAST Additional Contrast? Radiologist Recommendation    Result Date: 4/19/2022  EXAMINATION:  CT ABDOMEN AND PELVIS WITH IV CONTRAST CLINICAL HISTORY: Abdominal pain and hematochezia, ongoing for 2 days. TECHNIQUE: CT of the abdomen and pelvis was performed using standard technique, scanning from just above the dome of the diaphragm to the symphysis pubis. All CT scans at this facility use dose modulation, iterative reconstruction, and/or weight based dosing when appropriate to reduce radiation dose to as low as reasonably achievable.  Contrast: IV:  100 ml of Isovue-300 Oral contrast administered. COMPARISON: CT abdomen and pelvis 3/28/2018. RESULT: Liver: 3.7 cm left hepatic lobe cyst. Stable 1.3 cm posterior right hepatic lobe hyperintense lesion, unchanged since 2018 likely represents a flash filling hemangioma. Biliary: No bile duct dilation. Gallbladder is unremarkable. Spleen: No mass. No splenomegaly. Pancreas: No mass or duct dilation. Adrenals: No mass. Kidneys: No mass, calculus or hydronephrosis. GI tract: No dilation or wall thickening. Large amount of colonic stool. Appendix is unremarkable. Lymph nodes: No abdominal or pelvic lymphadenopathy. Mesentery/Peritoneum: No ascites or mass. Retroperitoneum: No mass. Vasculature: The celiac axis and SMA are patent. The portal vein and branches, splenic vein, SMV, and hepatic veins are patent. No abdominal aortic aneurysm. Pelvis: No mass, ascites or fluid collection. Fluid-filled moderately distended urinary bladder. Bones/Soft Tissues: Degenerative changes. Lower thorax: Bibasilar atelectasis. No acute findings in the abdomen and pelvis. Large amount of colonic stool. ==========       Endoscopic investigations:     Assessmentand Plan:  61 y.o. female with history of rectal bleeding altered bowel habits and abdominal pain, CT of the abdomen pelvis done recently showed stool in the colon otherwise unremarkable, patient has past history of colon polyp, differential diagnosis includes diverticular bleed, neoplasm or IBD or hemorrhoid will schedule colonoscopy,    Diagnosis Orders   1. Rectal bleeding  Endoscopy, colon, diagnostic     Return in about 2 months (around 7/10/2022). Gen Robles MD   Staff Gastroenterologist  [facility]    Please note this report has been partially produced using speech recognition software and may cause contain errors related to thatsystem including grammar, punctuation and spelling as well as words and phrases that may seem inappropriate.  If there are questions or concerns please feel free to contact me to clarify.

## 2022-05-20 RX ORDER — FREMANEZUMAB-VFRM 225 MG/1.5ML
INJECTION SUBCUTANEOUS
Qty: 1.5 ML | Refills: 6 | Status: SHIPPED | OUTPATIENT
Start: 2022-05-20 | End: 2022-10-03 | Stop reason: SDUPTHER

## 2022-05-27 ENCOUNTER — ANESTHESIA EVENT (OUTPATIENT)
Dept: ENDOSCOPY | Age: 67
End: 2022-05-27
Payer: COMMERCIAL

## 2022-05-31 ENCOUNTER — HOSPITAL ENCOUNTER (OUTPATIENT)
Age: 67
Setting detail: OUTPATIENT SURGERY
Discharge: HOME | End: 2022-05-31
Attending: SPECIALIST | Admitting: SPECIALIST
Payer: COMMERCIAL

## 2022-05-31 ENCOUNTER — ANCILLARY PROCEDURE (OUTPATIENT)
Dept: ENDOSCOPY | Age: 67
End: 2022-05-31
Attending: SPECIALIST
Payer: COMMERCIAL

## 2022-05-31 ENCOUNTER — ANESTHESIA (OUTPATIENT)
Dept: ENDOSCOPY | Age: 67
End: 2022-05-31
Payer: COMMERCIAL

## 2022-05-31 VITALS
TEMPERATURE: 97.7 F | HEIGHT: 64 IN | BODY MASS INDEX: 23.9 KG/M2 | HEART RATE: 61 BPM | SYSTOLIC BLOOD PRESSURE: 98 MMHG | WEIGHT: 140 LBS | RESPIRATION RATE: 18 BRPM | OXYGEN SATURATION: 100 % | DIASTOLIC BLOOD PRESSURE: 57 MMHG

## 2022-05-31 DIAGNOSIS — K62.89 RECTAL MASS: Primary | ICD-10-CM

## 2022-05-31 PROCEDURE — 6370000000 HC RX 637 (ALT 250 FOR IP): Performed by: SPECIALIST

## 2022-05-31 PROCEDURE — 88342 IMHCHEM/IMCYTCHM 1ST ANTB: CPT

## 2022-05-31 PROCEDURE — 45380 COLONOSCOPY AND BIOPSY: CPT | Performed by: SPECIALIST

## 2022-05-31 PROCEDURE — 3700000000 HC ANESTHESIA ATTENDED CARE: Performed by: SPECIALIST

## 2022-05-31 PROCEDURE — 2500000003 HC RX 250 WO HCPCS: Performed by: NURSE ANESTHETIST, CERTIFIED REGISTERED

## 2022-05-31 PROCEDURE — 2580000003 HC RX 258

## 2022-05-31 PROCEDURE — 3609027000 HC COLONOSCOPY: Performed by: SPECIALIST

## 2022-05-31 PROCEDURE — 3700000001 HC ADD 15 MINUTES (ANESTHESIA): Performed by: SPECIALIST

## 2022-05-31 PROCEDURE — 7100000011 HC PHASE II RECOVERY - ADDTL 15 MIN: Performed by: SPECIALIST

## 2022-05-31 PROCEDURE — 88341 IMHCHEM/IMCYTCHM EA ADD ANTB: CPT

## 2022-05-31 PROCEDURE — 6360000002 HC RX W HCPCS: Performed by: NURSE ANESTHETIST, CERTIFIED REGISTERED

## 2022-05-31 PROCEDURE — 7100000010 HC PHASE II RECOVERY - FIRST 15 MIN: Performed by: SPECIALIST

## 2022-05-31 PROCEDURE — 2709999900 HC NON-CHARGEABLE SUPPLY: Performed by: SPECIALIST

## 2022-05-31 PROCEDURE — 88305 TISSUE EXAM BY PATHOLOGIST: CPT

## 2022-05-31 PROCEDURE — 2580000003 HC RX 258: Performed by: NURSE ANESTHETIST, CERTIFIED REGISTERED

## 2022-05-31 PROCEDURE — 2580000003 HC RX 258: Performed by: SPECIALIST

## 2022-05-31 RX ORDER — MAGNESIUM HYDROXIDE 1200 MG/15ML
LIQUID ORAL PRN
Status: DISCONTINUED | OUTPATIENT
Start: 2022-05-31 | End: 2022-05-31 | Stop reason: ALTCHOICE

## 2022-05-31 RX ORDER — SODIUM CHLORIDE 0.9 % (FLUSH) 0.9 %
5-40 SYRINGE (ML) INJECTION EVERY 12 HOURS SCHEDULED
Status: DISCONTINUED | OUTPATIENT
Start: 2022-05-31 | End: 2022-05-31 | Stop reason: HOSPADM

## 2022-05-31 RX ORDER — SODIUM CHLORIDE 0.9 % (FLUSH) 0.9 %
5-40 SYRINGE (ML) INJECTION PRN
Status: DISCONTINUED | OUTPATIENT
Start: 2022-05-31 | End: 2022-05-31 | Stop reason: HOSPADM

## 2022-05-31 RX ORDER — SIMETHICONE 20 MG/.3ML
EMULSION ORAL PRN
Status: DISCONTINUED | OUTPATIENT
Start: 2022-05-31 | End: 2022-05-31 | Stop reason: ALTCHOICE

## 2022-05-31 RX ORDER — PROPOFOL 10 MG/ML
INJECTION, EMULSION INTRAVENOUS PRN
Status: DISCONTINUED | OUTPATIENT
Start: 2022-05-31 | End: 2022-05-31 | Stop reason: SDUPTHER

## 2022-05-31 RX ORDER — SODIUM CHLORIDE 9 MG/ML
INJECTION, SOLUTION INTRAVENOUS CONTINUOUS PRN
Status: DISCONTINUED | OUTPATIENT
Start: 2022-05-31 | End: 2022-05-31 | Stop reason: SDUPTHER

## 2022-05-31 RX ORDER — SODIUM CHLORIDE 9 MG/ML
INJECTION, SOLUTION INTRAVENOUS PRN
Status: DISCONTINUED | OUTPATIENT
Start: 2022-05-31 | End: 2022-05-31 | Stop reason: HOSPADM

## 2022-05-31 RX ORDER — OXYCODONE HYDROCHLORIDE AND ACETAMINOPHEN 5; 325 MG/1; MG/1
1 TABLET ORAL EVERY 4 HOURS PRN
COMMUNITY
End: 2022-10-07

## 2022-05-31 RX ORDER — SODIUM CHLORIDE 9 MG/ML
INJECTION, SOLUTION INTRAVENOUS
Status: COMPLETED
Start: 2022-05-31 | End: 2022-05-31

## 2022-05-31 RX ORDER — LIDOCAINE HYDROCHLORIDE 20 MG/ML
INJECTION, SOLUTION INFILTRATION; PERINEURAL PRN
Status: DISCONTINUED | OUTPATIENT
Start: 2022-05-31 | End: 2022-05-31 | Stop reason: SDUPTHER

## 2022-05-31 RX ORDER — ONDANSETRON 2 MG/ML
4 INJECTION INTRAMUSCULAR; INTRAVENOUS
Status: DISCONTINUED | OUTPATIENT
Start: 2022-05-31 | End: 2022-05-31 | Stop reason: HOSPADM

## 2022-05-31 RX ADMIN — SODIUM CHLORIDE: 9 INJECTION, SOLUTION INTRAVENOUS at 08:37

## 2022-05-31 RX ADMIN — SODIUM CHLORIDE 500 ML: 9 INJECTION, SOLUTION INTRAVENOUS at 08:30

## 2022-05-31 RX ADMIN — LIDOCAINE HYDROCHLORIDE 60 MG: 20 INJECTION, SOLUTION INFILTRATION; PERINEURAL at 08:40

## 2022-05-31 RX ADMIN — PROPOFOL 280 MG: 10 INJECTION, EMULSION INTRAVENOUS at 08:40

## 2022-05-31 ASSESSMENT — PAIN - FUNCTIONAL ASSESSMENT: PAIN_FUNCTIONAL_ASSESSMENT: 0-10

## 2022-05-31 NOTE — ANESTHESIA POSTPROCEDURE EVALUATION
Department of Anesthesiology  Postprocedure Note    Patient: Isaiah Castrejon  MRN: [id number]  YOB: 1955  Date of evaluation: 5/31/2022  Time:  9:04 AM     Procedure Summary     Date: 05/31/22 Room / Location: [address] [place] [address]    Anesthesia Start: 5453 Anesthesia Stop: 1476    Procedure: COLONOSCOPY w/biopsies (N/A ) Diagnosis: (rectal bleeding;   K62.5)    Surgeons: Lauryn Sandy MD Responsible Provider: LISA Oquendo CRNA    Anesthesia Type: Not recorded ASA Status: Not recorded          Anesthesia Type: No value filed. Aj Phase I: Aj Score: 10    Aj Phase II:      Last vitals: Reviewed and per EMR flowsheets.        Anesthesia Post Evaluation    Patient location during evaluation: PACU  Level of consciousness: awake  Pain score: 0  Airway patency: patent  Nausea & Vomiting: no vomiting and no nausea  Complications: no  Cardiovascular status: hemodynamically stable  Respiratory status: acceptable  Hydration status: stable

## 2022-05-31 NOTE — H&P
Patient Name: Fabrizio Levin  : 1955  MRN: [id number]  DATE: 22      ENDOSCOPY  History and Physical    Procedure:    [x] Diagnostic Colonoscopy       [] Screening Colonoscopy  [] EGD      [] ERCP      [] EUS       [] Other    [x] Previous office notes/History and Physical reviewed from the patients chart. Please see EMR for further details of HPI. I have examined the patient's status immediately prior to the procedure and:      Indications/HPI:    []Abdominal Pain  []Cancer- GI/Lung  []Fhx of colon CA/polyps  []History of Polyps  []Hernandezs   []Melena  []Abnormal Imaging  []Dysphagia    []Persistent Pneumonia  []Anemia  []Food Impaction  []History of Polyps  []GI Bleed  []Pulmonary nodule/Mass  [x]Change in bowel habits []Heartburn/Reflux  [x]Rectal Bleed (BRBPR)  []Chest Pain - Non Cardiac []Heme (+) Stoo  l[]Ulcers  []Constipation  []Hemoptysis   []Varices  []Diarrhea  []Hypoxemia  []Nausea/Vomiting  []Screening   []Crohns/Colitis  []Other:   Anesthesia:   [x] MAC [] Moderate Sedation   [] General   [] None     ROS: 12 pt Review of Symptoms was negative unless mentioned above    Medications:   Prior to Admission medications    Medication Sig Start Date End Date Taking? Authorizing Provider   oxyCODONE-acetaminophen (PERCOCET) 5-325 MG per tablet Take 1 tablet by mouth every 4 hours as needed for Pain.    Yes Historical Provider, MD   AJOVY 225 MG/1.5ML SOAJ INJECT 1 PEN UNDER THE SKIN 1 TIME EVERY 30 DAYS 22   Karina Keene MD   Na Sulfate-K Sulfate-Mg Sulf (SUPREP) 17.5-3.13-1.6 GM/177ML SOLN solution As directed 5/10/22   Kirstin Clark MD   CLIMARA 0.1 MG/24HR APPLY 1 PATCH TRANSDERMALLYONTO THE SKIN ONCE A WEEK 22   Glenna Romero MD   folic acid (FOLVITE) 1 MG tablet Take 1 tablet by mouth daily 22   Anne Marie Wing APRN - CNP   oxybutynin (DITROPAN-XL) 10 MG extended release tablet TAKE 1 TABLET DAILY 10/27/21   Glenna Romero MD   topiramate (TOPAMAX) 100 MG tablet Take 3 tablets by mouth daily 8/30/21   Tasneem Aviles MD   PARoxetine (PAXIL) 20 MG tablet Take 1 tablet by mouth daily 8/20/21 8/20/22  LISA Guzman CNM   Rimegepant Sulfate (NURTEC) 75 MG TBDP Take 75 mg by mouth as needed (take at the onset of migraine) 2 samples given  LOT: [id number]  EXP: 11/21 9/10/20   Tasneem Aviles MD   SUMAtriptan Succinate 3 MG/0.5ML SOAJ Inject into the skin as needed     Historical Provider, MD   ZOMIG 5 MG nasal solution once as needed  8/24/15   Historical Provider, MD   aspirin 81 MG EC tablet Take 81 mg by mouth daily. Historical Provider, MD       Allergies:    Allergies   Allergen Reactions    Erythromycin Hives    Seasonal Other (See Comments)     Pollen causes sinus sx    Sporanox [Itraconazole] Hives    Macrobid [Nitrofurantoin Macrocrystal] Other (See Comments)     Migraine, vomiting, fever/chills        History of allergic reaction to anesthesia:  No    Past Medical History:  Past Medical History:   Diagnosis Date    Allergic reaction     Arthritis     Cancer ([facility] [address]     Cervical, dx by Dr. Lizbeth Mitchell    Cerebral artery occlusion with cerebral infarction ([facility] [address]     at age 52 / sx as a migraine with slurred speech & visual disturbance    Diverticulitis     GERD (gastroesophageal reflux disease)     History of conization of cervix     Migraine     Renal insufficiency     Tinea unguium        Past Surgical History:  Past Surgical History:   Procedure Laterality Date    CARDIAC SURGERY      PFO surgery in age 48s    COLONOSCOPY  2018    HEMORRHOID SURGERY  2017    HYSTERECTOMY VAGINAL N/A 12/19/2018    TLH WITH BSO performed by Howie Davidson MD at [address] [place] [address] FLX DX W/COLLJ SPEC WHEN PFRMD N/A 4/19/2018    COLONOSCOPY performed by Duglas Lemon MD at [place] [address] W/ADJ VAG,W/LOOP BX N/A 9/26/2018    LEEP performed by Eleazar Mcdonough MD at [address] [place] [address] N/A 9/26/2018 DILATATION AND CURETTAGE HYSTEROSCOPY performed by Oneil Oliveira MD at [facility]    [facility]       Social History:  Social History     Tobacco Use    Smoking status: Never Smoker    Smokeless tobacco: Never Used   Vaping Use    Vaping Use: Never used   Substance Use Topics    Alcohol use: Yes     Comment: socially    Drug use: No       Vital Signs:   Vitals:    05/31/22 0817   BP: 125/60   Pulse: 69   Resp: 16   Temp: 97.7 °F (36.5 °C)   SpO2: 99%        Physical Exam:  Cardiac:  [x]WNL  []Comments:  Pulmonary:  [x]WNL   []Comments:   Neuro/Mental Status:  [x]WNL  []Comments:  Abdominal:  [x]WNL    []Comments:  Other:   []WNL  []Comments:    Informed Consent:  The risks and benefits of the procedure have been discussed with either the patient or if they cannot consent, their representative. Assessment:  Patient examined and appropriate for planned sedation and procedure. Plan:  Proceed with planned sedation and procedure as above.     Tessie Neil MD  8:31 AM

## 2022-05-31 NOTE — ANESTHESIA PRE PROCEDURE
Department of Anesthesiology  Preprocedure Note       Name:  Madelin Mg   Age:  61 y.o.  :  1955                                          MRN:  [id number]         Date:  2022      Surgeon: Abhay Reyes):  Rodrigue Wiggins MD    Procedure: Procedure(s):  COLONOSCOPY DIAGNOSTIC    Medications prior to admission:   Prior to Admission medications    Medication Sig Start Date End Date Taking? Authorizing Provider   AJOVY 225 MG/1.5ML SOAJ INJECT 1 PEN UNDER THE SKIN 1 TIME EVERY 30 DAYS 22   Rachid Crowell MD   Na Sulfate-K Sulfate-Mg Sulf (SUPREP) 17.5-3.13-1.6 GM/177ML SOLN solution As directed 5/10/22   Rodrigue Wiggins MD   CLIMARA 0.1 MG/24HR APPLY 1 PATCH TRANSDERMALLYONTO THE SKIN ONCE A WEEK 22   Juli Beebe MD   folic acid (FOLVITE) 1 MG tablet Take 1 tablet by mouth daily 22   LISA Haile CNP   oxybutynin (DITROPAN-XL) 10 MG extended release tablet TAKE 1 TABLET DAILY 10/27/21   Juli Beebe MD   topiramate (TOPAMAX) 100 MG tablet Take 3 tablets by mouth daily 21   Rachid Crowell MD   PARoxetine (PAXIL) 20 MG tablet Take 1 tablet by mouth daily 21  LISA Burns CNM   Rimegepant Sulfate (NURTEC) 75 MG TBDP Take 75 mg by mouth as needed (take at the onset of migraine) 2 samples given  LOT: [id number]  EXP: 11/21 9/10/20   Rcahid Crowell MD   SUMAtriptan Succinate 3 MG/0.5ML SOAJ Inject into the skin as needed     Historical Provider, MD   ZOMIG 5 MG nasal solution once as needed  8/24/15   Historical Provider, MD   aspirin 81 MG EC tablet Take 81 mg by mouth daily. Historical Provider, MD       Current medications:    No current facility-administered medications for this encounter. Allergies:     Allergies   Allergen Reactions    Erythromycin Hives    Seasonal Other (See Comments)     Pollen causes sinus sx    Sporanox [Itraconazole] Hives    Macrobid [Nitrofurantoin Macrocrystal] Other (See Comments)     Migraine, vomiting, fever/chills       Problem List:    Patient Active Problem List   Diagnosis Code    Cervical spondylosis M47.812    Cervical spinal stenosis M48.02    Degenerative disc disease, cervical M50.30    Radiculopathy affecting upper extremity M54.10    Tinea unguium B35.1    Intractable migraine with aura with status migrainosus G43. 111    Allergic reaction T78.40XA    Renal insufficiency N28.9    Vaginal bleeding N93.9    Stenosis of cervix N88.2    DUB (dysfunctional uterine bleeding) N93.8    S/P LEEP Z98.890    PMB (postmenopausal bleeding) N95.0    Thickened endometrium R93.89    Menopause Z78.0    Hot flashes due to menopause N95.1    Cerebrovascular accident ([facility] [address] I63.9    Cervical cancer (HCC) C53.9    PFO (patent foramen ovale) Q21.1       Past Medical History:        Diagnosis Date    Allergic reaction     Arthritis     Cancer (HCC)     Cervical, dx by Dr. Gordon Solorzano    Cerebral artery occlusion with cerebral infarction ([facility] [address]     at age 52 / sx as a migraine with slurred speech & visual disturbance    Diverticulitis     GERD (gastroesophageal reflux disease)     History of conization of cervix     Migraine     Renal insufficiency     Tinea unguium        Past Surgical History:        Procedure Laterality Date    CARDIAC SURGERY      PFO surgery in age 48s    COLONOSCOPY  2018    HEMORRHOID SURGERY  2017    HYSTERECTOMY VAGINAL N/A 12/19/2018    TLH WITH BSO performed by Tamiko Rivers MD at [address] [facility] COLONOSCOPY FLX DX W/COLLJ SPEC WHEN PFRMD N/A 4/19/2018    COLONOSCOPY performed by Lyly Beckett MD at [place] [address] W/ADJ VAG,W/LOOP BX N/A 9/26/2018    LEEP performed by Marisela Michelle MD at [address] [place] [address] N/A 9/26/2018    DILATATION AND CURETTAGE HYSTEROSCOPY performed by Marisela Michelle MD at [facility] [address]    [facility]       Social History:    Social History     Tobacco Use    Smoking status: Never Smoker    Smokeless tobacco: Never Used   Substance Use Topics    Alcohol use: Yes     Comment: socially                                Counseling given: Not Answered      Vital Signs (Current): There were no vitals filed for this visit. BP Readings from Last 3 Encounters:   05/10/22 114/66   04/19/22 120/72   02/28/22 120/82       NPO Status:                                                                                 BMI:   Wt Readings from Last 3 Encounters:   05/10/22 148 lb (67.1 kg)   04/19/22 146 lb 9.6 oz (66.5 kg)   02/28/22 147 lb (66.7 kg)     There is no height or weight on file to calculate BMI.    CBC:   Lab Results   Component Value Date    WBC 6.4 06/17/2021    RBC 3.98 06/17/2021    HGB 13.1 06/17/2021    HCT 39.0 06/17/2021    MCV 97.9 06/17/2021    RDW 13.4 06/17/2021     06/17/2021       CMP:   Lab Results   Component Value Date     06/17/2021    K 5.0 06/17/2021     06/17/2021    CO2 22 06/17/2021    BUN 23 06/17/2021    CREATININE 1.0 04/19/2022    CREATININE 0.90 06/17/2021    GFRAA >60 04/19/2022    LABGLOM 56 04/19/2022    GLUCOSE 84 06/17/2021    PROT 6.8 06/17/2021    CALCIUM 9.6 06/17/2021    BILITOT 0.3 06/17/2021    ALKPHOS 73 06/17/2021    AST 12 06/17/2021    ALT 7 06/17/2021       POC Tests: No results for input(s): POCGLU, POCNA, POCK, POCCL, POCBUN, POCHEMO, POCHCT in the last 72 hours.     Coags:   Lab Results   Component Value Date    PROTIME 10.7 11/07/2018    INR 1.0 11/07/2018    APTT 25.9 11/07/2018       HCG (If Applicable): No results found for: PREGTESTUR, PREGSERUM, HCG, HCGQUANT     ABGs: No results found for: PHART, PO2ART, DQG6WIV, LLJ8QKU, BEART, T9XIYPNU     Type & Screen (If Applicable):  No results found for: LABABO, LABRH    Drug/Infectious Status (If Applicable):  No results found for: HIV, HEPCAB    COVID-19 Screening (If Applicable): No results found for: COVID19        Anesthesia Evaluation  Patient summary reviewed and Nursing notes reviewed no history of anesthetic complications:   Airway: Mallampati: II  TM distance: >3 FB   Neck ROM: full  Mouth opening: > = 3 FB   Dental: normal exam         Pulmonary:                              Cardiovascular:          ECG reviewed                        Neuro/Psych:   (+) CVA:, headaches: migraine headaches,             GI/Hepatic/Renal:   (+) GERD:,           Endo/Other:    (+) : arthritis: OA., malignancy/cancer. Abdominal:             Vascular: Other Findings:           Anesthesia Plan      MAC     ASA 2       Induction: intravenous. Anesthetic plan and risks discussed with patient.                         Ariadne Phillip, APRN - CRNA   5/31/2022

## 2022-06-01 ENCOUNTER — TELEPHONE (OUTPATIENT)
Dept: GASTROENTEROLOGY | Age: 67
End: 2022-06-01

## 2022-06-06 ENCOUNTER — TELEPHONE (OUTPATIENT)
Dept: GASTROENTEROLOGY | Age: 67
End: 2022-06-06

## 2022-06-07 RX ORDER — PAROXETINE HYDROCHLORIDE 20 MG/1
TABLET, FILM COATED ORAL
Qty: 90 TABLET | Refills: 3 | Status: SHIPPED | OUTPATIENT
Start: 2022-06-07

## 2022-06-10 ENCOUNTER — OFFICE VISIT (OUTPATIENT)
Dept: GASTROENTEROLOGY | Age: 67
End: 2022-06-10
Payer: COMMERCIAL

## 2022-06-10 VITALS — HEART RATE: 70 BPM | WEIGHT: 146 LBS | HEIGHT: 64 IN | OXYGEN SATURATION: 92 % | BODY MASS INDEX: 24.92 KG/M2

## 2022-06-10 DIAGNOSIS — C20 RECTAL CANCER (HCC): Primary | ICD-10-CM

## 2022-06-10 PROCEDURE — 99212 OFFICE O/P EST SF 10 MIN: CPT | Performed by: SPECIALIST

## 2022-06-10 ASSESSMENT — ENCOUNTER SYMPTOMS
EYES NEGATIVE: 1
VOMITING: 0
ANAL BLEEDING: 0
ABDOMINAL DISTENTION: 0
DIARRHEA: 0
RECTAL PAIN: 0
BLOOD IN STOOL: 1
ABDOMINAL PAIN: 0
NAUSEA: 0
RESPIRATORY NEGATIVE: 1
CONSTIPATION: 0

## 2022-06-10 NOTE — PROGRESS NOTES
Gastroenterology Clinic Follow up Visit    Mitch Garcia  [id number]  Chief Complaint   Patient presents with    Follow-up     Patient is here for a follow up after having a colonoscopy 5/31. Patient reports feeling well since the procedure. HPI and A/P at last visit summarized below:  Patient is here for follow-up, colonoscopy showed an ulcerated mass in the rectum and the biopsy showed squamous cell carcinoma. .  Colonic biopsies were unremarkable. Patient had a CT abdomen pelvis with contrast in April 2022 which showed no obvious metastatic disease, patient has liver cyst and possible hemangioma of the liver. Review of Systems   Constitutional: Negative. HENT: Negative. Eyes: Negative. Respiratory: Negative. Cardiovascular: Negative. Gastrointestinal: Positive for blood in stool. Negative for abdominal distention, abdominal pain, anal bleeding, constipation, diarrhea, nausea, rectal pain and vomiting. Endocrine: Negative. Genitourinary: Negative. Musculoskeletal: Negative. Skin: Negative. Allergic/Immunologic: Negative for food allergies. Neurological: Negative. Hematological: Negative. Psychiatric/Behavioral: Negative. Past medical history, past surgical history, medication list, social and familyhistory reviewed    Pulse 70, height 5' 4\" (1.626 m), weight 146 lb (66.2 kg), last menstrual period 09/19/2008, SpO2 92 %, not currently breastfeeding. Physical Exam  Constitutional:       Appearance: She is well-developed. HENT:      Head: Normocephalic and atraumatic. Eyes:      Conjunctiva/sclera: Conjunctivae normal.      Pupils: Pupils are equal, round, and reactive to light. Cardiovascular:      Rate and Rhythm: Normal rate. Pulmonary:      Effort: Pulmonary effort is normal.   Abdominal:      General: Bowel sounds are normal.      Palpations: Abdomen is soft.       Comments: Soft nontender, no palpable lymph nodes in the inguinal area Musculoskeletal:         General: Normal range of motion. Cervical back: Normal range of motion. Skin:     General: Skin is warm. Neurological:      Mental Status: She is alert. Laboratory, Pathology, Radiology reviewed in detail with relevantimportant investigations summarized below:    No results for input(s): WBC, HGB, HCT, MCV, PLT in the last 720 hours. Lab Results   Component Value Date    ALT 7 06/17/2021    AST 12 06/17/2021    ALKPHOS 73 06/17/2021    BILITOT 0.3 06/17/2021     No results found. Endoscopic investigations:     Assessment and Plan:  Tarajerry Marichuy 61 y.o. female for follow up. Squamous cell carcinoma of the rectum. CT of the abdomen pelvis done in April showed no obvious metastatic disease. Refer to oncology. Diagnosis Orders   1. Rectal cancer ([address]  Lissett Akins MD, Terri Benjamin       No follow-ups on file. Bhanu Hassan MD   StaffGastroenterologist  [facility]    Please note this report has been partially produced using speech recognitionsoftware  and may cause contain errors related to that system including grammar, punctuation and spelling as well as words andphrases that may seem inappropriate. If there are questions or concerns please feel free to contact me to clarify.

## 2022-06-16 RX ORDER — TOPIRAMATE 100 MG/1
TABLET, FILM COATED ORAL
Qty: 270 TABLET | Refills: 3 | Status: SHIPPED | OUTPATIENT
Start: 2022-06-16

## 2022-06-21 ENCOUNTER — HOSPITAL ENCOUNTER (OUTPATIENT)
Dept: RADIATION ONCOLOGY | Age: 67
Discharge: HOME | End: 2022-06-21
Payer: COMMERCIAL

## 2022-06-21 VITALS
HEART RATE: 64 BPM | RESPIRATION RATE: 16 BRPM | SYSTOLIC BLOOD PRESSURE: 115 MMHG | HEIGHT: 64 IN | BODY MASS INDEX: 24.38 KG/M2 | OXYGEN SATURATION: 98 % | TEMPERATURE: 98 F | WEIGHT: 142.8 LBS | DIASTOLIC BLOOD PRESSURE: 70 MMHG

## 2022-06-21 DIAGNOSIS — C21.0 ANAL CANCER (HCC): ICD-10-CM

## 2022-06-21 PROCEDURE — 99497 ADVNCD CARE PLAN 30 MIN: CPT | Performed by: RADIOLOGY

## 2022-06-21 PROCEDURE — 99205 OFFICE O/P NEW HI 60 MIN: CPT | Performed by: RADIOLOGY

## 2022-06-21 PROCEDURE — 99212 OFFICE O/P EST SF 10 MIN: CPT | Performed by: RADIOLOGY

## 2022-06-21 RX ORDER — ACETAMINOPHEN 160 MG
TABLET,DISINTEGRATING ORAL DAILY
COMMUNITY

## 2022-06-21 NOTE — PROGRESS NOTES
SW met with pt and her , Nixon Cuellar, as she was seen in consultation in radiation oncology for treatment of cancer near the anal canal. The couple appears to have an easy and supportive relationship with one another. Pt states she rated her distress as 4/10, feeling that she is not pleased with the cancer diagnosis but, \"I have to keep it together, that's how I am about to do what I need to and talk to people, and learn what I have to do next. \" She states that at home alone, she feels free to speak with her  and cry when she is worried, but on the whole she keeps her emotions contained in order to \"not fall apart. \"  Additionally, the couple expressed their hope for cure, as they are hopeful after speaking with prior physicians. Easton Holt reports that she and Nixon Cuellar live at home alone together. They have two adult children, a son, who lives locally and a daughter in [place]. She is not currently employed, and her  works part-time, but states that he may schedule his work around her treatment as needed. At present, pt is hoping to drive herself, and she also has family and friends who can help out as needed. As such, they are hoping for late in the day radiation treatment to help with scheduling, and they are aware that they may mention this when scheduling treatments, if possible. Lastly, supportive services at the [facility] were explained. SW contact information was provided and the couple was invited to use supportive services as they feel need. SW will continue to follow.

## 2022-06-21 NOTE — PROGRESS NOTES
NURSING ASSESSMENT     Date: 2022        Patient Name: Vaughn Marie     YOB: 1955      Age:  61 y.o. MRN: [id number]       Chaperone [] Yes   [x] No      Advance Directives:   Do you currently have completed advance directives (living will)? [x] Yes   [] No         *If yes, please bring us a copy for your records. *If no, would you like info or assistance in completing advance directives (living will)? [] Yes   [x] No    Pain Score:   Pain Score (1-10): 6   Pain Location: rectal    Pain Duration: with sitting, bowel movements   Pain Management/Control: oxycodone if needed, but moving around usually relieves the pain      Is pain affecting your ability to take care of yourself or move throughout your home? [] Yes   [x] No    General: Fatigue more then usual  Patient has gained weight [] Yes   [x] No  Patient has lost weight [] Yes   [x] No  How much weight in pounds and over what length of time:     Eyes (Ophthalmic): No Problem     Skin (Dermatological):  No Problems     ENT: No Problems     Respiratory: Cough dry     Cardiovascular: No Problems      Device   [] Yes   [x] No   Copy of Card Obtained [] Yes   [x] No    Gastrointestinal: abdominal cramping at times, pain and bleeding with bowel movements    Genito-Urinary: stress and urge incontinence     Breast: No Problems     Musculoskeletal: No Problems    Neurological: Numbness and Tingling hands, has migranes, history of CVA      Hematological and Lymphatic: Abnormal Bleeding and Easy Bruising     Endocrine: has hair loss every day    Gyn History:   /Para: 2/2  Age of Menarche: 17  Age of Menopause 54's, hysterectomy 2-3 yrs ago  Vaginal Bleeding:  no  First Pregnancy: 27  Breast Feeding:  no  Last Menstrual period: n/a  Oral Contraceptives: yes     Number of years: 7  Hormone Replacement therapy currently on climara patch     Number of Years: 2-3 years ago  Last Pap Smear: no  Last Mammogram 2021    A 10-point review of systems  has been conducted and pertinent positives have been   recorded. All other review of systems are negative    Was the patient admitted during the course of treatment OR within 30 days of treatment? n/a        PALLIATIVE CARE SCREENING TOOL    1. Patient Scenarios               (Score 1 Point Each)  a. The Patient has  i. A chronic illness with uncontrolled symptoms (e.g. pain, nausea, vomiting, shortness of breath, anorexia)   []  ii. Unresolved psychosocial or spiritual issues                                                                                                     []  iii. Frequent visits to the Emergency Department  (>1 x per month or >2 in last 3 months)                                 []  iv. More than one hospital admission in past 90 days                  []   v. Complex care requirements (e.g. functional dependency, complex home support for ventilator/antibiotics/feedings, patient in SNF/LTAC/nursing home) []  vi. No advance directives in place                                                                                                                         []  Total score for section # 1- 0     2. Basic Disease Processes             (Score 3 Points Overall)  a. Locally advanced or metastatic cancer           []  b. Non-cancer life-limiting illness (COPD, CHF, advanced dementia, stroke)      []                      Total score for section # 2-  0    3. Concomitant Disease Processes            (Score 1 Point Overall)  a. COPD               []   b. Renal Disease              []  c. CHF               []  d. Liver Disease              []  e. Other significant comorbidities (e.g. failure to thrive, feeding intolerance, functional decline)    []                      Total score for section # 3-  0    Physician to complete #4, #5, & #6      4. Symptom Assessment             (Score 1 Point Each)  i.  Severe/Uncontrolled Pain (e.g. patients who are opiate naïve and/or only taking OTC medications OR patients prescribed opiate by non-palliative care provider and pain not adequately controlled OR patient on long-term opiates for chronic pain and high risk for tolerance/abuse) []  ii. Anorexia/failure to thrive            []  iii. Unintentional weight loss of greater than 10 lbs in 1 month       []  iv. Shortness of breath/increasing O2 needs via supplemental source      []  v. Cognitive impairment            []                     Total score for section # 4-   0      5. Functional Status of Patient  a. ECOG 2              [] (Score 2 Point)  b. ECOG 3             [] (Score 3 Points)                      Total score for section # 5-  0      6. Goals of Care              (Score 6 Points)  a. If patient wishes to pursue hospice and/or wishes to have modifications in their goals of care that involve stopping active treatment.  []                      Total score for section # 6-  0    TOTAL SCORE > 6 Consult Palliative Care (requires provider orders in Epic)         Total score-  0

## 2022-06-26 PROBLEM — C21.0 ANAL CANCER (HCC): Status: ACTIVE | Noted: 2022-06-26

## 2022-06-26 ASSESSMENT — ENCOUNTER SYMPTOMS
EYES NEGATIVE: 1
BLOOD IN STOOL: 1
NAUSEA: 0
VOMITING: 0
CHEST TIGHTNESS: 0
ALLERGIC/IMMUNOLOGIC NEGATIVE: 1
ANAL BLEEDING: 1
ABDOMINAL DISTENTION: 0
SHORTNESS OF BREATH: 0
RECTAL PAIN: 1
CONSTIPATION: 1
DIARRHEA: 0
ABDOMINAL PAIN: 1
COUGH: 0

## 2022-06-26 NOTE — CONSULTS
[facility]           Radiation Oncology      [address]        [address]        Angie Hodge: [phone]        F: [phone]       [email or web address]                   Dr. Damion Isaac MD PhD    CONSULT NOTE     Date of Service: 2022  Patient ID: Raji Montgomery   : 1955  MRN: [id number]   Acct Number: [de-identified]       Raji Montgomery  61 y.o.   1955    REFERRING PROVIDER: Rosana Duty    PCP:  LISA Packer - CNP    DIAGNOSIS:  1. Anal cancer ([facility]        STAGING: Cancer Staging  Anal cancer ([facility] [address]  Staging form: Anus, AJCC 8th Edition  - Clinical: Stage IIA (cT2, cN0, cM0) - Signed by Damion Isaac MD on 2022      HISTORY OF PRESENT ILLNESS: Ms. Raji Montgomery  is a 61y.o. 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 Her oncologic history dates back to few months ago when she began to notice change in bowel habits and bright red blood per rectum on a daily basis. She also noted some abdominal pain/cramping with bowel movements that were relieved after she would have a bowel movement. Her constipation has been going on for several months. She otherwise denies any appetite changes, nausea, vomiting, fevers, chills, or night sweats. On 2022 the patient had a CT of the abdomen pelvis that did not reveal any acute findings however there was a large amount of colonic stool.   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 The patient met with Dr. Kori Torre with medical oncology who discussed management options including definitive chemoradiation therapy with Mitomycin-C and Xeloda with curative intent. Referral was made to colorectal surgery and the patient will be seeing Dr. Charo Celaya on 6/28/2022. She presents today to discuss the role and rationale of radiation therapy. Symptomatically she notes that her bleeding has somewhat worsened over the last week. She does have daily abdominal pain in the pelvis that is occasionally relieved with bowel movements. Her constipation is also worsened.     PAST MEDICAL HISTORY:      Diagnosis Date    Allergic reaction     Arthritis     Cancer ([facility] [address]     Cervical, dx by Dr. Larry Payne Cerebral artery occlusion with cerebral infarction [facility])     at age 52 / sx as a migraine with slurred speech & visual disturbance    Diverticulitis     GERD (gastroesophageal reflux disease)     History of conization of cervix     Migraine     Rectal cancer ([facility] [address]     Renal insufficiency     Tinea unguium        PAST SURGICAL HISTORY:      Procedure Laterality Date    CARDIAC SURGERY      PFO surgery in age 48s    COLONOSCOPY  2018    COLONOSCOPY N/A 5/31/2022    COLONOSCOPY w/biopsies performed by Bhanu Hassan MD at [id number]  2017   [address] N/A 12/19/2018    [address] WITH BSO performed by Billy Pendleton MD at [address] [facility] FLX DX W/PINA [address] 1978 PFRMD N/A 4/19/2018    COLONOSCOPY performed by Bhanu Hassan MD at [facility] 9 W/ADJ VAG,W/LOOP BX N/A 9/26/2018    LEEP performed by Vanesa Major MD at [facility] N/A 9/26/2018    DILATATION AND CURETTAGE HYSTEROSCOPY performed by Vanesa Major MD at [address] [facility] 2000    [facility]       Allergies   Allergen Reactions    Erythromycin Hives    Seasonal Other (See Comments)     Pollen causes sinus sx    Sporanox [Itraconazole] Hives    Macrobid [Nitrofurantoin Macrocrystal] Other (See Comments)     Migraine, vomiting, fever/chills       MEDICATIONS:  Medications reviewed and reconciled. Current Outpatient Medications   Medication Sig Dispense Refill    Cholecalciferol (VITAMIN D3) 50 MCG (2000 UT) CAPS Take by mouth      topiramate (TOPAMAX) 100 MG tablet TAKE 3 TABLETS DAILY 270 tablet 3    PARoxetine (PAXIL) 20 MG tablet TAKE 1 TABLET DAILY 90 tablet 3    oxyCODONE-acetaminophen (PERCOCET) 5-325 MG per tablet Take 1 tablet by mouth every 4 hours as needed for Pain.  AJOVY 225 MG/1.5ML SOAJ INJECT 1 PEN UNDER THE SKIN 1 TIME EVERY 30 DAYS 1.5 mL 6    Na Sulfate-K Sulfate-Mg Sulf (SUPREP) 17.5-3.13-1.6 GM/177ML SOLN solution As directed (Patient not taking: Reported on 6/10/2022) 354 mL 0    CLIMARA 0.1 MG/24HR APPLY 1 PATCH TRANSDERMALLYONTO THE SKIN ONCE A WEEK 12 patch 4    folic acid (FOLVITE) 1 MG tablet Take 1 tablet by mouth daily 30 tablet 5    oxybutynin (DITROPAN-XL) 10 MG extended release tablet TAKE 1 TABLET DAILY (Patient not taking: Reported on 6/21/2022) 90 tablet 3    Rimegepant Sulfate (NURTEC) 75 MG TBDP Take 75 mg by mouth as needed (take at the onset of migraine) 2 samples given  LOT: [id number]  EXP: 11/21 2 tablet 0    SUMAtriptan Succinate 3 MG/0.5ML SOAJ Inject into the skin as needed       ZOMIG 5 MG nasal solution once as needed       aspirin 81 MG EC tablet Take 81 mg by mouth daily. No current facility-administered medications for this encounter.        FAMILY HISTORY:  Family History   Problem Relation Age of Onset    Cancer Father         skin ear and head    High Blood Pressure Father     Cancer Brother         gall bladder cancer    Other Mother         dementia    High Blood Pressure Mother     Other Sister         gout    Diabetes Brother     Cancer Brother         head    No Known Problems Son     No Known Problems Daughter sounds. Abdominal:      General: Bowel sounds are normal. There is no distension. Palpations: Abdomen is soft. There is no mass. Tenderness: There is no abdominal tenderness. There is no right CVA tenderness or left CVA tenderness. Hernia: No hernia is present. Genitourinary:     Comments: Digital rectal examination was performed in the clinic. There was a indurated lesion noted at the distal rectum/proximal anal canal that was measuring approximately 3 to 4 cm and encompassed about 30% of the circumference of the lumen. Lesion was located on the left posterior/lateral aspect of the anal rectum. There was blood on the examination finger. Anal sphincter tone was normal.  Musculoskeletal:         General: Normal range of motion. Cervical back: Normal range of motion and neck supple. No rigidity. Right lower leg: No edema. Left lower leg: No edema. Lymphadenopathy:      Cervical: No cervical adenopathy. Skin:     General: Skin is warm and dry. Neurological:      Mental Status: She is alert.            RADIATION SAFETY AND ONCOLOGIC TREATMENT SUPPORT:    - Previous radiation history: None  - History of autoimmune or connective tissue disease: None  - Nutritional support/ PEG: Our dietitian will evaluate the patient after starting radiation therapy  - Dental evaluation: None  -  requested:   met with the patient, please refer to the note from that encounter.  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 DISCUSSION/PLAN:   I reviewed the risks, benefits, and rationale of radiation therapy in the management of her disease. I discussed this histology and the true rectum is quite rare especially in the setting of p16 positive disease. I therefore think that the primary disease originated from the proximal anal canal and extended superiorly into the distal rectum. I am in agreement with medical oncology and recommending definitive chemoradiation therapy to her gross disease and pelvis with curative intent. The patient will be establishing care with Dr. Trista Liz with colorectal surgery on 6/29/2022. I then reviewed potential side effects of treatment to this area which include but are not limited to fatigue, dermatitis of the skin of the pelvis at the beam entry and exit points, potential for bowel and bladder irritation leading to increased urinary urgency, frequency, nocturia, diarrhea, and potential for blood in the stool and urine. I also discussed potential for vaginal irritation and I am recommending simulation and treatment with a vaginal dilator to help mitigate toxicity to this area. The patient and her  who was also present had several questions regarding the above plan all of which were answered to their apparent satisfaction. They wish to move forward and informed consent was signed with  as witness. I did indicate that I will present her case at our upcoming multidisciplinary tumor board to ensure that we have a good consensus recommendation for management.   I also explained the need for a staging PET CT scan given the abnormalities noted in the liver which I am confident are benign but nevertheless with this histology must be further investigated in order to rule out distant metastases. Should imaging demonstrate local regional disease only we will move forward with definitive chemoradiation therapy with curative intent. I will also use his PET CT scan to help with radiation treatment planning and delivery. I will apprise medical oncology of the above plan so that they are aware. In the interim the patient knows remain available should she have any additional questions or concerns. Of note I did recommend that she start stool softeners given her issues with constipation and I recommended Colace for the time being to be taken twice daily. We will follow-up on this and escalate therapy as needed. Thank you very much for the referral and for the opportunity to provide care for this patient. A total of 16 minutes were spent discussing Advance Care Planning to understand personal values, life goals, and preferences regarding future medical care with respect to potential modifications/establishing of advanced directives and any modifications of current goals of care. A combined total of 65 minutes was spent in preparing this consultation as well as in meeting with the patient and her  in person. Please excuse any typos in this consultation note as voice dictation was used.     ORDERS: Staging PET/CT scan; CT simulation scan    FOLLOW-UP: Return to clinic on 6/29/2022 for CT simulation    Giuliana Thomas MD PHD  Radiation Oncologist, [facility] Director    Department of [address] [place] [address]  Tomma Bloch, José Matía

## 2022-06-28 ENCOUNTER — OFFICE VISIT (OUTPATIENT)
Dept: SURGERY | Age: 67
End: 2022-06-28
Payer: COMMERCIAL

## 2022-06-28 VITALS
BODY MASS INDEX: 23.9 KG/M2 | WEIGHT: 140 LBS | HEART RATE: 58 BPM | HEIGHT: 64 IN | TEMPERATURE: 98 F | OXYGEN SATURATION: 95 %

## 2022-06-28 DIAGNOSIS — C21.0 ANAL CARCINOMA (HCC): Primary | ICD-10-CM

## 2022-06-28 PROCEDURE — 77263 THER RADIOLOGY TX PLNG CPLX: CPT | Performed by: RADIOLOGY

## 2022-06-28 PROCEDURE — 99203 OFFICE O/P NEW LOW 30 MIN: CPT | Performed by: COLON & RECTAL SURGERY

## 2022-06-28 ASSESSMENT — ENCOUNTER SYMPTOMS
RECTAL PAIN: 1
ABDOMINAL PAIN: 0
DIARRHEA: 0
SHORTNESS OF BREATH: 0
CHEST TIGHTNESS: 0
CONSTIPATION: 0
ANAL BLEEDING: 1

## 2022-06-28 NOTE — PROGRESS NOTES
Subjective:      Patient ID: Elyse Rossi is a 61 y.o. female who presents for:  Chief Complaint   Patient presents with    New Patient       Tr is a 51-year-old female who anoscopy in the past few weeks which showed an anal mass. 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 It was described as a rectal mass but it was more at the anal canal based on its endoscopic appearance as well as its histologic evaluation. She has been seen by oncology and radiation oncology. She has plans for mapping tomorrow in anticipation of combined modality treatment. She was referred to me for surgical evaluation. I have not seen her to date. She complains of rectal bleeding. She is otherwise healthy. I reviewed her colonoscopy as well as CAT scan abdomen and pelvis from April.       Past Medical History:   Diagnosis Date    Allergic reaction     Arthritis     Cancer ([facility] [address]     Cervical, dx by Dr. Jason Single Cerebral artery occlusion with cerebral infarction [facility])     at age 52 / sx as a migraine with slurred speech & visual disturbance    Diverticulitis     GERD (gastroesophageal reflux disease)     History of conization of cervix     Migraine     Rectal cancer ([facility] [address]     Renal insufficiency     Tinea unguium      Past Surgical History:   Procedure Laterality Date    CARDIAC SURGERY      PFO surgery in age 48s    COLONOSCOPY  2018    COLONOSCOPY N/A 5/31/2022    COLONOSCOPY w/biopsies performed by Sheron Velázquez MD at [address]   2017   [address] N/A 12/19/2018    [address] WITH BSO performed by Anna Marx MD at [address] [facility] FLX DX W/PINA [address] 1976 PFRMD N/A 4/19/2018    COLONOSCOPY performed by Sheron Velázquez MD at [facility] 9 W/ADJ VAG,W/LOOP BX N/A 9/26/2018    LEEP performed by Howard Cruz MD at [address] [facility] 9/26/2018    DILATATION AND CURETTAGE HYSTEROSCOPY performed by Verónica Parker MD at [facility]    [facility]     Social History     Socioeconomic History    Marital status:      Spouse name: Not on file    Number of children: Not on file    Years of education: Not on file    Highest education level: Not on file   Occupational History    Not on file   Tobacco Use    Smoking status: Never Smoker    Smokeless tobacco: Never Used   Vaping Use    Vaping Use: Never used   Substance and Sexual Activity    Alcohol use: Yes     Comment: socially    Drug use: Yes     Types: Marijuana Naz Ing)    Sexual activity: Yes   Other Topics Concern    Not on file   Social History Narrative    Not on file     Social Determinants of Health     Financial Resource Strain:     Difficulty of Paying Living Expenses: Not on file   Food Insecurity:     Worried About Running Out of Food in the Last Year: Not on file    Shamir of Food in the Last Year: Not on file   Transportation Needs:     Lack of Transportation (Medical): Not on file    Lack of Transportation (Non-Medical):  Not on file   Physical Activity:     Days of Exercise per Week: Not on file    Minutes of Exercise per Session: Not on file   Stress:     Feeling of Stress : Not on file   Social Connections:     Frequency of Communication with Friends and Family: Not on file    Frequency of Social Gatherings with Friends and Family: Not on file    Attends Quaker Services: Not on file    Active Member of Clubs or Organizations: Not on file    Attends Club or Organization Meetings: Not on file    Marital Status: Not on file   Intimate Partner Violence:     Fear of Current or Ex-Partner: Not on file    Emotionally Abused: Not on file    Physically Abused: Not on file    Sexually Abused: Not on file   Housing Stability:     Unable to Pay for Housing in the Last Year: Not on file    Number of [place] in the Last Year: Not on file    Unstable Housing in the Last Year: Not on file     Family History   Problem Relation Age of Onset    Cancer Father         skin ear and head    High Blood Pressure Father     Cancer Brother         gall bladder cancer    Other Mother         dementia    High Blood Pressure Mother     Other Sister         gout    Diabetes Brother     Cancer Brother         head    No Known Problems Son     No Known Problems Daughter     Cancer Paternal Uncle         skin    Other Sister         covid    Colon Cancer Neg Hx      Allergies:  Erythromycin, Seasonal, Sporanox [itraconazole], and Macrobid [nitrofurantoin macrocrystal]    Review of Systems   Constitutional: Negative for activity change, appetite change and unexpected weight change. HENT: Negative for congestion. Respiratory: Negative for chest tightness and shortness of breath. Cardiovascular: Negative for chest pain. Gastrointestinal: Positive for anal bleeding and rectal pain. Negative for abdominal pain, constipation and diarrhea. Genitourinary: Negative for difficulty urinating. Musculoskeletal: Negative for arthralgias. Neurological: Negative for dizziness. Hematological: Does not bruise/bleed easily. Psychiatric/Behavioral: Negative for agitation. Objective:    Pulse 58   Temp 98 °F (36.7 °C) (Temporal)   Ht 5' 4\" (1.626 m)   Wt 140 lb (63.5 kg)   LMP 09/19/2008   SpO2 95%   BMI 24.03 kg/m²     Physical Exam  Constitutional:       Appearance: She is well-developed. HENT:      Head: Normocephalic and atraumatic. Eyes:      Pupils: Pupils are equal, round, and reactive to light. Cardiovascular:      Rate and Rhythm: Normal rate and regular rhythm. Heart sounds: Normal heart sounds. Pulmonary:      Effort: Pulmonary effort is normal. No respiratory distress. Breath sounds: Normal breath sounds. No wheezing. Abdominal:      General: There is no distension. Palpations: There is no mass. Tenderness:  There is no abdominal tenderness. There is no guarding or rebound. Genitourinary:     Comments: On anal inspection no abnormalities present. On digital exam a palpable mass present in the right posterior portion of the anal canal obvious. It is mobile and does not appear to be sphincter involved. Based on the endoscopic appearance it measures approximately 30 to 40% of the circumference. No inguinal adenopathy is appreciated. Musculoskeletal:         General: Normal range of motion. Cervical back: Normal range of motion and neck supple. Skin:     General: Skin is warm and dry. Coloration: Skin is not pale. Findings: No erythema or rash. Neurological:      Mental Status: She is alert and oriented to person, place, and time. Psychiatric:         Behavior: Behavior normal.         Judgment: Judgment normal.              Assessment/Plan:          Diagnosis Orders   1. Anal carcinoma ([address]       At this time it is probably not feasible to proceed with an excision of this anal carcinoma. I believe the margins would be positive. Patient scheduled for radiation mapping tomorrow. She has a PET CT scan to look for any evidence of distant disease. I agree with preoperative chemoradiation therapy. If not a clinical cure, excision of residual disease can be performed I believe without difficulty. I have offered my services following treatment. I have offered an [id number] if needed for the chemotherapy aspect. She will be discussing this with Dr. Pascual Cantu.    If there are any problems or questions during her treatment or any way I can be of further assistance, I asked her to call            Please note this report has beenpartially produced using speech recognition software and may cause contain errors related to that system including grammar, punctuation and spelling as well as words and phrases that may seem inappropriate.  If there arequestions or concerns please feel free to contact me to clarify

## 2022-06-29 ENCOUNTER — HOSPITAL ENCOUNTER (OUTPATIENT)
Dept: RADIATION ONCOLOGY | Age: 67
Discharge: HOME | End: 2022-06-29
Payer: COMMERCIAL

## 2022-06-29 PROCEDURE — 77334 RADIATION TREATMENT AID(S): CPT | Performed by: RADIOLOGY

## 2022-06-29 NOTE — PROGRESS NOTES
Teaching & Instructions - Pelvis  General  Simulation  Initial Treatment  Self-Care Info  Nutrition  Social Service    Site-Specific  Side Effects  Cystitis Mgmt  Bowel Mgmt  Fatigue Mgmt  Perineal/Vaginal Care  Proctitis Mgmt  Sexuality Issues  Skin Care-aquaphor  Vaginal Dilation  Vaginal Implant    Educational Handouts  Radiation Therapy & You  Site Specific Instructions  Radiation Oncology Dept Information  CBMS Program    Patient eager to learn, verbalized understanding of verbal education and handouts. RADIATION THERAPY BARRIERS ASSESSMENT      During your CT Simulation, you were placed in the position that you be in for your radiation treatments. You will be will in this position for approximately 10-15 minutes, do you have any concerns about staying in this position for the required time? no    While you are on treatment, you will be evaluated by the Radiation Oncologist once a week on Thursdays. The focus of these appointments will be management of side effects. You will be here for a minimum of one hour or more depending on your specific needs. Do you have any concerns about your ability to keep this appointment? no    You will be coming to the [facility] for 27  daily treatments, do you have any concerns about transportation?    no    Do you have any financial concerns about your treatment that you would like to further discuss with social work? no    Do you have any other concerns or for see any concerns or potential barriers to completing your radiation therapy that you want us to be aware of? no

## 2022-06-30 ENCOUNTER — HOSPITAL ENCOUNTER (OUTPATIENT)
Dept: CT IMAGING | Age: 67
Discharge: HOME | End: 2022-07-02
Payer: COMMERCIAL

## 2022-06-30 DIAGNOSIS — C21.0 ANAL CANCER (HCC): ICD-10-CM

## 2022-06-30 PROCEDURE — 77399 UNLISTED PX MED RADJ PHYSICS: CPT | Performed by: RADIOLOGY

## 2022-06-30 PROCEDURE — 78815 PET IMAGE W/CT SKULL-THIGH: CPT

## 2022-06-30 PROCEDURE — 3430000000 HC RX DIAGNOSTIC RADIOPHARMACEUTICAL: Performed by: RADIOLOGY

## 2022-06-30 PROCEDURE — A9552 F18 FDG: HCPCS | Performed by: RADIOLOGY

## 2022-06-30 RX ORDER — FLUDEOXYGLUCOSE F 18 200 MCI/ML
15.4 INJECTION, SOLUTION INTRAVENOUS
Status: COMPLETED | OUTPATIENT
Start: 2022-06-30 | End: 2022-06-30

## 2022-06-30 RX ADMIN — FLUDEOXYGLUCOSE F 18 15.4 MILLICURIE: 200 INJECTION, SOLUTION INTRAVENOUS at 10:25

## 2022-07-01 ENCOUNTER — HOSPITAL ENCOUNTER (OUTPATIENT)
Dept: INTERVENTIONAL RADIOLOGY/VASCULAR | Age: 67
Discharge: HOME | End: 2022-07-03
Payer: COMMERCIAL

## 2022-07-01 DIAGNOSIS — C21.1 MALIGNANT NEOPLASM OF ANAL CANAL (HCC): ICD-10-CM

## 2022-07-01 PROCEDURE — 36573 INSJ PICC RS&I 5 YR+: CPT

## 2022-07-01 PROCEDURE — A4217 STERILE WATER/SALINE, 500 ML: HCPCS | Performed by: INTERNAL MEDICINE

## 2022-07-01 PROCEDURE — 2500000003 HC RX 250 WO HCPCS: Performed by: INTERNAL MEDICINE

## 2022-07-01 PROCEDURE — 2709999900 IR PICC WO SQ PORT/PUMP > 5 YEARS

## 2022-07-01 PROCEDURE — 36573 INSJ PICC RS&I 5 YR+: CPT | Performed by: RADIOLOGY

## 2022-07-01 PROCEDURE — 2580000003 HC RX 258: Performed by: INTERNAL MEDICINE

## 2022-07-01 RX ORDER — MAGNESIUM HYDROXIDE 1200 MG/15ML
1000 LIQUID ORAL CONTINUOUS
Status: DISCONTINUED | OUTPATIENT
Start: 2022-07-01 | End: 2022-07-04 | Stop reason: HOSPADM

## 2022-07-01 RX ORDER — LIDOCAINE HYDROCHLORIDE 20 MG/ML
5 INJECTION, SOLUTION INFILTRATION; PERINEURAL ONCE
Status: COMPLETED | OUTPATIENT
Start: 2022-07-01 | End: 2022-07-01

## 2022-07-01 RX ADMIN — LIDOCAINE HYDROCHLORIDE 5 ML: 20 INJECTION, SOLUTION INFILTRATION; PERINEURAL at 09:10

## 2022-07-01 RX ADMIN — SODIUM CHLORIDE 1000 ML: 900 IRRIGANT IRRIGATION at 09:19

## 2022-07-01 ASSESSMENT — PAIN SCALES - GENERAL: PAINLEVEL_OUTOF10: 0

## 2022-07-05 ENCOUNTER — HOSPITAL ENCOUNTER (OUTPATIENT)
Dept: RADIATION ONCOLOGY | Age: 67
End: 2022-07-05
Payer: COMMERCIAL

## 2022-07-05 ENCOUNTER — APPOINTMENT (OUTPATIENT)
Dept: RADIATION ONCOLOGY | Age: 67
End: 2022-07-05
Payer: COMMERCIAL

## 2022-07-05 PROCEDURE — 77338 DESIGN MLC DEVICE FOR IMRT: CPT | Performed by: RADIOLOGY

## 2022-07-05 PROCEDURE — 77301 RADIOTHERAPY DOSE PLAN IMRT: CPT | Performed by: RADIOLOGY

## 2022-07-05 PROCEDURE — 77300 RADIATION THERAPY DOSE PLAN: CPT | Performed by: RADIOLOGY

## 2022-07-06 ENCOUNTER — HOSPITAL ENCOUNTER (OUTPATIENT)
Dept: RADIATION ONCOLOGY | Age: 67
Discharge: HOME | End: 2022-07-06
Payer: COMMERCIAL

## 2022-07-06 VITALS
SYSTOLIC BLOOD PRESSURE: 124 MMHG | DIASTOLIC BLOOD PRESSURE: 63 MMHG | WEIGHT: 146.2 LBS | TEMPERATURE: 97.1 F | BODY MASS INDEX: 25.1 KG/M2 | RESPIRATION RATE: 16 BRPM | OXYGEN SATURATION: 99 % | HEART RATE: 62 BPM

## 2022-07-06 PROCEDURE — 77014 PR CT GUIDANCE PLACEMENT RAD THERAPY FIELDS: CPT | Performed by: RADIOLOGY

## 2022-07-06 PROCEDURE — 77386 HC NTSTY MODUL RAD TX DLVR CPLX: CPT | Performed by: RADIOLOGY

## 2022-07-06 RX ORDER — ONDANSETRON 4 MG/1
4 TABLET, FILM COATED ORAL EVERY 8 HOURS PRN
COMMUNITY
End: 2022-10-07

## 2022-07-06 RX ORDER — CAPECITABINE 500 MG/1
1500 TABLET, FILM COATED ORAL 2 TIMES DAILY
COMMUNITY
End: 2022-09-07 | Stop reason: ALTCHOICE

## 2022-07-06 NOTE — FLOWSHEET NOTE
17 [facility]           Radiation Oncology      [address] [facility]        [address]        Jefry Mcarthur: [phone]        F: [phone]       [email or web address]                   Dr. Cassia Hinds MD PhD    ON TREATMENT VISIT (OTV) NOTE     Date of Service: 2022  Patient ID: Harriet Vásquez   : 1955  MRN: [id number]   Acct Number: [de-identified]     DIAGNOSIS:  Cancer Staging  Anal cancer ([facility] [address]  Staging form: Anus, AJCC 8th Edition  - Clinical: Stage IIA (cT2, cN0, cM0) - Signed by Cassia Hinds MD on 2022      Treatment Area: Pelvis- Female    Current Total Dose(cGy): 200  Current Fraction: 1    Patient was seen today for weekly visit. Wt Readings from Last 3 Encounters:   22 146 lb 3.2 oz (66.3 kg)   22 140 lb (63.5 kg)   22 142 lb 12.8 oz (64.8 kg)       /63   Pulse 62   Temp 97.1 °F (36.2 °C) (Temporal)   Resp 16   Wt 146 lb 3.2 oz (66.3 kg)   LMP 2008   SpO2 99%   BMI 25.10 kg/m²     Lab Results   Component Value Date    WBC 6.4 2021     2021       Comfort Alteration  Fatigue:None, able to perform daily activities    Pain Location: Chronic migraines. Rectal pressure, \"I know its there\" describes as similar to a hemorrhoid increased after prolonged sitting. Pain Intensity (Current): 6 Severe pain  Pain Treatment: OTC Medications  Pain Relief: Pain relieved 75%    Emotional Alteration:   Coping: effective, States she keeps her mind occupied and has good support from spouse.     Nutritional Alteration  Anorexia: none   Nausea: No nausea noted  Vomiting: No vomiting     Skin Alteration   Skin reaction: No changes noted    Elimination Alterations  Urinary Frequency: None  Urinary Retention: Normal  Urinary Incontinence: None  Dysuria: None  Nocturia: None  Proctitis: Rectal pain with BM requires medication, small amount of bleeding with bowel movements  Diarrhea: None    Additional Comments:     MEDICATIONS: Current Outpatient Medications   Medication Sig Dispense Refill    capecitabine (XELODA) 500 MG chemo tablet Take 1,500 mg by mouth 2 times daily Monday through Friday with Radiation      ondansetron (ZOFRAN) 4 MG tablet Take 4 mg by mouth every 8 hours as needed for Nausea or Vomiting      Cholecalciferol (VITAMIN D3) 50 MCG ( UT) CAPS Take by mouth      topiramate (TOPAMAX) 100 MG tablet TAKE 3 TABLETS DAILY 270 tablet 3    PARoxetine (PAXIL) 20 MG tablet TAKE 1 TABLET DAILY 90 tablet 3    oxyCODONE-acetaminophen (PERCOCET) 5-325 MG per tablet Take 1 tablet by mouth every 4 hours as needed for Pain.  AJOVY 225 MG/1.5ML SOAJ INJECT 1 PEN UNDER THE SKIN 1 TIME EVERY 30 DAYS 1.5 mL 6    CLIMARA 0.1 MG/24HR APPLY 1 PATCH TRANSDERMALLYONTO THE SKIN ONCE A WEEK 12 patch 4    folic acid (FOLVITE) 1 MG tablet Take 1 tablet by mouth daily 30 tablet 5    oxybutynin (DITROPAN-XL) 10 MG extended release tablet TAKE 1 TABLET DAILY 90 tablet 3    Rimegepant Sulfate (NURTEC) 75 MG TBDP Take 75 mg by mouth as needed (take at the onset of migraine) 2 samples given  LOT: [id number]  EXP:  2 tablet 0    SUMAtriptan Succinate 3 MG/0.5ML SOAJ Inject into the skin as needed       ZOMIG 5 MG nasal solution once as needed       aspirin 81 MG EC tablet Take 81 mg by mouth daily. No current facility-administered medications for this encounter. * New    PHYSICAL EXAM:       ECO - Symptomatic but completely ambulatory (Restricted in physically strenuous activity but ambulatory and able to carry out work of a light or sedentary nature. For example, light housework, office work)     General: NAD, AO x 3, Mentation is clear with appropriate affect. HEENT: Normocephalic, atraumatic  Thorax:  Unlabored  Abdomen:  Non-distended    Chemotherapy Update: Concurrent chemotherapy    Treatment Imaging: CBCT    ASSESSMENT: No significant radiation side effects.  Responding appropriately to symptomatic management. New medications, diagnostic results: Continue treatment as planned    PLAN: Again reviewed potential side effects of radiation for the patient's treatment. Continue local/topical care. Continue current radiation course as prescribed.

## 2022-07-07 ENCOUNTER — HOSPITAL ENCOUNTER (OUTPATIENT)
Dept: RADIATION ONCOLOGY | Age: 67
Discharge: HOME | End: 2022-07-07
Payer: COMMERCIAL

## 2022-07-07 PROCEDURE — 77386 HC NTSTY MODUL RAD TX DLVR CPLX: CPT | Performed by: RADIOLOGY

## 2022-07-07 PROCEDURE — 77014 PR CT GUIDANCE PLACEMENT RAD THERAPY FIELDS: CPT | Performed by: RADIOLOGY

## 2022-07-08 ENCOUNTER — APPOINTMENT (OUTPATIENT)
Dept: RADIATION ONCOLOGY | Age: 67
End: 2022-07-08
Payer: COMMERCIAL

## 2022-07-11 ENCOUNTER — APPOINTMENT (OUTPATIENT)
Dept: RADIATION ONCOLOGY | Age: 67
End: 2022-07-11
Payer: COMMERCIAL

## 2022-07-12 ENCOUNTER — APPOINTMENT (OUTPATIENT)
Dept: RADIATION ONCOLOGY | Age: 67
End: 2022-07-12
Payer: COMMERCIAL

## 2022-07-13 ENCOUNTER — APPOINTMENT (OUTPATIENT)
Dept: RADIATION ONCOLOGY | Age: 67
End: 2022-07-13
Payer: COMMERCIAL

## 2022-07-14 ENCOUNTER — HOSPITAL ENCOUNTER (OUTPATIENT)
Dept: RADIATION ONCOLOGY | Age: 67
End: 2022-07-14
Payer: COMMERCIAL

## 2022-07-14 ENCOUNTER — HOSPITAL ENCOUNTER (OUTPATIENT)
Dept: RADIATION ONCOLOGY | Age: 67
Discharge: HOME | End: 2022-07-14
Payer: COMMERCIAL

## 2022-07-18 ENCOUNTER — HOSPITAL ENCOUNTER (OUTPATIENT)
Dept: RADIATION ONCOLOGY | Age: 67
Discharge: HOME | End: 2022-07-18
Payer: COMMERCIAL

## 2022-07-19 ENCOUNTER — HOSPITAL ENCOUNTER (OUTPATIENT)
Dept: RADIATION ONCOLOGY | Age: 67
Discharge: HOME | End: 2022-07-19
Payer: COMMERCIAL

## 2022-07-19 PROCEDURE — 77386 HC NTSTY MODUL RAD TX DLVR CPLX: CPT | Performed by: RADIOLOGY

## 2022-07-19 PROCEDURE — 77014 PR CT GUIDANCE PLACEMENT RAD THERAPY FIELDS: CPT | Performed by: RADIOLOGY

## 2022-07-20 ENCOUNTER — HOSPITAL ENCOUNTER (OUTPATIENT)
Dept: RADIATION ONCOLOGY | Age: 67
Discharge: HOME | End: 2022-07-20
Payer: COMMERCIAL

## 2022-07-20 PROCEDURE — 77386 HC NTSTY MODUL RAD TX DLVR CPLX: CPT | Performed by: RADIOLOGY

## 2022-07-20 PROCEDURE — 77014 PR CT GUIDANCE PLACEMENT RAD THERAPY FIELDS: CPT | Performed by: RADIOLOGY

## 2022-07-21 ENCOUNTER — HOSPITAL ENCOUNTER (OUTPATIENT)
Dept: RADIATION ONCOLOGY | Age: 67
Discharge: HOME | End: 2022-07-21
Payer: COMMERCIAL

## 2022-07-21 VITALS
BODY MASS INDEX: 25.2 KG/M2 | HEART RATE: 65 BPM | TEMPERATURE: 97.7 F | WEIGHT: 146.8 LBS | RESPIRATION RATE: 16 BRPM | OXYGEN SATURATION: 99 % | SYSTOLIC BLOOD PRESSURE: 103 MMHG | DIASTOLIC BLOOD PRESSURE: 64 MMHG

## 2022-07-21 PROCEDURE — 77386 HC NTSTY MODUL RAD TX DLVR CPLX: CPT | Performed by: RADIOLOGY

## 2022-07-21 PROCEDURE — 77014 PR CT GUIDANCE PLACEMENT RAD THERAPY FIELDS: CPT | Performed by: RADIOLOGY

## 2022-07-21 PROCEDURE — 77427 RADIATION TX MANAGEMENT X5: CPT | Performed by: RADIOLOGY

## 2022-07-21 PROCEDURE — 77336 RADIATION PHYSICS CONSULT: CPT | Performed by: RADIOLOGY

## 2022-07-21 RX ORDER — DOCUSATE SODIUM 100 MG/1
100 CAPSULE, LIQUID FILLED ORAL DAILY
COMMUNITY
End: 2022-10-07

## 2022-07-21 NOTE — PROGRESS NOTES
17 [facility]           Radiation Oncology      2016 [facility]        Di Heath: [phone]        F: [phone]       [employer]                   Dr. Ron Anderson MD PhD    ON TREATMENT VISIT (OTV) NOTE     Date of Service: 2022  Patient ID: Waqas Yanes   : 1955  MRN: [id number]   Acct Number: [de-identified]     DIAGNOSIS:  Cancer Staging  Anal cancer ([facility] [address]  Staging form: Anus, AJCC 8th Edition  - Clinical: Stage IIA (cT2, cN0, cM0) - Signed by Ron Anderson MD on 2022      Treatment Area: Pelvis- Female    Current Total Dose(cGy): 1000cGy  Current Fraction: 5    Patient was seen today for weekly visit. Wt Readings from Last 3 Encounters:   22 146 lb 12.8 oz (66.6 kg)   22 146 lb 3.2 oz (66.3 kg)   22 140 lb (63.5 kg)       /64   Pulse 65   Temp 97.7 °F (36.5 °C)   Resp 16   Wt 146 lb 12.8 oz (66.6 kg)   LMP 2008   SpO2 99%   BMI 25.20 kg/m²     Lab Results   Component Value Date    WBC 6.4 2021     2021       Comfort Alteration  Fatigue:None, able to perform daily activities, but gets a little sleepy in the afternoons. Will nap on occasion. Pain Location: Rectal pressure   Pain Intensity (Current): 4 Moderate pain  Pain Treatment:  Uses OTC (Recti-care) cream after every BM for external hemorrhoid  Pain Relief: 70-80% relief of hemorrhoid    Emotional Alteration:   Coping: Doing better since restarted treatment    Nutritional Alteration  Anorexia: none   Nausea: Once yesterday  Vomiting: Denies    Skin Alteration   Skin reaction: No changes noted    Elimination Alterations  Urinary Frequency: Depends on how much fluid patient is taking in, but does not urinate more than once an hour.   Urinary Retention: Normal  Urinary Incontinence:  Urge incontinence at baseline, takes ditropan and wears pads  Dysuria: None  Nocturia: None  Proctitis: None- bright red blood for the first couple of BM's of the day. Diarrhea: None    Additional Comments: Using docusate OTC to prevent constipation. MEDICATIONS:     Current Outpatient Medications   Medication Sig Dispense Refill    docusate sodium (COLACE) 100 MG capsule Take 100 mg by mouth in the morning. capecitabine (XELODA) 500 MG chemo tablet Take 1,500 mg by mouth 2 times daily Monday through Friday with Radiation      ondansetron (ZOFRAN) 4 MG tablet Take 4 mg by mouth every 8 hours as needed for Nausea or Vomiting      Cholecalciferol (VITAMIN D3) 50 MCG ( UT) CAPS Take by mouth      topiramate (TOPAMAX) 100 MG tablet TAKE 3 TABLETS DAILY 270 tablet 3    PARoxetine (PAXIL) 20 MG tablet TAKE 1 TABLET DAILY 90 tablet 3    oxyCODONE-acetaminophen (PERCOCET) 5-325 MG per tablet Take 1 tablet by mouth every 4 hours as needed for Pain. AJOVY 225 MG/1.5ML SOAJ INJECT 1 PEN UNDER THE SKIN 1 TIME EVERY 30 DAYS 1.5 mL 6    CLIMARA 0.1 MG/24HR APPLY 1 PATCH TRANSDERMALLYONTO THE SKIN ONCE A WEEK 12 patch 4    folic acid (FOLVITE) 1 MG tablet Take 1 tablet by mouth daily (Patient not taking: Reported on 2022) 30 tablet 5    oxybutynin (DITROPAN-XL) 10 MG extended release tablet TAKE 1 TABLET DAILY 90 tablet 3    Rimegepant Sulfate (NURTEC) 75 MG TBDP Take 75 mg by mouth as needed (take at the onset of migraine) 2 samples given  LOT: [id number]  EXP:  2 tablet 0    SUMAtriptan Succinate 3 MG/0.5ML SOAJ Inject into the skin as needed       ZOMIG 5 MG nasal solution once as needed       aspirin 81 MG EC tablet Take 81 mg by mouth daily. No current facility-administered medications for this encounter. * New    PHYSICAL EXAM:       ECO - Symptomatic but completely ambulatory (Restricted in physically strenuous activity but ambulatory and able to carry out work of a light or sedentary nature. For example, light housework, office work)     General: NAD, AO x 3, Mentation is clear with appropriate affect.   HEENT: Normocephalic, atraumatic  Thorax:  Unlabored  Abdomen:  Non-distended    Chemotherapy Update: Concurrent chemotherapy    Treatment Imaging: CBCT    ASSESSMENT: Minimal radiation side effects. Responding appropriately to symptomatic management. New medications, diagnostic results: Continue treatment as planned    PLAN: Again reviewed potential side effects of radiation for the patient's treatment. Continue local/topical care. Reinforced use of aquaphor to the perineal/perirectal area. I discussed adding a boost treatment to gross disease only to her treatment delay last week. She is amenable to this. Continue current radiation course as prescribed.

## 2022-07-22 ENCOUNTER — HOSPITAL ENCOUNTER (OUTPATIENT)
Dept: RADIATION ONCOLOGY | Age: 67
Discharge: HOME | End: 2022-07-22
Payer: COMMERCIAL

## 2022-07-22 PROCEDURE — 77386 HC NTSTY MODUL RAD TX DLVR CPLX: CPT | Performed by: RADIOLOGY

## 2022-07-22 PROCEDURE — 77014 PR CT GUIDANCE PLACEMENT RAD THERAPY FIELDS: CPT | Performed by: RADIOLOGY

## 2022-07-25 ENCOUNTER — HOSPITAL ENCOUNTER (OUTPATIENT)
Dept: RADIATION ONCOLOGY | Age: 67
Discharge: HOME | End: 2022-07-25
Payer: COMMERCIAL

## 2022-07-25 ENCOUNTER — APPOINTMENT (OUTPATIENT)
Dept: RADIATION ONCOLOGY | Age: 67
End: 2022-07-25
Payer: COMMERCIAL

## 2022-07-25 PROCEDURE — 77386 HC NTSTY MODUL RAD TX DLVR CPLX: CPT | Performed by: RADIOLOGY

## 2022-07-25 PROCEDURE — 77014 PR CT GUIDANCE PLACEMENT RAD THERAPY FIELDS: CPT | Performed by: RADIOLOGY

## 2022-07-26 ENCOUNTER — HOSPITAL ENCOUNTER (OUTPATIENT)
Dept: RADIATION ONCOLOGY | Age: 67
Discharge: HOME | End: 2022-07-26
Payer: COMMERCIAL

## 2022-07-26 PROCEDURE — 77014 PR CT GUIDANCE PLACEMENT RAD THERAPY FIELDS: CPT | Performed by: RADIOLOGY

## 2022-07-26 PROCEDURE — 77386 HC NTSTY MODUL RAD TX DLVR CPLX: CPT | Performed by: RADIOLOGY

## 2022-07-27 ENCOUNTER — HOSPITAL ENCOUNTER (OUTPATIENT)
Dept: RADIATION ONCOLOGY | Age: 67
Discharge: HOME | End: 2022-07-27
Payer: COMMERCIAL

## 2022-07-27 PROCEDURE — 77386 HC NTSTY MODUL RAD TX DLVR CPLX: CPT | Performed by: RADIOLOGY

## 2022-07-27 PROCEDURE — 77014 PR CT GUIDANCE PLACEMENT RAD THERAPY FIELDS: CPT | Performed by: RADIOLOGY

## 2022-07-28 ENCOUNTER — HOSPITAL ENCOUNTER (OUTPATIENT)
Dept: RADIATION ONCOLOGY | Age: 67
Discharge: HOME | End: 2022-07-28
Payer: COMMERCIAL

## 2022-07-28 VITALS
RESPIRATION RATE: 18 BRPM | DIASTOLIC BLOOD PRESSURE: 59 MMHG | BODY MASS INDEX: 24.82 KG/M2 | OXYGEN SATURATION: 99 % | HEART RATE: 67 BPM | WEIGHT: 144.6 LBS | SYSTOLIC BLOOD PRESSURE: 101 MMHG | TEMPERATURE: 97.7 F

## 2022-07-28 PROCEDURE — 77427 RADIATION TX MANAGEMENT X5: CPT | Performed by: RADIOLOGY

## 2022-07-28 PROCEDURE — 77336 RADIATION PHYSICS CONSULT: CPT | Performed by: RADIOLOGY

## 2022-07-28 PROCEDURE — 77014 PR CT GUIDANCE PLACEMENT RAD THERAPY FIELDS: CPT | Performed by: RADIOLOGY

## 2022-07-28 PROCEDURE — 77386 HC NTSTY MODUL RAD TX DLVR CPLX: CPT | Performed by: RADIOLOGY

## 2022-07-28 NOTE — PROGRESS NOTES
17 [facility]           Radiation Oncology      [address] [facility]        [address]        [facility] [place]: [phone]        F: [phone]       [employer]                   Dr. Neville Moore MD PhD    ON TREATMENT VISIT (OTV) NOTE     Date of Service: 2022  Patient ID: Penny Marinelli   : 1955  MRN: [id number]   Acct Number: [de-identified]     DIAGNOSIS:  Cancer Staging  Anal cancer ([facility] [address]  Staging form: Anus, AJCC 8th Edition  - Clinical: Stage IIA (cT2, cN0, cM0) - Signed by Neville Moore MD on 2022      Treatment Area: Pelvis- Female    Current Total Dose(cGy): 2000 cGy  Current Fraction: 10    Patient was seen today for weekly visit. Wt Readings from Last 3 Encounters:   22 144 lb 9.6 oz (65.6 kg)   22 146 lb 12.8 oz (66.6 kg)   22 146 lb 3.2 oz (66.3 kg)       BP (!) 101/59   Pulse 67   Temp 97.7 °F (36.5 °C)   Resp 18   Wt 144 lb 9.6 oz (65.6 kg)   LMP 2008   SpO2 99%   BMI 24.82 kg/m²     Lab Results   Component Value Date    WBC 6.4 2021     2021       Comfort Alteration  Fatigue:Able to perform daily activities with rest periods    Pain Location: Umm rectal  Pain Intensity (Current): 2 Mild pain  Pain Treatment: Aquaphor few times daily  Pain Relief: Pain relieved 25%    Emotional Alteration:   Coping: effective- is very supportive    Nutritional Alteration  Anorexia: none - eats breakfast and dinner and snacks throughout the day. Nausea:  2 episodes of nausea this past week, takes zofran with relief for a short time  Vomiting: No vomiting     Skin Alteration   Skin reaction:  Umm- rectal area is tender, but is not aware of any skin changes. Uses Aquaphor a few times daily. Elimination Alterations  Urinary Frequency: Urinating at least once an hour  Urinary Retention: Normal  Urinary Incontinence: Urge and stress incontinence at baseline. Takes ditropan and wears umm-pads daily.   Dysuria: None  Nocturia: None  Proctitis: None  Diarrhea: None    Additional Comments: No BM since . MEDICATIONS:     Current Outpatient Medications   Medication Sig Dispense Refill    docusate sodium (COLACE) 100 MG capsule Take 100 mg by mouth in the morning. capecitabine (XELODA) 500 MG chemo tablet Take 1,500 mg by mouth 2 times daily Monday through Friday with Radiation      ondansetron (ZOFRAN) 4 MG tablet Take 4 mg by mouth every 8 hours as needed for Nausea or Vomiting      Cholecalciferol (VITAMIN D3) 50 MCG ( UT) CAPS Take by mouth      topiramate (TOPAMAX) 100 MG tablet TAKE 3 TABLETS DAILY 270 tablet 3    PARoxetine (PAXIL) 20 MG tablet TAKE 1 TABLET DAILY 90 tablet 3    oxyCODONE-acetaminophen (PERCOCET) 5-325 MG per tablet Take 1 tablet by mouth every 4 hours as needed for Pain. (Patient not taking: Reported on 2022)      AJOVY 225 MG/1.5ML SOAJ INJECT 1 PEN UNDER THE SKIN 1 TIME EVERY 30 DAYS 1.5 mL 6    CLIMARA 0.1 MG/24HR APPLY 1 PATCH TRANSDERMALLYONTO THE SKIN ONCE A WEEK 12 patch 4    folic acid (FOLVITE) 1 MG tablet Take 1 tablet by mouth daily (Patient not taking: No sig reported) 30 tablet 5    oxybutynin (DITROPAN-XL) 10 MG extended release tablet TAKE 1 TABLET DAILY 90 tablet 3    Rimegepant Sulfate (NURTEC) 75 MG TBDP Take 75 mg by mouth as needed (take at the onset of migraine) 2 samples given  LOT: [id number]  EXP:  2 tablet 0    SUMAtriptan Succinate 3 MG/0.5ML SOAJ Inject into the skin as needed       ZOMIG 5 MG nasal solution once as needed       aspirin 81 MG EC tablet Take 81 mg by mouth daily. No current facility-administered medications for this encounter. * New    PHYSICAL EXAM:       ECO - Symptomatic but completely ambulatory (Restricted in physically strenuous activity but ambulatory and able to carry out work of a light or sedentary nature.  For example, light housework, office work)     General: NAD, AO x 3, Mentation is clear with appropriate affect. HEENT: Normocephalic, atraumatic  Thorax:  Unlabored  Abdomen:  Non-distended    Chemotherapy Update: Concurrent chemotherapy    Treatment Imaging: CBCT    ASSESSMENT: Minimal radiation side effects. Responding appropriately to symptomatic management. New medications, diagnostic results: Continue treatment as planned    PLAN: Again reviewed potential side effects of radiation for the patient's treatment. Continue local/topical care. Recommend she do full dose of miralax to help with constipation. Will have dietician meet with patient today to review nutritional goals and interventions that may also help address this. Continue current radiation course as prescribed.

## 2022-07-28 NOTE — PROGRESS NOTES
Nutrition Education    Educated on diet for constipation  Learners: Patient  Readiness: Acceptance  Method: Explanation  Response: Verbalizes Understanding  Contact name and number provided. [phone]    Julian Chao RD, JASON  Contact Number: [phone]    Education via phone

## 2022-07-29 ENCOUNTER — HOSPITAL ENCOUNTER (OUTPATIENT)
Dept: RADIATION ONCOLOGY | Age: 67
Discharge: HOME | End: 2022-07-29
Payer: COMMERCIAL

## 2022-07-29 PROCEDURE — 77386 HC NTSTY MODUL RAD TX DLVR CPLX: CPT | Performed by: RADIOLOGY

## 2022-07-29 PROCEDURE — 77014 PR CT GUIDANCE PLACEMENT RAD THERAPY FIELDS: CPT | Performed by: RADIOLOGY

## 2022-08-01 ENCOUNTER — HOSPITAL ENCOUNTER (OUTPATIENT)
Dept: RADIATION ONCOLOGY | Age: 67
Discharge: HOME | End: 2022-08-01
Payer: COMMERCIAL

## 2022-08-01 PROCEDURE — 77014 PR CT GUIDANCE PLACEMENT RAD THERAPY FIELDS: CPT | Performed by: RADIOLOGY

## 2022-08-01 PROCEDURE — 77386 HC NTSTY MODUL RAD TX DLVR CPLX: CPT | Performed by: RADIOLOGY

## 2022-08-02 ENCOUNTER — HOSPITAL ENCOUNTER (OUTPATIENT)
Dept: RADIATION ONCOLOGY | Age: 67
Discharge: HOME | End: 2022-08-02
Payer: COMMERCIAL

## 2022-08-02 PROCEDURE — 77386 HC NTSTY MODUL RAD TX DLVR CPLX: CPT | Performed by: RADIOLOGY

## 2022-08-02 PROCEDURE — 77014 PR CT GUIDANCE PLACEMENT RAD THERAPY FIELDS: CPT | Performed by: RADIOLOGY

## 2022-08-02 NOTE — PROGRESS NOTES
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 This was communicated to nursing staff, PAO Gonzalez RN, who stated it will be monitored        Eating, Loss of Appetite          Pain  Pt reports considerable pain at treatment site, tenderness        Fatigue       Other Concerns/Notes: SW met with pt for Midpoint Distress in the company of her , Fidelia Armstrong. She reports that she is beginning to have considerable physical discomfort at the treatment site, and she is trying to manage the balance between constipation and diarrhea. The couple reports that they were troubled early on in treatment, when some machine delays occurred, but they feel reassured that they are back on schedule and progressing.  It is noted that when pt cited her distress as a \"6\" she added, \"so it's really getting bad. \" This may reflect a higher distress tolerance, or a lower personal rating scale as she describes it experientially as quite high. Couple was reminded of support at cancer center, and they expressed appreciation for this. SW contact information was provided, and couple invited to use support as they feel need. Date of visit: 8/2/2022 10:03 AM      : Molly Bailey.  Hiram Dubin, LISW-S

## 2022-08-03 ENCOUNTER — HOSPITAL ENCOUNTER (OUTPATIENT)
Dept: RADIATION ONCOLOGY | Age: 67
Discharge: HOME | End: 2022-08-03
Payer: COMMERCIAL

## 2022-08-03 PROCEDURE — 77014 PR CT GUIDANCE PLACEMENT RAD THERAPY FIELDS: CPT | Performed by: RADIOLOGY

## 2022-08-03 PROCEDURE — 77386 HC NTSTY MODUL RAD TX DLVR CPLX: CPT | Performed by: RADIOLOGY

## 2022-08-04 ENCOUNTER — HOSPITAL ENCOUNTER (OUTPATIENT)
Dept: RADIATION ONCOLOGY | Age: 67
Discharge: HOME | End: 2022-08-04
Payer: COMMERCIAL

## 2022-08-04 VITALS
HEART RATE: 83 BPM | BODY MASS INDEX: 25.03 KG/M2 | WEIGHT: 145.8 LBS | TEMPERATURE: 96.4 F | OXYGEN SATURATION: 100 % | SYSTOLIC BLOOD PRESSURE: 111 MMHG | DIASTOLIC BLOOD PRESSURE: 56 MMHG | RESPIRATION RATE: 16 BRPM

## 2022-08-04 PROCEDURE — 77386 HC NTSTY MODUL RAD TX DLVR CPLX: CPT | Performed by: RADIOLOGY

## 2022-08-04 PROCEDURE — 77336 RADIATION PHYSICS CONSULT: CPT | Performed by: RADIOLOGY

## 2022-08-04 PROCEDURE — 77014 PR CT GUIDANCE PLACEMENT RAD THERAPY FIELDS: CPT | Performed by: RADIOLOGY

## 2022-08-04 PROCEDURE — 77427 RADIATION TX MANAGEMENT X5: CPT | Performed by: RADIOLOGY

## 2022-08-04 RX ORDER — PREDNISOLONE ACETATE 10 MG/ML
1 SUSPENSION/ DROPS OPHTHALMIC DAILY
COMMUNITY
Start: 2022-08-03

## 2022-08-04 RX ORDER — POLYETHYLENE GLYCOL 3350 17 G/17G
17 POWDER, FOR SOLUTION ORAL DAILY
COMMUNITY
End: 2022-10-07

## 2022-08-04 NOTE — PROGRESS NOTES
vulva  Nocturia: None  Proctitis: None  Diarrhea:  pt switched from Miralax to Benefiber last week per suggestion of dietician, took a dose on Sat and Sunday,had diarrhea all weekend,  she has not taken any since, last bowel movement was Sunday    Additional Comments: Jane Ray MEDICATIONS:     Current Outpatient Medications   Medication Sig Dispense Refill    Carboxymethylcellulose Sodium (ARTIFICIAL TEARS OP) Apply 1 drop to eye as needed      prednisoLONE acetate (PRED FORTE) 1 % ophthalmic suspension Place 1 drop into both eyes daily      docusate sodium (COLACE) 100 MG capsule Take 100 mg by mouth in the morning. capecitabine (XELODA) 500 MG chemo tablet Take 1,500 mg by mouth 2 times daily Monday through Friday with Radiation      ondansetron (ZOFRAN) 4 MG tablet Take 4 mg by mouth every 8 hours as needed for Nausea or Vomiting      Cholecalciferol (VITAMIN D3) 50 MCG (2000 UT) CAPS Take by mouth      topiramate (TOPAMAX) 100 MG tablet TAKE 3 TABLETS DAILY 270 tablet 3    PARoxetine (PAXIL) 20 MG tablet TAKE 1 TABLET DAILY 90 tablet 3    oxyCODONE-acetaminophen (PERCOCET) 5-325 MG per tablet Take 1 tablet by mouth every 4 hours as needed for Pain. AJOVY 225 MG/1.5ML SOAJ INJECT 1 PEN UNDER THE SKIN 1 TIME EVERY 30 DAYS 1.5 mL 6    CLIMARA 0.1 MG/24HR APPLY 1 PATCH TRANSDERMALLYONTO THE SKIN ONCE A WEEK 12 patch 4    folic acid (FOLVITE) 1 MG tablet Take 1 tablet by mouth daily (Patient not taking: Reported on 8/4/2022) 30 tablet 5    oxybutynin (DITROPAN-XL) 10 MG extended release tablet TAKE 1 TABLET DAILY 90 tablet 3    Rimegepant Sulfate (NURTEC) 75 MG TBDP Take 75 mg by mouth as needed (take at the onset of migraine) 2 samples given  LOT: [id number]  EXP: 11/21 2 tablet 0    SUMAtriptan Succinate 3 MG/0.5ML SOAJ Inject into the skin as needed       ZOMIG 5 MG nasal solution once as needed       aspirin 81 MG EC tablet Take 81 mg by mouth daily.          No current facility-administered medications for this encounter. * New    PHYSICAL EXAM:       ECO - Symptomatic but completely ambulatory (Restricted in physically strenuous activity but ambulatory and able to carry out work of a light or sedentary nature. For example, light housework, office work)     General: NAD, AO x 3, Mentation is clear with appropriate affect. HEENT: Normocephalic, atraumatic  Thorax:  Unlabored  Abdomen:  Non-distended  Skin - treatment portal: SEE above. Grade II dermatitis of the skin of the perianal and vulvar region    Chemotherapy Update: Concurrent chemotherapy    Treatment Imaging: CBCT    ASSESSMENT: Modest radiation side effects. Responding appropriately to symptomatic management. New medications, diagnostic results: Continue treatment as planned    PLAN: Again reviewed potential side effects of radiation for the patient's treatment. Continue local/topical care. Patient has been severely constipated since stopping benefiber over weekend due to excessive bowel movements. She does not wish to go back on that and wants to try Miralax instead. I discussed that this should be fine. I will prescribe silvadene to be used at least twice daily to skin in the treatment field with aquaphor. Continue current radiation course as prescribed.

## 2022-08-05 ENCOUNTER — HOSPITAL ENCOUNTER (OUTPATIENT)
Dept: RADIATION ONCOLOGY | Age: 67
Discharge: HOME | End: 2022-08-05
Payer: COMMERCIAL

## 2022-08-05 PROCEDURE — 77014 PR CT GUIDANCE PLACEMENT RAD THERAPY FIELDS: CPT | Performed by: RADIOLOGY

## 2022-08-05 PROCEDURE — 77386 HC NTSTY MODUL RAD TX DLVR CPLX: CPT | Performed by: RADIOLOGY

## 2022-08-08 ENCOUNTER — HOSPITAL ENCOUNTER (OUTPATIENT)
Dept: RADIATION ONCOLOGY | Age: 67
Discharge: HOME | End: 2022-08-08
Payer: COMMERCIAL

## 2022-08-08 PROCEDURE — 77014 PR CT GUIDANCE PLACEMENT RAD THERAPY FIELDS: CPT | Performed by: RADIOLOGY

## 2022-08-08 PROCEDURE — 77386 HC NTSTY MODUL RAD TX DLVR CPLX: CPT | Performed by: RADIOLOGY

## 2022-08-09 ENCOUNTER — HOSPITAL ENCOUNTER (OUTPATIENT)
Dept: RADIATION ONCOLOGY | Age: 67
Discharge: HOME | End: 2022-08-09
Payer: COMMERCIAL

## 2022-08-09 PROCEDURE — 77014 PR CT GUIDANCE PLACEMENT RAD THERAPY FIELDS: CPT | Performed by: RADIOLOGY

## 2022-08-09 PROCEDURE — 77386 HC NTSTY MODUL RAD TX DLVR CPLX: CPT | Performed by: RADIOLOGY

## 2022-08-09 RX ORDER — HYDROCORTISONE ACETATE 25 MG/1
25 SUPPOSITORY RECTAL EVERY 12 HOURS
Qty: 20 SUPPOSITORY | Refills: 1 | Status: SHIPPED | OUTPATIENT
Start: 2022-08-09 | End: 2022-10-07

## 2022-08-10 ENCOUNTER — HOSPITAL ENCOUNTER (OUTPATIENT)
Dept: RADIATION ONCOLOGY | Age: 67
Discharge: HOME | End: 2022-08-10
Payer: COMMERCIAL

## 2022-08-10 VITALS
HEART RATE: 70 BPM | TEMPERATURE: 97.2 F | RESPIRATION RATE: 28 BRPM | SYSTOLIC BLOOD PRESSURE: 101 MMHG | OXYGEN SATURATION: 99 % | DIASTOLIC BLOOD PRESSURE: 61 MMHG

## 2022-08-10 PROCEDURE — 77014 PR CT GUIDANCE PLACEMENT RAD THERAPY FIELDS: CPT | Performed by: RADIOLOGY

## 2022-08-10 PROCEDURE — 77386 HC NTSTY MODUL RAD TX DLVR CPLX: CPT | Performed by: RADIOLOGY

## 2022-08-10 RX ORDER — LOPERAMIDE HYDROCHLORIDE 2 MG/1
2 CAPSULE ORAL 4 TIMES DAILY PRN
COMMUNITY
End: 2022-11-04 | Stop reason: ALTCHOICE

## 2022-08-10 NOTE — PROGRESS NOTES
SW visit with Pt as requested by Dr. Charan Sheets following her OTV (and before her RT today). Pt was very anxious today ; primarily due to current physical side effects she is experiencing. SW provided emotional support as Pt states the best option for lowering her anxiety is to be home and go to sleep - that is when she is most comfortable. She is receiving fluids today (HOC) per Dr. Charan Sheets. Pt does not take anti-anxiety meds per Pt and spouse; they state when RT treatment is finished, they feel the anxiety will go away. This SW left message for BRITANY Liang to touch base with Pt tomorrow for further support, as able. Pt is in agreement to this plan.

## 2022-08-10 NOTE — PROGRESS NOTES
bleeding, uses one anusol supp last night, newly prescribed  Diarrhea:  explosive diarrhea, pt has not used imodium AD yet, reinstructed    Additional Comments: pt to see med on for fluids today and dietician tomorrow for diet teaching re: diarrhea    MEDICATIONS:     Current Outpatient Medications   Medication Sig Dispense Refill    loperamide (IMODIUM) 2 MG capsule Take 2 mg by mouth 4 times daily as needed for Diarrhea      hydrocortisone (ANUSOL-HC) 25 MG suppository Place 1 suppository rectally in the morning and 1 suppository in the evening. For 5 consecutive days. May repeat if necessary. . 20 suppository 1    prednisoLONE acetate (PRED FORTE) 1 % ophthalmic suspension Place 1 drop into both eyes daily      Carboxymethylcellulose Sodium (ARTIFICIAL TEARS OP) Apply 1 drop to eye as needed      Ca Carbonate-Mag Hydroxide (ROLAIDS PO) Take by mouth as needed (heartburn)      polyethylene glycol (GLYCOLAX) 17 GM/SCOOP powder Take 17 g by mouth daily      silver sulfADIAZINE (SILVADENE) 1 % cream Apply topically daily. 400 g 1    docusate sodium (COLACE) 100 MG capsule Take 100 mg by mouth in the morning. capecitabine (XELODA) 500 MG chemo tablet Take 1,500 mg by mouth 2 times daily Monday through Friday with Radiation      ondansetron (ZOFRAN) 4 MG tablet Take 4 mg by mouth every 8 hours as needed for Nausea or Vomiting      Cholecalciferol (VITAMIN D3) 50 MCG (2000 UT) CAPS Take by mouth      topiramate (TOPAMAX) 100 MG tablet TAKE 3 TABLETS DAILY 270 tablet 3    PARoxetine (PAXIL) 20 MG tablet TAKE 1 TABLET DAILY 90 tablet 3    oxyCODONE-acetaminophen (PERCOCET) 5-325 MG per tablet Take 1 tablet by mouth every 4 hours as needed for Pain.  (Patient not taking: Reported on 8/4/2022)      AJOVY 225 MG/1.5ML SOAJ INJECT 1 PEN UNDER THE SKIN 1 TIME EVERY 30 DAYS 1.5 mL 6    CLIMARA 0.1 MG/24HR APPLY 1 PATCH TRANSDERMALLYONTO THE SKIN ONCE A WEEK 12 patch 4    folic acid (FOLVITE) 1 MG tablet Take 1 tablet by mouth daily (Patient not taking: Reported on 2022) 30 tablet 5    oxybutynin (DITROPAN-XL) 10 MG extended release tablet TAKE 1 TABLET DAILY 90 tablet 3    Rimegepant Sulfate (NURTEC) 75 MG TBDP Take 75 mg by mouth as needed (take at the onset of migraine) 2 samples given  LOT: [id number]  EXP:  2 tablet 0    SUMAtriptan Succinate 3 MG/0.5ML SOAJ Inject into the skin as needed       ZOMIG 5 MG nasal solution once as needed       aspirin 81 MG EC tablet Take 81 mg by mouth daily. No current facility-administered medications for this encounter. * New    PHYSICAL EXAM:       ECO - Symptomatic, <50% in bed during the day (Ambulatory and capable of all self care but unable to carry out any work activities. Up and about more than 50% of waking hours)     General: NAD, AO x 3, Mentation is clear with appropriate affect. HEENT: Normocephalic, atraumatic  Thorax:  Unlabored  Abdomen:  Non-distended    Chemotherapy Update: Concurrent chemotherapy    Treatment Imaging: CBCT    ASSESSMENT: Modest radiation side effects. Responding appropriately to symptomatic management. New medications, diagnostic results: Continue treatment as planned    PLAN: Again reviewed potential side effects of radiation for the patient's treatment. Continue local/topical care. Reinforced silvadene and aquaphor twice daily, good PO hydration daily, PRN imodium for diarrhea, steroid suppositories, and will send her for IV fluids today as she is clinically dehydrated. Continue current radiation course as prescribed.

## 2022-08-10 NOTE — PROGRESS NOTES
Pt is having explosive diarrhea. Dr Estes Resides referred pt to dietician for diet teaching. Pt will also be sent to med onc for IV fluids today. RN spoke with Lyn Bender to arrange.

## 2022-08-11 ENCOUNTER — HOSPITAL ENCOUNTER (OUTPATIENT)
Dept: RADIATION ONCOLOGY | Age: 67
Discharge: HOME | End: 2022-08-11
Payer: COMMERCIAL

## 2022-08-11 PROCEDURE — 77336 RADIATION PHYSICS CONSULT: CPT | Performed by: RADIOLOGY

## 2022-08-11 PROCEDURE — 77014 PR CT GUIDANCE PLACEMENT RAD THERAPY FIELDS: CPT | Performed by: RADIOLOGY

## 2022-08-11 PROCEDURE — 77427 RADIATION TX MANAGEMENT X5: CPT | Performed by: RADIOLOGY

## 2022-08-11 PROCEDURE — 77386 HC NTSTY MODUL RAD TX DLVR CPLX: CPT | Performed by: RADIOLOGY

## 2022-08-11 NOTE — PROGRESS NOTES
Nutrition Education    Educated on diet for diarrhea  Learners: Patient  Readiness: Acceptance  Method: Handout  Response: Needs Reinforcement  Contact name and number provided. Pt reports improvement with diarrhea with use of Immodium per Dr Tyson Pugh instructions yesterday. Two watery stools yesterday and no BM today. Pt instructed on basics of diet for diarrhea and provided additional details through Eating Hints booklet with appropriate sections flagged. Questions answered. Pt's  not with her today. Pt requests IVF again today, assisted pt with getting on schedule at medical oncology for fluids. Met with pt along with Rosana Eubanks. Will f/u with pt next week.   Junior Stark, RD, LD  Contact Number: [phone]

## 2022-08-11 NOTE — PROGRESS NOTES
Met with pt along with dietitian, Aimee Mckay, as pt was struggling emotionally yesterday at [address] and met with SW Noa Hope, in this SW's absence. Plan was for follow-up by this SW today. Pt reports that she does feel better since receiving fluids yesterday, stating, \"I hit rock bottom. \" While we addressed that attending to physical needs, and thankfully finding some solace to sx, is a primary start to aiding with emotional struggles, also looked at ways to find pleasurable emotional experiences in simple activities at home, which might distract from cancer focus. List included sitting on porch with  as they used to, spending time with grandchild and celebrating his birthday, enjoying simple routines which are of her \"normal\" life. Also looked at hopeful prospect that while yesterday was so bad, there was help and encouraged her to continue to reach out to treatment team as she feels need. Pt is open to this idea, and SW will continue to follow for emotional support.

## 2022-08-12 ENCOUNTER — HOSPITAL ENCOUNTER (OUTPATIENT)
Dept: RADIATION ONCOLOGY | Age: 67
Discharge: HOME | End: 2022-08-12
Payer: COMMERCIAL

## 2022-08-12 PROCEDURE — 77014 PR CT GUIDANCE PLACEMENT RAD THERAPY FIELDS: CPT | Performed by: RADIOLOGY

## 2022-08-12 PROCEDURE — 77386 HC NTSTY MODUL RAD TX DLVR CPLX: CPT | Performed by: RADIOLOGY

## 2022-08-15 ENCOUNTER — HOSPITAL ENCOUNTER (OUTPATIENT)
Dept: RADIATION ONCOLOGY | Age: 67
Discharge: HOME | End: 2022-08-15
Payer: COMMERCIAL

## 2022-08-15 VITALS
RESPIRATION RATE: 18 BRPM | OXYGEN SATURATION: 99 % | BODY MASS INDEX: 24.61 KG/M2 | SYSTOLIC BLOOD PRESSURE: 102 MMHG | HEART RATE: 73 BPM | DIASTOLIC BLOOD PRESSURE: 57 MMHG | TEMPERATURE: 97.5 F | WEIGHT: 143.4 LBS

## 2022-08-15 PROCEDURE — 77386 HC NTSTY MODUL RAD TX DLVR CPLX: CPT | Performed by: RADIOLOGY

## 2022-08-15 PROCEDURE — 77014 PR CT GUIDANCE PLACEMENT RAD THERAPY FIELDS: CPT | Performed by: RADIOLOGY

## 2022-08-15 RX ORDER — OXYBUTYNIN CHLORIDE 10 MG/1
TABLET, EXTENDED RELEASE ORAL
Qty: 90 TABLET | Refills: 3 | Status: SHIPPED | OUTPATIENT
Start: 2022-08-15

## 2022-08-15 NOTE — PROGRESS NOTES
[address] [facility]           Radiation Oncology      [address] [facility]        [address]        [facility] [phone]        F: [phone]       [email or web address]                   Dr. Rome Suazo MD PhD    ON TREATMENT VISIT (OTV) NOTE     Date of Service: 8/15/2022  Patient ID: Jes Kwok   : 1955  MRN: [id number]   Acct Number: [de-identified]     DIAGNOSIS:  Cancer Staging  Anal cancer ([facility] [address]  Staging form: Anus, AJCC 8th Edition  - Clinical: Stage IIA (cT2, cN0, cM0) - Signed by Rome Suazo MD on 2022      Treatment Area: Pelvis- Female    Current Total Dose(cGy): 4400 cGy  Current Fraction: 22    Patient was seen today for weekly visit. Wt Readings from Last 3 Encounters:   08/15/22 143 lb 6.4 oz (65 kg)   22 145 lb 12.8 oz (66.1 kg)   22 144 lb 9.6 oz (65.6 kg)       BP (!) 102/57   Pulse 73   Temp 97.5 °F (36.4 °C)   Resp 18   Wt 143 lb 6.4 oz (65 kg)   LMP 2008   SpO2 99%   BMI 24.61 kg/m²     Lab Results   Component Value Date    WBC 6.4 2021     2021       Comfort Alteration  Fatigue:Fatigue has improved from last week. Pain Location: Perirectal area  Pain Intensity (Current): 10 Worst possible pain  Pain Treatment:  Tylenol  and anusol suppositories BID for internal hemorrhoids  Pain Relief: Pain relieved 50%     Emotional Alteration:   Coping: somewhat effective    Nutritional Alteration  Anorexia: Loss of appetite but able to eat smaller portions of food and/or liquids  Nausea:  1 episode of nausea since last week  Vomiting: No vomiting     Skin Alteration   Skin reaction: Patient states kassidy area especially the kassidy rectal area is somewhat worse  (raw) than last week.  Using Aquaphor, silvadene cream and recticare cream multiple times a day after every BM    Elimination Alterations  Urinary Frequency: Urinating less than once an hour  Urinary Retention: Normal  Urinary Incontinence:  Has some bladder leakage since   at baseline but feels it is somewhat worse since starting RT  Dysuria: None  Nocturia: None  Proctitis: Rectal pain with BM requires medication. Taking anusol suppositories twice daily and Tylenol prn  Diarrhea: Having every other day. Takes 1 imodium after diarrhea and will have a soft stool the next day. Additional Comments: Received IVF twice last week. MEDICATIONS:     Current Outpatient Medications   Medication Sig Dispense Refill    loperamide (IMODIUM) 2 MG capsule Take 2 mg by mouth 4 times daily as needed for Diarrhea      hydrocortisone (ANUSOL-HC) 25 MG suppository Place 1 suppository rectally in the morning and 1 suppository in the evening. For 5 consecutive days. May repeat if necessary. . 20 suppository 1    prednisoLONE acetate (PRED FORTE) 1 % ophthalmic suspension Place 1 drop into both eyes daily      Carboxymethylcellulose Sodium (ARTIFICIAL TEARS OP) Apply 1 drop to eye as needed      Ca Carbonate-Mag Hydroxide (ROLAIDS PO) Take by mouth as needed (heartburn)      polyethylene glycol (GLYCOLAX) 17 GM/SCOOP powder Take 17 g by mouth daily      silver sulfADIAZINE (SILVADENE) 1 % cream Apply topically daily. 400 g 1    docusate sodium (COLACE) 100 MG capsule Take 100 mg by mouth in the morning. capecitabine (XELODA) 500 MG chemo tablet Take 1,500 mg by mouth 2 times daily Monday through Friday with Radiation      ondansetron (ZOFRAN) 4 MG tablet Take 4 mg by mouth every 8 hours as needed for Nausea or Vomiting      Cholecalciferol (VITAMIN D3) 50 MCG (2000 UT) CAPS Take by mouth      topiramate (TOPAMAX) 100 MG tablet TAKE 3 TABLETS DAILY 270 tablet 3    PARoxetine (PAXIL) 20 MG tablet TAKE 1 TABLET DAILY 90 tablet 3    oxyCODONE-acetaminophen (PERCOCET) 5-325 MG per tablet Take 1 tablet by mouth every 4 hours as needed for Pain.  (Patient not taking: No sig reported)      AJOVY 225 MG/1.5ML SOAJ INJECT 1 PEN UNDER THE SKIN 1 TIME EVERY 30 DAYS 1.5 mL 6    CLIMARA 0.1 MG/24HR APPLY 1 PATCH TRANSDERMALLYONTO THE SKIN ONCE A WEEK 12 patch 4    folic acid (FOLVITE) 1 MG tablet Take 1 tablet by mouth daily (Patient not taking: No sig reported) 30 tablet 5    oxybutynin (DITROPAN-XL) 10 MG extended release tablet TAKE 1 TABLET DAILY 90 tablet 3    Rimegepant Sulfate (NURTEC) 75 MG TBDP Take 75 mg by mouth as needed (take at the onset of migraine) 2 samples given  LOT: [id number]  EXP:  2 tablet 0    SUMAtriptan Succinate 3 MG/0.5ML SOAJ Inject into the skin as needed       ZOMIG 5 MG nasal solution once as needed       aspirin 81 MG EC tablet Take 81 mg by mouth daily. No current facility-administered medications for this encounter. * New    PHYSICAL EXAM:       ECO - Symptomatic, <50% in bed during the day (Ambulatory and capable of all self care but unable to carry out any work activities. Up and about more than 50% of waking hours)     General: NAD, AO x 3, Mentation is clear with appropriate affect. HEENT: Normocephalic, atraumatic  Thorax:  Unlabored  Abdomen:  Non-distended  Skin - treatment portal: SEE above. Grade II dermatitis of the skin of the perineal area with focal desquamation    Chemotherapy Update: Concurrent chemotherapy    Treatment Imaging: CBCT    ASSESSMENT: Modest radiation side effects. Responding appropriately to symptomatic management. New medications, diagnostic results: Continue treatment as planned    PLAN: Again reviewed potential side effects of radiation for the patient's treatment. Continue local/topical care. Reinforced skin care with aquaphor, silvadene. Continue PRN imodium. Continue current radiation course as prescribed.

## 2022-08-16 ENCOUNTER — HOSPITAL ENCOUNTER (OUTPATIENT)
Dept: RADIATION ONCOLOGY | Age: 67
Discharge: HOME | End: 2022-08-16
Payer: COMMERCIAL

## 2022-08-16 PROCEDURE — 77014 PR CT GUIDANCE PLACEMENT RAD THERAPY FIELDS: CPT | Performed by: RADIOLOGY

## 2022-08-16 PROCEDURE — 77386 HC NTSTY MODUL RAD TX DLVR CPLX: CPT | Performed by: RADIOLOGY

## 2022-08-16 NOTE — PROGRESS NOTES
F/u with diet education on fidarrhea. Pt states diarrhea is now more controlled and defers any questions on diet. Pt taking Immodium appropriately and states she will be receiving IVF twice this week before her treatment is completed. Assisted pt with scheduling her IVF. Pt encouraged to call with any questions or concerns.

## 2022-08-17 ENCOUNTER — HOSPITAL ENCOUNTER (OUTPATIENT)
Dept: RADIATION ONCOLOGY | Age: 67
Discharge: HOME | End: 2022-08-17
Payer: COMMERCIAL

## 2022-08-17 PROCEDURE — 77386 HC NTSTY MODUL RAD TX DLVR CPLX: CPT | Performed by: RADIOLOGY

## 2022-08-17 PROCEDURE — 77014 PR CT GUIDANCE PLACEMENT RAD THERAPY FIELDS: CPT | Performed by: RADIOLOGY

## 2022-08-18 ENCOUNTER — APPOINTMENT (OUTPATIENT)
Dept: RADIATION ONCOLOGY | Age: 67
End: 2022-08-18
Payer: COMMERCIAL

## 2022-08-18 ENCOUNTER — HOSPITAL ENCOUNTER (OUTPATIENT)
Dept: RADIATION ONCOLOGY | Age: 67
Discharge: HOME | End: 2022-08-18
Payer: COMMERCIAL

## 2022-08-18 PROCEDURE — 77427 RADIATION TX MANAGEMENT X5: CPT | Performed by: RADIOLOGY

## 2022-08-18 PROCEDURE — 77336 RADIATION PHYSICS CONSULT: CPT | Performed by: RADIOLOGY

## 2022-08-18 PROCEDURE — 77014 PR CT GUIDANCE PLACEMENT RAD THERAPY FIELDS: CPT | Performed by: RADIOLOGY

## 2022-08-18 PROCEDURE — 77386 HC NTSTY MODUL RAD TX DLVR CPLX: CPT | Performed by: RADIOLOGY

## 2022-08-19 ENCOUNTER — APPOINTMENT (OUTPATIENT)
Dept: RADIATION ONCOLOGY | Age: 67
End: 2022-08-19
Payer: COMMERCIAL

## 2022-08-19 ENCOUNTER — HOSPITAL ENCOUNTER (OUTPATIENT)
Dept: RADIATION ONCOLOGY | Age: 67
Discharge: HOME | End: 2022-08-19
Payer: COMMERCIAL

## 2022-08-19 PROCEDURE — 77014 PR CT GUIDANCE PLACEMENT RAD THERAPY FIELDS: CPT | Performed by: RADIOLOGY

## 2022-08-19 PROCEDURE — 77386 HC NTSTY MODUL RAD TX DLVR CPLX: CPT | Performed by: RADIOLOGY

## 2022-08-22 ENCOUNTER — HOSPITAL ENCOUNTER (OUTPATIENT)
Dept: RADIATION ONCOLOGY | Age: 67
Discharge: HOME | End: 2022-08-22
Payer: COMMERCIAL

## 2022-08-22 PROCEDURE — 77014 PR CT GUIDANCE PLACEMENT RAD THERAPY FIELDS: CPT | Performed by: RADIOLOGY

## 2022-08-22 PROCEDURE — 77386 HC NTSTY MODUL RAD TX DLVR CPLX: CPT | Performed by: RADIOLOGY

## 2022-08-22 NOTE — PROGRESS NOTES
Discharge instructions provided and reviewed with patient. Questions answered to her satisfaction.   Pt will return to follow up

## 2022-08-22 NOTE — PROGRESS NOTES
17 [facility]           Radiation Oncology      [address] [facility] [address]        [place]: [phone]        F: [phone]       [employer]                   Dr. Manav Leavitt MD PhD    RADIATION TREATMENT SUMMARY NOTE     Date of Service: 2022  Patient ID: Ced Miller   : 1955  MRN: [id number]   Acct Number: [de-identified]       Patient Name:  Ced Miller,  1955,  61 y.o., female       Referring Physician: Geremias Adams MD  [facility] [address] [facility] 2  [facility],  [address] [facility]      PCP: LISA Gentile - CNP       Diagnosis:  Anal cancer [facility])  Staging form: Anus, AJCC 8th Edition  - Clinical: Stage IIA (cT2, cN0, cM0) - Signed by Manav Leavitt MD on 2022       Recent History: This is a 62-year-old female with a history of postmenopausal bleeding status post vaginal hysterectomy/BSO with benign pathology, gastroesophageal reflux disease, renal insufficiency, PFO status post heart surgery, diverticulosis, DDD, and CVA with mild memory loss residually, with more recent diagnosis of 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 She went on to receive definitive chemoradiation therapy with concurrent 5-FU/Mitomycin-C per below:    Site Start [address] [place] [address] [facility] ED Fractions Dose Fx Dose Technique   Anal  Pelvis 22 47  5400 cGy 200 cGy VMAT                 Response/Tolerance: She tolerated therapy well however she did have a flare in her hemorrhoid causing significant irritation. She also noted irritation/dermatitis of the vulva and perineal area from treatment effect. This was all managed with conservative medical therapies. She did have an unplanned break in her treatment due to mechanical issues with our linear accelerator and missed approximately 1 week of treatment early on after having received a few fractions.   She did not require any other treatment breaks beyond

## 2022-08-23 ENCOUNTER — APPOINTMENT (OUTPATIENT)
Dept: RADIATION ONCOLOGY | Age: 67
End: 2022-08-23
Payer: COMMERCIAL

## 2022-08-24 ENCOUNTER — APPOINTMENT (OUTPATIENT)
Dept: RADIATION ONCOLOGY | Age: 67
End: 2022-08-24
Payer: COMMERCIAL

## 2022-09-07 ENCOUNTER — HOSPITAL ENCOUNTER (OUTPATIENT)
Dept: RADIATION ONCOLOGY | Age: 67
Discharge: HOME | End: 2022-09-07
Payer: MEDICARE

## 2022-09-07 VITALS
OXYGEN SATURATION: 99 % | HEART RATE: 73 BPM | BODY MASS INDEX: 24.34 KG/M2 | RESPIRATION RATE: 18 BRPM | TEMPERATURE: 98 F | WEIGHT: 141.8 LBS

## 2022-09-07 DIAGNOSIS — C20 RECTAL CANCER (HCC): Primary | ICD-10-CM

## 2022-09-07 DIAGNOSIS — C21.0 ANAL CANCER (HCC): ICD-10-CM

## 2022-09-07 DIAGNOSIS — K62.89 RECTAL MASS: ICD-10-CM

## 2022-09-07 PROCEDURE — 99212 OFFICE O/P EST SF 10 MIN: CPT | Performed by: RADIOLOGY

## 2022-09-07 NOTE — PROGRESS NOTES
NURSING ASSESSMENT     Date: 9/7/2022        Patient Name: Lashonda Oleary     YOB: 1955      Age:  61 y.o. MRN: [id number]       Chaperone [] Yes   [x] No      Advance Directives:   Do you currently have completed advance directives (living will)? [x] Yes   [] No         *If yes, please bring us a copy for your records. *If no, would you like info or assistance in completing advance directives (living will)? [] Yes   [x] No    Pain Score:   Pain Score (1-10): 5-10 depending on position. Hurts worse with sitting   Pain Location: Rectal area/hemorrhoids   Pain Duration: continuous   Pain Management/Control: Tylenol, gel seat pad      Is pain affecting your ability to take care of yourself or move throughout your home? [] Yes   [x] No Doesn't want to go anywhere because of the pain. General: Fatigue  Patient has gained weight [] Yes   [] No  Patient has lost weight [x] Yes   [] No  How much weight in pounds and over what length of time: Down 1.4 lbs since last visit on 8/15/22    Eyes (Ophthalmic): No Problem     Skin (Dermatological): Uses Aquaphor and silvadene to both groin areas for some peeling. ENT: No Problems     Respiratory: No Problems     Cardiovascular: No Problems      Device   [] Yes   [x] No   Copy of Card Obtained [] Yes   [x] No    Gastrointestinal: Loose stool this past Sunday into Monday, took imodium on Sunday and Monday. No BM since Monday, took colace today. Using Aquaphor and silvadene every time she uses the bathroom. Genito-Urinary: Take ditropan for h/o leaky bladder. Denies any incontinence. Breast: No Problems     Musculoskeletal: No Problems    Neurological: No Problems      Hematological and Lymphatic: No Problems     Endocrine: No Problems     Gyn History: No issues    Last Mammogram: 2021    A 10-point review of systems  has been conducted and pertinent positives have been   recorded.  All other review of systems are negative    Was the patient admitted during the course of treatment OR within 30 days of treatment?  No        Additional Comments:

## 2022-09-10 NOTE — PROGRESS NOTES
17 [facility]           Radiation Oncology      [address] [facility]        [place]: [phone]        F: [phone]       [employer]                   Dr. Jessica Barber MD PhD    FOLLOW-UP NOTE     Date of Service: 2022  Patient ID: Adrienne Garcia   : 1955  MRN: [id number]   Acct Number: [de-identified]       NAME:  Adrienne Garcia    :  1955 61 y.o. female     PCP: LISA Zeng - CNP    REFERRING PROVIDER: Alisa Koroma    DIAGNOSIS:  1. Rectal cancer ([facility] [address]    2. Anal cancer ([facility] [address]    3. Rectal mass        STAGING: Cancer Staging  Anal cancer ([facility] [address]  Staging form: Anus, AJCC 8th Edition  - Clinical: Stage IIA (cT2, cN0, cM0) - Signed by Jessica aBrber MD on 2022      PRIOR TREATMENT: 5400 cGy in 27 fractions to gross disease in anal/distal rectal area    TIME SINCE TREATMENT:  2 weeks    RECENT HISTORY: Adrienne Garcia returns for follow up status post completion of definitive chemoradiation therapy for the above diagnosis. She returns for quick check approximately 2 weeks after completing chemoradiation therapy. She notes that the skin of the perirectal/perineal area and bilateral inguinal site is largely healed with only some dry desquamation. She is continue to use Aquaphor and Silvadene to these areas. She does note that her external hemorrhoid is the most problematic thing at this time and is quite painful. She notes pain daily with sitting and with straining bowel movements. She does have some occasional loose stools that alternates between diarrhea and constipation. She is taking Tylenol for her rectal pain with some relief. She is also using a hemorrhoid cream daily. Past medical, surgical, social and family histories reviewed and updated as indicated.     ALLERGIES:  Erythromycin, Seasonal, Sporanox [itraconazole], and Macrobid [nitrofurantoin macrocrystal]       MEDICATIONS:   Current Outpatient Medications: oxybutynin (DITROPAN-XL) 10 MG extended release tablet, TAKE 1 TABLET DAILY, Disp: 90 tablet, Rfl: 3    loperamide (IMODIUM) 2 MG capsule, Take 2 mg by mouth 4 times daily as needed for Diarrhea, Disp: , Rfl:     hydrocortisone (ANUSOL-HC) 25 MG suppository, Place 1 suppository rectally in the morning and 1 suppository in the evening. For 5 consecutive days. May repeat if necessary. . (Patient not taking: Reported on 9/7/2022), Disp: 20 suppository, Rfl: 1    prednisoLONE acetate (PRED FORTE) 1 % ophthalmic suspension, Place 1 drop into both eyes daily, Disp: , Rfl:     Carboxymethylcellulose Sodium (ARTIFICIAL TEARS OP), Apply 1 drop to eye as needed, Disp: , Rfl:     Ca Carbonate-Mag Hydroxide (ROLAIDS PO), Take by mouth as needed (heartburn), Disp: , Rfl:     polyethylene glycol (GLYCOLAX) 17 GM/SCOOP powder, Take 17 g by mouth daily, Disp: , Rfl:     silver sulfADIAZINE (SILVADENE) 1 % cream, Apply topically daily. , Disp: 400 g, Rfl: 1    docusate sodium (COLACE) 100 MG capsule, Take 100 mg by mouth in the morning., Disp: , Rfl:     ondansetron (ZOFRAN) 4 MG tablet, Take 4 mg by mouth every 8 hours as needed for Nausea or Vomiting, Disp: , Rfl:     Cholecalciferol (VITAMIN D3) 50 MCG (2000 UT) CAPS, Take by mouth, Disp: , Rfl:     topiramate (TOPAMAX) 100 MG tablet, TAKE 3 TABLETS DAILY, Disp: 270 tablet, Rfl: 3    PARoxetine (PAXIL) 20 MG tablet, TAKE 1 TABLET DAILY, Disp: 90 tablet, Rfl: 3    oxyCODONE-acetaminophen (PERCOCET) 5-325 MG per tablet, Take 1 tablet by mouth every 4 hours as needed for Pain.  (Patient not taking: No sig reported), Disp: , Rfl:     AJOVY 225 MG/1.5ML SOAJ, INJECT 1 PEN UNDER THE SKIN 1 TIME EVERY 30 DAYS, Disp: 1.5 mL, Rfl: 6    CLIMARA 0.1 MG/24HR, APPLY 1 PATCH TRANSDERMALLYONTO THE SKIN ONCE A WEEK, Disp: 12 patch, Rfl: 4    folic acid (FOLVITE) 1 MG tablet, Take 1 tablet by mouth daily (Patient not taking: No sig reported), Disp: 30 tablet, Rfl: 5    Rimegepant Sulfate (NURTEC) 75 MG TBDP, Take 75 mg by mouth as needed (take at the onset of migraine) 2 samples given LOT: [id number] EXP: 11/21, Disp: 2 tablet, Rfl: 0    SUMAtriptan Succinate 3 MG/0.5ML SOAJ, Inject into the skin as needed , Disp: , Rfl:     ZOMIG 5 MG nasal solution, once as needed , Disp: , Rfl:     aspirin 81 MG EC tablet, Take 81 mg by mouth daily. , Disp: , Rfl:     Review of Systems   All other systems reviewed and are negative. PHYSICAL EXAMINATION:      Vitals:    09/07/22 1239   Pulse: 73   Resp: 18   Temp: 98 °F (36.7 °C)   SpO2: 99%   Weight: 141 lb 12.8 oz (64.3 kg)       Wt Readings from Last 3 Encounters:   09/07/22 141 lb 12.8 oz (64.3 kg)   08/15/22 143 lb 6.4 oz (65 kg)   08/04/22 145 lb 12.8 oz (66.1 kg)       ECOG PERFORMANCE STATUS:  1    Physical Exam  Vitals and nursing note reviewed. Constitutional:       Appearance: Normal appearance. Comments: She is present unaccompanied. HENT:      Head: Normocephalic. Eyes:      General: No scleral icterus. Extraocular Movements: Extraocular movements intact. Conjunctiva/sclera: Conjunctivae normal.   Cardiovascular:      Heart sounds: Normal heart sounds. Pulmonary:      Breath sounds: Normal breath sounds. Abdominal:      Palpations: Abdomen is soft. Genitourinary:     Comments: Dry desquamation in the groin and perirectal area. There is a large hemorrhoid that is painful to touch the external anal region. There are otherwise no areas of active ulceration or moist desquamation. Musculoskeletal:         General: No swelling. Normal range of motion. Cervical back: Normal range of motion and neck supple. No rigidity or tenderness. Right lower leg: No edema. Lymphadenopathy:      Cervical: No cervical adenopathy. Skin:     General: Skin is warm and dry. Neurological:      General: No focal deficit present. Mental Status: She is alert and oriented to person, place, and time.    Psychiatric:         Mood and Affect: Mood normal.         Behavior: Behavior normal.        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 She returns for quick check approximately 2 weeks after completing radiation therapy. She notes the majority of her sequelae of acute radiation toxicity have largely resolved which I relayed is quite reassuring. I did recommend she continue applying Silvadene and Aquaphor to areas of dry desquamation in the groin and perirectal area. I will make a referral for her to see GI again for consideration of hemorrhoid removal as this is her most significant complaint at this time. I will also defer to their recommendations regarding rigid proctoscopy/anoscopy to evaluate disease response. I will get a restaging PET/CT scan in the next month and have her return shortly thereafter for a follow-up. The patient is amenable to this. All questions were answered.     ORDERS:  referral to GI; PET/CT scan; palliative care referral    FOLLOW-UP: return for follow-up on 12/6/22    Elisa Hill MD PhD  Radiation Oncologist, [address] [place] [address]

## 2022-09-20 ENCOUNTER — OFFICE VISIT (OUTPATIENT)
Dept: SURGERY | Age: 67
End: 2022-09-20
Payer: MEDICARE

## 2022-09-20 VITALS
BODY MASS INDEX: 24.07 KG/M2 | HEART RATE: 74 BPM | HEIGHT: 64 IN | TEMPERATURE: 97.4 F | WEIGHT: 141 LBS | OXYGEN SATURATION: 100 %

## 2022-09-20 DIAGNOSIS — C21.0 ANAL CARCINOMA (HCC): Primary | ICD-10-CM

## 2022-09-20 PROCEDURE — 46600 DIAGNOSTIC ANOSCOPY SPX: CPT | Performed by: COLON & RECTAL SURGERY

## 2022-09-20 PROCEDURE — G8427 DOCREV CUR MEDS BY ELIG CLIN: HCPCS | Performed by: COLON & RECTAL SURGERY

## 2022-09-20 PROCEDURE — 99213 OFFICE O/P EST LOW 20 MIN: CPT | Performed by: COLON & RECTAL SURGERY

## 2022-09-20 PROCEDURE — G8420 CALC BMI NORM PARAMETERS: HCPCS | Performed by: COLON & RECTAL SURGERY

## 2022-09-20 PROCEDURE — 3017F COLORECTAL CA SCREEN DOC REV: CPT | Performed by: COLON & RECTAL SURGERY

## 2022-09-20 PROCEDURE — 1036F TOBACCO NON-USER: CPT | Performed by: COLON & RECTAL SURGERY

## 2022-09-20 PROCEDURE — G8400 PT W/DXA NO RESULTS DOC: HCPCS | Performed by: COLON & RECTAL SURGERY

## 2022-09-20 PROCEDURE — 1090F PRES/ABSN URINE INCON ASSESS: CPT | Performed by: COLON & RECTAL SURGERY

## 2022-09-20 PROCEDURE — 1123F ACP DISCUSS/DSCN MKR DOCD: CPT | Performed by: COLON & RECTAL SURGERY

## 2022-09-20 ASSESSMENT — ENCOUNTER SYMPTOMS
SHORTNESS OF BREATH: 0
CONSTIPATION: 0
ABDOMINAL PAIN: 0
CHEST TIGHTNESS: 0
RECTAL PAIN: 1
ANAL BLEEDING: 0

## 2022-09-20 NOTE — PROGRESS NOTES
Subjective:      Patient ID: Rianna Mays is a 74 y.o. female who presents for:  Chief Complaint   Patient presents with    Hemorrhoids       This is a 56-year-old female who returns to the office following chemoradiation therapy for anal carcinoma. She completed treatment about 3 weeks ago. She is doing well. She complains of some discomfort around her anal canal still but it is improving. She has had some hemorrhoid tags which are resolving as well. She denies rectal bleeding. Bowel habits are normal.  Minimal discomfort on defecation.       Past Medical History:   Diagnosis Date    Allergic reaction     Arthritis     Cancer (HCC)     Cervical, dx by Dr. Carley Rankin    Cerebral artery occlusion with cerebral infarction [facility])     at age 52 / sx as a migraine with slurred speech & visual disturbance    Diverticulitis     GERD (gastroesophageal reflux disease)     History of conization of cervix     Migraine     Rectal cancer ([facility] [address]     Renal insufficiency     Tinea unguium      Past Surgical History:   Procedure Laterality Date    CARDIAC SURGERY      PFO surgery in age 48s    COLONOSCOPY  2018    COLONOSCOPY N/A 5/31/2022    COLONOSCOPY w/biopsies performed by Steve Marie MD at [address]  2017    HYSTERECTOMY VAGINAL N/A 12/19/2018    [address] WITH BSO performed by Adriane Crao MD at [address] [facility] FLX DX W/COLLJ [address] [facility] [address] WHEN PFRMD N/A 4/19/2018    COLONOSCOPY performed by Steve Marie MD at [address] [facility] W/ADJ VAG,W/LOOP BX N/A 9/26/2018    LEEP performed by Gabe Choe MD at [address] [facility] N/A 9/26/2018    DILATATION AND CURETTAGE HYSTEROSCOPY performed by Gabe Choe MD at [address] [facility] 2000    [facility]     Social History     Socioeconomic History    Marital status:      Spouse name: Not on file    Number of children: Not on file    Years of education: Not on file Highest education level: Not on file   Occupational History    Not on file   Tobacco Use    Smoking status: Never    Smokeless tobacco: Never   Vaping Use    Vaping Use: Never used   Substance and Sexual Activity    Alcohol use: Yes     Comment: socially    Drug use: Yes     Types: Marijuana Eliu Blow)    Sexual activity: Yes   Other Topics Concern    Not on file   Social History Narrative    Not on file     Social Determinants of Health     Financial Resource Strain: Not on file   Food Insecurity: Not on file   Transportation Needs: Not on file   Physical Activity: Not on file   Stress: Not on file   Social Connections: Not on file   Intimate Partner Violence: Not on file   Housing Stability: Not on file     Family History   Problem Relation Age of Onset    Cancer Father         skin ear and head    High Blood Pressure Father     Cancer Brother         gall bladder cancer    Other Mother         dementia    High Blood Pressure Mother     Other Sister         gout    Diabetes Brother     Cancer Brother         head    No Known Problems Son     No Known Problems Daughter     Cancer Paternal Uncle         skin    Other Sister         covid    Colon Cancer Neg Hx      Allergies:  Erythromycin, Seasonal, Sporanox [itraconazole], and Macrobid [nitrofurantoin macrocrystal]    Review of Systems   Constitutional:  Negative for activity change, fatigue, fever and unexpected weight change. HENT:  Negative for congestion. Respiratory:  Negative for chest tightness and shortness of breath. Cardiovascular:  Negative for chest pain. Gastrointestinal:  Positive for rectal pain. Negative for abdominal pain, anal bleeding and constipation. Genitourinary:  Negative for difficulty urinating. Musculoskeletal:  Negative for arthralgias. Neurological:  Negative for dizziness and headaches. Hematological:  Does not bruise/bleed easily. Psychiatric/Behavioral:  Negative for agitation.       Objective:    Pulse 74   Temp 97.4 °F (36.3 °C) (Temporal)   Ht 5' 4\" (1.626 m)   Wt 141 lb (64 kg)   LMP 09/19/2008   SpO2 100%   BMI 24.20 kg/m²     Physical Exam  Constitutional:       Appearance: She is well-developed. HENT:      Head: Normocephalic and atraumatic. Eyes:      Pupils: Pupils are equal, round, and reactive to light. Cardiovascular:      Rate and Rhythm: Normal rate and regular rhythm. Heart sounds: Normal heart sounds. Pulmonary:      Effort: Pulmonary effort is normal. No respiratory distress. Breath sounds: Normal breath sounds. No wheezing. Abdominal:      General: There is no distension. Palpations: There is no mass. Tenderness: There is no abdominal tenderness. There is no guarding or rebound. Hernia: No hernia is present. Genitourinary:     Comments: On anal inspection 2 hemorrhoid tags nonthrombosed seen posterior and anterior. No signs of infection. Anoscopy performed. The tumor has regressed and is no longer visible. No palpable tumor present on digital exam.    Grade 2 internal hemorrhoids identified. No other abnormality seen on anoscopy. Musculoskeletal:         General: Normal range of motion. Cervical back: Normal range of motion and neck supple. Skin:     General: Skin is warm and dry. Coloration: Skin is not pale. Findings: No erythema or rash. Neurological:      Mental Status: She is alert and oriented to person, place, and time. Psychiatric:         Behavior: Behavior normal.         Judgment: Judgment normal.            Assessment/Plan:          Diagnosis Orders   1. Anal carcinoma ([address]          Excellent clinical response to chemoradiation therapy    NAGA    Patient will return back in 3 months for reevaluation with a anoscopy. All questions answered.             Please note this report has beenpartially produced using speech recognition software and may cause contain errors related to that system including grammar, punctuation and spelling as well as words and phrases that may seem inappropriate.  If there arequestions or concerns please feel free to contact me to clarify

## 2022-10-03 RX ORDER — FREMANEZUMAB-VFRM 225 MG/1.5ML
INJECTION SUBCUTANEOUS
Qty: 1.5 ML | Refills: 6 | Status: SHIPPED | OUTPATIENT
Start: 2022-10-03

## 2022-10-03 NOTE — TELEPHONE ENCOUNTER
Medication is going to be over $600 for [facility] try to get help for patient.

## 2022-10-07 ENCOUNTER — OFFICE VISIT (OUTPATIENT)
Dept: PALLATIVE CARE | Age: 67
End: 2022-10-07
Payer: MEDICARE

## 2022-10-07 VITALS
SYSTOLIC BLOOD PRESSURE: 84 MMHG | BODY MASS INDEX: 24.2 KG/M2 | TEMPERATURE: 97 F | OXYGEN SATURATION: 99 % | WEIGHT: 141 LBS | HEART RATE: 77 BPM | DIASTOLIC BLOOD PRESSURE: 57 MMHG

## 2022-10-07 DIAGNOSIS — K52.0 RADIATION ENTERITIS: ICD-10-CM

## 2022-10-07 DIAGNOSIS — Z51.5 PALLIATIVE CARE ENCOUNTER: ICD-10-CM

## 2022-10-07 DIAGNOSIS — C21.0 ANAL CANCER (HCC): ICD-10-CM

## 2022-10-07 DIAGNOSIS — R19.7 DIARRHEA, UNSPECIFIED TYPE: Primary | ICD-10-CM

## 2022-10-07 PROCEDURE — G8427 DOCREV CUR MEDS BY ELIG CLIN: HCPCS | Performed by: NURSE PRACTITIONER

## 2022-10-07 PROCEDURE — 1036F TOBACCO NON-USER: CPT | Performed by: NURSE PRACTITIONER

## 2022-10-07 PROCEDURE — G8400 PT W/DXA NO RESULTS DOC: HCPCS | Performed by: NURSE PRACTITIONER

## 2022-10-07 PROCEDURE — G8484 FLU IMMUNIZE NO ADMIN: HCPCS | Performed by: NURSE PRACTITIONER

## 2022-10-07 PROCEDURE — 3017F COLORECTAL CA SCREEN DOC REV: CPT | Performed by: NURSE PRACTITIONER

## 2022-10-07 PROCEDURE — G8420 CALC BMI NORM PARAMETERS: HCPCS | Performed by: NURSE PRACTITIONER

## 2022-10-07 PROCEDURE — 1123F ACP DISCUSS/DSCN MKR DOCD: CPT | Performed by: NURSE PRACTITIONER

## 2022-10-07 PROCEDURE — 1090F PRES/ABSN URINE INCON ASSESS: CPT | Performed by: NURSE PRACTITIONER

## 2022-10-07 PROCEDURE — 99204 OFFICE O/P NEW MOD 45 MIN: CPT | Performed by: NURSE PRACTITIONER

## 2022-10-07 NOTE — PROGRESS NOTES
Subjective:      Patient Id: Seen Jodie Bray at the clinic at the  [facility] , for initial palliative medicine consult for symptom management, advance care planning, and goals of care discussion. She was accompanied to the appointment by: self. Chief Complaint   Patient presents with    Diarrhea      HPI       Eula Zuluaga is a 74 y.o. female referred to palliative care by Dr. Armando Nolasco for rectal cancer. Jodie Bray has complex medical history that includes arthritis, CVA/TIA, migraines, renal insufficiency, GERD, diverticulitis. General: Patient is alert and oriented x 4 in NAD. Functional status: Independent of all ADLs. No assistive devices. Steady gait. Rectal cancer: Per notes, this is locally advanced cancer. Following with Dr. Rony Akins. Completed chemotherapy with Mitomycin and xeloda. Completed radiation therapy. Per general surgery, excellent response to treatment and the tumor has regressed and is no longer visible. Radiation induced dermatitis/mucositis: Using silvadene and recticare ointment as needed. No bleeding just some irritation. Diarrhea after radiation therapy: Having intermittent loose stools. In the morning stools are regular. By early afternoon, she reports she cannot get to the bathroom fast enough. Diarrhea has been going on about a week. Was previously experiencing this when she was still on radiation. No painful BMs, abdominal pain, or fevers. No nausea. Weight/appetite: Lost weight with chemo radiation. Was about 145 lbs and now 131 per her scale. Weight has stabilized. Other: Reports one episode of dizziness, lightheadedness, and hot flash. Trying to keep up with hydration.        Past Medical History:   Diagnosis Date    Allergic reaction     Arthritis     Cancer ([facility] [address]     Cervical, dx by Dr. Michael Carey    Cerebral artery occlusion with cerebral infarction [facility])     at age 52 / sx as a migraine with slurred speech & visual disturbance    Diverticulitis     GERD (gastroesophageal reflux disease)     History of conization of cervix     Migraine     Rectal cancer (HCC)     Renal insufficiency     Tinea unguium      Past Surgical History:   Procedure Laterality Date    CARDIAC SURGERY      PFO surgery in age 48s    COLONOSCOPY  2018    COLONOSCOPY N/A 5/31/2022    COLONOSCOPY w/biopsies performed by Carmen Santamaria MD at [address] [address] [facility]  [facility]    HYSTERECTOMY VAGINAL N/A 12/19/2018    [address] WITH BSO performed by James Botello MD at [address] FLX DX W/COLLJ [address] WHEN PFRMD N/A 4/19/2018    COLONOSCOPY performed by Carmen Santamaria MD at [address] W/ADJ VAG,W/LOOP BX N/A 9/26/2018    LEEP performed by Ken Park MD at [address] [facility] N/A 9/26/2018    DILATATION AND CURETTAGE HYSTEROSCOPY performed by Ken Park MD at [address]     Social History     Socioeconomic History    Marital status:      Spouse name: Not on file    Number of children: Not on file    Years of education: Not on file    Highest education level: Not on file   Occupational History    Not on file   Tobacco Use    Smoking status: Never    Smokeless tobacco: Never   Vaping Use    Vaping Use: Never used   Substance and Sexual Activity    Alcohol use: Yes     Comment: socially    Drug use: Yes     Types: Marijuana Burnell Goldberg)    Sexual activity: Yes   Other Topics Concern    Not on file   Social History Narrative    Not on file     Social Determinants of Health     Financial Resource Strain: Not on file   Food Insecurity: Not on file   Transportation Needs: Not on file   Physical Activity: Not on file   Stress: Not on file   Social Connections: Not on file   Intimate Partner Violence: Not on file   Housing Stability: Not on file     Family History   Problem Relation Age of Onset    Cancer Father         skin ear and head    High Blood Pressure Father     Cancer Brother         gall bladder cancer    Other Mother         dementia    High Blood Pressure Mother     Other Sister         gout    Diabetes Brother     Cancer Brother         head    No Known Problems Son     No Known Problems Daughter     Cancer Paternal Uncle         skin    Other Sister         covid    Colon Cancer Neg Hx      Allergies   Allergen Reactions    Erythromycin Hives    Seasonal Other (See Comments)     Pollen causes sinus sx    Sporanox [Itraconazole] Hives    Macrobid [Nitrofurantoin Macrocrystal] Other (See Comments)     Migraine, vomiting, fever/chills     Current Outpatient Medications on File Prior to Visit   Medication Sig Dispense Refill    Fremanezumab-vfrm (AJOVY) 225 MG/1.5ML SOAJ INJECT 1 PEN UNDER THE SKIN 1 TIME EVERY 30 DAYS 1.5 mL 6    oxybutynin (DITROPAN-XL) 10 MG extended release tablet TAKE 1 TABLET DAILY 90 tablet 3    loperamide (IMODIUM) 2 MG capsule Take 2 mg by mouth 4 times daily as needed for Diarrhea      Carboxymethylcellulose Sodium (ARTIFICIAL TEARS OP) Apply 1 drop to eye as needed      Ca Carbonate-Mag Hydroxide (ROLAIDS PO) Take by mouth as needed (heartburn)      silver sulfADIAZINE (SILVADENE) 1 % cream Apply topically daily. 400 g 1    Cholecalciferol (VITAMIN D3) 50 MCG (2000 UT) CAPS Take by mouth daily      topiramate (TOPAMAX) 100 MG tablet TAKE 3 TABLETS DAILY 270 tablet 3    PARoxetine (PAXIL) 20 MG tablet TAKE 1 TABLET DAILY 90 tablet 3    Rimegepant Sulfate (NURTEC) 75 MG TBDP Take 75 mg by mouth as needed (take at the onset of migraine) 2 samples given  LOT: [id number]  EXP: 11/21 2 tablet 0    SUMAtriptan Succinate 3 MG/0.5ML SOAJ Inject into the skin as needed       ZOMIG 5 MG nasal solution once as needed       aspirin 81 MG EC tablet Take 81 mg by mouth daily.         prednisoLONE acetate (PRED FORTE) 1 % ophthalmic suspension Place 1 drop into both eyes daily (Patient not taking: Reported on 10/7/2022)      CLIMARA 0.1 MG/24HR APPLY 1 PATCH TRANSDERMALLYONTO THE SKIN ONCE A WEEK (Patient not taking: Reported on 10/7/2022) 12 patch 4    folic acid (FOLVITE) 1 MG tablet Take 1 tablet by mouth daily (Patient not taking: Reported on 10/7/2022) 30 tablet 5     No current facility-administered medications on file prior to visit. Review of Systems   Constitutional:  Positive for unexpected weight change (now stable). Negative for activity change. HENT:  Negative for trouble swallowing and voice change. Respiratory:  Negative for cough, shortness of breath and wheezing. Cardiovascular:  Negative for chest pain, palpitations and leg swelling. Gastrointestinal:  Positive for diarrhea. Negative for abdominal distention, abdominal pain, anal bleeding, blood in stool, constipation and nausea. Genitourinary: Negative. Musculoskeletal: Negative. Skin:  Negative for color change. Some perianal irritation. Neurological:  Positive for dizziness (one episode) and light-headedness (one episode). Hematological: Negative. Psychiatric/Behavioral: Negative. Objective:   BP (!) 84/57   Pulse 77   Temp 97 °F (36.1 °C)   Wt 141 lb (64 kg)   LMP 09/19/2008   SpO2 99%   BMI 24.20 kg/m²    Wt Readings from Last 3 Encounters:   10/07/22 141 lb (64 kg)   09/20/22 141 lb (64 kg)   09/07/22 141 lb 12.8 oz (64.3 kg)     Physical Exam  Constitutional:       General: She is not in acute distress. HENT:      Head: Normocephalic and atraumatic. Nose: No rhinorrhea. Eyes:      General: No scleral icterus. Right eye: No discharge. Left eye: No discharge. Extraocular Movements: Extraocular movements intact. Conjunctiva/sclera: Conjunctivae normal.   Cardiovascular:      Rate and Rhythm: Normal rate and regular rhythm. Pulmonary:      Effort: Pulmonary effort is normal.      Breath sounds: Normal breath sounds. Abdominal:      General: Bowel sounds are normal. There is no distension.       Palpations: Abdomen is soft. Tenderness: There is no abdominal tenderness. Musculoskeletal:      Cervical back: Normal range of motion. Right lower leg: No edema. Left lower leg: No edema. Skin:     General: Skin is warm and dry. Neurological:      General: No focal deficit present. Mental Status: She is alert and oriented to person, place, and time. Psychiatric:         Mood and Affect: Mood normal.         Behavior: Behavior normal.         Thought Content: Thought content normal.         Judgment: Judgment normal.       Assessment and Plan:      1. Radiation enteritis  2. Diarrhea, unspecified type  Start metamucil fiber QOD. Call in about a week to let us know how this is helping. 3. Anal cancer ([address]  Continue to follow with hem/onc. 4. Palliative care encounter  Explained the role of palliative care in treating patient. Discussed symptom management related to chronic disease/condition. Provided emotional support and active listening. Will likely only need a couple visits with palliative care, then plan is to discharge from our services. Symptoms are well controlled except for loose stools. Patient understands and is agreeable to current plan. - Advance Care Planning   We discussed Living Will (LW), and [facility]) as well as their activation. Patient choice discussed. LW/HCPOA in place Yes; Tasked  for assistance with completing paperwork No; Code status discussed Yes; signed No. Code Full code. All related questions were answered completely. - Goals of Care Discussion:  Disease process and goals of treatment were discussed in basic terms. Tina's goal is to optimize available comfort care measures and continue with full treatment of current medical conditions. We discussed the palliative care philosophy in light of those goals. We discussed all care options contingent on treatment response and QOL.  Much active listening, presence, and emotional support were given. Opioid contract signed:No    Due to acuity, symptomatology and high-risk medication management, I advised patient to Return in about 1 month (around 11/7/2022). Thanks for the opportunity you have allowed us to provide palliative care to [facility]. Total Time 55 mins (Adv. Care planning 3 mins)     Total time includes reviewing other provider notes, reviewing labs and tests, reviewing history, medications, and allergies, counseling patient, performing medically appropriate evaluation and examination, giving orders, and documenting in the medical record.      Clearance LISA Guzmán - [place]    Collaborating physician: Dr. Wendy Bolton

## 2022-10-10 ENCOUNTER — TELEPHONE (OUTPATIENT)
Dept: PALLATIVE CARE | Age: 67
End: 2022-10-10

## 2022-10-10 DIAGNOSIS — R12 HEARTBURN: ICD-10-CM

## 2022-10-10 DIAGNOSIS — R19.7 DIARRHEA, UNSPECIFIED TYPE: Primary | ICD-10-CM

## 2022-10-10 DIAGNOSIS — K52.0 RADIATION ENTERITIS: ICD-10-CM

## 2022-10-10 PROBLEM — Z51.5 PALLIATIVE CARE ENCOUNTER: Status: ACTIVE | Noted: 2022-10-10

## 2022-10-10 RX ORDER — PANTOPRAZOLE SODIUM 40 MG/1
40 TABLET, DELAYED RELEASE ORAL
Qty: 30 TABLET | Refills: 0 | Status: SHIPPED | OUTPATIENT
Start: 2022-10-10 | End: 2022-11-04 | Stop reason: ALTCHOICE

## 2022-10-10 RX ORDER — CHOLESTYRAMINE 4 G/9G
1 POWDER, FOR SUSPENSION ORAL DAILY
Qty: 30 PACKET | Refills: 0 | Status: SHIPPED | OUTPATIENT
Start: 2022-10-10 | End: 2022-11-04 | Stop reason: ALTCHOICE

## 2022-10-10 ASSESSMENT — ENCOUNTER SYMPTOMS
COUGH: 0
COLOR CHANGE: 0
WHEEZING: 0
NAUSEA: 0
CONSTIPATION: 0
SHORTNESS OF BREATH: 0
BLOOD IN STOOL: 0
ABDOMINAL DISTENTION: 0
VOICE CHANGE: 0
ANAL BLEEDING: 0
ABDOMINAL PAIN: 0
DIARRHEA: 1
TROUBLE SWALLOWING: 0

## 2022-10-10 NOTE — TELEPHONE ENCOUNTER
Have patient start questran 4 gms 1 packet po daily. We can increase this dose gradually. Have her call back in one week to let us know how this helps or sooner if she has side effects. Will also start Protonix 40 mg po daily. toothache

## 2022-10-10 NOTE — TELEPHONE ENCOUNTER
Salena Nayak called in wanting to discuss her gi issues further with you. She said in the morning she is \"fine\" and by the afternoon her stools are \"soft/loose\" and has \"diarrhea\". You instructed her to use metamucil QOD although she said it is causing her to have gas and is making her loose stools \"worse\". She reports that she has used this in the past and it caused her to have so many accidents she needed to wear depends. She is going to stop taking it thinking it will only make her symptoms worse as it did in the past. She is currently afraid to leave the house because she doesn't want to have an accident. She explains that for a few weeks now she has \"burning\" in her \"whole stomach\". She said that it feels like heart burn but in her stomach. No nausea/vomiting/indigestion. Reports that her and her spouse have been on the keto diet and that they have recently starting adding potatoes, rice, other carbs since they are both down to goal weight.    -recap: not using metamucil, wants something to help with her diarrhea and frequent accidents in the afternoon. Needs help with the burning sensation in her stomach.

## 2022-10-24 ENCOUNTER — TELEPHONE (OUTPATIENT)
Dept: PALLATIVE CARE | Age: 67
End: 2022-10-24

## 2022-10-24 NOTE — TELEPHONE ENCOUNTER
Grace Galvan called in to explain she feels the Protonix is making her somach \"hurt\", \"bad pains\", and \"burns\". She said it was \"OK\" at first although by noon time she is having the bad stomach issues. She reports that she is weaning herself off the med. I asked what she has been doing to wean herself, she said that she is taking it every couple of days, and asked if she is able to just stop taking it? I asked if her acid reflux/heart burn was better while taking it and she said she couldn't tell. She wants off Protonix, is asking if there is something else you could prescribe to help her? I asked about her previous issue with diarrhea, this is now resolved.

## 2022-10-27 ENCOUNTER — HOSPITAL ENCOUNTER (OUTPATIENT)
Dept: CT IMAGING | Age: 67
Discharge: HOME | End: 2022-10-29
Payer: MEDICARE

## 2022-10-27 DIAGNOSIS — C20 RECTAL CANCER (HCC): ICD-10-CM

## 2022-10-27 PROCEDURE — 78815 PET IMAGE W/CT SKULL-THIGH: CPT

## 2022-10-27 PROCEDURE — 3430000000 HC RX DIAGNOSTIC RADIOPHARMACEUTICAL: Performed by: RADIOLOGY

## 2022-10-27 PROCEDURE — A9552 F18 FDG: HCPCS | Performed by: RADIOLOGY

## 2022-10-27 RX ORDER — FLUDEOXYGLUCOSE F 18 200 MCI/ML
15 INJECTION, SOLUTION INTRAVENOUS
Status: COMPLETED | OUTPATIENT
Start: 2022-10-27 | End: 2022-10-27

## 2022-10-27 RX ADMIN — FLUDEOXYGLUCOSE F 18 15 MILLICURIE: 200 INJECTION, SOLUTION INTRAVENOUS at 11:44

## 2022-11-04 ENCOUNTER — OFFICE VISIT (OUTPATIENT)
Dept: PALLATIVE CARE | Age: 67
End: 2022-11-04
Payer: MEDICARE

## 2022-11-04 VITALS
WEIGHT: 142 LBS | TEMPERATURE: 98.4 F | HEART RATE: 68 BPM | DIASTOLIC BLOOD PRESSURE: 65 MMHG | SYSTOLIC BLOOD PRESSURE: 104 MMHG | BODY MASS INDEX: 24.37 KG/M2 | OXYGEN SATURATION: 98 %

## 2022-11-04 DIAGNOSIS — C21.0 ANAL CANCER (HCC): Primary | ICD-10-CM

## 2022-11-04 PROCEDURE — G8484 FLU IMMUNIZE NO ADMIN: HCPCS

## 2022-11-04 PROCEDURE — 1036F TOBACCO NON-USER: CPT

## 2022-11-04 PROCEDURE — G8400 PT W/DXA NO RESULTS DOC: HCPCS

## 2022-11-04 PROCEDURE — 1090F PRES/ABSN URINE INCON ASSESS: CPT

## 2022-11-04 PROCEDURE — 1123F ACP DISCUSS/DSCN MKR DOCD: CPT

## 2022-11-04 PROCEDURE — G8427 DOCREV CUR MEDS BY ELIG CLIN: HCPCS

## 2022-11-04 PROCEDURE — 99214 OFFICE O/P EST MOD 30 MIN: CPT

## 2022-11-04 PROCEDURE — 3017F COLORECTAL CA SCREEN DOC REV: CPT

## 2022-11-04 PROCEDURE — G8420 CALC BMI NORM PARAMETERS: HCPCS

## 2022-11-04 ASSESSMENT — ENCOUNTER SYMPTOMS
CONSTIPATION: 0
ANAL BLEEDING: 0
ABDOMINAL PAIN: 0
TROUBLE SWALLOWING: 0
NAUSEA: 0
SHORTNESS OF BREATH: 0
EYE ITCHING: 0
COUGH: 0
VOICE CHANGE: 0
VOMITING: 0
WHEEZING: 0
DIARRHEA: 0
BLOOD IN STOOL: 0
COLOR CHANGE: 0
ABDOMINAL DISTENTION: 0

## 2022-11-04 NOTE — PROGRESS NOTES
Subjective:      Patient Id:  Jovani Lackey was seen at the clinic at the  [facility] , for follow up for symptom management, advance care planning, and goals of care discussion. She was accompanied to the appointment by: self. No chief complaint on file. LOLITA Mcleod is a 74 y.o. female referred to palliative care by Dr. Chey Iraheta for rectal cancer. Jovani Lackey has complex medical history that includes arthritis, CVA/TIA, migraines, renal insufficiency, GERD, diverticulitis. Jovani Lackey is doing well. General: Patient is alert and oriented x 4 in NAD. Functional status: Independent of all ADLs. No assistive devices. Steady gait. Rectal cancer: Per notes, this is locally advanced cancer. Following with Dr. Teressa Dorantes. Completed chemotherapy with Mitomycin and xeloda. Completed radiation therapy. Per general surgery, excellent response to treatment and the tumor has regressed and is no longer visible. Radiation induced dermatitis/mucositis: Resolved. She is still dealing with hemorrhoids. No bleeding or pain, just itching. She uses silvadene and she said that helps. Diarrhea has resolved. She has formed, normal bm's daily. No painful BMs, abdominal pain, or fevers. No nausea. Her weight is stable, no nausea or vomiting. She eats a keto based diet (starting nearly 2 years ago). She does have an intermittent epigastric pain which she describes as stabbing. This started in the past 1-2 months. She said that it does not feel like heartburn and that an episode lasts maybe 1 minute in duration. She said that zegerid helps. She is worried about a possible ulcer. Deferred her to Dr. Keenan Marley for f/u. She reports no pain and she takes no pain medication. Informed her that she is doing well and at this time, no further visits are needed as palliative care is not extended beyond the cancer remission unless there is a need for taping of medications which does not apply to her.  Advised her to call with questions or concerns.        Past Medical History:   Diagnosis Date    Allergic reaction     Arthritis     Cancer (HCC)     Cervical, dx by Dr. Belkis Barclay    Cerebral artery occlusion with cerebral infarction ([facility] [address]     at age 52 / sx as a migraine with slurred speech & visual disturbance    Diverticulitis     GERD (gastroesophageal reflux disease)     History of conization of cervix     Migraine     Rectal cancer ([facility] [address]     Renal insufficiency     Tinea unguium      Past Surgical History:   Procedure Laterality Date    CARDIAC SURGERY      PFO surgery in age 48s    COLONOSCOPY  2018    COLONOSCOPY N/A 5/31/2022    COLONOSCOPY w/biopsies performed by Nicolle Contreras MD at [address]  2017    HYSTERECTOMY VAGINAL N/A 12/19/2018    [address] WITH BSO performed by Joanna Albarado MD at [address] [facility] FLX DX W/COLLJ [address] [facility] [address] WHEN PFRMD N/A 4/19/2018    COLONOSCOPY performed by Nicolle Contreras MD at [address] W/ADJ VAG,W/LOOP BX N/A 9/26/2018    LEEP performed by Monte Nyhan, MD at [address] [facility] N/A 9/26/2018    DILATATION AND CURETTAGE HYSTEROSCOPY performed by Monte Nyhan, MD at [address]     Social History     Socioeconomic History    Marital status:      Spouse name: Not on file    Number of children: Not on file    Years of education: Not on file    Highest education level: Not on file   Occupational History    Not on file   Tobacco Use    Smoking status: Never    Smokeless tobacco: Never   Vaping Use    Vaping Use: Never used   Substance and Sexual Activity    Alcohol use: Yes     Comment: socially    Drug use: Yes     Types: Marijuana Poonamjulia Kwan)    Sexual activity: Yes   Other Topics Concern    Not on file   Social History Narrative    Not on file     Social Determinants of Health     Financial Resource Strain: Not on file   Food Insecurity: Not on file   Transportation Needs: Not on file   Physical Activity: Not on file   Stress: Not on file   Social Connections: Not on file   Intimate Partner Violence: Not on file   Housing Stability: Not on file     Family History   Problem Relation Age of Onset    Cancer Father         skin ear and head    High Blood Pressure Father     Cancer Brother         gall bladder cancer    Other Mother         dementia    High Blood Pressure Mother     Other Sister         gout    Diabetes Brother     Cancer Brother         head    No Known Problems Son     No Known Problems Daughter     Cancer Paternal Uncle         skin    Other Sister         covid    Colon Cancer Neg Hx      Allergies   Allergen Reactions    Erythromycin Hives    Seasonal Other (See Comments)     Pollen causes sinus sx    Sporanox [Itraconazole] Hives    Macrobid [Nitrofurantoin Macrocrystal] Other (See Comments)     Migraine, vomiting, fever/chills     Current Outpatient Medications on File Prior to Visit   Medication Sig Dispense Refill    cholestyramine (QUESTRAN) 4 g packet Take 1 packet by mouth daily 30 packet 0    pantoprazole (PROTONIX) 40 MG tablet Take 1 tablet by mouth every morning (before breakfast) 30 tablet 0    Fremanezumab-vfrm (AJOVY) 225 MG/1.5ML SOAJ INJECT 1 PEN UNDER THE SKIN 1 TIME EVERY 30 DAYS 1.5 mL 6    oxybutynin (DITROPAN-XL) 10 MG extended release tablet TAKE 1 TABLET DAILY 90 tablet 3    loperamide (IMODIUM) 2 MG capsule Take 2 mg by mouth 4 times daily as needed for Diarrhea      prednisoLONE acetate (PRED FORTE) 1 % ophthalmic suspension Place 1 drop into both eyes daily (Patient not taking: Reported on 10/7/2022)      Carboxymethylcellulose Sodium (ARTIFICIAL TEARS OP) Apply 1 drop to eye as needed      Ca Carbonate-Mag Hydroxide (ROLAIDS PO) Take by mouth as needed (heartburn)      silver sulfADIAZINE (SILVADENE) 1 % cream Apply topically daily.  400 g 1    Cholecalciferol (VITAMIN D3) 50 MCG (2000 UT) CAPS Take by mouth daily      topiramate (TOPAMAX) 100 MG tablet TAKE 3 TABLETS DAILY 270 tablet 3    PARoxetine (PAXIL) 20 MG tablet TAKE 1 TABLET DAILY 90 tablet 3    CLIMARA 0.1 MG/24HR APPLY 1 PATCH TRANSDERMALLYONTO THE SKIN ONCE A WEEK (Patient not taking: Reported on 10/7/2022) 12 patch 4    folic acid (FOLVITE) 1 MG tablet Take 1 tablet by mouth daily (Patient not taking: Reported on 10/7/2022) 30 tablet 5    Rimegepant Sulfate (NURTEC) 75 MG TBDP Take 75 mg by mouth as needed (take at the onset of migraine) 2 samples given  LOT: [id number]  EXP: 11/21 2 tablet 0    SUMAtriptan Succinate 3 MG/0.5ML SOAJ Inject into the skin as needed       ZOMIG 5 MG nasal solution once as needed       aspirin 81 MG EC tablet Take 81 mg by mouth daily. No current facility-administered medications on file prior to visit. Review of Systems   Constitutional:  Negative for activity change, fatigue and unexpected weight change (now stable). HENT:  Negative for trouble swallowing and voice change. Eyes:  Negative for itching. Dry eye   Respiratory:  Negative for cough, shortness of breath and wheezing. Cardiovascular:  Negative for chest pain, palpitations and leg swelling. Gastrointestinal:  Negative for abdominal distention, abdominal pain, anal bleeding, blood in stool, constipation, diarrhea, nausea and vomiting. Genitourinary: Negative. Negative for difficulty urinating and vaginal bleeding. Musculoskeletal: Negative. Negative for arthralgias and myalgias. Skin:  Negative for color change, rash and wound. Some perianal irritation. Allergic/Immunologic: Positive for environmental allergies. Negative for food allergies. Neurological:  Negative for dizziness (one episode), light-headedness (one episode) and headaches. Hematological: Negative. Negative for adenopathy. Psychiatric/Behavioral: Negative. Negative for sleep disturbance. The patient is not nervous/anxious.           Objective:   LMP 09/19/2008    Wt Palliative care encounter  - Palliative care services no longer indicated      Due to acuity, symptomatology and high-risk medication management, I advised patient to No follow-ups on file. Total Time 30 min     Total time includes reviewing other provider notes, reviewing labs and tests, reviewing history, medications, and allergies, counseling patient, performing medically appropriate evaluation and examination, giving orders, and documenting in the medical record.      LISA Haywood - CNP    Collaborating physician: Dr. Valeriano Barahona

## 2022-11-08 ENCOUNTER — OFFICE VISIT (OUTPATIENT)
Dept: SURGERY | Age: 67
End: 2022-11-08
Payer: MEDICARE

## 2022-11-08 VITALS
HEIGHT: 64 IN | HEART RATE: 71 BPM | TEMPERATURE: 97.6 F | OXYGEN SATURATION: 98 % | BODY MASS INDEX: 22.53 KG/M2 | WEIGHT: 132 LBS

## 2022-11-08 DIAGNOSIS — C21.0 ANAL CANCER (HCC): ICD-10-CM

## 2022-11-08 DIAGNOSIS — K52.0 RADIATION ENTERITIS: ICD-10-CM

## 2022-11-08 DIAGNOSIS — R10.33 PERIUMBILICAL ABDOMINAL PAIN: Primary | ICD-10-CM

## 2022-11-08 PROCEDURE — 3017F COLORECTAL CA SCREEN DOC REV: CPT | Performed by: COLON & RECTAL SURGERY

## 2022-11-08 PROCEDURE — 1036F TOBACCO NON-USER: CPT | Performed by: COLON & RECTAL SURGERY

## 2022-11-08 PROCEDURE — 99213 OFFICE O/P EST LOW 20 MIN: CPT | Performed by: COLON & RECTAL SURGERY

## 2022-11-08 PROCEDURE — 1090F PRES/ABSN URINE INCON ASSESS: CPT | Performed by: COLON & RECTAL SURGERY

## 2022-11-08 PROCEDURE — G8420 CALC BMI NORM PARAMETERS: HCPCS | Performed by: COLON & RECTAL SURGERY

## 2022-11-08 PROCEDURE — 1123F ACP DISCUSS/DSCN MKR DOCD: CPT | Performed by: COLON & RECTAL SURGERY

## 2022-11-08 PROCEDURE — G8427 DOCREV CUR MEDS BY ELIG CLIN: HCPCS | Performed by: COLON & RECTAL SURGERY

## 2022-11-08 PROCEDURE — G8484 FLU IMMUNIZE NO ADMIN: HCPCS | Performed by: COLON & RECTAL SURGERY

## 2022-11-08 PROCEDURE — G8400 PT W/DXA NO RESULTS DOC: HCPCS | Performed by: COLON & RECTAL SURGERY

## 2022-11-08 ASSESSMENT — ENCOUNTER SYMPTOMS
CHEST TIGHTNESS: 0
ABDOMINAL PAIN: 1
SHORTNESS OF BREATH: 0

## 2022-11-08 NOTE — PROGRESS NOTES
Subjective:      Patient ID: Sandra Huang is a 74 y.o. female who presents for:  Chief Complaint   Patient presents with    Abdominal Pain       This is a 42-year-old female treated for anal carcinoma with radiation and chemotherapy. I have seen her in the past and reviewed my recent notes. She had a CT PET scan done recently which shows that the hypermetabolic area in her rectum/anal area has resolved. Patient has intermittent episodes of abdominal pain. These are lower above the pubic area. They last for a few seconds and resolved. I reviewed her recent CT PET scan.     She is concerned about her abdominal pain      Past Medical History:   Diagnosis Date    Allergic reaction     Arthritis     Cancer ([address]     Cervical, dx by Dr. Vick Gu    Cerebral artery occlusion with cerebral infarction ([address]     at age 52 / sx as a migraine with slurred speech & visual disturbance    Diverticulitis     GERD (gastroesophageal reflux disease)     History of conization of cervix     Migraine     Rectal cancer ([address]     Renal insufficiency     Tinea unguium      Past Surgical History:   Procedure Laterality Date    CARDIAC SURGERY      PFO surgery in age 48s    COLONOSCOPY  2018    COLONOSCOPY N/A 5/31/2022    COLONOSCOPY w/biopsies performed by Valencia Lazar MD at [address]  2017    HYSTERECTOMY VAGINAL N/A 12/19/2018    [address] WITH BSO performed by Arash Edwards MD at [address] [facility] FL DX W/COLLJ [address] PFRMD N/A 4/19/2018    COLONOSCOPY performed by Valencia Lazar MD at [address] [facility] [address] W/ADJ VAG,W/LOOP BX N/A 9/26/2018    LEEP performed by Jamie Cartagena MD at [address] [facility] N/A 9/26/2018    DILATATION AND CURETTAGE HYSTEROSCOPY performed by Jamie Cartagena MD at [facility] 2000    [facility]     Social History     Socioeconomic History    Marital status:      Spouse name: Not on file    Number of children: Not on file    Years of education: Not on file    Highest education level: Not on file   Occupational History    Not on file   Tobacco Use    Smoking status: Never    Smokeless tobacco: Never   Vaping Use    Vaping Use: Never used   Substance and Sexual Activity    Alcohol use: Yes     Comment: socially    Drug use: Yes     Types: Marijuana Daril Georges)    Sexual activity: Yes   Other Topics Concern    Not on file   Social History Narrative    Not on file     Social Determinants of Health     Financial Resource Strain: Not on file   Food Insecurity: Not on file   Transportation Needs: Not on file   Physical Activity: Not on file   Stress: Not on file   Social Connections: Not on file   Intimate Partner Violence: Not on file   Housing Stability: Not on file     Family History   Problem Relation Age of Onset    Cancer Father         skin ear and head    High Blood Pressure Father     Cancer Brother         gall bladder cancer    Other Mother         dementia    High Blood Pressure Mother     Other Sister         gout    Diabetes Brother     Cancer Brother         head    No Known Problems Son     No Known Problems Daughter     Cancer Paternal Uncle         skin    Other Sister         covid    Colon Cancer Neg Hx      Allergies:  Erythromycin, Seasonal, Sporanox [itraconazole], and Macrobid [nitrofurantoin macrocrystal]    Review of Systems   Constitutional:  Negative for activity change, appetite change, fatigue, fever and unexpected weight change. HENT:  Negative for congestion. Respiratory:  Negative for chest tightness and shortness of breath. Cardiovascular:  Negative for chest pain and palpitations. Gastrointestinal:  Positive for abdominal pain. Genitourinary:  Negative for difficulty urinating. Musculoskeletal:  Negative for arthralgias. Neurological:  Negative for dizziness and headaches. Hematological:  Does not bruise/bleed easily.    Psychiatric/Behavioral:  Negative for agitation. Objective:    Pulse 71   Temp 97.6 °F (36.4 °C) (Temporal)   Ht 5' 4\" (1.626 m)   Wt 132 lb (59.9 kg)   LMP 09/19/2008   SpO2 98%   BMI 22.66 kg/m²     Physical Exam  Constitutional:       Appearance: She is well-developed. HENT:      Head: Normocephalic and atraumatic. Eyes:      Pupils: Pupils are equal, round, and reactive to light. Cardiovascular:      Rate and Rhythm: Normal rate and regular rhythm. Heart sounds: Normal heart sounds. Pulmonary:      Effort: Pulmonary effort is normal. No respiratory distress. Breath sounds: Normal breath sounds. No wheezing. Abdominal:      General: There is no distension. Palpations: There is no mass. Tenderness: There is no abdominal tenderness. There is no guarding or rebound. Hernia: No hernia is present. Comments: Abdomen soft and flat, lower umbilical abdominal pain. No hernia appreciated. Musculoskeletal:         General: Normal range of motion. Cervical back: Normal range of motion and neck supple. Skin:     General: Skin is warm and dry. Coloration: Skin is not pale. Findings: No erythema or rash. Neurological:      Mental Status: She is alert and oriented to person, place, and time. Psychiatric:         Behavior: Behavior normal.         Judgment: Judgment normal.            Assessment/Plan:          Diagnosis Orders   1. Periumbilical abdominal pain  US GALLBLADDER RUQ      2. Radiation enteritis        3. Anal cancer ([address]          Lower abdominal pain. Possibilities include radiation enteritis is probably the main potential culprit. I am happy to assist with an ultrasound of her gallbladder since it looked distended on one of her PET scans but I am not sure her gallbladder is going to be responsible. I have also offered a colonoscopy given her previous history of anal cancer along with lower abdominal pain.     I have talked to her about investigations for finding a potential culprit. I made it quite clear that I am not sure I will find something surgically reversible. I will talk to her upon completion of a right upper quadrant ultrasound. I answered her questions the best I could. Please note this report has beenpartially produced using speech recognition software and may cause contain errors related to that system including grammar, punctuation and spelling as well as words and phrases that may seem inappropriate.  If there arequestions or concerns please feel free to contact me to clarify

## 2022-11-09 ENCOUNTER — HOSPITAL ENCOUNTER (OUTPATIENT)
Dept: ULTRASOUND IMAGING | Age: 67
Discharge: HOME | End: 2022-11-11
Payer: MEDICARE

## 2022-11-09 DIAGNOSIS — R10.33 PERIUMBILICAL ABDOMINAL PAIN: ICD-10-CM

## 2022-11-09 PROCEDURE — 76705 ECHO EXAM OF ABDOMEN: CPT

## 2022-11-17 DIAGNOSIS — E53.8 FOLIC ACID DEFICIENCY: ICD-10-CM

## 2022-11-17 NOTE — TELEPHONE ENCOUNTER
Pt is  requesting medication refill.  Please approve or deny this request.    Rx requested:  Requested Prescriptions     Pending Prescriptions Disp Refills    Rimegepant Sulfate (NURTEC) 75 MG TBDP 2 tablet 0     Sig: Take 75 mg by mouth as needed (take at the onset of migraine) 2 samples given  LOT: [id number]  EXP: 11/21         Last Office Visit:   2/28/2022      Next Visit Date:  Future Appointments   Date Time Provider Janette Tan   12/7/2022 10:30 AM SCHEDULE, MAIRA RAD ONC NURSE [facility]   12/20/2022 11:00 AM Georgette Ospina MD [address]   2/27/2023  1:00 PM Marcial Moreira MD [facility] -

## 2022-11-18 RX ORDER — RIMEGEPANT SULFATE 75 MG/75MG
75 TABLET, ORALLY DISINTEGRATING ORAL PRN
Qty: 2 TABLET | Refills: 0 | Status: SHIPPED | OUTPATIENT
Start: 2022-11-18

## 2022-11-18 NOTE — TELEPHONE ENCOUNTER
Comments: Unsure when patient should follow up due to recent visits to specialty & palliative care     Last Office Visit (last PCP visit):   1/12/2022    Next Visit Date:  Future Appointments   Date Time Provider Janette Tan   12/7/2022 10:30 AM SCHEDULE, MAIRA GARCIA ONC NURSE [facility]iff[facility] ARMIJO   12/20/2022 11:00 AM Matthew Rubalcava MD [address] [place] [address]   2/27/2023  1:00 PM Marcial Armando MD [facility] -       **If hasn't been seen in over a year OR hasn't followed up according to last diabetes/ADHD visit, make appointment for patient before sending refill to provider.     Rx requested:  Requested Prescriptions     Pending Prescriptions Disp Refills    folic acid (FOLVITE) 1 MG tablet [Pharmacy Med Name: Folic Acid Oral Tablet 1 MG] 30 tablet 0     Sig: TAKE ONE TABLET BY MOUTH DAILY

## 2022-11-20 RX ORDER — FOLIC ACID 1 MG/1
TABLET ORAL
Qty: 90 TABLET | Refills: 0 | Status: SHIPPED | OUTPATIENT
Start: 2022-11-20

## 2022-12-03 RX ORDER — RIMEGEPANT SULFATE 75 MG/75MG
75 TABLET, ORALLY DISINTEGRATING ORAL DAILY PRN
Qty: 8 TABLET | Refills: 3 | Status: SHIPPED | OUTPATIENT
Start: 2022-12-03 | End: 2023-01-02

## 2022-12-05 ENCOUNTER — OFFICE VISIT (OUTPATIENT)
Dept: OBGYN CLINIC | Age: 67
End: 2022-12-05
Payer: MEDICARE

## 2022-12-05 VITALS
BODY MASS INDEX: 23.73 KG/M2 | SYSTOLIC BLOOD PRESSURE: 102 MMHG | HEIGHT: 64 IN | WEIGHT: 139 LBS | DIASTOLIC BLOOD PRESSURE: 68 MMHG | HEART RATE: 68 BPM

## 2022-12-05 DIAGNOSIS — N94.11 INTROITAL DYSPAREUNIA: Primary | ICD-10-CM

## 2022-12-05 PROCEDURE — 3017F COLORECTAL CA SCREEN DOC REV: CPT | Performed by: OBSTETRICS & GYNECOLOGY

## 2022-12-05 PROCEDURE — G8427 DOCREV CUR MEDS BY ELIG CLIN: HCPCS | Performed by: OBSTETRICS & GYNECOLOGY

## 2022-12-05 PROCEDURE — 1036F TOBACCO NON-USER: CPT | Performed by: OBSTETRICS & GYNECOLOGY

## 2022-12-05 PROCEDURE — 1090F PRES/ABSN URINE INCON ASSESS: CPT | Performed by: OBSTETRICS & GYNECOLOGY

## 2022-12-05 PROCEDURE — G8400 PT W/DXA NO RESULTS DOC: HCPCS | Performed by: OBSTETRICS & GYNECOLOGY

## 2022-12-05 PROCEDURE — 1123F ACP DISCUSS/DSCN MKR DOCD: CPT | Performed by: OBSTETRICS & GYNECOLOGY

## 2022-12-05 PROCEDURE — G8484 FLU IMMUNIZE NO ADMIN: HCPCS | Performed by: OBSTETRICS & GYNECOLOGY

## 2022-12-05 PROCEDURE — G8420 CALC BMI NORM PARAMETERS: HCPCS | Performed by: OBSTETRICS & GYNECOLOGY

## 2022-12-05 PROCEDURE — 99213 OFFICE O/P EST LOW 20 MIN: CPT | Performed by: OBSTETRICS & GYNECOLOGY

## 2022-12-05 RX ORDER — ESTRADIOL 0.1 MG/G
1 CREAM VAGINAL DAILY
Qty: 1 EACH | Refills: 3 | Status: SHIPPED | OUTPATIENT
Start: 2022-12-05

## 2022-12-05 NOTE — PROGRESS NOTES
Vitor Albright is a 74 y.o. female who presents here today for complaints of Other (She states she had chemo and radiation for rectal cancer. She states her and her  tried having intercourse and \"he doesn't fit\")  Patient has been recently treated for anal cancer treatment including chemotherapy and radiation therapy,, mentions significant pain with a course where she could not achieve full penetration due to the pain, patient is worried that that the vagina has shrunk according to her description. Patient was on HRT which was stopped when she started her cancer treatment. Patient currently not on treatment.   .      Vitals:  /68 (Site: Left Upper Arm, Position: Sitting, Cuff Size: Medium Adult)   Pulse 68   Ht 5' 4\" (1.626 m)   Wt 139 lb (63 kg)   LMP 09/19/2008   BMI 23.86 kg/m²   Allergies:  Erythromycin, Seasonal, Sporanox [itraconazole], and Macrobid [nitrofurantoin macrocrystal]  Past Medical History:   Diagnosis Date    Allergic reaction     Arthritis     Cancer ([facility] [address]     Cervical, dx by Dr. Felicitas Valadez    Cerebral artery occlusion with cerebral infarction ([facility] [address]     at age 52 / sx as a migraine with slurred speech & visual disturbance    Diverticulitis     GERD (gastroesophageal reflux disease)     History of conization of cervix     Migraine     Rectal cancer ([facility] [address]     Renal insufficiency     Tinea unguium      Past Surgical History:   Procedure Laterality Date    CARDIAC SURGERY      PFO surgery in age 48s    COLONOSCOPY  2018    COLONOSCOPY N/A 5/31/2022    COLONOSCOPY w/biopsies performed by Dionne Bernheim, MD at [address]  2017    HYSTERECTOMY VAGINAL N/A 12/19/2018    [address] WITH BSO performed by Lisbeth Pathak MD at [address] [facility] FLX DX W/COLLJ [address] PFRMD N/A 4/19/2018    COLONOSCOPY performed by Dionne Bernheim, MD at [address] [facility] [facility] [address] W/ADJ VAG,W/LOOP BX N/A 9/26/2018    LEEP performed by Osito Williamson MD at [facility] OR    NC HYSTEROSCOPY,W/ENDOMETRIAL ABLATION N/A 2018    DILATATION AND CURETTAGE HYSTEROSCOPY performed by Kaylan Mcdonald MD at [facility] Left     RCR     OB History          2    Para   2    Term                AB        Living             SAB        IAB        Ectopic        Molar        Multiple        Live Births                  Family History   Problem Relation Age of Onset    Cancer Father         skin ear and head    High Blood Pressure Father     Cancer Brother         gall bladder cancer    Other Mother         dementia    High Blood Pressure Mother     Other Sister         gout    Diabetes Brother     Cancer Brother         head    No Known Problems Son     No Known Problems Daughter     Cancer Paternal Uncle         skin    Other Sister         covid    Colon Cancer Neg Hx      Social History     Socioeconomic History    Marital status:      Spouse name: Not on file    Number of children: Not on file    Years of education: Not on file    Highest education level: Not on file   Occupational History    Not on file   Tobacco Use    Smoking status: Never    Smokeless tobacco: Never   Vaping Use    Vaping Use: Never used   Substance and Sexual Activity    Alcohol use: Yes     Comment: socially    Drug use: Yes     Types: Marijuana Jurline Aníbal)    Sexual activity: Yes   Other Topics Concern    Not on file   Social History Narrative    Not on file     Social Determinants of Health     Financial Resource Strain: Not on file   Food Insecurity: Not on file   Transportation Needs: Not on file   Physical Activity: Not on file   Stress: Not on file   Social Connections: Not on file   Intimate Partner Violence: Not on file   Housing Stability: Not on file       Contraceptive method:  none    Patient's medications, allergies, past medical, surgical, social and family histories were reviewed and updated as appropriate.     Review of Systems  As per chief complaint   All other systems reviewed and are negative. Dyspareunia  Physical Exam:  Vitals:  /68 (Site: Left Upper Arm, Position: Sitting, Cuff Size: Medium Adult)   Pulse 68   Ht 5' 4\" (1.626 m)   Wt 139 lb (63 kg)   LMP 09/19/2008   BMI 23.86 kg/m²   Lungs: CTAB   Heart : Regular S1/S2, no M/R/G  Abdomen: Soft , NT, ND , + BS   Pelvic exam : Pelvic exam: VULVA: normal appearing vulva with no masses, tenderness or lesions, VAGINA: normal appearing vagina with normal color and discharge, no lesions. Assessment:      Diagnosis Orders   1. Introital dyspareunia            Plan:     Vaginal exam findings discussed with patient, I did explain to the patient that her recent radiation therapy that was completed in August would be the underlying cause of the dyspareunia as she is experiencing as well as stopping her hormone replacement treatment with the therapy. I did explain to the patient that on exam the vaginal introitus and vaginal canal was abnormal girth and length, and there was no evidence of any fibrosis or anatomical changes. I did advise the patient of expectant management for the next 6 months to 1 year for resolution of symptoms and side effects from her radiation treatment. Advised patient to use vaginal estrogen cream, as well as coconut oil due to its content of vitamin A and bacteriostatic properties as well as lubrication with and without intercourse  Patient is to follow-up as needed if his symptoms continue next 3 to 6-month      No orders of the defined types were placed in this encounter. Orders Placed This Encounter   Medications    estradiol (ESTRACE VAGINAL) 0.1 MG/GM vaginal cream     Sig: Place 1 g vaginally daily     Dispense:  1 each     Refill:  3       Follow Up:  Return in about 3 months (around 3/5/2023), or if symptoms worsen or fail to improve, for next annual exam visit.         Dave Angelucci, MD

## 2022-12-14 ENCOUNTER — HOSPITAL ENCOUNTER (OUTPATIENT)
Dept: RADIATION ONCOLOGY | Age: 67
Discharge: HOME | End: 2022-12-14
Payer: MEDICARE

## 2022-12-14 VITALS
TEMPERATURE: 97.1 F | OXYGEN SATURATION: 99 % | HEART RATE: 64 BPM | WEIGHT: 140.6 LBS | RESPIRATION RATE: 18 BRPM | SYSTOLIC BLOOD PRESSURE: 98 MMHG | BODY MASS INDEX: 24.13 KG/M2 | DIASTOLIC BLOOD PRESSURE: 62 MMHG

## 2022-12-14 PROCEDURE — 99212 OFFICE O/P EST SF 10 MIN: CPT | Performed by: RADIOLOGY

## 2022-12-14 PROCEDURE — 99497 ADVNCD CARE PLAN 30 MIN: CPT | Performed by: RADIOLOGY

## 2022-12-14 PROCEDURE — 99214 OFFICE O/P EST MOD 30 MIN: CPT | Performed by: RADIOLOGY

## 2022-12-14 NOTE — PROGRESS NOTES
NURSING ASSESSMENT     Date: 12/14/2022        Patient Name: Preston Hinojosa     YOB: 1955      Age:  74 y.o. MRN: [id number]       Chaperone [] Yes   [x] No      Advance Directives:   Do you currently have completed advance directives (living will)? [x] Yes   [] No         *If yes, please bring us a copy for your records. *If no, would you like info or assistance in completing advance directives (living will)? [] Yes   [x] No    Pain Score:   Pain Score (1-10): Mild shooting pain inside rectum   Pain Location: Rectum   Pain Duration: Momentary   Pain Management/Control: NA      Is pain affecting your ability to take care of yourself or move throughout your home? [] Yes   [x] No    General: Fatigue  Patient has gained weight [] Yes   [x] No  Patient has lost weight [] Yes   [x] No  How much weight in pounds and over what length of time:     Eyes (Ophthalmic): No Problem     Skin (Dermatological): Groin area is completely, no longer using Aquaphor or silvadene to this area     ENT: No Problems     Respiratory: Started with dry cough at night a few days ago. Keeps water at bedside to drink as needed. Cardiovascular: No Problems      Device   [] Yes   [x] No   Copy of Card Obtained [] Yes   [x] No    Gastrointestinal: Having daily soft BM's. States she will get an occasional RLQ abdominal burning sensation that last a couple minutes that is relieved spontaneously since last visit. States les frequent since avoiding dairy. External hemorrhoids, uses Recti Care as needed or Silvadene cream.    Genito-Urinary:    H/O leaky bladder- takes dirtopan which works. Breast: No Problems     Musculoskeletal: No Problems    Neurological: H/O migraines takes migraine meds as needed with relief. Hematological and Lymphatic: No Problems     Endocrine: No Problems     Gyn History:   Recent visit with Dr. Berry Bedoya. Started on estrace cream daily.   Last Mammogram: 2021    A 10-point review of systems  has been conducted and pertinent positives have been   recorded. All other review of systems are negative    Was the patient admitted during the course of treatment OR within 30 days of treatment? No        Additional Comments:  Will see Dr. Ethan Foster on 12/20/22

## 2022-12-20 ENCOUNTER — OFFICE VISIT (OUTPATIENT)
Dept: SURGERY | Age: 67
End: 2022-12-20
Payer: MEDICARE

## 2022-12-20 VITALS
BODY MASS INDEX: 23.9 KG/M2 | WEIGHT: 140 LBS | HEART RATE: 80 BPM | HEIGHT: 64 IN | OXYGEN SATURATION: 98 % | TEMPERATURE: 97.3 F

## 2022-12-20 DIAGNOSIS — C21.0 ANAL CARCINOMA (HCC): Primary | ICD-10-CM

## 2022-12-20 PROCEDURE — G8427 DOCREV CUR MEDS BY ELIG CLIN: HCPCS | Performed by: COLON & RECTAL SURGERY

## 2022-12-20 PROCEDURE — G8484 FLU IMMUNIZE NO ADMIN: HCPCS | Performed by: COLON & RECTAL SURGERY

## 2022-12-20 PROCEDURE — 1123F ACP DISCUSS/DSCN MKR DOCD: CPT | Performed by: COLON & RECTAL SURGERY

## 2022-12-20 PROCEDURE — 1090F PRES/ABSN URINE INCON ASSESS: CPT | Performed by: COLON & RECTAL SURGERY

## 2022-12-20 PROCEDURE — 46600 DIAGNOSTIC ANOSCOPY SPX: CPT | Performed by: COLON & RECTAL SURGERY

## 2022-12-20 PROCEDURE — 1036F TOBACCO NON-USER: CPT | Performed by: COLON & RECTAL SURGERY

## 2022-12-20 PROCEDURE — 3017F COLORECTAL CA SCREEN DOC REV: CPT | Performed by: COLON & RECTAL SURGERY

## 2022-12-20 PROCEDURE — G8400 PT W/DXA NO RESULTS DOC: HCPCS | Performed by: COLON & RECTAL SURGERY

## 2022-12-20 PROCEDURE — 99213 OFFICE O/P EST LOW 20 MIN: CPT | Performed by: COLON & RECTAL SURGERY

## 2022-12-20 PROCEDURE — G8420 CALC BMI NORM PARAMETERS: HCPCS | Performed by: COLON & RECTAL SURGERY

## 2022-12-20 ASSESSMENT — ENCOUNTER SYMPTOMS
SHORTNESS OF BREATH: 0
ANAL BLEEDING: 1
COLOR CHANGE: 0
ABDOMINAL PAIN: 0
CHEST TIGHTNESS: 0
RECTAL PAIN: 0
DIARRHEA: 1
NAUSEA: 0
VOMITING: 0

## 2022-12-20 NOTE — PROGRESS NOTES
Subjective:      Patient ID: Morris Zarate is a 74 y.o. female who presents for:  Chief Complaint   Patient presents with    Follow-up       She returns to the office in follow-up for anal surveillance given her carcinoma of the anus. She has been doing well. She describes intermittent rectal bleeding which has not been present for the past few weeks. I reviewed her ultrasound from November which was unremarkable. She still has some lower abdominal pain occasionally. She has intermittent diarrhea. None currently. I reviewed again her past medical and surgical history.       Past Medical History:   Diagnosis Date    Allergic reaction     Arthritis     Cancer (HCC)     Cervical, dx by Dr. Avila Milling    Cerebral artery occlusion with cerebral infarction [facility])     at age 52 / sx as a migraine with slurred speech & visual disturbance    Diverticulitis     GERD (gastroesophageal reflux disease)     History of conization of cervix     Migraine     Rectal cancer ([facility] [address]     Renal insufficiency     Tinea unguium      Past Surgical History:   Procedure Laterality Date    CARDIAC SURGERY      PFO surgery in age 48s    COLONOSCOPY  2018    COLONOSCOPY N/A 5/31/2022    COLONOSCOPY w/biopsies performed by Alfreda Johnson MD at [address]  2017    HYSTERECTOMY VAGINAL N/A 12/19/2018    [address] WITH BSO performed by Waylon Vera MD at [facility] FLX DX W/COLLJ [address] WHEN PFRMD N/A 4/19/2018    COLONOSCOPY performed by Alfreda Johnson MD at [address] [place] W/ADJ VAG,W/LOOP BX N/A 9/26/2018    LEEP performed by Autumn Bautista MD at [facility] N/A 9/26/2018    DILATATION AND CURETTAGE HYSTEROSCOPY performed by Autumn Bautista MD at [address]     Social History     Socioeconomic History    Marital status:      Spouse name: Not on file    Number of children: Not on file    Years of education: Not on file    Highest education level: Not on file   Occupational History    Not on file   Tobacco Use    Smoking status: Never    Smokeless tobacco: Never   Vaping Use    Vaping Use: Never used   Substance and Sexual Activity    Alcohol use: Yes     Comment: socially    Drug use: Yes     Types: Marijuana Racquel Ege)    Sexual activity: Yes   Other Topics Concern    Not on file   Social History Narrative    Not on file     Social Determinants of Health     Financial Resource Strain: Not on file   Food Insecurity: Not on file   Transportation Needs: Not on file   Physical Activity: Not on file   Stress: Not on file   Social Connections: Not on file   Intimate Partner Violence: Not on file   Housing Stability: Not on file     Family History   Problem Relation Age of Onset    Cancer Father         skin ear and head    High Blood Pressure Father     Cancer Brother         gall bladder cancer    Other Mother         dementia    High Blood Pressure Mother     Other Sister         gout    Diabetes Brother     Cancer Brother         head    No Known Problems Son     No Known Problems Daughter     Cancer Paternal Uncle         skin    Other Sister         covid    Colon Cancer Neg Hx      Allergies:  Erythromycin, Seasonal, Sporanox [itraconazole], and Macrobid [nitrofurantoin macrocrystal]    Review of Systems   Constitutional:  Negative for activity change, appetite change and unexpected weight change. HENT:  Negative for congestion. Respiratory:  Negative for chest tightness and shortness of breath. Cardiovascular:  Negative for chest pain and palpitations. Gastrointestinal:  Positive for anal bleeding and diarrhea. Negative for abdominal pain, nausea, rectal pain and vomiting. Genitourinary:  Negative for difficulty urinating. Musculoskeletal:  Negative for arthralgias. Skin:  Negative for color change. Neurological:  Negative for dizziness and headaches.    Hematological:  Does not bruise/bleed easily. Psychiatric/Behavioral:  Negative for agitation. Objective:    Pulse 80   Temp 97.3 °F (36.3 °C) (Temporal)   Ht 5' 4\" (1.626 m)   Wt 140 lb (63.5 kg)   LMP 09/19/2008   SpO2 98%   BMI 24.03 kg/m²     Physical Exam  Constitutional:       Appearance: She is well-developed. HENT:      Head: Normocephalic and atraumatic. Eyes:      Pupils: Pupils are equal, round, and reactive to light. Cardiovascular:      Rate and Rhythm: Normal rate and regular rhythm. Heart sounds: Normal heart sounds. Pulmonary:      Effort: Pulmonary effort is normal. No respiratory distress. Breath sounds: Normal breath sounds. No wheezing. Abdominal:      General: There is no distension. Palpations: There is no mass. Tenderness: There is no abdominal tenderness. There is no guarding or rebound. Hernia: No hernia is present. Genitourinary:     Comments: Anal inspection show a nonthrombosed external hemorrhoid tag in the left anterior position. Digital exam was unremarkable    Anoscopy was performed. Full circumference of the anal canal shows no evidence of recurrence. Grade 1 internal hemorrhoids seen. No evidence of bleeding. Musculoskeletal:         General: Normal range of motion. Cervical back: Normal range of motion and neck supple. Skin:     General: Skin is warm and dry. Coloration: Skin is not pale. Findings: No erythema or rash. Neurological:      Mental Status: She is alert and oriented to person, place, and time. Psychiatric:         Behavior: Behavior normal.         Judgment: Judgment normal.            Assessment/Plan:          Diagnosis Orders   1. Anal carcinoma ([address]          Nonthrombosed external hemorrhoid tag unremarkable no surgical intervention discussed. Internal hemorrhoid bleeding intermittent. None currently. I asked her to call regarding any bleeding which light ensue.   I be happy to look again with a anoscopy to see if any hemorrhoid treatment could be performed at that time. Anal carcinoma. NAGA. Will continue to follow in 3 months for surveillance. All questions answered. Please note this report has beenpartially produced using speech recognition software and may cause contain errors related to that system including grammar, punctuation and spelling as well as words and phrases that may seem inappropriate.  If there arequestions or concerns please feel free to contact me to clarify

## 2022-12-28 ENCOUNTER — TELEPHONE (OUTPATIENT)
Dept: FAMILY MEDICINE CLINIC | Age: 67
End: 2022-12-28

## 2023-01-12 ENCOUNTER — OFFICE VISIT (OUTPATIENT)
Dept: OBGYN CLINIC | Age: 68
End: 2023-01-12
Payer: MEDICARE

## 2023-01-12 VITALS
SYSTOLIC BLOOD PRESSURE: 98 MMHG | HEIGHT: 64 IN | WEIGHT: 141 LBS | DIASTOLIC BLOOD PRESSURE: 70 MMHG | BODY MASS INDEX: 24.07 KG/M2

## 2023-01-12 DIAGNOSIS — N89.8 VAGINAL DISCHARGE: Primary | ICD-10-CM

## 2023-01-12 DIAGNOSIS — N89.8 VAGINAL DISCHARGE: ICD-10-CM

## 2023-01-12 PROCEDURE — 3017F COLORECTAL CA SCREEN DOC REV: CPT | Performed by: OBSTETRICS & GYNECOLOGY

## 2023-01-12 PROCEDURE — 1036F TOBACCO NON-USER: CPT | Performed by: OBSTETRICS & GYNECOLOGY

## 2023-01-12 PROCEDURE — 99213 OFFICE O/P EST LOW 20 MIN: CPT | Performed by: OBSTETRICS & GYNECOLOGY

## 2023-01-12 PROCEDURE — G8484 FLU IMMUNIZE NO ADMIN: HCPCS | Performed by: OBSTETRICS & GYNECOLOGY

## 2023-01-12 PROCEDURE — G8420 CALC BMI NORM PARAMETERS: HCPCS | Performed by: OBSTETRICS & GYNECOLOGY

## 2023-01-12 PROCEDURE — 1123F ACP DISCUSS/DSCN MKR DOCD: CPT | Performed by: OBSTETRICS & GYNECOLOGY

## 2023-01-12 PROCEDURE — G8427 DOCREV CUR MEDS BY ELIG CLIN: HCPCS | Performed by: OBSTETRICS & GYNECOLOGY

## 2023-01-12 PROCEDURE — G8400 PT W/DXA NO RESULTS DOC: HCPCS | Performed by: OBSTETRICS & GYNECOLOGY

## 2023-01-12 PROCEDURE — 81002 URINALYSIS NONAUTO W/O SCOPE: CPT | Performed by: OBSTETRICS & GYNECOLOGY

## 2023-01-12 PROCEDURE — 1090F PRES/ABSN URINE INCON ASSESS: CPT | Performed by: OBSTETRICS & GYNECOLOGY

## 2023-01-12 RX ORDER — METRONIDAZOLE 500 MG/1
500 TABLET ORAL 2 TIMES DAILY
Qty: 14 TABLET | Refills: 0 | Status: SHIPPED | OUTPATIENT
Start: 2023-01-12 | End: 2023-01-19

## 2023-01-12 ASSESSMENT — ENCOUNTER SYMPTOMS
RESPIRATORY NEGATIVE: 1
ANAL BLEEDING: 0
RECTAL PAIN: 0
NAUSEA: 0
ALLERGIC/IMMUNOLOGIC NEGATIVE: 1
VOMITING: 0
DIARRHEA: 0
EYES NEGATIVE: 1
CONSTIPATION: 0
ABDOMINAL DISTENTION: 0
ABDOMINAL PAIN: 0
BLOOD IN STOOL: 0

## 2023-01-12 ASSESSMENT — PATIENT HEALTH QUESTIONNAIRE - PHQ9
SUM OF ALL RESPONSES TO PHQ9 QUESTIONS 1 & 2: 0
SUM OF ALL RESPONSES TO PHQ QUESTIONS 1-9: 0
2. FEELING DOWN, DEPRESSED OR HOPELESS: 0
SUM OF ALL RESPONSES TO PHQ QUESTIONS 1-9: 0
1. LITTLE INTEREST OR PLEASURE IN DOING THINGS: 0
SUM OF ALL RESPONSES TO PHQ QUESTIONS 1-9: 0
SUM OF ALL RESPONSES TO PHQ QUESTIONS 1-9: 0

## 2023-01-12 NOTE — PROGRESS NOTES
Patient here c/o malodorous vaginal discharge. Reviewed medical, surgical, social and family history. Also reviewed current medications and allergies. States sx for few weeks. Patient just finished radiation for anal cancer. States discharge can be slightly brown when dries. Occasional spotting, but not certain where spotting is coming from, vaginal or rectal.  Patient is s/p EUGENE/BSO. SSE with clear discharge. No obvious lesion or bleeding. No evidence of stool in vaginal, but cannot completely R/O RV fistula based on sx and hx as above. Instructed to make appointment with Dr Valentin Hoffman for evaluation as well. Urine dip today negative. Wet prep obtained and provided Flagyl as directed in event of BV. All questions answered.         Vitals:  BP 98/70   Ht 5' 4\" (1.626 m)   Wt 141 lb (64 kg)   LMP 09/19/2008   BMI 24.20 kg/m²   Past Medical History:   Diagnosis Date    Allergic reaction     Arthritis     Cancer (HCC)     Cervical, dx by Dr. Grecia Galvin    Cerebral artery occlusion with cerebral infarction ([facility] [address]     at age 52 / sx as a migraine with slurred speech & visual disturbance    Diverticulitis     GERD (gastroesophageal reflux disease)     History of conization of cervix     Migraine     Rectal cancer ([facility] [address]     Renal insufficiency     Tinea unguium      Past Surgical History:   Procedure Laterality Date    CARDIAC SURGERY      PFO surgery in age 48s    COLONOSCOPY  2018    COLONOSCOPY N/A 5/31/2022    COLONOSCOPY w/biopsies performed by Rodolfo Vides MD at [address] [address] [facility]  2017    HYSTERECTOMY VAGINAL N/A 12/19/2018    [address] WITH BSO performed by Beau Frye MD at [address] FLX DX W/PINA [address] 1978 PFRMD N/A 4/19/2018    COLONOSCOPY performed by Rodolfo Vides MD at [address] [facility] N/A 9/26/2018    LEEP performed by Aure Sosa MD at [address] [facility] N/A 9/26/2018 DILATATION AND CURETTAGE HYSTEROSCOPY performed by Gabe Mcintyre MD at [facility] 2000    [facility]     Allergies:  Erythromycin, Seasonal, Sporanox [itraconazole], and Macrobid [nitrofurantoin macrocrystal]  Current Outpatient Medications   Medication Sig Dispense Refill    estradiol (ESTRACE VAGINAL) 0.1 MG/GM vaginal cream Place 1 g vaginally daily 1 each 3    folic acid (FOLVITE) 1 MG tablet TAKE ONE TABLET BY MOUTH DAILY 90 tablet 0    Fremanezumab-vfrm (AJOVY) 225 MG/1.5ML SOAJ INJECT 1 PEN UNDER THE SKIN 1 TIME EVERY 30 DAYS 1.5 mL 6    oxybutynin (DITROPAN-XL) 10 MG extended release tablet TAKE 1 TABLET DAILY 90 tablet 3    prednisoLONE acetate (PRED FORTE) 1 % ophthalmic suspension Place 1 drop into both eyes daily      Carboxymethylcellulose Sodium (ARTIFICIAL TEARS OP) Apply 1 drop to eye as needed      silver sulfADIAZINE (SILVADENE) 1 % cream Apply topically daily. 400 g 1    Cholecalciferol (VITAMIN D3) 50 MCG (2000 UT) CAPS Take by mouth daily      topiramate (TOPAMAX) 100 MG tablet TAKE 3 TABLETS DAILY 270 tablet 3    PARoxetine (PAXIL) 20 MG tablet TAKE 1 TABLET DAILY 90 tablet 3    CLIMARA 0.1 MG/24HR APPLY 1 PATCH TRANSDERMALLYONTO THE SKIN ONCE A WEEK 12 patch 4    SUMAtriptan Succinate 3 MG/0.5ML SOAJ Inject into the skin as needed       ZOMIG 5 MG nasal solution once as needed       aspirin 81 MG EC tablet Take 81 mg by mouth daily. No current facility-administered medications for this visit.      Social History     Socioeconomic History    Marital status:      Spouse name: Not on file    Number of children: Not on file    Years of education: Not on file    Highest education level: Not on file   Occupational History    Not on file   Tobacco Use    Smoking status: Never    Smokeless tobacco: Never   Vaping Use    Vaping Use: Never used   Substance and Sexual Activity    Alcohol use: Yes     Comment: socially    Drug use: Yes     Types: Marijuana Laura Cotton)    Sexual activity: Yes   Other Topics Concern    Not on file   Social History Narrative    Not on file     Social Determinants of Health     Financial Resource Strain: Not on file   Food Insecurity: Not on file   Transportation Needs: Not on file   Physical Activity: Not on file   Stress: Not on file   Social Connections: Not on file   Intimate Partner Violence: Not on file   Housing Stability: Not on file        Family History   Problem Relation Age of Onset    Cancer Father         skin ear and head    High Blood Pressure Father     Cancer Brother         gall bladder cancer    Other Mother         dementia    High Blood Pressure Mother     Other Sister         gout    Diabetes Brother     Cancer Brother         head    No Known Problems Son     No Known Problems Daughter     Cancer Paternal Uncle         skin    Other Sister         covid    Colon Cancer Neg Hx        Review of Systems   Constitutional: Negative. Negative for activity change, appetite change, chills, diaphoresis, fatigue, fever and unexpected weight change. HENT: Negative. Eyes: Negative. Respiratory: Negative. Cardiovascular: Negative. Gastrointestinal:  Negative for abdominal distention, abdominal pain, anal bleeding, blood in stool, constipation, diarrhea, nausea, rectal pain and vomiting. Endocrine: Negative. Genitourinary:  Positive for vaginal discharge. Negative for decreased urine volume, difficulty urinating, dyspareunia, dysuria, enuresis, flank pain, frequency, genital sores, hematuria, menstrual problem, pelvic pain, urgency, vaginal bleeding and vaginal pain. Musculoskeletal: Negative. Skin: Negative. Allergic/Immunologic: Negative. Neurological: Negative. Hematological: Negative. Psychiatric/Behavioral: Negative. Objective:     Physical Exam  Constitutional:       General: She is not in acute distress. Appearance: She is well-developed. She is not diaphoretic.    HENT:      Head: Normocephalic and atraumatic. Eyes:      Conjunctiva/sclera: Conjunctivae normal.   Cardiovascular:      Rate and Rhythm: Normal rate and regular rhythm. Pulmonary:      Effort: Pulmonary effort is normal. No respiratory distress. Genitourinary:     General: Normal vulva. Pubic Area: No rash or pubic lice. Labia:         Right: No rash, tenderness, lesion or injury. Left: No rash, tenderness, lesion or injury. Urethra: No prolapse, urethral pain, urethral swelling or urethral lesion. Vagina: No signs of injury and foreign body. Vaginal discharge (Valentina Pierce) present. No erythema, tenderness, bleeding, lesions or prolapsed vaginal walls. Adnexa: Right adnexa normal and left adnexa normal.        Right: No fullness. Left: No fullness. Rectum: Normal.      Comments: Atrophic vaginal changes c/w age  Musculoskeletal:         General: No tenderness or deformity. Normal range of motion. Cervical back: Normal range of motion and neck supple. Skin:     General: Skin is warm and dry. Coloration: Skin is not pale. Neurological:      Mental Status: She is alert and oriented to person, place, and time. Motor: No abnormal muscle tone. Coordination: Coordination normal.   Psychiatric:         Behavior: Behavior normal.         Thought Content: Thought content normal.         Judgment: Judgment normal.       Assessment:          Diagnosis Orders   1. Vaginal discharge  Wet prep, genital           Plan:      Medications placedthis encounter:  No orders of the defined types were placed in this encounter.         Orders placedthis encounter:  Orders Placed This Encounter   Procedures    Wet prep, genital     Standing Status:   Future     Standing Expiration Date:   1/12/2024         Follow up:  Return for [address] [place] [address] ( Colorectal ), Annual.

## 2023-01-12 NOTE — PROGRESS NOTES
The patient was asked if she would like a chaperone present for her intimate exam. She  Declined the chaperone.  Kristen Aranda CMA ([address] [place] [address])

## 2023-01-13 LAB
CLUE CELLS: NORMAL
TRICHOMONAS PREP: NORMAL
TRICHOMONAS VAGINALIS SCREEN: NEGATIVE
YEAST WET PREP: NORMAL

## 2023-01-24 ENCOUNTER — OFFICE VISIT (OUTPATIENT)
Dept: SURGERY | Age: 68
End: 2023-01-24
Payer: MEDICARE

## 2023-01-24 VITALS
OXYGEN SATURATION: 98 % | WEIGHT: 132 LBS | BODY MASS INDEX: 22.53 KG/M2 | TEMPERATURE: 97.1 F | HEART RATE: 74 BPM | HEIGHT: 64 IN

## 2023-01-24 DIAGNOSIS — C21.0 ANAL CANCER (HCC): Primary | ICD-10-CM

## 2023-01-24 PROCEDURE — G8484 FLU IMMUNIZE NO ADMIN: HCPCS | Performed by: COLON & RECTAL SURGERY

## 2023-01-24 PROCEDURE — G8420 CALC BMI NORM PARAMETERS: HCPCS | Performed by: COLON & RECTAL SURGERY

## 2023-01-24 PROCEDURE — 46600 DIAGNOSTIC ANOSCOPY SPX: CPT | Performed by: COLON & RECTAL SURGERY

## 2023-01-24 PROCEDURE — G8427 DOCREV CUR MEDS BY ELIG CLIN: HCPCS | Performed by: COLON & RECTAL SURGERY

## 2023-01-24 PROCEDURE — 1090F PRES/ABSN URINE INCON ASSESS: CPT | Performed by: COLON & RECTAL SURGERY

## 2023-01-24 PROCEDURE — G8400 PT W/DXA NO RESULTS DOC: HCPCS | Performed by: COLON & RECTAL SURGERY

## 2023-01-24 PROCEDURE — 1036F TOBACCO NON-USER: CPT | Performed by: COLON & RECTAL SURGERY

## 2023-01-24 PROCEDURE — 99213 OFFICE O/P EST LOW 20 MIN: CPT | Performed by: COLON & RECTAL SURGERY

## 2023-01-24 PROCEDURE — 3017F COLORECTAL CA SCREEN DOC REV: CPT | Performed by: COLON & RECTAL SURGERY

## 2023-01-24 PROCEDURE — 1123F ACP DISCUSS/DSCN MKR DOCD: CPT | Performed by: COLON & RECTAL SURGERY

## 2023-01-24 ASSESSMENT — ENCOUNTER SYMPTOMS
CONSTIPATION: 0
CHEST TIGHTNESS: 0
ABDOMINAL PAIN: 0
ANAL BLEEDING: 0
SHORTNESS OF BREATH: 0
RECTAL PAIN: 0
DIARRHEA: 0
VOMITING: 0
COLOR CHANGE: 0

## 2023-01-24 NOTE — PROGRESS NOTES
Subjective:      Patient ID: Luz Maria Cardoso is a 74 y.o. female who presents for:  Chief Complaint   Patient presents with    Follow-up       This is a 72-year-old female who completed chemoradiation for anal cancer in August of last year. She was recently seen by her gynecologist for suspicion of vaginal drainage which appeared brownish in nature. No gynecologic issues were seen. I have seen this woman for surveillance following chemoradiation therapy. Her last colonoscopy was last year where her anal carcinoma was found. She denies gross incontinence. She denies rectal bleeding. She denies any blood from her vagina. I reviewed my previous office notes.       Past Medical History:   Diagnosis Date    Allergic reaction     Arthritis     Cancer (HCC)     Cervical, dx by Dr. Gibran Zayas    Cerebral artery occlusion with cerebral infarction [facility])     at age 52 / sx as a migraine with slurred speech & visual disturbance    Diverticulitis     GERD (gastroesophageal reflux disease)     History of conization of cervix     Migraine     Rectal cancer ([facility] [address]     Renal insufficiency     Tinea unguium      Past Surgical History:   Procedure Laterality Date    CARDIAC SURGERY      PFO surgery in age 48s    COLONOSCOPY  2018    COLONOSCOPY N/A 5/31/2022    COLONOSCOPY w/biopsies performed by Jose Spangler MD at [address]  2017    HYSTERECTOMY VAGINAL N/A 12/19/2018    [address] WITH BSO performed by Galdino Hernandez MD at [address] [facility] FL DX W/COLLJ [address] 1978 PFRMD N/A 4/19/2018    COLONOSCOPY performed by Jose Spangler MD at [address] [facility] N/A 9/26/2018    LEEP performed by Calli Perry MD at [address] [facility] N/A 9/26/2018    DILATATION AND CURETTAGE HYSTEROSCOPY performed by Calli Perry MD at [facility] 2000    [facility]     Social History     Socioeconomic History Marital status:      Spouse name: Not on file    Number of children: Not on file    Years of education: Not on file    Highest education level: Not on file   Occupational History    Not on file   Tobacco Use    Smoking status: Never    Smokeless tobacco: Never   Vaping Use    Vaping Use: Never used   Substance and Sexual Activity    Alcohol use: Yes     Comment: socially    Drug use: Yes     Types: Marijuana Lucianne Bars)    Sexual activity: Yes   Other Topics Concern    Not on file   Social History Narrative    Not on file     Social Determinants of Health     Financial Resource Strain: Not on file   Food Insecurity: Not on file   Transportation Needs: Not on file   Physical Activity: Not on file   Stress: Not on file   Social Connections: Not on file   Intimate Partner Violence: Not on file   Housing Stability: Not on file     Family History   Problem Relation Age of Onset    Cancer Father         skin ear and head    High Blood Pressure Father     Cancer Brother         gall bladder cancer    Other Mother         dementia    High Blood Pressure Mother     Other Sister         gout    Diabetes Brother     Cancer Brother         head    No Known Problems Son     No Known Problems Daughter     Cancer Paternal Uncle         skin    Other Sister         covid    Colon Cancer Neg Hx      Allergies:  Erythromycin, Seasonal, Sporanox [itraconazole], and Macrobid [nitrofurantoin macrocrystal]    Review of Systems   Constitutional:  Negative for activity change, appetite change, fatigue and fever. HENT:  Negative for congestion. Respiratory:  Negative for chest tightness and shortness of breath. Cardiovascular:  Negative for chest pain and palpitations. Gastrointestinal:  Negative for abdominal pain, anal bleeding, constipation, diarrhea, rectal pain and vomiting. Genitourinary:  Positive for vaginal discharge. Negative for difficulty urinating. Musculoskeletal:  Negative for arthralgias.    Skin:  Negative for color change. Neurological:  Negative for dizziness and headaches. Hematological:  Does not bruise/bleed easily. Psychiatric/Behavioral:  Negative for agitation. Objective:    Pulse 74   Temp 97.1 °F (36.2 °C) (Temporal)   Ht 5' 4\" (1.626 m)   Wt 132 lb (59.9 kg)   LMP 09/19/2008   SpO2 98%   BMI 22.66 kg/m²     Physical Exam  Constitutional:       Appearance: She is well-developed. HENT:      Head: Normocephalic and atraumatic. Eyes:      Pupils: Pupils are equal, round, and reactive to light. Cardiovascular:      Rate and Rhythm: Normal rate and regular rhythm. Heart sounds: Normal heart sounds. Pulmonary:      Effort: Pulmonary effort is normal. No respiratory distress. Breath sounds: Normal breath sounds. No wheezing. Abdominal:      General: There is no distension. Palpations: There is no mass. Tenderness: There is no abdominal tenderness. There is no guarding or rebound. Hernia: No hernia is present. Genitourinary:     Comments: Vaginoscopy was performed. There was no evidence of any drainage or feculence in her vagina. The entire posterior wall of the vagina was evaluated without evidence of fistula     anoscopy was then performed. There were no abnormalities seen endoscopically. The anal canal circumferentially was unremarkable without signs of recurrence. To chronic nonthrombosed external hemorrhoid tags present posterior and anterior. Musculoskeletal:         General: Normal range of motion. Cervical back: Normal range of motion and neck supple. Skin:     General: Skin is warm and dry. Coloration: Skin is not pale. Findings: No erythema or rash. Neurological:      Mental Status: She is alert and oriented to person, place, and time. Psychiatric:         Behavior: Behavior normal.         Judgment: Judgment normal.            Assessment/Plan:          Diagnosis Orders   1.  Anal cancer [facility])          With her  present, I discussed no findings suspicious for rectovaginal fistula. I examined posterior wall of vagina and anterior wall the rectum without abnormality. Patient without any digital issues either. I suspect she might have some anal leakage from sphincter issues following radiation therapy. I also offered to proceed with colonoscopy to look for unusual findings including potential diverticular fistula which is unusual and doubtful. I would like to see her back in 3 months for continued surveillance    No findings suspicious for rectovaginal fistula. Patient considering colonoscopy in the next few weeks to months. She will call the office if she chooses. Please note this report has beenpartially produced using speech recognition software and may cause contain errors related to that system including grammar, punctuation and spelling as well as words and phrases that may seem inappropriate.  If there arequestions or concerns please feel free to contact me to clarify

## 2023-02-03 DIAGNOSIS — E53.8 FOLIC ACID DEFICIENCY: ICD-10-CM

## 2023-02-05 RX ORDER — FOLIC ACID 1 MG/1
TABLET ORAL
Qty: 90 TABLET | Refills: 0 | Status: SHIPPED | OUTPATIENT
Start: 2023-02-05

## 2023-02-06 ENCOUNTER — OFFICE VISIT (OUTPATIENT)
Dept: GASTROENTEROLOGY | Age: 68
End: 2023-02-06
Payer: MEDICARE

## 2023-02-06 ENCOUNTER — PREP FOR PROCEDURE (OUTPATIENT)
Dept: GASTROENTEROLOGY | Age: 68
End: 2023-02-06

## 2023-02-06 VITALS
SYSTOLIC BLOOD PRESSURE: 112 MMHG | HEART RATE: 69 BPM | DIASTOLIC BLOOD PRESSURE: 70 MMHG | OXYGEN SATURATION: 98 % | WEIGHT: 132 LBS | BODY MASS INDEX: 22.66 KG/M2

## 2023-02-06 DIAGNOSIS — Z85.048 HISTORY OF RECTAL CANCER: ICD-10-CM

## 2023-02-06 DIAGNOSIS — K62.89 RECTAL PAIN: Primary | ICD-10-CM

## 2023-02-06 PROCEDURE — 3017F COLORECTAL CA SCREEN DOC REV: CPT | Performed by: SPECIALIST

## 2023-02-06 PROCEDURE — G8427 DOCREV CUR MEDS BY ELIG CLIN: HCPCS | Performed by: SPECIALIST

## 2023-02-06 PROCEDURE — G8420 CALC BMI NORM PARAMETERS: HCPCS | Performed by: SPECIALIST

## 2023-02-06 PROCEDURE — G8484 FLU IMMUNIZE NO ADMIN: HCPCS | Performed by: SPECIALIST

## 2023-02-06 PROCEDURE — 99213 OFFICE O/P EST LOW 20 MIN: CPT | Performed by: SPECIALIST

## 2023-02-06 PROCEDURE — 1123F ACP DISCUSS/DSCN MKR DOCD: CPT | Performed by: SPECIALIST

## 2023-02-06 PROCEDURE — 1036F TOBACCO NON-USER: CPT | Performed by: SPECIALIST

## 2023-02-06 PROCEDURE — G8400 PT W/DXA NO RESULTS DOC: HCPCS | Performed by: SPECIALIST

## 2023-02-06 PROCEDURE — 1090F PRES/ABSN URINE INCON ASSESS: CPT | Performed by: SPECIALIST

## 2023-02-06 ASSESSMENT — ENCOUNTER SYMPTOMS
BLOOD IN STOOL: 0
RESPIRATORY NEGATIVE: 1
ANAL BLEEDING: 0
VOMITING: 0
DIARRHEA: 0
RECTAL PAIN: 0
EYES NEGATIVE: 1
GASTROINTESTINAL NEGATIVE: 1
NAUSEA: 0
ABDOMINAL DISTENTION: 0
ABDOMINAL PAIN: 0
CONSTIPATION: 0

## 2023-02-06 NOTE — PROGRESS NOTES
Gastroenterology Clinic Follow up Visit    Isabel Pals  [id number]  No chief complaint on file. HPI and A/P at last visit summarized below:  Patient is here because of rectal discomfort, mucousy discharge occasional rectal bleeding and also has discomfort in the left lower quadrant area, also reports seeing watery stool daily after passing formed stool, patient had rectal cancer and was treated with chemo and radiation. Finished the treatment in August, patient was told that the tumor shrunk with chemo and radiation, to have surgery, also reports having hemorrhoids    Review of Systems   Constitutional: Negative. HENT: Negative. Eyes: Negative. Respiratory: Negative. Cardiovascular: Negative. Gastrointestinal: Negative. Negative for abdominal distention, abdominal pain, anal bleeding, blood in stool, constipation, diarrhea, nausea, rectal pain and vomiting. Rectal discomfort and rectal discharge   Endocrine: Negative. Genitourinary: Negative. Musculoskeletal: Negative. Skin: Negative. Allergic/Immunologic: Negative for food allergies. Neurological: Negative. Hematological: Negative. Psychiatric/Behavioral: Negative. Past medical history, past surgical history, medication list, social and familyhistory reviewed    Blood pressure 112/70, pulse 69, weight 132 lb (59.9 kg), last menstrual period 09/19/2008, SpO2 98 %, not currently breastfeeding. Physical Exam  Constitutional:       Appearance: She is well-developed. HENT:      Head: Normocephalic and atraumatic. Eyes:      Conjunctiva/sclera: Conjunctivae normal.      Pupils: Pupils are equal, round, and reactive to light. Cardiovascular:      Rate and Rhythm: Normal rate. Pulmonary:      Effort: Pulmonary effort is normal.   Abdominal:      General: Bowel sounds are normal.      Palpations: Abdomen is soft.       Comments: Mild tenderness in left lower quadrant area   Musculoskeletal: General: Normal range of motion. Cervical back: Normal range of motion. Skin:     General: Skin is warm. Neurological:      Mental Status: She is alert. Laboratory, Pathology, Radiology reviewed in detail with relevantimportant investigations summarized below:    No results for input(s): WBC, HGB, HCT, MCV, PLT in the last 720 hours. Lab Results   Component Value Date    ALT 7 06/17/2021    AST 12 06/17/2021    ALKPHOS 73 06/17/2021    BILITOT 0.3 06/17/2021     No results found. Endoscopic investigations:     Assessment and Plan:  Christinabrii Santiagopatricia 74 y.o. female for follow up. Rectal discomfort and discharge with occasional bleeding, history of rectal cancer status post chemo and radiation. Possibly radiation-induced changes. Radiation proctitis, will schedule flexible sigmoidoscopy    No follow-ups on file. Flora Lee MD   StaffGastroenterologist  [facility]    Please note this report has been partially produced using speech recognitionsoftware  and may cause contain errors related to that system including grammar, punctuation and spelling as well as words andphrases that may seem inappropriate. If there are questions or concerns please feel free to contact me to clarify.

## 2023-02-09 ENCOUNTER — HOSPITAL ENCOUNTER (OUTPATIENT)
Age: 68
Setting detail: OUTPATIENT SURGERY
Discharge: HOME | End: 2023-02-09
Attending: SPECIALIST | Admitting: SPECIALIST
Payer: MEDICARE

## 2023-02-09 ENCOUNTER — ANESTHESIA (OUTPATIENT)
Dept: ENDOSCOPY | Age: 68
End: 2023-02-09
Payer: MEDICARE

## 2023-02-09 ENCOUNTER — ANESTHESIA EVENT (OUTPATIENT)
Dept: ENDOSCOPY | Age: 68
End: 2023-02-09
Payer: MEDICARE

## 2023-02-09 VITALS
BODY MASS INDEX: 22.53 KG/M2 | HEART RATE: 60 BPM | SYSTOLIC BLOOD PRESSURE: 108 MMHG | DIASTOLIC BLOOD PRESSURE: 62 MMHG | WEIGHT: 132 LBS | HEIGHT: 64 IN | OXYGEN SATURATION: 98 % | RESPIRATION RATE: 16 BRPM | TEMPERATURE: 97.9 F

## 2023-02-09 DIAGNOSIS — Z85.048 HISTORY OF RECTAL CANCER: ICD-10-CM

## 2023-02-09 DIAGNOSIS — K62.89 RECTAL PAIN: ICD-10-CM

## 2023-02-09 PROCEDURE — 2500000003 HC RX 250 WO HCPCS: Performed by: NURSE ANESTHETIST, CERTIFIED REGISTERED

## 2023-02-09 PROCEDURE — 6360000002 HC RX W HCPCS: Performed by: NURSE ANESTHETIST, CERTIFIED REGISTERED

## 2023-02-09 PROCEDURE — 88342 IMHCHEM/IMCYTCHM 1ST ANTB: CPT

## 2023-02-09 PROCEDURE — 2580000003 HC RX 258

## 2023-02-09 PROCEDURE — 2709999900 HC NON-CHARGEABLE SUPPLY: Performed by: SPECIALIST

## 2023-02-09 PROCEDURE — 88305 TISSUE EXAM BY PATHOLOGIST: CPT

## 2023-02-09 PROCEDURE — 3700000000 HC ANESTHESIA ATTENDED CARE: Performed by: SPECIALIST

## 2023-02-09 PROCEDURE — 2580000003 HC RX 258: Performed by: SPECIALIST

## 2023-02-09 PROCEDURE — 3609008400 HC SIGMOIDOSCOPY DIAGNOSTIC: Performed by: SPECIALIST

## 2023-02-09 PROCEDURE — 6370000000 HC RX 637 (ALT 250 FOR IP): Performed by: SPECIALIST

## 2023-02-09 PROCEDURE — 7100000011 HC PHASE II RECOVERY - ADDTL 15 MIN: Performed by: SPECIALIST

## 2023-02-09 PROCEDURE — 7100000010 HC PHASE II RECOVERY - FIRST 15 MIN: Performed by: SPECIALIST

## 2023-02-09 RX ORDER — SODIUM CHLORIDE 9 MG/ML
INJECTION, SOLUTION INTRAVENOUS CONTINUOUS
Status: DISCONTINUED | OUTPATIENT
Start: 2023-02-09 | End: 2023-02-09 | Stop reason: HOSPADM

## 2023-02-09 RX ORDER — LIDOCAINE HYDROCHLORIDE 20 MG/ML
INJECTION, SOLUTION INFILTRATION; PERINEURAL PRN
Status: DISCONTINUED | OUTPATIENT
Start: 2023-02-09 | End: 2023-02-09 | Stop reason: SDUPTHER

## 2023-02-09 RX ORDER — PROPOFOL 10 MG/ML
INJECTION, EMULSION INTRAVENOUS PRN
Status: DISCONTINUED | OUTPATIENT
Start: 2023-02-09 | End: 2023-02-09 | Stop reason: SDUPTHER

## 2023-02-09 RX ORDER — SIMETHICONE 20 MG/.3ML
EMULSION ORAL PRN
Status: DISCONTINUED | OUTPATIENT
Start: 2023-02-09 | End: 2023-02-09 | Stop reason: ALTCHOICE

## 2023-02-09 RX ORDER — MAGNESIUM HYDROXIDE 1200 MG/15ML
LIQUID ORAL PRN
Status: DISCONTINUED | OUTPATIENT
Start: 2023-02-09 | End: 2023-02-09 | Stop reason: ALTCHOICE

## 2023-02-09 RX ORDER — SODIUM CHLORIDE 0.9 % (FLUSH) 0.9 %
5-40 SYRINGE (ML) INJECTION PRN
Status: DISCONTINUED | OUTPATIENT
Start: 2023-02-09 | End: 2023-02-09 | Stop reason: HOSPADM

## 2023-02-09 RX ORDER — SODIUM CHLORIDE 0.9 % (FLUSH) 0.9 %
5-40 SYRINGE (ML) INJECTION EVERY 12 HOURS SCHEDULED
Status: DISCONTINUED | OUTPATIENT
Start: 2023-02-09 | End: 2023-02-09 | Stop reason: HOSPADM

## 2023-02-09 RX ORDER — HYDROCORTISONE ACETATE 25 MG/1
25 SUPPOSITORY RECTAL EVERY 12 HOURS
Qty: 30 SUPPOSITORY | Refills: 1 | Status: SHIPPED | OUTPATIENT
Start: 2023-02-09

## 2023-02-09 RX ORDER — SODIUM CHLORIDE 9 MG/ML
INJECTION, SOLUTION INTRAVENOUS PRN
Status: DISCONTINUED | OUTPATIENT
Start: 2023-02-09 | End: 2023-02-09 | Stop reason: HOSPADM

## 2023-02-09 RX ORDER — SODIUM CHLORIDE 9 MG/ML
INJECTION, SOLUTION INTRAVENOUS
Status: COMPLETED
Start: 2023-02-09 | End: 2023-02-09

## 2023-02-09 RX ADMIN — SODIUM CHLORIDE: 9 INJECTION, SOLUTION INTRAVENOUS at 10:40

## 2023-02-09 RX ADMIN — PROPOFOL 100 MG: 10 INJECTION, EMULSION INTRAVENOUS at 11:28

## 2023-02-09 RX ADMIN — LIDOCAINE HYDROCHLORIDE 50 MG: 20 INJECTION, SOLUTION INFILTRATION; PERINEURAL at 11:28

## 2023-02-09 ASSESSMENT — PAIN - FUNCTIONAL ASSESSMENT: PAIN_FUNCTIONAL_ASSESSMENT: 0-10

## 2023-02-09 ASSESSMENT — PAIN SCALES - GENERAL
PAINLEVEL_OUTOF10: 0
PAINLEVEL_OUTOF10: 0

## 2023-02-09 ASSESSMENT — LIFESTYLE VARIABLES: SMOKING_STATUS: 0

## 2023-02-09 NOTE — H&P
Patient Name: Josy Rosenbaum  : 1955  MRN: [id number]  DATE: 23      ENDOSCOPY  History and Physical    Procedure:    [] Diagnostic Colonoscopy       [] Screening Colonoscopy  [] EGD      [] ERCP      [] EUS       [x] Other    [x] Previous office notes/History and Physical reviewed from the patients chart. Please see EMR for further details of HPI. I have examined the patient's status immediately prior to the procedure and:      Indications/HPI:    []Abdominal Pain  []Cancer- GI/Lung  []Fhx of colon CA/polyps  []History of Polyps  []Hernandezs   []Melena  []Abnormal Imaging  []Dysphagia    []Persistent Pneumonia  []Anemia  []Food Impaction  []History of Polyps  []GI Bleed  []Pulmonary nodule/Mass  []Change in bowel habits []Heartburn/Reflux  [x]Rectal Bleed (BRBPR)  []Chest Pain - Non Cardiac []Heme (+) Stoo  l[]Ulcers  []Constipation  []Hemoptysis   []Varices  []Diarrhea  []Hypoxemia  []Nausea/Vomiting  []Screening   []Crohns/Colitis  []Other: rectal pain,h/o rectal cancer,s/p chemo and radiation. Anesthesia:   [x] MAC [] Moderate Sedation   [] General   [] None     ROS: 12 pt Review of Symptoms was negative unless mentioned above    Medications:   Prior to Admission medications    Medication Sig Start Date End Date Taking?  Authorizing Provider   folic acid (FOLVITE) 1 MG tablet TAKE ONE TABLET BY MOUTH DAILY 23   LISA Ortega - CNP   estradiol (ESTRACE VAGINAL) 0.1 MG/GM vaginal cream Place 1 g vaginally daily 22   Perla Pichardo MD   Fremanezumab-vfrm (AJOVY) 225 MG/1.5ML SOAJ INJECT 1 PEN UNDER THE SKIN 1 TIME EVERY 30 DAYS 10/3/22   Britton Smith MD   oxybutynin (DITROPAN-XL) 10 MG extended release tablet TAKE 1 TABLET DAILY 8/15/22   Mike Cronin MD   prednisoLONE acetate (PRED FORTE) 1 % ophthalmic suspension Place 1 drop into both eyes daily 8/3/22   Historical Provider, MD   Carboxymethylcellulose Sodium (ARTIFICIAL TEARS OP) Apply 1 drop to eye as needed    Historical Provider, MD   silver sulfADIAZINE (SILVADENE) 1 % cream Apply topically daily. 8/4/22   Codie Scott MD   Cholecalciferol (VITAMIN D3) 50 MCG (2000 UT) CAPS Take by mouth daily    Historical Provider, MD   topiramate (TOPAMAX) 100 MG tablet TAKE 3 TABLETS DAILY 6/16/22   Héctor Pineda MD   PARoxetine (PAXIL) 20 MG tablet TAKE 1 TABLET DAILY 6/7/22   Timothy James, APRN - CNM   CLIMARA 0.1 MG/24HR APPLY 1 PATCH TRANSDERMALLYONTO THE SKIN ONCE A WEEK  Patient not taking: Reported on 2/9/2023 2/28/22   Linda Agee MD   SUMAtriptan Succinate 3 MG/0.5ML SOAJ Inject into the skin as needed     Historical Provider, MD   ZOMIG 5 MG nasal solution once as needed  8/24/15   Historical Provider, MD   aspirin 81 MG EC tablet Take 81 mg by mouth daily. Historical Provider, MD       Allergies:    Allergies   Allergen Reactions    Erythromycin Hives    Seasonal Other (See Comments)     Pollen causes sinus sx    Sporanox [Itraconazole] Hives    Macrobid [Nitrofurantoin Macrocrystal] Other (See Comments)     Migraine, vomiting, fever/chills        History of allergic reaction to anesthesia:  No    Past Medical History:  Past Medical History:   Diagnosis Date    Allergic reaction     Arthritis     Cancer (HCC)     Cervical, dx by Dr. Burgos Stage    Cerebral artery occlusion with cerebral infarction ([facility] [address]     at age 52 / sx as a migraine with slurred speech & visual disturbance    Diverticulitis     GERD (gastroesophageal reflux disease)     History of conization of cervix     Migraine     Rectal cancer ([facility] [address]     Renal insufficiency     Tinea unguium        Past Surgical History:  Past Surgical History:   Procedure Laterality Date    CARDIAC SURGERY      PFO surgery in age 48s    COLONOSCOPY  2018    COLONOSCOPY N/A 5/31/2022    COLONOSCOPY w/biopsies performed by Patrick Tirado MD at [address] [place] [address]  2017    HYSTERECTOMY VAGINAL N/A 12/19/2018    TLH WITH BSO performed by Linda Agee MD at [facility] [place] COLONOSCOPY FLX DX W/COLLJ SPEC WHEN PFRMD N/A 4/19/2018    COLONOSCOPY performed by Sharlot Curling, MD at [address] N/A 9/26/2018    LEEP performed by Erasmo Bach MD at [address] [place] N/A 9/26/2018    DILATATION AND CURETTAGE HYSTEROSCOPY performed by Erasmo Bach MD at [address] [facility] [address]    RCR       Social History:  Social History     Tobacco Use    Smoking status: Never    Smokeless tobacco: Never   Vaping Use    Vaping Use: Never used   Substance Use Topics    Alcohol use: Yes     Comment: socially    Drug use: Yes     Types: Marijuana Clydia Formica)     Comment: occ       Vital Signs:   Vitals:    02/09/23 1036   BP: (!) 118/59   Pulse: 67   Resp: 16   Temp: 97.9 °F (36.6 °C)   SpO2: 99%        Physical Exam:  Cardiac:  [x]WNL  []Comments:  Pulmonary:  [x]WNL   []Comments:   Neuro/Mental Status:  [x]WNL  []Comments:  Abdominal:  [x]WNL    []Comments:  Other:   []WNL  []Comments:    Informed Consent:  The risks and benefits of the procedure have been discussed with either the patient or if they cannot consent, their representative. Assessment:  Patient examined and appropriate for planned sedation and procedure. Plan:  Proceed with planned sedation and procedure as above.     Sharlot Curling, MD  11:17 AM

## 2023-02-09 NOTE — ANESTHESIA PRE PROCEDURE
Department of Anesthesiology  Preprocedure Note       Name:  Mikayla Peralta   Age:  74 y.o.  :  1955                                          MRN:  [id number]         Date:  2023      Surgeon: Estrada Cedeno):  Meena Salas MD    Procedure: Procedure(s):  SIGMOIDOSCOPY DIAGNOSTIC FLEXIBLE    Medications prior to admission:   Prior to Admission medications    Medication Sig Start Date End Date Taking? Authorizing Provider   folic acid (FOLVITE) 1 MG tablet TAKE ONE TABLET BY MOUTH DAILY 23   LISA Villareal - CNP   estradiol (ESTRACE VAGINAL) 0.1 MG/GM vaginal cream Place 1 g vaginally daily 22   Perla Pichardo MD   Fremanezumab-vfrm (AJOVY) 225 MG/1.5ML SOAJ INJECT 1 PEN UNDER THE SKIN 1 TIME EVERY 30 DAYS 10/3/22   Doron Sandy MD   oxybutynin (DITROPAN-XL) 10 MG extended release tablet TAKE 1 TABLET DAILY 8/15/22   Selvin Silvestre MD   prednisoLONE acetate (PRED FORTE) 1 % ophthalmic suspension Place 1 drop into both eyes daily 8/3/22   Historical Provider, MD   Carboxymethylcellulose Sodium (ARTIFICIAL TEARS OP) Apply 1 drop to eye as needed    Historical Provider, MD   silver sulfADIAZINE (SILVADENE) 1 % cream Apply topically daily. 22   Yadi Boogie MD   Cholecalciferol (VITAMIN D3) 50 MCG (2000) CAPS Take by mouth daily    Historical Provider, MD   topiramate (TOPAMAX) 100 MG tablet TAKE 3 TABLETS DAILY 22   Doron Sandy MD   PARoxetine (PAXIL) 20 MG tablet TAKE 1 TABLET DAILY 22   LISA Rendon - FLYM   CLIMARA 0.1 MG/24HR APPLY 1 PATCH TRANSDERMALLYONTO THE SKIN ONCE A WEEK 22   Selvin Silvestre MD   SUMAtriptan Succinate 3 MG/0.5ML SOAJ Inject into the skin as needed     Historical Provider, MD   ZOMIG 5 MG nasal solution once as needed  8/24/15   Historical Provider, MD   aspirin 81 MG EC tablet Take 81 mg by mouth daily.       Historical Provider, MD       Current medications:    Current Facility-Administered Medications   Medication Dose Route Frequency Provider Last Rate Last Admin    sodium chloride 0.9 % infusion             0.9 % sodium chloride infusion   IntraVENous Continuous America Marks MD           Allergies: Allergies   Allergen Reactions    Erythromycin Hives    Seasonal Other (See Comments)     Pollen causes sinus sx    Sporanox [Itraconazole] Hives    Macrobid [Nitrofurantoin Macrocrystal] Other (See Comments)     Migraine, vomiting, fever/chills       Problem List:    Patient Active Problem List   Diagnosis Code    Cervical spondylosis M47.812    Cervical spinal stenosis M48.02    Degenerative disc disease, cervical M50.30    Radiculopathy affecting upper extremity M54.10    Tinea unguium B35.1    Intractable migraine with aura with status migrainosus G43. 111    Allergic reaction T78.40XA    Renal insufficiency N28.9    Vaginal bleeding N93.9    Stenosis of cervix N88.2    DUB (dysfunctional uterine bleeding) N93.8    S/P LEEP Z98.890    PMB (postmenopausal bleeding) N95.0    Thickened endometrium R93.89    Menopause Z78.0    Hot flashes due to menopause N95.1    Cerebrovascular accident ([facility] [address] I63.9    Cervical cancer (HCC) C53.9    PFO (patent foramen ovale) Q21.12    Rectal bleeding K62.5    Altered bowel habits R19.4    Adenomatous polyp of sigmoid colon D12.5    Rectal mass K62.89    Anal cancer (HCC) C21.0    Palliative care encounter Z51.5    Diarrhea R19.7    Radiation enteritis 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 Mango Diaz    Cerebral artery occlusion with cerebral infarction ([facility] [address]     at age 52 / sx as a migraine with slurred speech & visual disturbance    Diverticulitis     GERD (gastroesophageal reflux disease)     History of conization of cervix     Migraine     Rectal cancer ([facility] [address]     Renal insufficiency     Tinea unguium        Past Surgical History: Procedure Laterality Date    CARDIAC SURGERY      PFO surgery in age 48s    COLONOSCOPY  2018    COLONOSCOPY N/A 5/31/2022    COLONOSCOPY w/biopsies performed by Makenzie Isabel MD at [address]  2017    HYSTERECTOMY VAGINAL N/A 12/19/2018    [address] WITH BSO performed by Amando Jc MD at [address] [facility] FLX DX W/COLLJ Jessie 1978 PFRMD N/A 4/19/2018    COLONOSCOPY performed by Makenzie Isabel MD at [facility] N/A 9/26/2018    LEEP performed by Denisse Hernandez MD at [place] [address] N/A 9/26/2018    DILATATION AND CURETTAGE HYSTEROSCOPY performed by Denisse Hernandez MD at [address]       Social History:    Social History     Tobacco Use    Smoking status: Never    Smokeless tobacco: Never   Substance Use Topics    Alcohol use: Yes     Comment: socially                                Counseling given: Not Answered      Vital Signs (Current): There were no vitals filed for this visit.                                            BP Readings from Last 3 Encounters:   02/06/23 112/70   01/12/23 98/70   12/14/22 98/62       NPO Status:                                                                                 BMI:   Wt Readings from Last 3 Encounters:   02/06/23 132 lb (59.9 kg)   01/24/23 132 lb (59.9 kg)   01/12/23 141 lb (64 kg)     There is no height or weight on file to calculate BMI.    CBC:   Lab Results   Component Value Date/Time    WBC 6.4 06/17/2021 01:49 PM    RBC 3.98 06/17/2021 01:49 PM    HGB 13.1 06/17/2021 01:49 PM    HCT 39.0 06/17/2021 01:49 PM    MCV 97.9 06/17/2021 01:49 PM    RDW 13.4 06/17/2021 01:49 PM     06/17/2021 01:49 PM       CMP:   Lab Results   Component Value Date/Time     06/17/2021 01:49 PM    K 5.0 06/17/2021 01:49 PM     06/17/2021 01:49 PM    CO2 22 06/17/2021 01:49 PM    BUN 23 06/17/2021 01:49 PM    CREATININE 1.0 04/19/2022 04:45 PM    CREATININE 0.90 06/17/2021 01:49 PM    GFRAA >60 04/19/2022 04:45 PM    LABGLOM 56 04/19/2022 04:45 PM    GLUCOSE 84 06/17/2021 01:49 PM    PROT 6.8 06/17/2021 01:49 PM    CALCIUM 9.6 06/17/2021 01:49 PM    BILITOT 0.3 06/17/2021 01:49 PM    ALKPHOS 73 06/17/2021 01:49 PM    AST 12 06/17/2021 01:49 PM    ALT 7 06/17/2021 01:49 PM       POC Tests: No results for input(s): POCGLU, POCNA, POCK, POCCL, POCBUN, POCHEMO, POCHCT in the last 72 hours. Coags:   Lab Results   Component Value Date/Time    PROTIME 10.7 11/07/2018 09:45 AM    INR 1.0 11/07/2018 09:45 AM    APTT 25.9 11/07/2018 09:45 AM       HCG (If Applicable): No results found for: PREGTESTUR, PREGSERUM, HCG, HCGQUANT     ABGs: No results found for: PHART, PO2ART, KFI2ISX, HOW3WOU, BEART, D1MIMNVA     Type & Screen (If Applicable):  No results found for: LABABO, LABRH    Drug/Infectious Status (If Applicable):  No results found for: HIV, HEPCAB    COVID-19 Screening (If Applicable): No results found for: COVID19        Anesthesia Evaluation  Patient summary reviewed and Nursing notes reviewed no history of anesthetic complications:   Airway: Mallampati: II  TM distance: >3 FB   Neck ROM: full  Mouth opening: > = 3 FB   Dental:          Pulmonary:       (-) not a current smoker                           Cardiovascular:  Exercise tolerance: good (>4 METS),         ECG reviewed               Beta Blocker:  Not on Beta Blocker      ROS comment: PFO     Neuro/Psych:   (+) CVA:, headaches: migraine headaches,             GI/Hepatic/Renal:   (+) GERD:, bowel prep,           Endo/Other:    (+) : arthritis: OA., malignancy/cancer. Pt had no PAT visit       Abdominal:             Vascular: negative vascular ROS. Other Findings:           Anesthesia Plan      MAC     ASA 2       Induction: intravenous. Anesthetic plan and risks discussed with patient.                         Mercyriam Lexes Enriqueta Galvan - MARGOTH   2/9/2023

## 2023-02-09 NOTE — ANESTHESIA POSTPROCEDURE EVALUATION
Department of Anesthesiology  Postprocedure Note    Patient: Reggie Godoy  MRN: [id number]  YOB: 1955  Date of evaluation: 2/9/2023      Procedure Summary     Date: 02/09/23 Room / Location: [facility] OR [address] [place] [address]    Anesthesia Start: 1072 Anesthesia Stop: 1136    Procedure: SIGMOIDOSCOPY DIAGNOSTIC FLEXIBLE Diagnosis:       Rectal pain      History of rectal cancer      (Rectal pain [K62.89])      (History of rectal cancer [Z85.048])    Surgeons: Penny Walter MD Responsible Provider: LISA Pate CRNA    Anesthesia Type: MAC ASA Status: 2          Anesthesia Type: No value filed.     Aj Phase I:      Aj Phase II:        Anesthesia Post Evaluation    Patient location during evaluation: bedside  Patient participation: complete - patient participated  Level of consciousness: awake and awake and alert  Pain score: 0  Airway patency: patent  Nausea & Vomiting: no nausea and no vomiting  Complications: no  Cardiovascular status: blood pressure returned to baseline and hemodynamically stable  Respiratory status: acceptable  Hydration status: euvolemic

## 2023-03-17 ENCOUNTER — TELEPHONE (OUTPATIENT)
Dept: FAMILY MEDICINE CLINIC | Age: 68
End: 2023-03-17

## 2023-03-20 ENCOUNTER — HOSPITAL ENCOUNTER (OUTPATIENT)
Dept: CT IMAGING | Age: 68
Discharge: HOME | End: 2023-03-22
Payer: MEDICARE

## 2023-03-20 DIAGNOSIS — K62.89 RECTAL MASS: ICD-10-CM

## 2023-03-20 DIAGNOSIS — C21.0 ANAL CANCER (HCC): ICD-10-CM

## 2023-03-20 PROCEDURE — 6360000004 HC RX CONTRAST MEDICATION: Performed by: RADIOLOGY

## 2023-03-20 PROCEDURE — 74177 CT ABD & PELVIS W/CONTRAST: CPT

## 2023-03-20 RX ADMIN — IOPAMIDOL 50 ML: 612 INJECTION, SOLUTION INTRAVENOUS at 13:05

## 2023-03-20 RX ADMIN — IOPAMIDOL 20 ML: 612 INJECTION, SOLUTION INTRAVENOUS at 13:04

## 2023-03-21 ENCOUNTER — HOSPITAL ENCOUNTER (OUTPATIENT)
Dept: RADIATION ONCOLOGY | Age: 68
Discharge: HOME | End: 2023-03-21
Payer: MEDICARE

## 2023-03-21 VITALS
TEMPERATURE: 97.3 F | DIASTOLIC BLOOD PRESSURE: 71 MMHG | HEART RATE: 70 BPM | RESPIRATION RATE: 16 BRPM | OXYGEN SATURATION: 98 % | WEIGHT: 140 LBS | BODY MASS INDEX: 24.03 KG/M2 | SYSTOLIC BLOOD PRESSURE: 116 MMHG

## 2023-03-21 DIAGNOSIS — C21.0 ANAL CANCER (HCC): Primary | ICD-10-CM

## 2023-03-21 LAB
PERFORMED ON: ABNORMAL
POC CREATININE: 1.2 MG/DL (ref 0.6–1.2)
POC SAMPLE TYPE: ABNORMAL

## 2023-03-21 PROCEDURE — 99212 OFFICE O/P EST SF 10 MIN: CPT | Performed by: RADIOLOGY

## 2023-03-21 PROCEDURE — 99214 OFFICE O/P EST MOD 30 MIN: CPT | Performed by: RADIOLOGY

## 2023-03-21 NOTE — PROGRESS NOTES
[facility]           Radiation Oncology      [address] [facility]        [address]        Davon Yusuf: [phone]        F: [phone]       Purple Harry                   Dr. Bernett Cooks MD PhD    FOLLOW-UP NOTE     Date of Service: 3/21/2023  Patient ID: Kellen Delacruz   : 1955  MRN: [id number]   Acct Number: [de-identified]       NAME:  Kellen Delacruz    :  1955 74 y.o. female     PCP: LISA Shankar - CNP    REFERRING PROVIDER: Yuly Ornelas    DIAGNOSIS:  1. Anal cancer ([facility] [address]        STAGING: Cancer Staging  Anal cancer ([facility] [address]  Staging form: Anus, AJCC 8th Edition  - Clinical: Stage IIA (cT2, cN0, cM0) - Signed by Bernett Cooks, MD on 2022    PRIOR TREATMENT: 5400 cGy in 27 fractions to gross disease in anal/distal rectal area     TIME SINCE TREATMENT:  7 months     RECENT HISTORY: Kellen Delacruz returns for follow up status post completion of definitive chemoradiation therapy for the above diagnosis. She returns for follow-up approximately 7 months after completing chemoradiation therapy. She notes that the skin of the perirectal/perineal area and bilateral inguinal site has fully healed. She does have regular bowel movements and occasional constipation which is baseline for her. She did previously have some pain and discomfort in the perianal area that has nearly completely resolved. She also notes her baseline stress urinary incontinence has not worsened but denies any bowel incontinence. She also denies any blood in the stool. She is reporting some occasional clear vaginal discharge but no julissa blood. She is sexually active and using her medium size dilator without any issues. She denies any dyspareunia. She does note some lower back pain over the past month and reports that this may have been related to packing up some boxes and getting ready for a road trip that she took with her  recently.  Her appetite and energy are good and she
no      Additional Comments: will see Dr Mireya Ordonez Next month

## 2023-03-23 ENCOUNTER — OFFICE VISIT (OUTPATIENT)
Dept: GASTROENTEROLOGY | Age: 68
End: 2023-03-23
Payer: MEDICARE

## 2023-03-23 VITALS
TEMPERATURE: 97.5 F | HEART RATE: 66 BPM | OXYGEN SATURATION: 95 % | BODY MASS INDEX: 23.9 KG/M2 | HEIGHT: 64 IN | WEIGHT: 140 LBS

## 2023-03-23 DIAGNOSIS — C20 RECTAL CANCER (HCC): Primary | ICD-10-CM

## 2023-03-23 PROCEDURE — 3017F COLORECTAL CA SCREEN DOC REV: CPT | Performed by: SPECIALIST

## 2023-03-23 PROCEDURE — G8420 CALC BMI NORM PARAMETERS: HCPCS | Performed by: SPECIALIST

## 2023-03-23 PROCEDURE — 1123F ACP DISCUSS/DSCN MKR DOCD: CPT | Performed by: SPECIALIST

## 2023-03-23 PROCEDURE — G8400 PT W/DXA NO RESULTS DOC: HCPCS | Performed by: SPECIALIST

## 2023-03-23 PROCEDURE — G8427 DOCREV CUR MEDS BY ELIG CLIN: HCPCS | Performed by: SPECIALIST

## 2023-03-23 PROCEDURE — G8484 FLU IMMUNIZE NO ADMIN: HCPCS | Performed by: SPECIALIST

## 2023-03-23 PROCEDURE — 1090F PRES/ABSN URINE INCON ASSESS: CPT | Performed by: SPECIALIST

## 2023-03-23 PROCEDURE — 1036F TOBACCO NON-USER: CPT | Performed by: SPECIALIST

## 2023-03-23 PROCEDURE — 99212 OFFICE O/P EST SF 10 MIN: CPT | Performed by: SPECIALIST

## 2023-03-23 ASSESSMENT — ENCOUNTER SYMPTOMS
GASTROINTESTINAL NEGATIVE: 1
VOMITING: 0
ANAL BLEEDING: 0
CONSTIPATION: 0
ABDOMINAL PAIN: 0
RESPIRATORY NEGATIVE: 1
BLOOD IN STOOL: 0
NAUSEA: 0
RECTAL PAIN: 0
EYES NEGATIVE: 1
ABDOMINAL DISTENTION: 0
DIARRHEA: 0

## 2023-03-23 NOTE — PROGRESS NOTES
Gastroenterology Clinic Follow up Visit    Bernie Gould  [id number]  Chief Complaint   Patient presents with    Follow-up       HPI and A/P at last visit summarized below:  Patient is here for follow-up. Had rectal cancer which was treated with chemo and radiation and because of rectal discharge and discomfort a follow-up sigmoidoscopy was done which showed complete resolution of the rectal mass. 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 Reports occasional pain left lower quadrant area and also had some mucousy discharge after patient had a digital rectal exam.,  Currently there is no discharge per rectum. Has normal BM. Review of Systems   Constitutional: Negative. HENT: Negative. Eyes: Negative. Respiratory: Negative. Cardiovascular: Negative. Gastrointestinal: Negative. Negative for abdominal distention, abdominal pain, anal bleeding, blood in stool, constipation, diarrhea, nausea, rectal pain and vomiting. Occasional pain left lower quadrant area   Endocrine: Negative. Genitourinary: Negative. Musculoskeletal: Negative. Skin: Negative. Allergic/Immunologic: Negative for food allergies. Neurological: Negative. Hematological: Negative. Psychiatric/Behavioral: Negative. Past medical history, past surgical history, medication list, social and familyhistory reviewed    Pulse 66, temperature 97.5 °F (36.4 °C), temperature source Temporal, height 5' 4\" (1.626 m), weight 140 lb (63.5 kg), last menstrual period 09/19/2008, SpO2 95 %, not currently breastfeeding. Physical Exam    Laboratory, Pathology, Radiology reviewed in detail with relevantimportant investigations summarized below:    No results for input(s): WBC, HGB, HCT, MCV, PLT in the last 720 hours.   Lab Results   Component Value Date    ALT 7 06/17/2021    AST 12 06/17/2021    ALKPHOS 73 06/17/2021    BILITOT 0.3 06/17/2021     CT ABDOMEN PELVIS

## 2023-03-29 ENCOUNTER — TELEPHONE (OUTPATIENT)
Dept: GASTROENTEROLOGY | Age: 68
End: 2023-03-29

## 2023-03-29 RX ORDER — HYDROCORTISONE ACETATE 25 MG/1
25 SUPPOSITORY RECTAL EVERY 12 HOURS
Qty: 10 SUPPOSITORY | Refills: 1 | Status: SHIPPED | OUTPATIENT
Start: 2023-03-29

## 2023-03-30 RX ORDER — UBROGEPANT 100 MG/1
100 TABLET ORAL DAILY PRN
Qty: 16 TABLET | Refills: 3 | Status: SHIPPED | OUTPATIENT
Start: 2023-03-30 | End: 2023-04-29

## 2023-05-05 ENCOUNTER — OFFICE VISIT (OUTPATIENT)
Dept: SURGERY | Age: 68
End: 2023-05-05

## 2023-05-05 VITALS
HEART RATE: 68 BPM | OXYGEN SATURATION: 98 % | HEIGHT: 64 IN | BODY MASS INDEX: 22.88 KG/M2 | TEMPERATURE: 98.1 F | WEIGHT: 134 LBS

## 2023-05-05 DIAGNOSIS — C21.0 ANAL CANCER (HCC): Primary | ICD-10-CM

## 2023-05-05 ASSESSMENT — ENCOUNTER SYMPTOMS
DIARRHEA: 0
SHORTNESS OF BREATH: 0
COLOR CHANGE: 0
ABDOMINAL PAIN: 0
RECTAL PAIN: 0
CHEST TIGHTNESS: 0
CONSTIPATION: 0

## 2023-05-05 NOTE — PROGRESS NOTES
Hematological:  Does not bruise/bleed easily. Psychiatric/Behavioral:  Negative for agitation. Objective:    Pulse 68   Temp 98.1 °F (36.7 °C) (Temporal)   Ht 5' 4\" (1.626 m)   Wt 134 lb (60.8 kg)   LMP 09/19/2008   SpO2 98%   BMI 23.00 kg/m²     Physical Exam  Constitutional:       Appearance: She is well-developed. HENT:      Head: Normocephalic and atraumatic. Eyes:      Pupils: Pupils are equal, round, and reactive to light. Cardiovascular:      Rate and Rhythm: Normal rate and regular rhythm. Heart sounds: Normal heart sounds. Pulmonary:      Effort: Pulmonary effort is normal. No respiratory distress. Breath sounds: Normal breath sounds. No wheezing. Abdominal:      General: There is no distension. Palpations: There is no mass. Tenderness: There is no abdominal tenderness. There is no guarding or rebound. Hernia: No hernia is present. Genitourinary:     Comments: Nonthrombosed external tags present as described in previous notes. Anoscopy was performed completely. No evidence of recurrence based visually on full anoscopy. Digital exam performed with adequate sphincter tone. No palpable findings present  Musculoskeletal:         General: Normal range of motion. Cervical back: Normal range of motion and neck supple. Skin:     General: Skin is warm and dry. Coloration: Skin is not pale. Findings: No erythema or rash. Neurological:      Mental Status: She is alert and oriented to person, place, and time. Psychiatric:         Behavior: Behavior normal.         Judgment: Judgment normal.            Assessment/Plan:          Diagnosis Orders   1. Anal cancer ([address]          Radiation proctitis with mucus production discussed. She has intermittent questionable improvement with steroid suppositories. She is going to continue those. I will continue to follow with anoscopy to evaluate for any potential recurrence.     Patient currently

## 2023-05-18 ENCOUNTER — TELEPHONE (OUTPATIENT)
Dept: FAMILY MEDICINE CLINIC | Age: 68
End: 2023-05-18

## 2023-05-18 DIAGNOSIS — Z12.31 ENCOUNTER FOR SCREENING MAMMOGRAM FOR MALIGNANT NEOPLASM OF BREAST: Primary | ICD-10-CM

## 2023-05-23 ENCOUNTER — HOSPITAL ENCOUNTER (OUTPATIENT)
Dept: WOMENS IMAGING | Age: 68
Discharge: HOME | End: 2023-05-25
Payer: MEDICARE

## 2023-05-23 VITALS — BODY MASS INDEX: 23 KG/M2 | HEIGHT: 64 IN

## 2023-05-23 DIAGNOSIS — Z12.31 ENCOUNTER FOR SCREENING MAMMOGRAM FOR MALIGNANT NEOPLASM OF BREAST: ICD-10-CM

## 2023-05-23 PROCEDURE — 77063 BREAST TOMOSYNTHESIS BI: CPT

## 2023-05-26 DIAGNOSIS — E53.8 FOLIC ACID DEFICIENCY: ICD-10-CM

## 2023-05-26 RX ORDER — FOLIC ACID 1 MG/1
TABLET ORAL
Qty: 90 TABLET | Refills: 0 | Status: SHIPPED | OUTPATIENT
Start: 2023-05-26

## 2023-05-26 RX ORDER — PAROXETINE HYDROCHLORIDE 20 MG/1
20 TABLET, FILM COATED ORAL DAILY
Qty: 90 TABLET | Refills: 3 | Status: SHIPPED | OUTPATIENT
Start: 2023-05-26

## 2023-05-26 RX ORDER — OXYBUTYNIN CHLORIDE 10 MG/1
10 TABLET, EXTENDED RELEASE ORAL DAILY
Qty: 90 TABLET | Refills: 3 | Status: SHIPPED | OUTPATIENT
Start: 2023-05-26

## 2023-05-26 RX ORDER — TOPIRAMATE 100 MG/1
TABLET, FILM COATED ORAL
Qty: 270 TABLET | Refills: 3 | Status: SHIPPED | OUTPATIENT
Start: 2023-05-26

## 2023-05-26 NOTE — TELEPHONE ENCOUNTER
Pt is requesting medication refill.  Please approve or deny this request.    Rx requested:  Requested Prescriptions     Pending Prescriptions Disp Refills    topiramate (TOPAMAX) 100 MG tablet 270 tablet 3     Sig: TAKE 3 TABLETS DAILY         Last Office Visit:   2/28/2022      Next Visit Date:  Future Appointments   Date Time Provider Janette Tan   8/4/2023  9:30 AM Jose Alberto Laureano MD [facility]   9/19/2023 11:00 AM SCHEDULE, MAIRA RAD ONC NURSE MAIRA RAD ONC [place]   10/4/2023  2:30 PM MD Felipe [facility] Neurology -   11/1/2023 10:30 AM LISA Linton - CNP [facility] [place]   3/1/2024 10:00 AM Ad Santos MD [facility]

## 2023-05-26 NOTE — TELEPHONE ENCOUNTER
Patient requesting refills , she would her prescriptions for 90 day supply through her mail order please

## 2023-05-26 NOTE — TELEPHONE ENCOUNTER
Comments:     Last Office Visit (last PCP visit):   1/12/2022    Next Visit Date:  Future Appointments   Date Time Provider Janette Tan   8/4/2023  9:30 AM Roxane Guerrero MD [facility] [place]   9/19/2023 11:00 AM SCHEDULE, MAIRA RAD ONC NURSE MARTYOZ RAD ONC [place] [facility]   10/4/2023  2:30 PM MD [facility] -   11/1/2023 10:30 AM Rosmery Fernandez, APRN - CNP [facility] [place]   3/1/2024 10:00 AM Valerie Storey MD [facility]       **If hasn't been seen in over a year OR hasn't followed up according to last diabetes/ADHD visit, make appointment for patient before sending refill to provider.     Rx requested:  Requested Prescriptions     Pending Prescriptions Disp Refills    folic acid (FOLVITE) 1 MG tablet [Pharmacy Med Name: Folic Acid Oral Tablet 1 MG] 90 tablet 0     Sig: TAKE ONE TABLET BY MOUTH DAILY

## 2023-08-04 ENCOUNTER — OFFICE VISIT (OUTPATIENT)
Dept: SURGERY | Age: 68
End: 2023-08-04

## 2023-08-04 VITALS
WEIGHT: 132 LBS | OXYGEN SATURATION: 99 % | HEIGHT: 64 IN | TEMPERATURE: 97.9 F | BODY MASS INDEX: 22.53 KG/M2 | HEART RATE: 75 BPM

## 2023-08-04 DIAGNOSIS — C21.0 ANAL CANCER (HCC): Primary | ICD-10-CM

## 2023-08-04 ASSESSMENT — ENCOUNTER SYMPTOMS
COLOR CHANGE: 0
DIARRHEA: 0
ANAL BLEEDING: 0
CONSTIPATION: 0
ABDOMINAL PAIN: 0
SHORTNESS OF BREATH: 0
CHEST TIGHTNESS: 0

## 2023-08-04 NOTE — PROGRESS NOTES
Socioeconomic History    Marital status:      Spouse name: Not on file    Number of children: Not on file    Years of education: Not on file    Highest education level: Not on file   Occupational History    Not on file   Tobacco Use    Smoking status: Never    Smokeless tobacco: Never   Vaping Use    Vaping Use: Never used   Substance and Sexual Activity    Alcohol use: Yes     Alcohol/week: 3.0 - 4.0 standard drinks     Types: 3 - 4 Cans of beer per week     Comment: socially    Drug use: Yes     Types: Marijuana Zeferino Eliezer)     Comment: rarely    Sexual activity: Yes   Other Topics Concern    Not on file   Social History Narrative    Not on file     Social Determinants of Health     Financial Resource Strain: Not on file   Food Insecurity: Not on file   Transportation Needs: Not on file   Physical Activity: Not on file   Stress: Not on file   Social Connections: Not on file   Intimate Partner Violence: Not on file   Housing Stability: Not on file     Family History   Problem Relation Age of Onset    Cancer Father         skin ear and head    High Blood Pressure Father     Cancer Brother         gall bladder cancer    Other Mother         dementia    High Blood Pressure Mother     Other Sister         gout    Diabetes Brother     Cancer Brother         head    No Known Problems Son     No Known Problems Daughter     Cancer Paternal Uncle         skin    Other Sister         covid    Colon Cancer Neg Hx      Allergies:  Erythromycin, Seasonal, Sporanox [itraconazole], and Macrobid [nitrofurantoin macrocrystal]    Review of Systems   Constitutional:  Negative for activity change, appetite change and unexpected weight change. HENT:  Negative for congestion. Respiratory:  Negative for chest tightness and shortness of breath. Cardiovascular:  Negative for chest pain and palpitations. Gastrointestinal:  Negative for abdominal pain, anal bleeding, constipation and diarrhea.    Genitourinary:  Negative for

## 2023-09-11 ENCOUNTER — HOSPITAL ENCOUNTER (OUTPATIENT)
Dept: CT IMAGING | Age: 68
Discharge: HOME | End: 2023-09-13
Payer: MEDICARE

## 2023-09-11 DIAGNOSIS — C21.0 ANAL CANCER (HCC): ICD-10-CM

## 2023-09-11 LAB
PERFORMED ON: ABNORMAL
POC CREATININE: 1.1 MG/DL (ref 0.6–1.2)
POC SAMPLE TYPE: ABNORMAL

## 2023-09-11 PROCEDURE — 6360000004 HC RX CONTRAST MEDICATION: Performed by: RADIOLOGY

## 2023-09-11 PROCEDURE — 74177 CT ABD & PELVIS W/CONTRAST: CPT

## 2023-09-11 RX ADMIN — IOPAMIDOL 50 ML: 612 INJECTION, SOLUTION INTRAVENOUS at 10:13

## 2023-09-18 NOTE — TELEPHONE ENCOUNTER
Pharmacy is requesting medication refill.  Please approve or deny this request.    Rx requested:  Requested Prescriptions     Pending Prescriptions Disp Refills    Fremanezumab-vfrm (AJOVY) 225 MG/1.5ML SOAJ 1.5 mL 6     Sig: INJECT 1 PEN UNDER THE SKIN 1 TIME EVERY 30 DAYS         Last Office Visit:   2/28/2022      Next Visit Date:  Future Appointments   Date Time Provider [facility] [address]   9/19/2023 11:00 AM SCHEDULE, MLOZ RAD ONC NURSE MLOZ RAD ONC [place] [facility]   10/4/2023  2:30 PM Marcial Enrrique Jane MD [facility] Neurology -   11/1/2023 10:30 AM LISA Ballesteros - CNP [facility] [place] [facility]   11/3/2023 10:00 AM Celsa Lira MD [facility]   3/1/2024 10:00 AM Pauline Harding MD [address] [place] [address]

## 2023-09-19 ENCOUNTER — HOSPITAL ENCOUNTER (OUTPATIENT)
Dept: RADIATION ONCOLOGY | Age: 68
Discharge: HOME | End: 2023-09-19
Payer: MEDICARE

## 2023-09-19 VITALS
SYSTOLIC BLOOD PRESSURE: 100 MMHG | RESPIRATION RATE: 18 BRPM | DIASTOLIC BLOOD PRESSURE: 65 MMHG | OXYGEN SATURATION: 99 % | BODY MASS INDEX: 24.24 KG/M2 | TEMPERATURE: 97.7 F | HEART RATE: 69 BPM | WEIGHT: 141.2 LBS

## 2023-09-19 DIAGNOSIS — C21.0 ANAL CANCER (HCC): Primary | ICD-10-CM

## 2023-09-19 DIAGNOSIS — K62.89 RECTAL MASS: ICD-10-CM

## 2023-09-19 DIAGNOSIS — Z85.048 HISTORY OF RECTAL CANCER: ICD-10-CM

## 2023-09-19 PROCEDURE — 99214 OFFICE O/P EST MOD 30 MIN: CPT | Performed by: RADIOLOGY

## 2023-09-19 PROCEDURE — 99212 OFFICE O/P EST SF 10 MIN: CPT | Performed by: RADIOLOGY

## 2023-09-19 RX ORDER — FREMANEZUMAB-VFRM 225 MG/1.5ML
INJECTION SUBCUTANEOUS
Qty: 1.5 ML | Refills: 6 | Status: SHIPPED | OUTPATIENT
Start: 2023-09-19

## 2023-09-19 NOTE — PROGRESS NOTES
NURSING ASSESSMENT     Date: 9/19/2023        Patient Name: Kaley De La Paz     YOB: 1955      Age:  79 y.o. MRN: [id number]       Chaperone [] Yes   [x] No      Advance Directives:   Do you currently have completed advance directives (living will)? [x] Yes   [] No         *If yes, please bring us a copy for your records. *If no, would you like info or assistance in completing advance directives (living will)? [] Yes   [x] No    Pain Score:   Pain Score (1-10): Denies   Pain Location: NA   Pain Duration: NA   Pain Management/Control: NA      Is pain affecting your ability to take care of yourself or move throughout your home? [] Yes   [x] No    General: Mild fatigue ongoing since finishing radiation therapy  Patient has gained weight [] Yes   [x] No  Patient has lost weight [] Yes   [x] No  How much weight in pounds and over what length of time:     Eyes (Ophthalmic): No Problem     Skin (Dermatological): Generalized uticaria over the last month. Uses Azeb lotion after showering with no relief     ENT: No Problems     Respiratory: No Problems     Cardiovascular: No Problems      Device   [] Yes   [x] No   Copy of Card Obtained [] Yes   [x] No    Gastrointestinal: Having daily soft BM's, denies any blood in stool. Genito-Urinary: No Problems       Breast: No Problems     Musculoskeletal: No Problems    Neurological: Migraines on occasion at baseline. Last one was 1 month ago      Hematological and Lymphatic: Easy Bruising     Endocrine: No Problems     Gyn History:   Small amounts of clear vaginal drainage daily, states has improved since last visit. Had mammogram this past summer 2023    A 10-point review of systems  has been conducted and pertinent positives have been   recorded. All other review of systems are negative    Was the patient admitted during the course of treatment OR within 30 days of treatment?    No      Additional Comments: F/U with Dr. Lara Barron

## 2023-10-04 ENCOUNTER — OFFICE VISIT (OUTPATIENT)
Dept: NEUROLOGY | Age: 68
End: 2023-10-04
Payer: MEDICARE

## 2023-10-04 VITALS
HEART RATE: 68 BPM | BODY MASS INDEX: 24.44 KG/M2 | DIASTOLIC BLOOD PRESSURE: 64 MMHG | WEIGHT: 142.4 LBS | SYSTOLIC BLOOD PRESSURE: 100 MMHG

## 2023-10-04 DIAGNOSIS — I63.512 CEREBROVASCULAR ACCIDENT (CVA) DUE TO STENOSIS OF LEFT MIDDLE CEREBRAL ARTERY (HCC): ICD-10-CM

## 2023-10-04 DIAGNOSIS — Q21.12 PFO (PATENT FORAMEN OVALE): ICD-10-CM

## 2023-10-04 DIAGNOSIS — G43.111 INTRACTABLE MIGRAINE WITH AURA WITH STATUS MIGRAINOSUS: Primary | ICD-10-CM

## 2023-10-04 PROCEDURE — 99214 OFFICE O/P EST MOD 30 MIN: CPT | Performed by: PSYCHIATRY & NEUROLOGY

## 2023-10-04 PROCEDURE — 1036F TOBACCO NON-USER: CPT | Performed by: PSYCHIATRY & NEUROLOGY

## 2023-10-04 PROCEDURE — 1123F ACP DISCUSS/DSCN MKR DOCD: CPT | Performed by: PSYCHIATRY & NEUROLOGY

## 2023-10-04 PROCEDURE — 3017F COLORECTAL CA SCREEN DOC REV: CPT | Performed by: PSYCHIATRY & NEUROLOGY

## 2023-10-04 PROCEDURE — G8427 DOCREV CUR MEDS BY ELIG CLIN: HCPCS | Performed by: PSYCHIATRY & NEUROLOGY

## 2023-10-04 PROCEDURE — G8420 CALC BMI NORM PARAMETERS: HCPCS | Performed by: PSYCHIATRY & NEUROLOGY

## 2023-10-04 PROCEDURE — 1090F PRES/ABSN URINE INCON ASSESS: CPT | Performed by: PSYCHIATRY & NEUROLOGY

## 2023-10-04 PROCEDURE — G8484 FLU IMMUNIZE NO ADMIN: HCPCS | Performed by: PSYCHIATRY & NEUROLOGY

## 2023-10-04 PROCEDURE — G8400 PT W/DXA NO RESULTS DOC: HCPCS | Performed by: PSYCHIATRY & NEUROLOGY

## 2023-10-04 RX ORDER — RIMEGEPANT SULFATE 75 MG/75MG
75 TABLET, ORALLY DISINTEGRATING ORAL PRN
COMMUNITY
End: 2023-10-04 | Stop reason: SDUPTHER

## 2023-10-04 RX ORDER — RIMEGEPANT SULFATE 75 MG/75MG
75 TABLET, ORALLY DISINTEGRATING ORAL PRN
Qty: 8 TABLET | Refills: 3 | Status: SHIPPED | OUTPATIENT
Start: 2023-10-04

## 2023-10-04 ASSESSMENT — ENCOUNTER SYMPTOMS
PHOTOPHOBIA: 0
SHORTNESS OF BREATH: 0
COLOR CHANGE: 0
VOMITING: 0
BACK PAIN: 0
TROUBLE SWALLOWING: 0
CHOKING: 0
NAUSEA: 0

## 2023-10-04 NOTE — PROGRESS NOTES
No results found. Lab Results   Component Value Date/Time    WBC 6.4 06/17/2021 01:49 PM    RBC 3.98 06/17/2021 01:49 PM    HGB 13.1 06/17/2021 01:49 PM    HCT 39.0 06/17/2021 01:49 PM    MCV 97.9 06/17/2021 01:49 PM    MCH 33.0 06/17/2021 01:49 PM    MCHC 33.7 06/17/2021 01:49 PM    RDW 13.4 06/17/2021 01:49 PM     06/17/2021 01:49 PM     Lab Results   Component Value Date/Time     06/17/2021 01:49 PM    K 5.0 06/17/2021 01:49 PM     06/17/2021 01:49 PM    CO2 22 06/17/2021 01:49 PM    BUN 23 06/17/2021 01:49 PM    CREATININE 1.1 09/11/2023 10:09 AM    CREATININE 0.90 06/17/2021 01:49 PM    GFRAA >60 04/19/2022 04:45 PM    LABGLOM 55 09/11/2023 10:09 AM    GLUCOSE 84 06/17/2021 01:49 PM    PROT 6.8 06/17/2021 01:49 PM    LABALBU 4.3 06/17/2021 01:49 PM    CALCIUM 9.6 06/17/2021 01:49 PM    BILITOT 0.3 06/17/2021 01:49 PM    ALKPHOS 73 06/17/2021 01:49 PM    AST 12 06/17/2021 01:49 PM    ALT 7 06/17/2021 01:49 PM     Lab Results   Component Value Date/Time    PROTIME 10.7 11/07/2018 09:45 AM    INR 1.0 11/07/2018 09:45 AM     Lab Results   Component Value Date/Time    TSH 1.760 05/18/2020 10:23 AM    BGHIEHHQ54 303 01/24/2022 02:47 PM    FOLATE 17.4 01/24/2022 02:47 PM    IRON 48 06/24/2021 02:37 PM    TIBC 212 06/24/2021 02:37 PM     Lab Results   Component Value Date/Time    TRIG 55 03/09/2021 11:05 AM    HDL 75 03/09/2021 11:05 AM    LDLCALC 131 03/09/2021 11:05 AM     Lab Results   Component Value Date/Time    ETOH <10 11/07/2018 09:45 AM     No results found for: \"LITHIUM\", \"DILFRTOT\", \"VALPROATE\"    Assessment:       Diagnosis Orders   1. Intractable migraine with aura with status migrainosus        2. Cerebrovascular accident (CVA) due to stenosis of left middle cerebral artery ([address])        3. PFO (patent foramen ovale)        Migraine headaches which had not responded to multiple medical line of treatment that he we have been treating her for so long.   When Estrellita Huitron was

## 2023-10-05 RX ORDER — RIMEGEPANT SULFATE 75 MG/75MG
75 TABLET, ORALLY DISINTEGRATING ORAL PRN
Qty: 8 TABLET | Refills: 0 | COMMUNITY
Start: 2023-10-05

## 2023-10-30 SDOH — ECONOMIC STABILITY: FOOD INSECURITY: WITHIN THE PAST 12 MONTHS, THE FOOD YOU BOUGHT JUST DIDN'T LAST AND YOU DIDN'T HAVE MONEY TO GET MORE.: NEVER TRUE

## 2023-10-30 SDOH — HEALTH STABILITY: PHYSICAL HEALTH: ON AVERAGE, HOW MANY DAYS PER WEEK DO YOU ENGAGE IN MODERATE TO STRENUOUS EXERCISE (LIKE A BRISK WALK)?: 0 DAYS

## 2023-10-30 SDOH — ECONOMIC STABILITY: INCOME INSECURITY: HOW HARD IS IT FOR YOU TO PAY FOR THE VERY BASICS LIKE FOOD, HOUSING, MEDICAL CARE, AND HEATING?: NOT HARD AT ALL

## 2023-10-30 SDOH — ECONOMIC STABILITY: FOOD INSECURITY: WITHIN THE PAST 12 MONTHS, YOU WORRIED THAT YOUR FOOD WOULD RUN OUT BEFORE YOU GOT MONEY TO BUY MORE.: NEVER TRUE

## 2023-10-30 SDOH — ECONOMIC STABILITY: HOUSING INSECURITY
IN THE LAST 12 MONTHS, WAS THERE A TIME WHEN YOU DID NOT HAVE A STEADY PLACE TO SLEEP OR SLEPT IN A SHELTER (INCLUDING NOW)?: NO

## 2023-10-30 ASSESSMENT — PATIENT HEALTH QUESTIONNAIRE - PHQ9
1. LITTLE INTEREST OR PLEASURE IN DOING THINGS: 0
SUM OF ALL RESPONSES TO PHQ QUESTIONS 1-9: 0
2. FEELING DOWN, DEPRESSED OR HOPELESS: 0
SUM OF ALL RESPONSES TO PHQ9 QUESTIONS 1 & 2: 0
SUM OF ALL RESPONSES TO PHQ QUESTIONS 1-9: 0

## 2023-10-30 ASSESSMENT — LIFESTYLE VARIABLES
HOW OFTEN DO YOU HAVE SIX OR MORE DRINKS ON ONE OCCASION: 1
HOW MANY STANDARD DRINKS CONTAINING ALCOHOL DO YOU HAVE ON A TYPICAL DAY: 1 OR 2
HOW OFTEN DO YOU HAVE A DRINK CONTAINING ALCOHOL: 4
HOW OFTEN DO YOU HAVE A DRINK CONTAINING ALCOHOL: 2-3 TIMES A WEEK
HOW MANY STANDARD DRINKS CONTAINING ALCOHOL DO YOU HAVE ON A TYPICAL DAY: 1

## 2023-11-01 ENCOUNTER — OFFICE VISIT (OUTPATIENT)
Dept: FAMILY MEDICINE CLINIC | Age: 68
End: 2023-11-01
Payer: MEDICARE

## 2023-11-01 VITALS
BODY MASS INDEX: 24.24 KG/M2 | SYSTOLIC BLOOD PRESSURE: 104 MMHG | HEIGHT: 64 IN | OXYGEN SATURATION: 97 % | HEART RATE: 70 BPM | WEIGHT: 142 LBS | DIASTOLIC BLOOD PRESSURE: 70 MMHG

## 2023-11-01 DIAGNOSIS — Z00.00 INITIAL MEDICARE ANNUAL WELLNESS VISIT: ICD-10-CM

## 2023-11-01 DIAGNOSIS — Z23 FLU VACCINE NEED: Primary | ICD-10-CM

## 2023-11-01 DIAGNOSIS — E53.8 FOLIC ACID DEFICIENCY: ICD-10-CM

## 2023-11-01 PROCEDURE — G8484 FLU IMMUNIZE NO ADMIN: HCPCS | Performed by: NURSE PRACTITIONER

## 2023-11-01 PROCEDURE — G0438 PPPS, INITIAL VISIT: HCPCS | Performed by: NURSE PRACTITIONER

## 2023-11-01 PROCEDURE — 1123F ACP DISCUSS/DSCN MKR DOCD: CPT | Performed by: NURSE PRACTITIONER

## 2023-11-01 PROCEDURE — G0008 ADMIN INFLUENZA VIRUS VAC: HCPCS | Performed by: NURSE PRACTITIONER

## 2023-11-01 PROCEDURE — 90694 VACC AIIV4 NO PRSRV 0.5ML IM: CPT | Performed by: NURSE PRACTITIONER

## 2023-11-01 PROCEDURE — 3017F COLORECTAL CA SCREEN DOC REV: CPT | Performed by: NURSE PRACTITIONER

## 2023-11-01 RX ORDER — UBROGEPANT 100 MG/1
TABLET ORAL
COMMUNITY
Start: 2023-10-02

## 2023-11-01 RX ORDER — FOLIC ACID 1 MG/1
1000 TABLET ORAL DAILY
Qty: 90 TABLET | Refills: 0 | Status: SHIPPED | OUTPATIENT
Start: 2023-11-01

## 2023-11-01 NOTE — PATIENT INSTRUCTIONS

## 2024-01-08 ENCOUNTER — OFFICE VISIT (OUTPATIENT)
Dept: FAMILY MEDICINE CLINIC | Age: 69
End: 2024-01-08
Payer: MEDICARE

## 2024-01-08 VITALS
WEIGHT: 143 LBS | DIASTOLIC BLOOD PRESSURE: 80 MMHG | BODY MASS INDEX: 24.41 KG/M2 | TEMPERATURE: 97.9 F | HEART RATE: 70 BPM | OXYGEN SATURATION: 97 % | SYSTOLIC BLOOD PRESSURE: 118 MMHG | HEIGHT: 64 IN

## 2024-01-08 DIAGNOSIS — R11.0 NAUSEA: ICD-10-CM

## 2024-01-08 DIAGNOSIS — J01.90 ACUTE BACTERIAL SINUSITIS: Primary | ICD-10-CM

## 2024-01-08 DIAGNOSIS — J11.1 INFLUENZA-LIKE ILLNESS: ICD-10-CM

## 2024-01-08 DIAGNOSIS — B96.89 ACUTE BACTERIAL SINUSITIS: Primary | ICD-10-CM

## 2024-01-08 DIAGNOSIS — Z11.52 ENCOUNTER FOR SCREENING FOR COVID-19: ICD-10-CM

## 2024-01-08 LAB
INFLUENZA A ANTIBODY: NORMAL
INFLUENZA B ANTIBODY: NORMAL
Lab: NORMAL
PERFORMING INSTRUMENT: NORMAL
QC PASS/FAIL: NORMAL
SARS-COV-2, POC: NORMAL

## 2024-01-08 PROCEDURE — 87426 SARSCOV CORONAVIRUS AG IA: CPT | Performed by: FAMILY MEDICINE

## 2024-01-08 PROCEDURE — G8400 PT W/DXA NO RESULTS DOC: HCPCS | Performed by: FAMILY MEDICINE

## 2024-01-08 PROCEDURE — G8427 DOCREV CUR MEDS BY ELIG CLIN: HCPCS | Performed by: FAMILY MEDICINE

## 2024-01-08 PROCEDURE — 1123F ACP DISCUSS/DSCN MKR DOCD: CPT | Performed by: FAMILY MEDICINE

## 2024-01-08 PROCEDURE — 99213 OFFICE O/P EST LOW 20 MIN: CPT | Performed by: FAMILY MEDICINE

## 2024-01-08 PROCEDURE — G8484 FLU IMMUNIZE NO ADMIN: HCPCS | Performed by: FAMILY MEDICINE

## 2024-01-08 PROCEDURE — 1036F TOBACCO NON-USER: CPT | Performed by: FAMILY MEDICINE

## 2024-01-08 PROCEDURE — 1090F PRES/ABSN URINE INCON ASSESS: CPT | Performed by: FAMILY MEDICINE

## 2024-01-08 PROCEDURE — 87804 INFLUENZA ASSAY W/OPTIC: CPT | Performed by: FAMILY MEDICINE

## 2024-01-08 PROCEDURE — 3017F COLORECTAL CA SCREEN DOC REV: CPT | Performed by: FAMILY MEDICINE

## 2024-01-08 PROCEDURE — G8420 CALC BMI NORM PARAMETERS: HCPCS | Performed by: FAMILY MEDICINE

## 2024-01-08 RX ORDER — BENZONATATE 100 MG/1
100 CAPSULE ORAL 3 TIMES DAILY PRN
Qty: 30 CAPSULE | Refills: 0 | Status: SHIPPED | OUTPATIENT
Start: 2024-01-08 | End: 2024-01-18

## 2024-01-08 RX ORDER — AMOXICILLIN AND CLAVULANATE POTASSIUM 875; 125 MG/1; MG/1
1 TABLET, FILM COATED ORAL 2 TIMES DAILY
Qty: 20 TABLET | Refills: 0 | Status: SHIPPED | OUTPATIENT
Start: 2024-01-08 | End: 2024-01-18

## 2024-01-08 RX ORDER — ONDANSETRON 4 MG/1
4 TABLET, FILM COATED ORAL 3 TIMES DAILY PRN
Qty: 15 TABLET | Refills: 0 | Status: SHIPPED | OUTPATIENT
Start: 2024-01-08

## 2024-01-08 ASSESSMENT — ENCOUNTER SYMPTOMS
COUGH: 1
SINUS PRESSURE: 1
VOMITING: 0
ABDOMINAL PAIN: 0
RHINORRHEA: 1
SORE THROAT: 0
DIARRHEA: 1
NAUSEA: 0
SINUS PAIN: 1
EYE REDNESS: 0
EYE DISCHARGE: 0
SHORTNESS OF BREATH: 0

## 2024-01-08 NOTE — PROGRESS NOTES
[facility]  [address], [address]  [facility] [address]  Dept: [phone]  Dept Fax: [phone]  Loc: [phone]     1/8/2024    Visit type: Follow up    Reason for Visit: Dizziness (Pt states she feels like spinning, nauseous for the last 5 days. Denies fainting, vomiting, SOB. ) and URI (Pt states she has cough, rhinorrhea, headache, nasal congestion, dry mouth, body ache, bilateral otalgia, chills for the last 10 days. Denies fever, pharyngitis. . )         Patient: Tina Dwyer is a 68 y.o. female   HPI: 68-year-old female presents with dizziness, intermittent nausea, cough, rhinorrhea, headaches, nasal congestion, dry mouth and bloody nose, body aches, bilateral ear pain/pressure for the last 10 days.  Patient states the nausea has improved at this time.  Denies any fever or chills at this time.    ASSESSMENT/PLAN   1. Acute bacterial sinusitis  -     amoxicillin-clavulanate (AUGMENTIN) 875-125 MG per tablet; Take 1 tablet by mouth 2 times daily for 10 days, Disp-20 tablet, R-0Normal  -     benzonatate (TESSALON) 100 MG capsule; Take 1 capsule by mouth 3 times daily as needed for Cough, Disp-30 capsule, R-0Normal  -     ondansetron (ZOFRAN) 4 MG tablet; Take 1 tablet by mouth 3 times daily as needed for Nausea or Vomiting, Disp-15 tablet, R-0Normal  2. Influenza-like illness  -     POCT Influenza A/B  3. Encounter for screening for COVID-19  -     POCT COVID-19, Antigen  4. Nausea  -     ondansetron (ZOFRAN) 4 MG tablet; Take 1 tablet by mouth 3 times daily as needed for Nausea or Vomiting, Disp-15 tablet, R-0Normal  -covid and flu negative  -Adverse effects of medications prescribed were discussed, patient acknowledged understanding.  -Patient was advised to let me know if there is no improvement in the next 7 to 10 days. Discussed red flags warranting decision to go to the emergency department and patient understood.       Orders

## 2024-01-19 DIAGNOSIS — E53.8 FOLIC ACID DEFICIENCY: ICD-10-CM

## 2024-01-19 NOTE — TELEPHONE ENCOUNTER
Tina called this morning requesting refill of Oxybutynin. Last seen 1/12/23; Annual scheduled 2/6/24. Medication pending; Pharmacy verified.

## 2024-01-19 NOTE — TELEPHONE ENCOUNTER
Pt calling for this script her ins changed so this needs a Prior auth       Pt asking for a call when done    [phone]

## 2024-01-22 DIAGNOSIS — E53.8 FOLIC ACID DEFICIENCY: ICD-10-CM

## 2024-01-22 RX ORDER — FOLIC ACID 1 MG/1
1000 TABLET ORAL DAILY
Qty: 90 TABLET | Refills: 0 | OUTPATIENT
Start: 2024-01-22

## 2024-01-22 RX ORDER — FOLIC ACID 1 MG/1
1000 TABLET ORAL DAILY
Qty: 90 TABLET | Refills: 0 | Status: SHIPPED | OUTPATIENT
Start: 2024-01-22

## 2024-01-22 RX ORDER — OXYBUTYNIN CHLORIDE 10 MG/1
10 TABLET, EXTENDED RELEASE ORAL DAILY
Qty: 90 TABLET | Refills: 1 | Status: SHIPPED | OUTPATIENT
Start: 2024-01-22

## 2024-01-23 NOTE — TELEPHONE ENCOUNTER
Pt calling stating that even with the PA ins will not pay for this medication. Pt is going to get this otc if you approve. Please advise. Pt phone number is [phone].

## 2024-02-05 RX ORDER — TOPIRAMATE 100 MG/1
TABLET, FILM COATED ORAL
Qty: 270 TABLET | Refills: 3 | Status: SHIPPED | OUTPATIENT
Start: 2024-02-05

## 2024-02-05 RX ORDER — FREMANEZUMAB-VFRM 225 MG/1.5ML
INJECTION SUBCUTANEOUS
Qty: 1.5 ML | Refills: 6 | Status: SHIPPED | OUTPATIENT
Start: 2024-02-05

## 2024-02-05 NOTE — TELEPHONE ENCOUNTER
Patient is requesting medication refill. Please approve or deny this request.    Rx requested:  Requested Prescriptions     Pending Prescriptions Disp Refills    topiramate (TOPAMAX) 100 MG tablet 270 tablet 3     Sig: TAKE 3 TABLETS DAILY    Fremanezumab-vfrm (AJOVY) 225 MG/1.5ML SOAJ 1.5 mL 6     Sig: INJECT 1 PEN UNDER THE SKIN 1 TIME EVERY 30 DAYS         Last Office Visit:   10/4/2023      Next Visit Date:  Future Appointments   Date Time Provider Department Center   2/6/2024  1:00 PM Perla Pichardo MD [facility]   4/1/2024  2:30 PM Pattie Lewis MD [place] [facility]   9/10/2024  9:30 AM SCHEDULE, MAIRA GARCIA ONC NURSE MA[facility]A RAD ONC [facility]   10/2/2024  1:00 PM Marcial Cortés MD [place] [facility] Neurology -   11/4/2024 10:30 AM Sandee Goodman, APRN - CNP [facility]

## 2024-02-06 ENCOUNTER — OFFICE VISIT (OUTPATIENT)
Dept: OBGYN CLINIC | Age: 69
End: 2024-02-06

## 2024-02-06 VITALS
BODY MASS INDEX: 25.1 KG/M2 | WEIGHT: 147 LBS | HEIGHT: 64 IN | HEART RATE: 76 BPM | SYSTOLIC BLOOD PRESSURE: 100 MMHG | DIASTOLIC BLOOD PRESSURE: 62 MMHG

## 2024-02-06 DIAGNOSIS — Z12.31 VISIT FOR SCREENING MAMMOGRAM: ICD-10-CM

## 2024-02-06 DIAGNOSIS — N39.41 URGE INCONTINENCE: ICD-10-CM

## 2024-02-06 DIAGNOSIS — N94.11 INTROITAL DYSPAREUNIA: ICD-10-CM

## 2024-02-06 DIAGNOSIS — Z01.419 WELL WOMAN EXAM WITH ROUTINE GYNECOLOGICAL EXAM: Primary | ICD-10-CM

## 2024-02-06 RX ORDER — OXYBUTYNIN CHLORIDE 10 MG/1
10 TABLET, EXTENDED RELEASE ORAL DAILY
Qty: 30 TABLET | Refills: 0 | Status: CANCELLED | OUTPATIENT
Start: 2024-02-06

## 2024-02-06 RX ORDER — ESTRADIOL 0.1 MG/G
CREAM VAGINAL
Qty: 1 EACH | Refills: 3 | Status: SHIPPED | OUTPATIENT
Start: 2024-02-06

## 2024-02-06 RX ORDER — OXYBUTYNIN CHLORIDE 10 MG/1
10 TABLET, EXTENDED RELEASE ORAL DAILY
Qty: 90 TABLET | Refills: 1 | Status: CANCELLED | OUTPATIENT
Start: 2024-02-06

## 2024-02-14 ENCOUNTER — TELEPHONE (OUTPATIENT)
Dept: FAMILY MEDICINE CLINIC | Age: 69
End: 2024-02-14

## 2024-02-14 NOTE — TELEPHONE ENCOUNTER
[facility] calling to see if the provider signed the medical aquasition that they had faxed over, told them that we would call patient to see if she wanted to have this done and we would call them back.     The call back number to the [employer] is [phone]

## 2024-02-21 ENCOUNTER — OFFICE VISIT (OUTPATIENT)
Dept: OBGYN CLINIC | Age: 69
End: 2024-02-21
Payer: MEDICARE

## 2024-02-21 VITALS
DIASTOLIC BLOOD PRESSURE: 60 MMHG | HEIGHT: 64 IN | WEIGHT: 147 LBS | BODY MASS INDEX: 25.1 KG/M2 | SYSTOLIC BLOOD PRESSURE: 98 MMHG | HEART RATE: 68 BPM

## 2024-02-21 DIAGNOSIS — N39.41 URGE INCONTINENCE: Primary | ICD-10-CM

## 2024-02-21 PROCEDURE — 0509F URINE INCON PLAN DOCD: CPT | Performed by: OBSTETRICS & GYNECOLOGY

## 2024-02-21 PROCEDURE — 1123F ACP DISCUSS/DSCN MKR DOCD: CPT | Performed by: OBSTETRICS & GYNECOLOGY

## 2024-02-21 PROCEDURE — G8419 CALC BMI OUT NRM PARAM NOF/U: HCPCS | Performed by: OBSTETRICS & GYNECOLOGY

## 2024-02-21 PROCEDURE — 99213 OFFICE O/P EST LOW 20 MIN: CPT | Performed by: OBSTETRICS & GYNECOLOGY

## 2024-02-21 PROCEDURE — G8400 PT W/DXA NO RESULTS DOC: HCPCS | Performed by: OBSTETRICS & GYNECOLOGY

## 2024-02-21 PROCEDURE — G8427 DOCREV CUR MEDS BY ELIG CLIN: HCPCS | Performed by: OBSTETRICS & GYNECOLOGY

## 2024-02-21 PROCEDURE — G8484 FLU IMMUNIZE NO ADMIN: HCPCS | Performed by: OBSTETRICS & GYNECOLOGY

## 2024-02-21 PROCEDURE — 1036F TOBACCO NON-USER: CPT | Performed by: OBSTETRICS & GYNECOLOGY

## 2024-02-21 PROCEDURE — 1090F PRES/ABSN URINE INCON ASSESS: CPT | Performed by: OBSTETRICS & GYNECOLOGY

## 2024-02-21 PROCEDURE — 3017F COLORECTAL CA SCREEN DOC REV: CPT | Performed by: OBSTETRICS & GYNECOLOGY

## 2024-02-28 NOTE — TELEPHONE ENCOUNTER
I do not have form, have not heard form company or pt. I do not believe this is warranted     Next appt is in Nov for AWV

## 2024-02-29 NOTE — PROGRESS NOTES
Tina Dwyer is a 68 y.o. female who presents here today for complaints of Follow-up (Myrbetriq. She states it seems like it's working but she feels like it \"wears off\" and takes a little while to kick in the next day when she takes it.)    Patient has been using vaginal estrogen since her last visit ~ 1 yr ago for the above symptoms, and mentions the vaginal estrogen helped a lot and that her symptoms have improved. But she is still experiencing significant urgency, frequency, incontinence, nocturia. Patient was started on mirabegron 50 mg po daily last visit . Patient had failed anticholinergics in the past .   .      Vitals:  BP 98/60 (Site: Left Upper Arm, Position: Sitting, Cuff Size: Medium Adult)   Pulse 68   Ht 1.626 m (5' 4\")   Wt 66.7 kg (147 lb)   LMP 09/19/2008   BMI 25.23 kg/m²   Allergies:  Erythromycin, Seasonal, Sporanox [itraconazole], and Macrobid [nitrofurantoin macrocrystal]  Past Medical History:   Diagnosis Date    Allergic reaction     Arthritis     Cancer (HCC)     Cervical, dx by Dr. Yo    Cerebral artery occlusion with cerebral infarction (HCC)     at age 50 / sx as a migraine with slurred speech & visual disturbance    Cerebrovascular disease Age 50    Depression     Diverticulitis     GERD (gastroesophageal reflux disease)     Hearing loss     History of conization of cervix     History of therapeutic radiation     Hx antineoplastic chemo     Migraine     Rectal cancer (HCC)     Renal insufficiency     Tinea unguium     Urinary incontinence      Past Surgical History:   Procedure Laterality Date    CARDIAC SURGERY      PFO surgery in age 50s    COLONOSCOPY  2018    COLONOSCOPY N/A 05/31/2022    COLONOSCOPY w/biopsies performed by Pattie [facility] MD at [facility] CENTER    HEMORRHOID SURGERY  2017    HYSTERECTOMY VAGINAL N/A 12/19/2018    TLH WITH BSO performed by Perla Pichardo MD at [facility]    HYSTERECTOMY, TOTAL ABDOMINAL (CERVIX REMOVED)      OVARY REMOVAL      [place]

## 2024-04-01 ENCOUNTER — OFFICE VISIT (OUTPATIENT)
Dept: GASTROENTEROLOGY | Age: 69
End: 2024-04-01
Payer: MEDICARE

## 2024-04-01 VITALS — BODY MASS INDEX: 25.61 KG/M2 | WEIGHT: 150 LBS | HEART RATE: 68 BPM | OXYGEN SATURATION: 97 % | HEIGHT: 64 IN

## 2024-04-01 DIAGNOSIS — C20 RECTAL CANCER (HCC): Primary | ICD-10-CM

## 2024-04-01 PROCEDURE — G8427 DOCREV CUR MEDS BY ELIG CLIN: HCPCS | Performed by: SPECIALIST

## 2024-04-01 PROCEDURE — G8400 PT W/DXA NO RESULTS DOC: HCPCS | Performed by: SPECIALIST

## 2024-04-01 PROCEDURE — 1090F PRES/ABSN URINE INCON ASSESS: CPT | Performed by: SPECIALIST

## 2024-04-01 PROCEDURE — 1036F TOBACCO NON-USER: CPT | Performed by: SPECIALIST

## 2024-04-01 PROCEDURE — 1123F ACP DISCUSS/DSCN MKR DOCD: CPT | Performed by: SPECIALIST

## 2024-04-01 PROCEDURE — 99212 OFFICE O/P EST SF 10 MIN: CPT | Performed by: SPECIALIST

## 2024-04-01 PROCEDURE — G8419 CALC BMI OUT NRM PARAM NOF/U: HCPCS | Performed by: SPECIALIST

## 2024-04-01 PROCEDURE — 3017F COLORECTAL CA SCREEN DOC REV: CPT | Performed by: SPECIALIST

## 2024-04-01 ASSESSMENT — ENCOUNTER SYMPTOMS
ABDOMINAL PAIN: 0
VOMITING: 0
NAUSEA: 0
BLOOD IN STOOL: 0
CONSTIPATION: 0
ANAL BLEEDING: 0
GASTROINTESTINAL NEGATIVE: 1
EYES NEGATIVE: 1
RECTAL PAIN: 0
ABDOMINAL DISTENTION: 0
RESPIRATORY NEGATIVE: 1

## 2024-04-01 NOTE — PROGRESS NOTES
Gastroenterology Clinic Follow up Visit    Tina Dwyer  [id number]  Chief Complaint   Patient presents with    Follow-up     1 year,        HPI and A/P at last visit summarized below:  Patient is here for follow-up, he has a history of rectal cancer and was treated with chemo and radiation.  Patient completed the treatment about 18 months ago and had a sigmoidoscopy in February 2023 which showed mild erythema of the rectum and biopsy showed no malignancy  Patient reports occasional mucousy discharge per rectum and also has urinary incontinence, patient was experiencing urinary urgency prior to radiation however urinary symptoms seem to have gotten worse after radiation he had tried and had tried some medications through her GYN which has not given her significant relief.  No history of rectal bleeding, has formed stool most of the time.  Review of Systems   Constitutional: Negative.    HENT: Negative.     Eyes: Negative.    Respiratory: Negative.     Cardiovascular: Negative.    Gastrointestinal: Negative.  Negative for abdominal distention, abdominal pain, anal bleeding, blood in stool, constipation, diarrhea, nausea, rectal pain and vomiting.        Occasional mucousy discharge per rectum   Endocrine: Negative.    Genitourinary: Negative.         Urinary incontinence   Musculoskeletal: Negative.    Skin: Negative.    Allergic/Immunologic: Negative for food allergies.   Neurological: Negative.    Hematological: Negative.    Psychiatric/Behavioral: Negative.          Past medical history, past surgical history, medication list, social and familyhistory reviewed    Pulse 68, height 1.626 m (5' 4\"), weight 68 kg (150 lb), last menstrual period 09/19/2008, SpO2 97 %, not currently breastfeeding.    Physical Exam  Constitutional:       Appearance: She is well-developed.   HENT:      Head: Normocephalic and atraumatic.   Eyes:      Conjunctiva/sclera: Conjunctivae normal.      Pupils: Pupils are equal, round, and

## 2024-04-02 ENCOUNTER — OFFICE VISIT (OUTPATIENT)
Dept: OBGYN CLINIC | Age: 69
End: 2024-04-02
Payer: MEDICARE

## 2024-04-02 VITALS
SYSTOLIC BLOOD PRESSURE: 108 MMHG | HEIGHT: 64 IN | BODY MASS INDEX: 25.44 KG/M2 | HEART RATE: 76 BPM | DIASTOLIC BLOOD PRESSURE: 60 MMHG | WEIGHT: 149 LBS

## 2024-04-02 DIAGNOSIS — N39.41 URGE INCONTINENCE: Primary | ICD-10-CM

## 2024-04-02 PROCEDURE — G8400 PT W/DXA NO RESULTS DOC: HCPCS | Performed by: OBSTETRICS & GYNECOLOGY

## 2024-04-02 PROCEDURE — G8427 DOCREV CUR MEDS BY ELIG CLIN: HCPCS | Performed by: OBSTETRICS & GYNECOLOGY

## 2024-04-02 PROCEDURE — 1036F TOBACCO NON-USER: CPT | Performed by: OBSTETRICS & GYNECOLOGY

## 2024-04-02 PROCEDURE — 1090F PRES/ABSN URINE INCON ASSESS: CPT | Performed by: OBSTETRICS & GYNECOLOGY

## 2024-04-02 PROCEDURE — 1123F ACP DISCUSS/DSCN MKR DOCD: CPT | Performed by: OBSTETRICS & GYNECOLOGY

## 2024-04-02 PROCEDURE — 0509F URINE INCON PLAN DOCD: CPT | Performed by: OBSTETRICS & GYNECOLOGY

## 2024-04-02 PROCEDURE — 3017F COLORECTAL CA SCREEN DOC REV: CPT | Performed by: OBSTETRICS & GYNECOLOGY

## 2024-04-02 PROCEDURE — 99213 OFFICE O/P EST LOW 20 MIN: CPT | Performed by: OBSTETRICS & GYNECOLOGY

## 2024-04-02 PROCEDURE — G8419 CALC BMI OUT NRM PARAM NOF/U: HCPCS | Performed by: OBSTETRICS & GYNECOLOGY

## 2024-04-02 RX ORDER — TROSPIUM CHLORIDE ER 60 MG/1
60 CAPSULE ORAL DAILY
Qty: 15 CAPSULE | Refills: 0 | Status: SHIPPED | OUTPATIENT
Start: 2024-04-02

## 2024-04-02 NOTE — PROGRESS NOTES
Medication FollowUp     Tina Dwyer is a 68 y.o. year old female here todiscuss symptoms after use of medications prescribed last visit. Symptoms  urgency, frequency, incontinence, nocturia   .Medication/s prescribed mirabegron 50 mg daily  . Side effects reported : none. Mentions symptomsimproved : no.   Patient with history of anal cancer with radiation to the pelvic area .     Vitals:  /60 (Site: Right Upper Arm, Position: Sitting, Cuff Size: Medium Adult)   Pulse 76   Ht 1.626 m (5' 4\")   Wt 67.6 kg (149 lb)   LMP 09/19/2008   BMI 25.58 kg/m²   Allergies:  Erythromycin, Seasonal, Sporanox [itraconazole], and Macrobid [nitrofurantoin macrocrystal]  Past Medical History:   Diagnosis Date    Allergic reaction     Arthritis     Cancer (HCC)     Cervical, dx by Dr. Yo    Cerebral artery occlusion with cerebral infarction (HCC)     at age 50 / sx as a migraine with slurred speech & visual disturbance    Cerebrovascular disease Age 50    Depression     Diverticulitis     GERD (gastroesophageal reflux disease)     Hearing loss     History of conization of cervix     History of therapeutic radiation     Hx antineoplastic chemo     Migraine     Rectal cancer (HCC)     Renal insufficiency     Tinea unguium     Urinary incontinence         Past Surgical History:   Procedure Laterality Date    CARDIAC SURGERY      PFO surgery in age 50s    COLONOSCOPY  2018    COLONOSCOPY N/A 05/31/2022    COLONOSCOPY w/biopsies performed by [facility]    HEMORRHOID SURGERY  2017    HYSTERECTOMY VAGINAL N/A 12/19/2018    TLH WITH BSO performed by Perla Pichardo MD at [facility] OR    HYSTERECTOMY, TOTAL ABDOMINAL (CERVIX REMOVED)      OVARY REMOVAL      PA COLONOSCOPY FLX DX W/COLLJ SPEC WHEN PFRMD N/A 04/19/2018    COLONOSCOPY performed by [facility]att[facility] Lewis MD at [facility]    PA COLPOSCOPY CERVIX VAG LOOP ELTRD BX CERVIX N/A 09/26/2018    LEEP performed by Danielle Ronquillo MD at [facility] OR    PA 
Otc Regimen: Selsun blue 1-3 times a week
Continue Regimen: ketoconazole 2 % shampoo QD\\nSig: Apply to chest  and scalp QD
Detail Level: Zone

## 2024-04-05 RX ORDER — FREMANEZUMAB-VFRM 225 MG/1.5ML
INJECTION SUBCUTANEOUS
Qty: 1.5 ML | Refills: 6 | Status: SHIPPED | OUTPATIENT
Start: 2024-04-05

## 2024-04-05 NOTE — TELEPHONE ENCOUNTER
Pharmacy is requesting medication refill. Please approve or deny this request.    Rx requested:  Requested Prescriptions     Pending Prescriptions Disp Refills    Fremanezumab-vfrm (AJOVY) 225 MG/1.5ML SOAJ [Pharmacy Med Name: AJOVY 225MG/1.5ML PF AUT INJ 1.5ML] 1.5 mL 6     Sig: ADMINISTER 225MG(1 INJECTION) UNDER THE SKIN EVERY 30 DAYS         Last Office Visit:   10/4/2023      Next Visit Date:  Future Appointments   Date Time Provider Department Center   4/16/2024 11:00 AM Perla Pichardo MD [facility]   9/10/2024  9:30 AM SCHEDULE, MLOZ RAD ONC NURSE [facility] [place] HOD   10/2/2024  1:00 PM Marcial Cortés MD [place] [facility] Neurology -   11/4/2024 10:30 AM Sandee Goodman, APRN - CNP [facility] [place]   4/1/2025  1:30 PM Pattie Lewis MD [place] [facility]

## 2024-04-22 ENCOUNTER — OFFICE VISIT (OUTPATIENT)
Dept: OBGYN CLINIC | Age: 69
End: 2024-04-22
Payer: MEDICARE

## 2024-04-22 VITALS
DIASTOLIC BLOOD PRESSURE: 62 MMHG | BODY MASS INDEX: 25.27 KG/M2 | SYSTOLIC BLOOD PRESSURE: 110 MMHG | WEIGHT: 148 LBS | HEIGHT: 64 IN

## 2024-04-22 DIAGNOSIS — N39.41 URGE INCONTINENCE: Primary | ICD-10-CM

## 2024-04-22 PROCEDURE — G8427 DOCREV CUR MEDS BY ELIG CLIN: HCPCS | Performed by: OBSTETRICS & GYNECOLOGY

## 2024-04-22 PROCEDURE — 0509F URINE INCON PLAN DOCD: CPT | Performed by: OBSTETRICS & GYNECOLOGY

## 2024-04-22 PROCEDURE — 99213 OFFICE O/P EST LOW 20 MIN: CPT | Performed by: OBSTETRICS & GYNECOLOGY

## 2024-04-22 PROCEDURE — 1036F TOBACCO NON-USER: CPT | Performed by: OBSTETRICS & GYNECOLOGY

## 2024-04-22 PROCEDURE — 1090F PRES/ABSN URINE INCON ASSESS: CPT | Performed by: OBSTETRICS & GYNECOLOGY

## 2024-04-22 PROCEDURE — G8419 CALC BMI OUT NRM PARAM NOF/U: HCPCS | Performed by: OBSTETRICS & GYNECOLOGY

## 2024-04-22 PROCEDURE — 3017F COLORECTAL CA SCREEN DOC REV: CPT | Performed by: OBSTETRICS & GYNECOLOGY

## 2024-04-22 PROCEDURE — 1123F ACP DISCUSS/DSCN MKR DOCD: CPT | Performed by: OBSTETRICS & GYNECOLOGY

## 2024-04-22 PROCEDURE — G8400 PT W/DXA NO RESULTS DOC: HCPCS | Performed by: OBSTETRICS & GYNECOLOGY

## 2024-04-22 RX ORDER — TROSPIUM CHLORIDE ER 60 MG/1
60 CAPSULE ORAL DAILY
Qty: 15 CAPSULE | Refills: 0 | Status: SHIPPED | OUTPATIENT
Start: 2024-04-22

## 2024-04-22 RX ORDER — TROSPIUM CHLORIDE ER 60 MG/1
60 CAPSULE ORAL DAILY
Qty: 30 CAPSULE | Refills: 6 | Status: SHIPPED | OUTPATIENT
Start: 2024-04-22

## 2024-04-22 RX ORDER — TROSPIUM CHLORIDE ER 60 MG/1
60 CAPSULE ORAL DAILY
Qty: 30 CAPSULE | Refills: 3 | Status: SHIPPED | OUTPATIENT
Start: 2024-04-22 | End: 2024-04-22

## 2024-04-22 ASSESSMENT — PATIENT HEALTH QUESTIONNAIRE - PHQ9
1. LITTLE INTEREST OR PLEASURE IN DOING THINGS: NOT AT ALL
SUM OF ALL RESPONSES TO PHQ QUESTIONS 1-9: 0
SUM OF ALL RESPONSES TO PHQ QUESTIONS 1-9: 0
2. FEELING DOWN, DEPRESSED OR HOPELESS: NOT AT ALL
SUM OF ALL RESPONSES TO PHQ QUESTIONS 1-9: 0
SUM OF ALL RESPONSES TO PHQ9 QUESTIONS 1 & 2: 0
SUM OF ALL RESPONSES TO PHQ QUESTIONS 1-9: 0

## 2024-04-22 NOTE — PROGRESS NOTES
Transportation Needs: Unknown (10/30/2023)    PRAPARE - Transportation     Lack of Transportation (Medical): Not on file     Lack of Transportation (Non-Medical): No   Physical Activity: Inactive (10/30/2023)    Exercise Vital Sign     Days of Exercise per Week: 0 days     Minutes of Exercise per Session: 0 min   Stress: Not on file   Social Connections: Not on file   Intimate Partner Violence: Not on file   Housing Stability: Unknown (10/30/2023)    Housing Stability Vital Sign     Unable to Pay for Housing in the Last Year: Not on file     Number of Places Lived in the Last Year: Not on file     Unstable Housing in the Last Year: No         Patient's medications,allergies, past medical, surgical, social and family histories were reviewed andupdated as appropriate.      Current Outpatient Medications:     trospium (SANCTURA) 60 MG CP24 extended release capsule, Take 1 capsule by mouth daily, Disp: 15 capsule, Rfl: 0    trospium (SANCTURA) 60 MG CP24 extended release capsule, Take 1 capsule by mouth daily, Disp: 30 capsule, Rfl: 6    Fremanezumab-vfrm (AJOVY) 225 MG/1.5ML SOAJ, ADMINISTER 225MG(1 INJECTION) UNDER THE SKIN EVERY 30 DAYS, Disp: 1.5 mL, Rfl: 6    trospium (SANCTURA) 60 MG CP24 extended release capsule, Take 1 capsule by mouth daily, Disp: 15 capsule, Rfl: 0    topiramate (TOPAMAX) 100 MG tablet, TAKE 3 TABLETS DAILY, Disp: 270 tablet, Rfl: 3    folic acid (FOLVITE) 1 MG tablet, Take 1 tablet by mouth daily, Disp: 90 tablet, Rfl: 0    ondansetron (ZOFRAN) 4 MG tablet, Take 1 tablet by mouth 3 times daily as needed for Nausea or Vomiting, Disp: 15 tablet, Rfl: 0    UBRELVY 100 MG TABS, TAKE 1 TABLET BY MOUTH DAILY AS NEEDED FOR MIGRAINES, Disp: , Rfl:     Rimegepant Sulfate (NURTEC) 75 MG TBDP, Take 75 mg by mouth as needed (TAKE 1 TABLET AT ONSET OF MIGRAINE) 4 SAMPLES GIVEN LOT: [id number] EXP: 06/25 NDC: [address]-3, Disp: 8 tablet, Rfl: 0    Rimegepant Sulfate (NURTEC) 75 MG TBDP, Take 75 mg by mouth

## 2024-05-22 ENCOUNTER — OFFICE VISIT (OUTPATIENT)
Dept: OBGYN CLINIC | Age: 69
End: 2024-05-22
Payer: MEDICARE

## 2024-05-22 VITALS
WEIGHT: 148 LBS | DIASTOLIC BLOOD PRESSURE: 64 MMHG | SYSTOLIC BLOOD PRESSURE: 106 MMHG | BODY MASS INDEX: 25.27 KG/M2 | HEIGHT: 64 IN | HEART RATE: 84 BPM

## 2024-05-22 DIAGNOSIS — N39.41 URGE INCONTINENCE: Primary | ICD-10-CM

## 2024-05-22 DIAGNOSIS — K59.09 OTHER CONSTIPATION: ICD-10-CM

## 2024-05-22 PROCEDURE — G8400 PT W/DXA NO RESULTS DOC: HCPCS | Performed by: OBSTETRICS & GYNECOLOGY

## 2024-05-22 PROCEDURE — G8427 DOCREV CUR MEDS BY ELIG CLIN: HCPCS | Performed by: OBSTETRICS & GYNECOLOGY

## 2024-05-22 PROCEDURE — G8419 CALC BMI OUT NRM PARAM NOF/U: HCPCS | Performed by: OBSTETRICS & GYNECOLOGY

## 2024-05-22 PROCEDURE — 3017F COLORECTAL CA SCREEN DOC REV: CPT | Performed by: OBSTETRICS & GYNECOLOGY

## 2024-05-22 PROCEDURE — 1036F TOBACCO NON-USER: CPT | Performed by: OBSTETRICS & GYNECOLOGY

## 2024-05-22 PROCEDURE — 1123F ACP DISCUSS/DSCN MKR DOCD: CPT | Performed by: OBSTETRICS & GYNECOLOGY

## 2024-05-22 PROCEDURE — 99213 OFFICE O/P EST LOW 20 MIN: CPT | Performed by: OBSTETRICS & GYNECOLOGY

## 2024-05-22 PROCEDURE — 1090F PRES/ABSN URINE INCON ASSESS: CPT | Performed by: OBSTETRICS & GYNECOLOGY

## 2024-05-22 PROCEDURE — 0509F URINE INCON PLAN DOCD: CPT | Performed by: OBSTETRICS & GYNECOLOGY

## 2024-05-22 RX ORDER — DIPHENHYDRAMINE HCL 25 MG
50 TABLET ORAL EVERY 6 HOURS PRN
Qty: 60 TABLET | Refills: 0 | Status: SHIPPED | OUTPATIENT
Start: 2024-05-22 | End: 2024-05-22

## 2024-05-22 RX ORDER — DOXYCYCLINE HYCLATE 100 MG
100 TABLET ORAL 2 TIMES DAILY
Qty: 14 TABLET | Refills: 0 | Status: SHIPPED | OUTPATIENT
Start: 2024-05-22 | End: 2024-05-22

## 2024-05-22 RX ORDER — DARIFENACIN 15 MG/1
15 TABLET, EXTENDED RELEASE ORAL DAILY
Qty: 30 TABLET | Refills: 0 | Status: SHIPPED | OUTPATIENT
Start: 2024-05-22

## 2024-05-22 RX ORDER — DOXYCYCLINE HYCLATE 100 MG
100 TABLET ORAL 2 TIMES DAILY
Qty: 14 TABLET | Refills: 0 | Status: SHIPPED | OUTPATIENT
Start: 2024-05-22 | End: 2024-05-29

## 2024-05-22 RX ORDER — DARIFENACIN 15 MG/1
15 TABLET, EXTENDED RELEASE ORAL DAILY
Qty: 30 TABLET | Refills: 0 | Status: SHIPPED | OUTPATIENT
Start: 2024-05-22 | End: 2024-05-22

## 2024-05-22 RX ORDER — DIPHENHYDRAMINE HCL 25 MG
50 TABLET ORAL EVERY 6 HOURS PRN
Qty: 60 TABLET | Refills: 0 | Status: SHIPPED | OUTPATIENT
Start: 2024-05-22 | End: 2024-06-21

## 2024-05-30 NOTE — PROGRESS NOTES
Sig: Take 1 tablet by mouth daily     Dispense:  30 tablet     Refill:  0    DISCONTD: doxycycline hyclate (VIBRA-TABS) 100 MG tablet     Sig: Take 1 tablet by mouth 2 times daily for 7 days     Dispense:  14 tablet     Refill:  0    DISCONTD: diphenhydrAMINE (BENADRYL ALLERGY) 25 MG tablet     Sig: Take 2 tablets by mouth every 6 hours as needed for Itching     Dispense:  60 tablet     Refill:  0    darifenacin (ENABLEX) 15 MG extended release tablet     Sig: Take 1 tablet by mouth daily     Dispense:  30 tablet     Refill:  0    diphenhydrAMINE (BENADRYL ALLERGY) 25 MG tablet     Sig: Take 2 tablets by mouth every 6 hours as needed for Itching     Dispense:  60 tablet     Refill:  0    doxycycline hyclate (VIBRA-TABS) 100 MG tablet     Sig: Take 1 tablet by mouth 2 times daily for 7 days     Dispense:  14 tablet     Refill:  0       Follow up:  No follow-ups on file.        Perla Pichardo MD

## 2024-06-17 ENCOUNTER — OFFICE VISIT (OUTPATIENT)
Dept: FAMILY MEDICINE CLINIC | Age: 69
End: 2024-06-17
Payer: MEDICARE

## 2024-06-17 VITALS
BODY MASS INDEX: 25.4 KG/M2 | HEIGHT: 64 IN | HEART RATE: 68 BPM | WEIGHT: 148.8 LBS | OXYGEN SATURATION: 98 % | DIASTOLIC BLOOD PRESSURE: 82 MMHG | SYSTOLIC BLOOD PRESSURE: 118 MMHG
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
## 2024-06-17 PROCEDURE — 1090F PRES/ABSN URINE INCON ASSESS: CPT | Performed by: NURSE PRACTITIONER

## 2024-06-17 PROCEDURE — 3017F COLORECTAL CA SCREEN DOC REV: CPT | Performed by: NURSE PRACTITIONER

## 2024-06-17 PROCEDURE — G8400 PT W/DXA NO RESULTS DOC: HCPCS | Performed by: NURSE PRACTITIONER

## 2024-06-17 PROCEDURE — 99213 OFFICE O/P EST LOW 20 MIN: CPT | Performed by: NURSE PRACTITIONER

## 2024-06-17 PROCEDURE — 96372 THER/PROPH/DIAG INJ SC/IM: CPT | Performed by: NURSE PRACTITIONER

## 2024-06-17 PROCEDURE — 1123F ACP DISCUSS/DSCN MKR DOCD: CPT | Performed by: NURSE PRACTITIONER

## 2024-06-17 PROCEDURE — G8419 CALC BMI OUT NRM PARAM NOF/U: HCPCS | Performed by: NURSE PRACTITIONER

## 2024-06-17 PROCEDURE — G8427 DOCREV CUR MEDS BY ELIG CLIN: HCPCS | Performed by: NURSE PRACTITIONER

## 2024-06-17 PROCEDURE — 1036F TOBACCO NON-USER: CPT | Performed by: NURSE PRACTITIONER

## 2024-06-17 RX ORDER — PREDNISONE 20 MG/1
TABLET ORAL
Qty: 20 TABLET | Refills: 0 | Status: SHIPPED | OUTPATIENT
Start: 2024-06-17

## 2024-06-17 RX ORDER — DEXAMETHASONE SODIUM PHOSPHATE 10 MG/ML
6 INJECTION INTRAMUSCULAR; INTRAVENOUS ONCE
Status: COMPLETED | OUTPATIENT
Start: 2024-06-17 | End: 2024-06-17

## 2024-06-17 RX ADMIN — DEXAMETHASONE SODIUM PHOSPHATE 6 MG: 10 INJECTION INTRAMUSCULAR; INTRAVENOUS at 10:58

## 2024-06-17 ASSESSMENT — ENCOUNTER SYMPTOMS
RHINORRHEA: 0
DIARRHEA: 0
TROUBLE SWALLOWING: 0
WHEEZING: 0
NAUSEA: 0
EYE PAIN: 0
VOMITING: 0
FACIAL SWELLING: 0
SHORTNESS OF BREATH: 0
CHEST TIGHTNESS: 0
SORE THROAT: 0
COUGH: 0
ABDOMINAL PAIN: 0

## 2024-06-17 NOTE — PROGRESS NOTES
Date of Visit:  2024  Patient Name: Tina Dwyer   Patient :  1955     CHIEF COMPLAINT:     Tina Dwyer is a 68 y.o. female who presents today for an general visit to be evaluated for the following condition(s):  Chief Complaint   Patient presents with    Poison Ivy     Symptoms onset- 6 weeks   Location- Hands/ feet and legs   Itch- Y  Blister- Y  Sore- Y  Ever had in past- N  Change to soap, detergent, exposures- pt states she did change her detergent but a year ago    Treatment- Cortisone        HISTORY OF PRESENT ILLNESS     I reviewed staff HPI/chief complaint and do agree with above    Patient with concerns of multiple areas of rash (bilateral arms, bilateral legs and back of neck) that has been ongoing for the last 6 weeks.  Patient states first noticed when she was outside planting flowers however was planting in her front yard in  multiple pots was not in weeded areas or garden at that time.  States she has been using hydrocortisone cream to the areas that are scattered throughout the areas of concern and did seem to work very short-term.  The areas do itch intensely and when she does itch the area she notices increased redness and inflammation to the areas.  Denies any fevers, joint pain, shortness of breath/troubles breathing, open areas of skin or drainage, any nausea/vomiting/diarrhea symptoms.        REVIEW OF SYSTEM      Review of Systems   Constitutional:  Negative for chills, diaphoresis, fatigue and fever.   HENT:  Negative for congestion, facial swelling, rhinorrhea, sore throat and trouble swallowing.    Eyes:  Negative for pain and visual disturbance.   Respiratory:  Negative for cough, chest tightness, shortness of breath and wheezing.    Cardiovascular:  Negative for chest pain and palpitations.   Gastrointestinal:  Negative for abdominal pain, diarrhea, nausea and vomiting.   Musculoskeletal:  Negative for arthralgias and myalgias.   Skin:  Positive for rash.

## 2024-06-17 NOTE — PROGRESS NOTES
.After obtaining consent, and per orders of Gilbert Stapleton,CARRINGTON injection of dexamethasone given in LT deltoid by Olimpia Brandt MA. Patient instructed to remain in clinic for 20 minutes afterwards, and to report any adverse reaction to me immediately. Tolerated well

## 2024-06-21 ENCOUNTER — TELEPHONE (OUTPATIENT)
Dept: OBGYN CLINIC | Age: 69
End: 2024-06-21

## 2024-06-21 RX ORDER — DARIFENACIN 15 MG/1
15 TABLET, EXTENDED RELEASE ORAL DAILY
Qty: 90 TABLET | Refills: 2 | Status: SHIPPED | OUTPATIENT
Start: 2024-06-21

## 2024-07-19 RX ORDER — PAROXETINE HYDROCHLORIDE 20 MG/1
20 TABLET, FILM COATED ORAL DAILY
Qty: 90 TABLET | Refills: 3 | Status: SHIPPED | OUTPATIENT
Start: 2024-07-19

## 2024-07-26 ENCOUNTER — OFFICE VISIT (OUTPATIENT)
Dept: FAMILY MEDICINE CLINIC | Age: 69
End: 2024-07-26

## 2024-07-26 VITALS — OXYGEN SATURATION: 97 % | TEMPERATURE: 98.8 F | HEART RATE: 79 BPM

## 2024-07-26 DIAGNOSIS — D49.2 ABNORMAL SKIN GROWTH: ICD-10-CM

## 2024-07-26 DIAGNOSIS — L98.9 CHANGING SKIN LESION: ICD-10-CM

## 2024-07-26 DIAGNOSIS — L73.8 SEBACEOUS HYPERPLASIA: ICD-10-CM

## 2024-07-26 DIAGNOSIS — L82.0 SEBORRHEIC KERATOSES, INFLAMED: ICD-10-CM

## 2024-07-26 DIAGNOSIS — Z12.83 SKIN CANCER SCREENING: Primary | ICD-10-CM

## 2024-07-26 ASSESSMENT — ENCOUNTER SYMPTOMS: COLOR CHANGE: 1

## 2024-07-26 NOTE — PROGRESS NOTES
Diagnosis Orders   1. Skin cancer screening        2. Sebaceous hyperplasia        3. Abnormal skin growth  AR PUNCH BIOPSY SKIN SINGLE LESION    Specimen to Pathology Outpatient      4. Changing skin lesion  AR PUNCH BIOPSY SKIN SINGLE LESION    Specimen to Pathology Outpatient      5. Seborrheic keratoses, inflamed  AR DESTRUCTION BENIGN LESIONS UP TO 14          No follow-ups on file.        Patient comes in with skin changes on her forehead.  Interested in skin cancer screening.  History of sun exposure and radiation therapy.        Current Outpatient Medications on File Prior to Visit   Medication Sig Dispense Refill    PARoxetine (PAXIL) 20 MG tablet TAKE 1 TABLET DAILY 90 tablet 3    darifenacin (ENABLEX) 15 MG extended release tablet Take 1 tablet by mouth daily 90 tablet 2    predniSONE (DELTASONE) 20 MG tablet Take 3 tabs for 3 days, then 2 tabs for 3 days, then 1 tab for 3 days, then 1/2 tab for 3 days, then stop. 20 tablet 0    trospium (SANCTURA) 60 MG CP24 extended release capsule Take 1 capsule by mouth daily (Patient not taking: Reported on 6/17/2024) 15 capsule 0    trospium (SANCTURA) 60 MG CP24 extended release capsule Take 1 capsule by mouth daily (Patient not taking: Reported on 6/17/2024) 30 capsule 6    Fremanezumab-vfrm (AJOVY) 225 MG/1.5ML SOAJ ADMINISTER 225MG(1 INJECTION) UNDER THE SKIN EVERY 30 DAYS 1.5 mL 6    trospium (SANCTURA) 60 MG CP24 extended release capsule Take 1 capsule by mouth daily (Patient not taking: Reported on 6/17/2024) 15 capsule 0    topiramate (TOPAMAX) 100 MG tablet TAKE 3 TABLETS DAILY 270 tablet 3    folic acid (FOLVITE) 1 MG tablet Take 1 tablet by mouth daily 90 tablet 0    ondansetron (ZOFRAN) 4 MG tablet Take 1 tablet by mouth 3 times daily as needed for Nausea or Vomiting 15 tablet 0    UBRELVY 100 MG TABS TAKE 1 TABLET BY MOUTH DAILY AS NEEDED FOR MIGRAINES      Rimegepant Sulfate (NURTEC) 75 MG TBDP Take 75 mg by mouth as needed (TAKE 1 TABLET AT ONSET OF

## 2024-07-26 NOTE — PATIENT INSTRUCTIONS
Patient has been educated on nature of the benign lesions that have been noted and how to monitor for changes.    She can consider Retin-A treatment for sebaceous hyperplasia though this is not generally covered by insurance.    Patient has been educated on methods of skin cancer prevention such as using SPF 50 sunscreen products, especially those containing zinc.    Patient has been educated on changes and lesions that would need earlier follow-up than routine 1 year skin check as well as the ABCD's of watching for signs of melanoma    Incision/ Shave biopsy/ Laceration repair    -Clean surgical area with antibacterial soap and water once daily.    -Keep surgical site moist with vaseline or antibiotic ointment (single- Bacitracin, not triple-Neosporin) and apply a fresh bandage daily until a solid scab forms or if the wound is at risk for trauma or dirt.   -Follow up immediately if any growing redness (minimal redness or pale pink is normal along wound edges) surrounds the surgical site or if dripping drainage occurs at surgical site. Once a solid scab forms no more bandage needed. A wet scab can look yellow.  This is not infection, just moisture.  -You may be instructed to soak the wound with Hydrogen Peroxide to loosen scabbing around sutures, this is not to be done more often that every 3 days, should be for 30 seconds-1 min and then rinsed off with water.   -Once the lesion is healed be sure to apply sunscreen to the area to prevent burn of the newer and more delicate skin during the first 6 months of healing.    -If the scar begins to be raised you may massage the area firmly twice a day to help break down scar tissue and help the area become a flat scar. There is some evidence that Mederma applied to a scar daily for the first few months can help shrink and fade it more quickly then without intervention.     Cryotherapy instructions    Post op instructions given. A printed copy provided.    It is best to

## 2024-08-01 DIAGNOSIS — C44.91 SKIN CANCER, BASAL CELL: Primary | ICD-10-CM

## 2024-08-06 ENCOUNTER — HOSPITAL ENCOUNTER (OUTPATIENT)
Dept: RADIATION ONCOLOGY | Age: 69
Discharge: HOME | End: 2024-08-06
Payer: MEDICARE

## 2024-08-06 VITALS
DIASTOLIC BLOOD PRESSURE: 81 MMHG | TEMPERATURE: 98 F | RESPIRATION RATE: 16 BRPM | HEART RATE: 68 BPM | BODY MASS INDEX: 25.88 KG/M2 | SYSTOLIC BLOOD PRESSURE: 114 MMHG | OXYGEN SATURATION: 97 % | WEIGHT: 150.8 LBS

## 2024-08-06 DIAGNOSIS — C21.0 ANAL CANCER (HCC): Primary | ICD-10-CM

## 2024-08-06 DIAGNOSIS — C44.519 BASAL CELL CARCINOMA (BCC) OF CHEST: ICD-10-CM

## 2024-08-06 PROCEDURE — 99212 OFFICE O/P EST SF 10 MIN: CPT | Performed by: RADIOLOGY

## 2024-08-06 NOTE — PROGRESS NOTES
NURSING ASSESSMENT     Date: 8/6/2024        Patient Name: Tina Dwyer     YOB: 1955      Age:  68 y.o.      MRN: [id number]       Chaperone [] Yes   [x] No      Advance Directives:   Do you currently have completed advance directives (living will)? [x] Yes   [] No         *If yes, please bring us a copy for your records.  *If no, would you like info or assistance in completing advance directives (living will)?   [] Yes   [x] No    Pain Score:   Pain Score (1-10): 2-3  Pain Location: mid chest lesion   Pain Duration: intermittent pain surge every once in a while  Pain Management/Control: nothing needed      Is pain affecting your ability to take care of yourself or move throughout your home?                        [] Yes   [x] No    General: No Problems  Patient has gained weight [] Yes   [x] No  Patient has lost weight [] Yes   [x] No  How much weight in pounds and over what length of time:     Eyes (Ophthalmic): No Problem     Skin (Dermatological): right mid chest lesion, Basal cell ca, has 2 lesions removed right neck and one mid upper back as well, all areas scabbed and healing     ENT: wears readers only since cataract surgery done May and June 2024     Respiratory: No Problems     Cardiovascular: No Problems      Device   [] Yes   [x] No   Copy of Card Obtained [] Yes   [x] No    Gastrointestinal: denies nausea, vomiting, daily bowel movement without pain, blood or mucous in stool    Genito-Urinary: takes enablex with good results     Breast: No Problems     Musculoskeletal: No Problems    Neurological: Headaches migraine this am      Hematological and Lymphatic: No Problems     Endocrine: No Problems     Gyn History: No problems    A 10-point review of systems  has been conducted and pertinent positives have been   recorded. All other review of systems are negative    Was the patient admitted during the course of treatment OR within 30 days of treatment? N/a    Additional Comments: CT abd  and pelvis scheduled 8/26/24

## 2024-08-26 ENCOUNTER — HOSPITAL ENCOUNTER (OUTPATIENT)
Dept: CT IMAGING | Age: 69
Discharge: HOME | End: 2024-08-28
Payer: MEDICARE

## 2024-08-26 DIAGNOSIS — K62.89 RECTAL MASS: ICD-10-CM

## 2024-08-26 DIAGNOSIS — C21.0 ANAL CANCER (HCC): ICD-10-CM

## 2024-08-26 DIAGNOSIS — Z85.048 HISTORY OF RECTAL CANCER: ICD-10-CM

## 2024-08-26 LAB
PERFORMED ON: ABNORMAL
POC CREATININE: 1.3 MG/DL (ref 0.6–1.2)
POC SAMPLE TYPE: ABNORMAL

## 2024-08-26 PROCEDURE — 6360000004 HC RX CONTRAST MEDICATION: Performed by: RADIOLOGY

## 2024-08-26 PROCEDURE — 74177 CT ABD & PELVIS W/CONTRAST: CPT

## 2024-08-26 RX ORDER — IOPAMIDOL 755 MG/ML
75 INJECTION, SOLUTION INTRAVASCULAR
Status: COMPLETED | OUTPATIENT
Start: 2024-08-26 | End: 2024-08-26

## 2024-08-26 RX ADMIN — IOPAMIDOL 75 ML: 755 INJECTION, SOLUTION INTRAVENOUS at 09:34

## 2024-08-28 ENCOUNTER — HOSPITAL ENCOUNTER (OUTPATIENT)
Dept: RADIATION ONCOLOGY | Age: 69
Discharge: HOME | End: 2024-08-28
Payer: MEDICARE

## 2024-08-28 DIAGNOSIS — C44.519 BASAL CELL CARCINOMA (BCC) OF CHEST: Primary | ICD-10-CM

## 2024-08-28 PROCEDURE — 77334 RADIATION TREATMENT AID(S): CPT | Performed by: RADIOLOGY

## 2024-08-28 NOTE — PROGRESS NOTES
Teaching & Instructions - Skin  General  Simulation  Initial Treatment  Self-Care Info  Nutrition  Social Service    Site-Specific  Side Effects  Fatigue Mgmt  Pain Mgmt  Skin Care      Educational Handouts  Radiation Therapy & You  Site Specific Instructions  Radiation Oncology Dept Information  CBMS Program    Patient was accompanied by her  Maksim, both are eager to learn, verbalized understanding of verbal education and handout

## 2024-09-10 ENCOUNTER — APPOINTMENT (OUTPATIENT)
Dept: RADIATION ONCOLOGY | Age: 69
End: 2024-09-10
Payer: MEDICARE

## 2024-09-16 ENCOUNTER — HOSPITAL ENCOUNTER (OUTPATIENT)
Dept: RADIATION ONCOLOGY | Age: 69
Discharge: HOME | End: 2024-09-16
Payer: MEDICARE

## 2024-09-16 DIAGNOSIS — C44.519 BASAL CELL CARCINOMA (BCC) OF CHEST: Primary | ICD-10-CM

## 2024-09-16 PROCEDURE — C1717 BRACHYTX, NON-STR,HDR IR-192: HCPCS | Performed by: RADIOLOGY

## 2024-09-16 PROCEDURE — 77280 THER RAD SIMULAJ FIELD SMPL: CPT | Performed by: RADIOLOGY

## 2024-09-16 PROCEDURE — 77767 HDR RDNCL SKN SURF BRACHYTX: CPT | Performed by: RADIOLOGY

## 2024-09-19 ENCOUNTER — HOSPITAL ENCOUNTER (OUTPATIENT)
Dept: RADIATION ONCOLOGY | Age: 69
Discharge: HOME | End: 2024-09-19
Payer: MEDICARE

## 2024-09-19 VITALS
DIASTOLIC BLOOD PRESSURE: 66 MMHG | SYSTOLIC BLOOD PRESSURE: 123 MMHG | WEIGHT: 151 LBS | BODY MASS INDEX: 25.92 KG/M2 | OXYGEN SATURATION: 99 % | TEMPERATURE: 98 F | HEART RATE: 58 BPM | RESPIRATION RATE: 16 BRPM

## 2024-09-19 DIAGNOSIS — C44.519 BASAL CELL CARCINOMA (BCC) OF CHEST: Primary | ICD-10-CM

## 2024-09-19 PROCEDURE — 77767 HDR RDNCL SKN SURF BRACHYTX: CPT | Performed by: RADIOLOGY

## 2024-09-19 PROCEDURE — 77280 THER RAD SIMULAJ FIELD SMPL: CPT | Performed by: RADIOLOGY

## 2024-09-19 PROCEDURE — C1717 BRACHYTX, NON-STR,HDR IR-192: HCPCS | Performed by: RADIOLOGY

## 2024-09-20 ENCOUNTER — APPOINTMENT (OUTPATIENT)
Dept: RADIATION ONCOLOGY | Age: 69
End: 2024-09-20
Payer: MEDICARE

## 2024-09-23 ENCOUNTER — HOSPITAL ENCOUNTER (OUTPATIENT)
Dept: RADIATION ONCOLOGY | Age: 69
Discharge: HOME | End: 2024-09-23
Payer: MEDICARE

## 2024-09-23 DIAGNOSIS — C44.519 BASAL CELL CARCINOMA (BCC) OF CHEST: Primary | ICD-10-CM

## 2024-09-23 PROCEDURE — 77767 HDR RDNCL SKN SURF BRACHYTX: CPT | Performed by: RADIOLOGY

## 2024-09-23 PROCEDURE — C1717 BRACHYTX, NON-STR,HDR IR-192: HCPCS | Performed by: RADIOLOGY

## 2024-09-23 PROCEDURE — 77280 THER RAD SIMULAJ FIELD SMPL: CPT | Performed by: RADIOLOGY

## 2024-09-24 ENCOUNTER — APPOINTMENT (OUTPATIENT)
Dept: RADIATION ONCOLOGY | Age: 69
End: 2024-09-24
Payer: MEDICARE

## 2024-09-25 ENCOUNTER — APPOINTMENT (OUTPATIENT)
Dept: RADIATION ONCOLOGY | Age: 69
End: 2024-09-25
Payer: MEDICARE

## 2024-09-26 ENCOUNTER — HOSPITAL ENCOUNTER (OUTPATIENT)
Dept: RADIATION ONCOLOGY | Age: 69
Discharge: HOME | End: 2024-09-26
Payer: MEDICARE

## 2024-09-26 VITALS
SYSTOLIC BLOOD PRESSURE: 127 MMHG | DIASTOLIC BLOOD PRESSURE: 68 MMHG | RESPIRATION RATE: 16 BRPM | TEMPERATURE: 97.3 F | OXYGEN SATURATION: 99 % | BODY MASS INDEX: 26.09 KG/M2 | WEIGHT: 152 LBS | HEART RATE: 68 BPM

## 2024-09-26 DIAGNOSIS — C44.519 BASAL CELL CARCINOMA (BCC) OF CHEST: Primary | ICD-10-CM

## 2024-09-26 PROCEDURE — 77280 THER RAD SIMULAJ FIELD SMPL: CPT | Performed by: RADIOLOGY

## 2024-09-26 PROCEDURE — C1717 BRACHYTX, NON-STR,HDR IR-192: HCPCS | Performed by: RADIOLOGY

## 2024-09-26 PROCEDURE — 77767 HDR RDNCL SKN SURF BRACHYTX: CPT | Performed by: RADIOLOGY

## 2024-09-27 ENCOUNTER — APPOINTMENT (OUTPATIENT)
Dept: RADIATION ONCOLOGY | Age: 69
End: 2024-09-27
Payer: MEDICARE

## 2024-09-30 ENCOUNTER — HOSPITAL ENCOUNTER (OUTPATIENT)
Dept: RADIATION ONCOLOGY | Age: 69
Discharge: HOME | End: 2024-09-30
Payer: MEDICARE

## 2024-09-30 DIAGNOSIS — C44.519 BASAL CELL CARCINOMA (BCC) OF CHEST: Primary | ICD-10-CM

## 2024-09-30 PROCEDURE — 77280 THER RAD SIMULAJ FIELD SMPL: CPT | Performed by: RADIOLOGY

## 2024-09-30 PROCEDURE — 77767 HDR RDNCL SKN SURF BRACHYTX: CPT | Performed by: RADIOLOGY

## 2024-09-30 PROCEDURE — C1717 BRACHYTX, NON-STR,HDR IR-192: HCPCS | Performed by: RADIOLOGY

## 2024-09-30 NOTE — PROGRESS NOTES
Breath sounds: Normal breath sounds.   Abdominal:      Palpations: Abdomen is soft.   Genitourinary:     Comments: Deferred desquamation.  Musculoskeletal:         General: No swelling. Normal range of motion.      Cervical back: Normal range of motion and neck supple. No rigidity or tenderness.      Right lower leg: No edema.   Lymphadenopathy:      Cervical: No cervical adenopathy.   Skin:     General: Skin is warm and dry.      Comments: There is a 1 cm x 1.5 cm pigmented lesion with evolving postbiopsy changes.  There is no evidence of infection or other concerning finding.  Neurological:      General: No focal deficit present.      Mental Status: She is alert and oriented to person, place, and time.   Psychiatric:         Mood and Affect: Mood normal.         Behavior: Behavior normal.     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  She returns for routine follow-up approximately 12 months after completing chemoradiation therapy and appears to be clinically and radiographically free of disease recurrence at this time.  She does have a repeat CT chest abdomen pelvis scheduled for 9/10/2024.    She has biopsy-proven metachronous basal cell carcinoma of the right medial chest status post shave biopsy with good healing.  She returns to discuss the role and rationale of radiation therapy.  I discussed the

## 2024-10-02 ENCOUNTER — OFFICE VISIT (OUTPATIENT)
Dept: NEUROLOGY | Age: 69
End: 2024-10-02
Payer: MEDICARE

## 2024-10-02 VITALS — SYSTOLIC BLOOD PRESSURE: 122 MMHG | BODY MASS INDEX: 25.92 KG/M2 | WEIGHT: 151 LBS | DIASTOLIC BLOOD PRESSURE: 70 MMHG

## 2024-10-02 DIAGNOSIS — Q21.12 PFO (PATENT FORAMEN OVALE): ICD-10-CM

## 2024-10-02 DIAGNOSIS — Z86.73 HISTORY OF STROKE: ICD-10-CM

## 2024-10-02 DIAGNOSIS — G43.111 INTRACTABLE MIGRAINE WITH AURA WITH STATUS MIGRAINOSUS: Primary | ICD-10-CM

## 2024-10-02 DIAGNOSIS — I63.512 CEREBROVASCULAR ACCIDENT (CVA) DUE TO STENOSIS OF LEFT MIDDLE CEREBRAL ARTERY (HCC): ICD-10-CM

## 2024-10-02 DIAGNOSIS — M48.02 CERVICAL SPINAL STENOSIS: ICD-10-CM

## 2024-10-02 PROCEDURE — G8427 DOCREV CUR MEDS BY ELIG CLIN: HCPCS | Performed by: PSYCHIATRY & NEUROLOGY

## 2024-10-02 PROCEDURE — G8400 PT W/DXA NO RESULTS DOC: HCPCS | Performed by: PSYCHIATRY & NEUROLOGY

## 2024-10-02 PROCEDURE — G8484 FLU IMMUNIZE NO ADMIN: HCPCS | Performed by: PSYCHIATRY & NEUROLOGY

## 2024-10-02 PROCEDURE — 1123F ACP DISCUSS/DSCN MKR DOCD: CPT | Performed by: PSYCHIATRY & NEUROLOGY

## 2024-10-02 PROCEDURE — 99214 OFFICE O/P EST MOD 30 MIN: CPT | Performed by: PSYCHIATRY & NEUROLOGY

## 2024-10-02 PROCEDURE — G8419 CALC BMI OUT NRM PARAM NOF/U: HCPCS | Performed by: PSYCHIATRY & NEUROLOGY

## 2024-10-02 PROCEDURE — 1090F PRES/ABSN URINE INCON ASSESS: CPT | Performed by: PSYCHIATRY & NEUROLOGY

## 2024-10-02 PROCEDURE — 1036F TOBACCO NON-USER: CPT | Performed by: PSYCHIATRY & NEUROLOGY

## 2024-10-02 PROCEDURE — 3017F COLORECTAL CA SCREEN DOC REV: CPT | Performed by: PSYCHIATRY & NEUROLOGY

## 2024-10-02 RX ORDER — TOPIRAMATE 100 MG/1
200 TABLET, FILM COATED ORAL 2 TIMES DAILY
Qty: 360 TABLET | Refills: 3 | Status: SHIPPED | OUTPATIENT
Start: 2024-10-02

## 2024-10-02 NOTE — PROGRESS NOTES
Subjective:      Patient ID: Tina Dwyer is a 69 y.o. female who presents today for:  Chief Complaint   Patient presents with    Follow-up     Pt states things are going good. Pt states her longest migraine was In aug it was 5 days and she just took the nurtec and ubrelvy        HPI 69-year-old right-handed female with a known history of intractable migraine headaches.  Receiving yearly basis.  Patient has also history of PFO and CVA.  She had 1 headache which lasted 5 days and she took Nurtec and Ubrelvy which helped.  Patient been tried on multiple medications in the past and also had a PFO which has been closed and since then has had better   Patient actually is had few headaches here and there is parkinson's spring and the fall she has more headaches.  She still responsive to Ubrelvy and Nurtec she alternates this.  She is on Topamax 300 mg.  Prior to this she was on 600 mg.  She continues on Ajovy.  Patient is not any further cerebrovascular events.  Past Medical History:   Diagnosis Date    Allergic reaction     Arthritis     Basal cell carcinoma (BCC) of chest 07/26/2024    Cancer (HCC)     Cervical, dx by Dr. Yo    Cerebral artery occlusion with cerebral infarction (HCC)     at age 50 / sx as a migraine with slurred speech & visual disturbance    Cerebrovascular disease Age 50    Depression     Diverticulitis     GERD (gastroesophageal reflux disease)     Hearing loss     History of conization of cervix     History of therapeutic radiation     Hx antineoplastic chemo     Migraine     Overactive bladder     Rectal cancer (HCC)     Renal insufficiency     Tinea unguium     Urinary incontinence      Past Surgical History:   Procedure Laterality Date    CARDIAC SURGERY      PFO surgery in age 50s    COLONOSCOPY  2018    COLONOSCOPY N/A 05/31/2022    COLONOSCOPY w/biopsies performed by Pattie Lewis MD at [facility]    HEMORRHOID SURGERY  2017    HYSTERECTOMY (CERVIX STATUS UNKNOWN)

## 2024-10-03 ENCOUNTER — HOSPITAL ENCOUNTER (OUTPATIENT)
Dept: RADIATION ONCOLOGY | Age: 69
Discharge: HOME | End: 2024-10-03
Payer: MEDICARE

## 2024-10-03 VITALS
RESPIRATION RATE: 18 BRPM | WEIGHT: 151.6 LBS | HEART RATE: 68 BPM | DIASTOLIC BLOOD PRESSURE: 72 MMHG | SYSTOLIC BLOOD PRESSURE: 119 MMHG | BODY MASS INDEX: 26.02 KG/M2 | TEMPERATURE: 97.2 F | OXYGEN SATURATION: 96 %

## 2024-10-03 DIAGNOSIS — C44.519 BASAL CELL CARCINOMA (BCC) OF CHEST: Primary | ICD-10-CM

## 2024-10-03 PROCEDURE — C1717 BRACHYTX, NON-STR,HDR IR-192: HCPCS | Performed by: RADIOLOGY

## 2024-10-03 PROCEDURE — 77767 HDR RDNCL SKN SURF BRACHYTX: CPT | Performed by: RADIOLOGY

## 2024-10-03 PROCEDURE — 77280 THER RAD SIMULAJ FIELD SMPL: CPT | Performed by: RADIOLOGY

## 2024-10-03 RX ORDER — ACETAMINOPHEN 500 MG
500 TABLET ORAL EVERY 6 HOURS PRN
COMMUNITY

## 2024-10-03 RX ORDER — MOMETASONE FUROATE 1 MG/G
OINTMENT TOPICAL
Qty: 60 G | Refills: 2 | Status: SHIPPED | OUTPATIENT
Start: 2024-10-03

## 2024-10-03 NOTE — PROGRESS NOTES
[facility]           Radiation Oncology      [id number] [place], [place] [address]        O: [phone]        F: [phone]       [facility]                   Dr. Julien Granados MD PhD    ON TREATMENT VISIT (OTV) NOTE     Date of Service: 10/3/2024  Patient ID: Tina Dwyer   : 1955  MRN: [id number]   Acct Number: [id number]       DIAGNOSIS:   Cancer Staging   Anal cancer (HCC)  Staging form: Anus, AJCC 8th Edition  - Clinical: Stage IIA (cT2, cN0, cM0) - Signed by Julien Granados MD on 2022    Basal cell carcinoma (BCC) of chest  Staging form: Carcinoma of the Skin, AJCC 6th Edition  - Clinical: Stage I (T1, N0, M0) - Signed by Julien Granados MD on 2024      Treatment Area: Skin    Current Total Dose(cGy): 3000  Current Fraction: 6    Patient was seen today for weekly visit.     Wt Readings from Last 3 Encounters:   10/03/24 68.8 kg (151 lb 9.6 oz)   10/02/24 68.5 kg (151 lb)   24 68.9 kg (152 lb)       /72   Pulse 68   Temp 97.2 °F (36.2 °C)   Resp 18   Wt 68.8 kg (151 lb 9.6 oz)   LMP 2008   SpO2 96%   BMI 26.02 kg/m²     Lab Results   Component Value Date    WBC 6.4 2021    HGB 13.1 2021    HCT 39.0 2021     2021       Comfort Alteration  Fatigue: has had to curtail activities related to fatigue    Pain Location: chest lesion area, stabbing pain intermittently  Pain Intensity (Current):  up to 8  Pain Treatment: OTC Medications, tylenol  Pain Relief: No relief    Emotional Alteration:   Coping: effective    Nutritional Alteration  Anorexia: none   Nausea: No nausea noted  Vomiting: No vomiting     Skin Alteration   Skin reaction: faint to moderate erythema to chest lesion, small amt moist desquamation, uses aquaphor twice a day    Additional Comments: .    MEDICATIONS:     Current Outpatient Medications   Medication Sig Dispense Refill    topiramate (TOPAMAX) 100 MG tablet Take 2 tablets by mouth

## 2024-10-04 ENCOUNTER — TELEPHONE (OUTPATIENT)
Dept: OBGYN CLINIC | Age: 69
End: 2024-10-04

## 2024-10-04 NOTE — TELEPHONE ENCOUNTER
Patient states that she would like to stop taking the Paxil that was prescribed to her, she denies any issues as to why she would like to stop, patient was advised to continue taking the medication until instructed by the provider.

## 2024-10-07 ENCOUNTER — APPOINTMENT (OUTPATIENT)
Dept: RADIATION ONCOLOGY | Age: 69
End: 2024-10-07
Payer: MEDICARE

## 2024-10-09 NOTE — TELEPHONE ENCOUNTER
Paxil was prescribed by a different provider, but if she feels ok I believe she should be ok stopping. I agree with your response and thank you .

## 2024-10-10 ENCOUNTER — HOSPITAL ENCOUNTER (OUTPATIENT)
Dept: RADIATION ONCOLOGY | Age: 69
Discharge: HOME | End: 2024-10-10
Payer: MEDICARE

## 2024-10-10 VITALS
RESPIRATION RATE: 16 BRPM | DIASTOLIC BLOOD PRESSURE: 75 MMHG | TEMPERATURE: 98.1 F | OXYGEN SATURATION: 100 % | HEART RATE: 64 BPM | WEIGHT: 150.6 LBS | BODY MASS INDEX: 25.85 KG/M2 | SYSTOLIC BLOOD PRESSURE: 126 MMHG

## 2024-10-10 DIAGNOSIS — C44.519 BASAL CELL CARCINOMA (BCC) OF CHEST: Primary | ICD-10-CM

## 2024-10-10 PROCEDURE — 77280 THER RAD SIMULAJ FIELD SMPL: CPT | Performed by: RADIOLOGY

## 2024-10-10 PROCEDURE — C1717 BRACHYTX, NON-STR,HDR IR-192: HCPCS | Performed by: RADIOLOGY

## 2024-10-10 PROCEDURE — 77767 HDR RDNCL SKN SURF BRACHYTX: CPT | Performed by: RADIOLOGY

## 2024-10-10 NOTE — PROGRESS NOTES
brachytherapy    ASSESSMENT: Modest radiation side effects. Responding appropriately to symptomatic management.    New medications, diagnostic results: Continue treatment as planned    PLAN: Again reviewed potential side effects of radiation for the patient's treatment.    Continue local/topical care.  The patient will be finishing radiation therapy next week and I will see her for a routine follow-up in 3 months.  In the interim I encouraged her to continue her current skin care regimen until the dermatitis resolves.   Continue current radiation course as prescribed.

## 2024-10-14 ENCOUNTER — APPOINTMENT (OUTPATIENT)
Dept: RADIATION ONCOLOGY | Age: 69
End: 2024-10-14
Payer: MEDICARE

## 2024-10-14 ENCOUNTER — HOSPITAL ENCOUNTER (OUTPATIENT)
Dept: RADIATION ONCOLOGY | Age: 69
Discharge: HOME | End: 2024-10-14
Payer: MEDICARE

## 2024-10-14 DIAGNOSIS — C44.519 BASAL CELL CARCINOMA (BCC) OF CHEST: Primary | ICD-10-CM

## 2024-10-14 NOTE — TELEPHONE ENCOUNTER
If she had already stopped it , she should be ok . She has not used the medication for a long period.

## 2024-10-14 NOTE — PROGRESS NOTES
Discharge instructions provided and reviewed with patient.  She will continue skin care and follow up with Dr Granados 1/22/25

## 2024-10-14 NOTE — PROGRESS NOTES
[facility]           Radiation Oncology      [address] [place], [place] [address]        O: [phone]        F: [phone]       [facility]                   Dr. Julien Granados MD PhD    RADIATION TREATMENT SUMMARY NOTE     Date of Service: 10/14/2024  Patient ID: Tina Dwyer   : 1955  MRN: [id number]   Acct Number: [id number]       Patient Name:  Tina Dwyer,  1955,  69 y.o., female       Referring Physician: Israel Fang MD  [address] [id number]      PCP: Sandee Goodman APRN - CNP       Diagnosis:    Basal cell carcinoma (BCC) of chest  Staging form: Carcinoma of the Skin, AJCC 6th Edition  - Clinical: Stage I (T1, N0, M0) - Signed by Julien Granados MD on 2024       Recent History:  ***    Site Start TX Last TX ED Fractions Dose Fx Dose Technique   Rt chest wall 9/16/24 10/14/24 28 8 4000 cGy 500 cGy Surface Brachy               Concurrent therapy:      ***    Technique:                 ***      Treatment course:       ***    Plan:  ***         Julien Granados MD PhD  Radiation Oncologist, [facility]    Department of Radiation Oncology  [facility]

## 2024-11-04 RX ORDER — FREMANEZUMAB-VFRM 225 MG/1.5ML
INJECTION SUBCUTANEOUS
Qty: 1.5 ML | Refills: 6 | Status: SHIPPED | OUTPATIENT
Start: 2024-11-04

## 2024-11-04 NOTE — TELEPHONE ENCOUNTER
Requesting medication refill. Please approve or deny this request.    Rx requested:  Requested Prescriptions     Pending Prescriptions Disp Refills    Fremanezumab-vfrm (AJOVY) 225 MG/1.5ML SOAJ [Pharmacy Med Name: AJOVY 225MG/1.5ML PF AUT INJ 1.5ML] 1.5 mL 6     Sig: INJECT 225 MG( 1 INJECTION) UNDER THE SKIN EVERY 30 DAYS         Last Office Visit:   10/2/2024      Next Visit Date:  Future Appointments   Date Time Provider Department Center   11/11/2024 12:30 PM Sandee Goodman, APRN - CNP [facility] DEP   1/22/2025  9:00 AM SCHEDULE, [facility] NURSE [facility] [place] [facility]   4/1/2025  1:30 PM Pattie Lewis MD [place] [facility]   10/1/2025  1:15 PM Marcial Cortés MD [place] [facility] Neurology -               Last refill 4/5/24. Please approve or deny.

## 2024-11-07 DIAGNOSIS — I63.512 CEREBROVASCULAR ACCIDENT (CVA) DUE TO STENOSIS OF LEFT MIDDLE CEREBRAL ARTERY (HCC): Primary | ICD-10-CM

## 2024-11-07 NOTE — TELEPHONE ENCOUNTER
Requesting medication refill. Please approve or deny this request.    Rx requested:  Requested Prescriptions     Pending Prescriptions Disp Refills    NURTEC 75 MG TBDP [Pharmacy Med Name: NURTEC 75MG ODT TABLETS] 8 tablet 3     Sig: TAKE 1 TABLET BY MOUTH AS NEEDED FOR MIGRAINES. MAX 1 TABLET PER DAILY         Last Office Visit:   10/2/2024      Next Visit Date:  Future Appointments   Date Time Provider Department Center   11/11/2024 12:30 PM Sandee Goodman, APRN - CNP [facility]   11/20/2024  9:15 AM Perla Pichardo MD [facility]   1/22/2025  9:00 AM SCHEDULE, [facility] NURSE [facility]   4/1/2025  1:30 PM Pattie Lewis MD [place] [facility]   10/1/2025  1:15 PM Marcial Cortés MD [place] [facility] Neurology -               Last refill 10/4/23. Please approve or deny.

## 2024-11-08 RX ORDER — RIMEGEPANT SULFATE 75 MG/75MG
TABLET, ORALLY DISINTEGRATING ORAL
Qty: 8 TABLET | Refills: 3 | Status: SHIPPED | OUTPATIENT
Start: 2024-11-08

## 2024-11-08 SDOH — ECONOMIC STABILITY: FOOD INSECURITY: WITHIN THE PAST 12 MONTHS, THE FOOD YOU BOUGHT JUST DIDN'T LAST AND YOU DIDN'T HAVE MONEY TO GET MORE.: NEVER TRUE

## 2024-11-08 SDOH — ECONOMIC STABILITY: FOOD INSECURITY: WITHIN THE PAST 12 MONTHS, YOU WORRIED THAT YOUR FOOD WOULD RUN OUT BEFORE YOU GOT MONEY TO BUY MORE.: NEVER TRUE

## 2024-11-08 SDOH — HEALTH STABILITY: PHYSICAL HEALTH: ON AVERAGE, HOW MANY DAYS PER WEEK DO YOU ENGAGE IN MODERATE TO STRENUOUS EXERCISE (LIKE A BRISK WALK)?: 0 DAYS

## 2024-11-08 SDOH — ECONOMIC STABILITY: INCOME INSECURITY: HOW HARD IS IT FOR YOU TO PAY FOR THE VERY BASICS LIKE FOOD, HOUSING, MEDICAL CARE, AND HEATING?: NOT HARD AT ALL

## 2024-11-08 ASSESSMENT — LIFESTYLE VARIABLES
HOW OFTEN DO YOU HAVE SIX OR MORE DRINKS ON ONE OCCASION: 2
HOW OFTEN DURING THE LAST YEAR HAVE YOU BEEN UNABLE TO REMEMBER WHAT HAPPENED THE NIGHT BEFORE BECAUSE YOU HAD BEEN DRINKING: NEVER
HOW OFTEN DURING THE LAST YEAR HAVE YOU FOUND THAT YOU WERE NOT ABLE TO STOP DRINKING ONCE YOU HAD STARTED: NEVER
HOW OFTEN DURING THE LAST YEAR HAVE YOU HAD A FEELING OF GUILT OR REMORSE AFTER DRINKING: NEVER
HOW MANY STANDARD DRINKS CONTAINING ALCOHOL DO YOU HAVE ON A TYPICAL DAY: 3 OR 4
HOW OFTEN DURING THE LAST YEAR HAVE YOU HAD A FEELING OF GUILT OR REMORSE AFTER DRINKING: NEVER
HAS A RELATIVE, FRIEND, DOCTOR, OR ANOTHER HEALTH PROFESSIONAL EXPRESSED CONCERN ABOUT YOUR DRINKING OR SUGGESTED YOU CUT DOWN: NO
HOW OFTEN DURING THE LAST YEAR HAVE YOU FOUND THAT YOU WERE NOT ABLE TO STOP DRINKING ONCE YOU HAD STARTED: NEVER
HOW OFTEN DURING THE LAST YEAR HAVE YOU BEEN UNABLE TO REMEMBER WHAT HAPPENED THE NIGHT BEFORE BECAUSE YOU HAD BEEN DRINKING: NEVER
HOW OFTEN DO YOU HAVE A DRINK CONTAINING ALCOHOL: 2-3 TIMES A WEEK
HOW MANY STANDARD DRINKS CONTAINING ALCOHOL DO YOU HAVE ON A TYPICAL DAY: 2
HAS A RELATIVE, FRIEND, DOCTOR, OR ANOTHER HEALTH PROFESSIONAL EXPRESSED CONCERN ABOUT YOUR DRINKING OR SUGGESTED YOU CUT DOWN: NO
HOW OFTEN DURING THE LAST YEAR HAVE YOU NEEDED AN ALCOHOLIC DRINK FIRST THING IN THE MORNING TO GET YOURSELF GOING AFTER A NIGHT OF HEAVY DRINKING: NEVER
HAVE YOU OR SOMEONE ELSE BEEN INJURED AS A RESULT OF YOUR DRINKING: NO
HAVE YOU OR SOMEONE ELSE BEEN INJURED AS A RESULT OF YOUR DRINKING: NO
HOW OFTEN DO YOU HAVE A DRINK CONTAINING ALCOHOL: 4
HOW OFTEN DURING THE LAST YEAR HAVE YOU FAILED TO DO WHAT WAS NORMALLY EXPECTED FROM YOU BECAUSE OF DRINKING: NEVER
HOW OFTEN DURING THE LAST YEAR HAVE YOU NEEDED AN ALCOHOLIC DRINK FIRST THING IN THE MORNING TO GET YOURSELF GOING AFTER A NIGHT OF HEAVY DRINKING: NEVER
HOW OFTEN DURING THE LAST YEAR HAVE YOU FAILED TO DO WHAT WAS NORMALLY EXPECTED FROM YOU BECAUSE OF DRINKING: NEVER

## 2024-11-08 ASSESSMENT — PATIENT HEALTH QUESTIONNAIRE - PHQ9
SUM OF ALL RESPONSES TO PHQ QUESTIONS 1-9: 0
2. FEELING DOWN, DEPRESSED OR HOPELESS: NOT AT ALL
SUM OF ALL RESPONSES TO PHQ QUESTIONS 1-9: 0
SUM OF ALL RESPONSES TO PHQ QUESTIONS 1-9: 0
SUM OF ALL RESPONSES TO PHQ9 QUESTIONS 1 & 2: 0
SUM OF ALL RESPONSES TO PHQ QUESTIONS 1-9: 0
1. LITTLE INTEREST OR PLEASURE IN DOING THINGS: NOT AT ALL

## 2024-11-11 ENCOUNTER — OFFICE VISIT (OUTPATIENT)
Dept: FAMILY MEDICINE CLINIC | Age: 69
End: 2024-11-11

## 2024-11-11 VITALS
DIASTOLIC BLOOD PRESSURE: 78 MMHG | TEMPERATURE: 98.9 F | WEIGHT: 154.8 LBS | BODY MASS INDEX: 26.43 KG/M2 | HEART RATE: 74 BPM | OXYGEN SATURATION: 97 % | HEIGHT: 64 IN | SYSTOLIC BLOOD PRESSURE: 106 MMHG

## 2024-11-11 DIAGNOSIS — R53.83 FATIGUE, UNSPECIFIED TYPE: ICD-10-CM

## 2024-11-11 DIAGNOSIS — R23.8 OTHER SKIN CHANGES: ICD-10-CM

## 2024-11-11 DIAGNOSIS — Z23 NEED FOR INFLUENZA VACCINATION: ICD-10-CM

## 2024-11-11 DIAGNOSIS — J30.9 ALLERGIC RHINITIS, UNSPECIFIED SEASONALITY, UNSPECIFIED TRIGGER: ICD-10-CM

## 2024-11-11 DIAGNOSIS — Z13.220 LIPID SCREENING: ICD-10-CM

## 2024-11-11 DIAGNOSIS — M79.89 TOE SWELLING: ICD-10-CM

## 2024-11-11 DIAGNOSIS — Z00.00 MEDICARE ANNUAL WELLNESS VISIT, SUBSEQUENT: Primary | ICD-10-CM

## 2024-11-11 DIAGNOSIS — Z23 NEED FOR PNEUMOCOCCAL 20-VALENT CONJUGATE VACCINATION: ICD-10-CM

## 2024-11-11 NOTE — PROGRESS NOTES
After obtaining consent, and per orders of Dr. Rex SHULTZ, injection of FLU and Prevnar 20 given in Left deltoid by Dipti Holder MA. Patient instructed to remain in clinic for 20 minutes afterwards, and to report any adverse reaction to me immediately.   
Medicare Annual Wellness Visit    Tina Dwyer is here for Medicare AWV (Does have concerns) and Balance (Having balance issue, x 5 days)    Assessment & Plan   Need for influenza vaccination  -     Influenza, FLUAD Trivalent, (age 65 y+), IM, Preservative Free, 0.5mL    Recommendations for Preventive Services Due: see orders and patient instructions/AVS.  Recommended screening schedule for the next 5-10 years is provided to the patient in written form: see Patient Instructions/AVS.     No follow-ups on file.     Subjective   The following acute and/or chronic problems were also addressed today:  See other note    Patient's complete Health Risk Assessment and screening values have been reviewed and are found in Flowsheets. The following problems were reviewed today and where indicated follow up appointments were made and/or referrals ordered.    Positive Risk Factor Screenings with Interventions:       Alcohol Screening:  AUDIT-C Score: 5  AUDIT Total Score: 5  Total Score Interpretation: 0-7 suggests low risk alcohol consumption but assess individual risks     Interventions:  Patient declined any further intervention or treatment    Drug Use:   Substance and Sexual Activity   Drug Use Yes    Types: Marijuana (Weed)    Comment: rarely       Interventions:  Patient declined any further intervention or treatment         Inactivity:  On average, how many days per week do you engage in moderate to strenuous exercise (like a brisk walk)?: 0 days (!) Abnormal       Interventions:  Patient comments: thinking about starting to walk with           Objective   Vitals:    11/11/24 1234   BP: 106/78   Site: Left Upper Arm   Pulse: 74   Temp: 98.9 °F (37.2 °C)   SpO2: 97%   Weight: 70.2 kg (154 lb 12.8 oz)   Height: 1.626 m (5' 4\")      Body mass index is 26.57 kg/m².               Allergies   Allergen Reactions    Erythromycin Hives    Seasonal Other (See Comments)     Pollen causes sinus sx    Sporanox 
is present.      Left Ear: External ear normal. A middle ear effusion is present.      Nose: Congestion present.      Mouth/Throat:      Mouth: Mucous membranes are moist.      Pharynx: Oropharynx is clear.   Eyes:      Conjunctiva/sclera: Conjunctivae normal.      Pupils: Pupils are equal, round, and reactive to light.   Neck:      Thyroid: No thyromegaly.   Cardiovascular:      Rate and Rhythm: Normal rate and regular rhythm.      Pulses: Normal pulses.      Heart sounds: Normal heart sounds.   Pulmonary:      Effort: Pulmonary effort is normal.      Breath sounds: Normal breath sounds.   Abdominal:      Palpations: Abdomen is soft.   Musculoskeletal:      Cervical back: Normal range of motion and neck supple.        Feet:    Skin:     General: Skin is warm.   Neurological:      General: No focal deficit present.      Mental Status: She is alert and oriented to person, place, and time. Mental status is at baseline.   Psychiatric:         Mood and Affect: Mood normal.         Behavior: Behavior normal.         Thought Content: Thought content normal.         Judgment: Judgment normal.            Assessment & Plan   Diagnosis Orders   1. Medicare annual wellness visit, subsequent        2. Need for influenza vaccination  Influenza, FLUAD Trivalent, (age 65 y+), IM, Preservative Free, 0.5mL      3. Toe swelling  Uric Acid      4. Lipid screening  Lipid Panel      5. Fatigue, unspecified type  Comprehensive Metabolic Panel    CBC      6. Need for pneumococcal 20-valent conjugate vaccination  Pneumococcal, PCV20, PREVNAR 20, (age 6w+), IM, PF      7. Allergic rhinitis, unspecified seasonality, unspecified trigger        8. Other skin changes [R23.8]            Orders Placed This Encounter   Procedures    Influenza, FLUAD Trivalent, (age 65 y+), IM, Preservative Free, 0.5mL    Pneumococcal, PCV20, PREVNAR 20, (age 6w+), IM, PF    Comprehensive Metabolic Panel     Standing Status:   Future     Standing Expiration Date:

## 2024-11-11 NOTE — PATIENT INSTRUCTIONS
for your use of this information.      Personalized Preventive Plan for Tina Dwyer - 11/11/2024  Medicare offers a range of preventive health benefits. Some of the tests and screenings are paid in full while other may be subject to a deductible, co-insurance, and/or copay.    Some of these benefits include a comprehensive review of your medical history including lifestyle, illnesses that may run in your family, and various assessments and screenings as appropriate.    After reviewing your medical record and screening and assessments performed today your provider may have ordered immunizations, labs, imaging, and/or referrals for you.  A list of these orders (if applicable) as well as your Preventive Care list are included within your After Visit Summary for your review.    Other Preventive Recommendations:    A preventive eye exam performed by an eye specialist is recommended every 1-2 years to screen for glaucoma; cataracts, macular degeneration, and other eye disorders.  A preventive dental visit is recommended every 6 months.  Try to get at least 150 minutes of exercise per week or 10,000 steps per day on a pedometer .  Order or download the FREE \"Exercise & Physical Activity: Your Everyday Guide\" from [employer] [place] [employer]. Call 1-910.784.7096 or search [employer] [place] [employer] online.  You need 4442-8753 mg of calcium and 6549-8505 IU of vitamin D per day. It is possible to meet your calcium requirement with diet alone, but a vitamin D supplement is usually necessary to meet this goal.  When exposed to the sun, use a sunscreen that protects against both UVA and UVB radiation with an SPF of 30 or greater. Reapply every 2 to 3 hours or after sweating, drying off with a towel, or swimming.  Always wear a seat belt when traveling in a car. Always wear a helmet when riding a bicycle or motorcycle.

## 2024-11-14 DIAGNOSIS — Z13.220 LIPID SCREENING: ICD-10-CM

## 2024-11-14 DIAGNOSIS — M79.89 TOE SWELLING: ICD-10-CM

## 2024-11-14 DIAGNOSIS — R53.83 FATIGUE, UNSPECIFIED TYPE: ICD-10-CM

## 2024-11-14 DIAGNOSIS — E78.5 HYPERLIPIDEMIA, UNSPECIFIED HYPERLIPIDEMIA TYPE: Primary | ICD-10-CM

## 2024-11-14 LAB
ALBUMIN SERPL-MCNC: 4.5 G/DL (ref 3.5–4.6)
ALP SERPL-CCNC: 96 U/L (ref 40–130)
ALT SERPL-CCNC: 10 U/L (ref 0–33)
ANION GAP SERPL CALCULATED.3IONS-SCNC: 14 MEQ/L (ref 9–15)
AST SERPL-CCNC: 22 U/L (ref 0–35)
BILIRUB SERPL-MCNC: 0.4 MG/DL (ref 0.2–0.7)
BUN SERPL-MCNC: 21 MG/DL (ref 8–23)
CALCIUM SERPL-MCNC: 9.6 MG/DL (ref 8.5–9.9)
CHLORIDE SERPL-SCNC: 105 MEQ/L (ref 95–107)
CHOLEST SERPL-MCNC: 267 MG/DL (ref 0–199)
CO2 SERPL-SCNC: 22 MEQ/L (ref 20–31)
CREAT SERPL-MCNC: 1.19 MG/DL (ref 0.5–0.9)
ERYTHROCYTE [DISTWIDTH] IN BLOOD BY AUTOMATED COUNT: 13.5 % (ref 11.5–14.5)
GLOBULIN SER CALC-MCNC: 2.9 G/DL (ref 2.3–3.5)
GLUCOSE SERPL-MCNC: 83 MG/DL (ref 70–99)
HCT VFR BLD AUTO: 40.2 % (ref 37–47)
HDLC SERPL-MCNC: 96 MG/DL (ref 40–59)
HGB BLD-MCNC: 13.3 G/DL (ref 12–16)
LDLC SERPL CALC-MCNC: 162 MG/DL (ref 0–129)
MCH RBC QN AUTO: 32.8 PG (ref 27–31.3)
MCHC RBC AUTO-ENTMCNC: 33.1 % (ref 33–37)
MCV RBC AUTO: 99 FL (ref 79.4–94.8)
PLATELET # BLD AUTO: 254 K/UL (ref 130–400)
POTASSIUM SERPL-SCNC: 4 MEQ/L (ref 3.4–4.9)
PROT SERPL-MCNC: 7.4 G/DL (ref 6.3–8)
RBC # BLD AUTO: 4.06 M/UL (ref 4.2–5.4)
SODIUM SERPL-SCNC: 141 MEQ/L (ref 135–144)
TRIGL SERPL-MCNC: 46 MG/DL (ref 0–150)
URATE SERPL-MCNC: 5.9 MG/DL (ref 2.4–5.7)
WBC # BLD AUTO: 4 K/UL (ref 4.8–10.8)

## 2024-11-18 ENCOUNTER — OFFICE VISIT (OUTPATIENT)
Dept: FAMILY MEDICINE CLINIC | Age: 69
End: 2024-11-18

## 2024-11-18 VITALS — WEIGHT: 154 LBS | BODY MASS INDEX: 26.29 KG/M2 | TEMPERATURE: 98.6 F | HEIGHT: 64 IN

## 2024-11-18 DIAGNOSIS — Z98.890 H/O BASAL CELL CARCINOMA EXCISION: ICD-10-CM

## 2024-11-18 DIAGNOSIS — D23.9 DERMAL NEVUS: ICD-10-CM

## 2024-11-18 DIAGNOSIS — L73.8 SEBACEOUS HYPERPLASIA: Primary | ICD-10-CM

## 2024-11-18 DIAGNOSIS — L57.0 ACTINIC KERATOSES: ICD-10-CM

## 2024-11-18 DIAGNOSIS — Z12.83 SKIN CANCER SCREENING: ICD-10-CM

## 2024-11-18 DIAGNOSIS — Z85.828 H/O BASAL CELL CARCINOMA EXCISION: ICD-10-CM

## 2024-11-18 RX ORDER — PRAVASTATIN SODIUM 20 MG
20 TABLET ORAL DAILY
Qty: 90 TABLET | Refills: 1 | Status: SHIPPED | OUTPATIENT
Start: 2024-11-18

## 2024-11-18 RX ORDER — UBIDECARENONE 100 MG
100 CAPSULE ORAL DAILY
Qty: 90 CAPSULE | Refills: 1 | Status: SHIPPED | OUTPATIENT
Start: 2024-11-18

## 2024-11-18 RX ORDER — TRETINOIN 0.25 MG/G
CREAM TOPICAL
Qty: 20 G | Refills: 0 | Status: SHIPPED | OUTPATIENT
Start: 2024-11-18

## 2024-11-18 ASSESSMENT — ENCOUNTER SYMPTOMS: COLOR CHANGE: 1

## 2024-11-18 NOTE — PATIENT INSTRUCTIONS
Patient has been educated on nature of the benign lesions that have been noted and how to monitor for changes.    Patient has been educated on methods of skin cancer prevention such as using SPF 50 sunscreen products, especially those containing zinc.    Patient has been educated on changes and lesions that would need earlier follow-up than routine 1 year skin check as well as the ABCD's of watching for signs of melanoma    No new basal cell cancers evident on exam.  Prior scar stable    Actinic keratosis were elected to be treated by liquid nitrogen therapy.    Cryotherapy instructions    Post op instructions given. A printed copy provided.    It is best to leave blisters alone if they form for the first 1-3 days to allow the desired damaged tissue (precancer lesion, wart, or whatever lesion is being removed) to separate from healthy tissue.     The area should be covered with a bandage to prevent blister breakage and dirt exposure.      The wounds should remain dry while there is a blister, therefore if this is a sweaty location, like the foot ,you may need to change clothing, such as socks, multiple times per day.       Remember that the reason for cryosurgery to be done is to damage skin that is not normal so that it will be removed.  Therefore the area could be red or pink, can sting or burn for the first day or 2, will appear raw when the skin sloughs off.    When the blister(s) pop or patient removes the top as instructed between day 3-5, apply antibiotic (NOT triple antibiotic, one brand is Neosporin) ointment, usually Bacitracin, and a bandage to affected area(s).  The ointment should be applied to the open area as long as it is not covered with skin.  Exposed tissue is meant to be moist.      Once a scab is formed the patient may stop applying ointment.  The scab may appear yellow while moist, don't confuse this with infection.  If the wound is infection pus will drain from the site. If this treatment

## 2024-11-18 NOTE — PROGRESS NOTES
stable    Actinic keratosis were elected to be treated by liquid nitrogen therapy.    Cryotherapy instructions    Post op instructions given. A printed copy provided.    It is best to leave blisters alone if they form for the first 1-3 days to allow the desired damaged tissue (precancer lesion, wart, or whatever lesion is being removed) to separate from healthy tissue.     The area should be covered with a bandage to prevent blister breakage and dirt exposure.      The wounds should remain dry while there is a blister, therefore if this is a sweaty location, like the foot ,you may need to change clothing, such as socks, multiple times per day.       Remember that the reason for cryosurgery to be done is to damage skin that is not normal so that it will be removed.  Therefore the area could be red or pink, can sting or burn for the first day or 2, will appear raw when the skin sloughs off.    When the blister(s) pop or patient removes the top as instructed between day 3-5, apply antibiotic (NOT triple antibiotic, one brand is Neosporin) ointment, usually Bacitracin, and a bandage to affected area(s).  The ointment should be applied to the open area as long as it is not covered with skin.  Exposed tissue is meant to be moist.      Once a scab is formed the patient may stop applying ointment.  The scab may appear yellow while moist, don't confuse this with infection.  If the wound is infection pus will drain from the site. If this treatment was for a large wart you may note that a plug of skin may fall out of the area that was treated.  That is the center of the wart and it is appropriate for it to come out.  If exposed skin remains, treat that area as you would a ruptured blister as mentioned above.    Bacitracin sample supplied               DO NOT USE TRIPLE ANTIBIOTIC ON THE WOUND    It is best to leave blisters alone if they form. They should be covered with a bandage to prevent blister breakage and dirt exposure.

## 2024-11-20 ENCOUNTER — OFFICE VISIT (OUTPATIENT)
Dept: OBGYN CLINIC | Age: 69
End: 2024-11-20
Payer: MEDICARE

## 2024-11-20 VITALS
WEIGHT: 153 LBS | DIASTOLIC BLOOD PRESSURE: 70 MMHG | HEIGHT: 64 IN | BODY MASS INDEX: 26.12 KG/M2 | SYSTOLIC BLOOD PRESSURE: 114 MMHG | HEART RATE: 100 BPM

## 2024-11-20 DIAGNOSIS — N39.41 URGE INCONTINENCE: ICD-10-CM

## 2024-11-20 DIAGNOSIS — N39.41 URGE INCONTINENCE: Primary | ICD-10-CM

## 2024-11-20 DIAGNOSIS — K59.09 OTHER CONSTIPATION: ICD-10-CM

## 2024-11-20 LAB
BACTERIA URNS QL MICRO: NEGATIVE /HPF
BILIRUB UR QL STRIP: NEGATIVE
CLARITY UR: CLEAR
COLOR UR: YELLOW
EPI CELLS #/AREA URNS AUTO: ABNORMAL /HPF (ref 0–5)
GLUCOSE UR STRIP-MCNC: NEGATIVE MG/DL
HGB UR QL STRIP: NEGATIVE
HYALINE CASTS #/AREA URNS AUTO: ABNORMAL /HPF (ref 0–5)
KETONES UR STRIP-MCNC: NEGATIVE MG/DL
LEUKOCYTE ESTERASE UR QL STRIP: ABNORMAL
NITRITE UR QL STRIP: NEGATIVE
PH UR STRIP: 5.5 [PH] (ref 5–9)
PROT UR STRIP-MCNC: NEGATIVE MG/DL
RBC #/AREA URNS AUTO: ABNORMAL /HPF (ref 0–5)
SP GR UR STRIP: 1.02 (ref 1–1.03)
UROBILINOGEN UR STRIP-ACNC: 0.2 E.U./DL
WBC #/AREA URNS AUTO: ABNORMAL /HPF (ref 0–5)

## 2024-11-20 PROCEDURE — G8419 CALC BMI OUT NRM PARAM NOF/U: HCPCS | Performed by: OBSTETRICS & GYNECOLOGY

## 2024-11-20 PROCEDURE — 0509F URINE INCON PLAN DOCD: CPT | Performed by: OBSTETRICS & GYNECOLOGY

## 2024-11-20 PROCEDURE — 1036F TOBACCO NON-USER: CPT | Performed by: OBSTETRICS & GYNECOLOGY

## 2024-11-20 PROCEDURE — 1123F ACP DISCUSS/DSCN MKR DOCD: CPT | Performed by: OBSTETRICS & GYNECOLOGY

## 2024-11-20 PROCEDURE — 1159F MED LIST DOCD IN RCRD: CPT | Performed by: OBSTETRICS & GYNECOLOGY

## 2024-11-20 PROCEDURE — G8427 DOCREV CUR MEDS BY ELIG CLIN: HCPCS | Performed by: OBSTETRICS & GYNECOLOGY

## 2024-11-20 PROCEDURE — 1090F PRES/ABSN URINE INCON ASSESS: CPT | Performed by: OBSTETRICS & GYNECOLOGY

## 2024-11-20 PROCEDURE — G8400 PT W/DXA NO RESULTS DOC: HCPCS | Performed by: OBSTETRICS & GYNECOLOGY

## 2024-11-20 PROCEDURE — 99213 OFFICE O/P EST LOW 20 MIN: CPT | Performed by: OBSTETRICS & GYNECOLOGY

## 2024-11-20 PROCEDURE — 3017F COLORECTAL CA SCREEN DOC REV: CPT | Performed by: OBSTETRICS & GYNECOLOGY

## 2024-11-20 PROCEDURE — G8482 FLU IMMUNIZE ORDER/ADMIN: HCPCS | Performed by: OBSTETRICS & GYNECOLOGY

## 2024-11-20 RX ORDER — DARIFENACIN 15 MG/1
15 TABLET, EXTENDED RELEASE ORAL DAILY
Qty: 30 TABLET | Refills: 0 | Status: SHIPPED | OUTPATIENT
Start: 2024-11-20

## 2024-11-20 RX ORDER — FEZOLINETANT 45 MG/1
1 TABLET, FILM COATED ORAL DAILY
Qty: 30 TABLET | Refills: 0 | Status: SHIPPED | OUTPATIENT
Start: 2024-11-20

## 2024-11-20 RX ORDER — MIRABEGRON 50 MG/1
50 TABLET, FILM COATED, EXTENDED RELEASE ORAL DAILY
Qty: 30 TABLET | Refills: 0 | Status: SHIPPED | OUTPATIENT
Start: 2024-11-20

## 2024-11-20 NOTE — PROGRESS NOTES
Tina Dwyer is a 69 y.o. female who presents here today for complaints of Follow-up (Patient here today and states the medication for her urinary issues worked in the beginning but has stopped.Back to the urgency real bad.)    The patient, Tina Dwyer, reports having tried multiple medications for her bladder issues without success. She mentions having tried oxybutynin, trospium, darifenacin, and Myrbetriq. She is considering trying Gemtesa but is concerned about the cost : 227$ WITH INSURANCE COVERAGE . The patient is also considering the interstim/ sacral neuromodulation based on our discussions in the past,  as a potential treatment option.    Tina reports experiencing hot flashes, which have returned after stopping her depression medication. She is not currently taking any medications for hot flashes and is seeking a non-hormonal treatment option that will not interfere with her previous cancer treatments.    The patient mentions having recently stopped taking her depression medication and has noticed an improvement in her overall appearance and relaxation. She is currently taking Topamax, aspirin, and folic acid without the antidepressant.   .      Vitals:  /70 (Site: Right Upper Arm)   Pulse 100   Ht 1.626 m (5' 4\")   Wt 69.4 kg (153 lb)   LMP 09/19/2008   BMI 26.26 kg/m²   Allergies:  Erythromycin, Seasonal, Sporanox [itraconazole], and Macrobid [nitrofurantoin macrocrystal]  Past Medical History:   Diagnosis Date    Allergic reaction     Arthritis     Basal cell carcinoma (BCC) of chest 07/26/2024    Cancer (HCC)     Cervical, dx by Dr. Yo    Cerebral artery occlusion with cerebral infarction (HCC)     at age 50 / sx as a migraine with slurred speech & visual disturbance    Cerebrovascular disease Age 50    Depression     Diverticulitis     GERD (gastroesophageal reflux disease)     Hearing loss     History of conization of cervix     History of therapeutic radiation     Hx

## 2024-11-21 LAB — BACTERIA UR CULT: NORMAL

## 2024-12-10 ENCOUNTER — OFFICE VISIT (OUTPATIENT)
Dept: OBGYN CLINIC | Age: 69
End: 2024-12-10
Payer: MEDICARE

## 2024-12-10 VITALS
HEART RATE: 68 BPM | HEIGHT: 64 IN | DIASTOLIC BLOOD PRESSURE: 66 MMHG | BODY MASS INDEX: 26.12 KG/M2 | WEIGHT: 153 LBS | SYSTOLIC BLOOD PRESSURE: 108 MMHG

## 2024-12-10 DIAGNOSIS — N39.41 URGE INCONTINENCE: Primary | ICD-10-CM

## 2024-12-10 DIAGNOSIS — N39.41 URGE INCONTINENCE: ICD-10-CM

## 2024-12-10 LAB
BACTERIA URNS QL MICRO: NEGATIVE /HPF
BILIRUB UR QL STRIP: NEGATIVE
CLARITY UR: CLEAR
COLOR UR: YELLOW
CRYSTALS URNS MICRO: ABNORMAL /HPF
EPI CELLS #/AREA URNS AUTO: ABNORMAL /HPF (ref 0–5)
EPI CELLS #/AREA URNS HPF: ABNORMAL /HPF
GLUCOSE UR STRIP-MCNC: NEGATIVE MG/DL
HGB UR QL STRIP: NEGATIVE
HYALINE CASTS #/AREA URNS AUTO: ABNORMAL /HPF (ref 0–5)
KETONES UR STRIP-MCNC: NEGATIVE MG/DL
LEUKOCYTE ESTERASE UR QL STRIP: ABNORMAL
NITRITE UR QL STRIP: NEGATIVE
PH UR STRIP: 5.5 [PH] (ref 5–9)
PROT UR STRIP-MCNC: ABNORMAL MG/DL
RBC #/AREA URNS HPF: ABNORMAL /HPF (ref 0–2)
SP GR UR STRIP: 1.03 (ref 1–1.03)
UROBILINOGEN UR STRIP-ACNC: 0.2 E.U./DL
WBC #/AREA URNS AUTO: ABNORMAL /HPF (ref 0–5)

## 2024-12-10 PROCEDURE — 1159F MED LIST DOCD IN RCRD: CPT | Performed by: OBSTETRICS & GYNECOLOGY

## 2024-12-10 PROCEDURE — 0509F URINE INCON PLAN DOCD: CPT | Performed by: OBSTETRICS & GYNECOLOGY

## 2024-12-10 PROCEDURE — 1036F TOBACCO NON-USER: CPT | Performed by: OBSTETRICS & GYNECOLOGY

## 2024-12-10 PROCEDURE — 1090F PRES/ABSN URINE INCON ASSESS: CPT | Performed by: OBSTETRICS & GYNECOLOGY

## 2024-12-10 PROCEDURE — G8400 PT W/DXA NO RESULTS DOC: HCPCS | Performed by: OBSTETRICS & GYNECOLOGY

## 2024-12-10 PROCEDURE — 99214 OFFICE O/P EST MOD 30 MIN: CPT | Performed by: OBSTETRICS & GYNECOLOGY

## 2024-12-10 PROCEDURE — G8427 DOCREV CUR MEDS BY ELIG CLIN: HCPCS | Performed by: OBSTETRICS & GYNECOLOGY

## 2024-12-10 PROCEDURE — G8482 FLU IMMUNIZE ORDER/ADMIN: HCPCS | Performed by: OBSTETRICS & GYNECOLOGY

## 2024-12-10 PROCEDURE — 3017F COLORECTAL CA SCREEN DOC REV: CPT | Performed by: OBSTETRICS & GYNECOLOGY

## 2024-12-10 PROCEDURE — G8419 CALC BMI OUT NRM PARAM NOF/U: HCPCS | Performed by: OBSTETRICS & GYNECOLOGY

## 2024-12-10 PROCEDURE — 1123F ACP DISCUSS/DSCN MKR DOCD: CPT | Performed by: OBSTETRICS & GYNECOLOGY

## 2024-12-10 NOTE — PROGRESS NOTES
TABLET BY MOUTH DAILY AS NEEDED FOR MIGRAINES       Patient's medications, allergies, past medical, surgical, social and family histories were reviewed and updated as appropriate.    Review of Systems  As per chief complaint   All other systems reviewed and are negative.  urgency, frequency, incontinence, nocturia    Physical Exam:  Vitals:  /66 (Site: Left Upper Arm, Position: Sitting, Cuff Size: Medium Adult)   Pulse 68   Ht 1.626 m (5' 4\")   Wt 69.4 kg (153 lb)   LMP 09/19/2008   BMI 26.26 kg/m²   Lungs: CTAB   Heart : Regular S1/S2, no M/R/G  Abdomen: Soft , NT, ND , + BS   Pelvic exam : deferred     Assessment:      Diagnosis Orders   1. Urge incontinence  Urinalysis    Culture, Urine          Plan:     Failed most medical treatment options for the Urge incontinence   Discussed InterStim versus Botox, patient opted for InterStim.  Will be scheduled for percutaneous nerve evaluation for possible stage I and stage II InterStim.    Sacral neuromodulation and PNE counseling    What I s It?  A Percutaneous Nerve Evaluation (PNE) is a test done in the office to determine if InterStim™ therapy  will improve your urinary symptoms. InterStim™ therapy is an FDA-approved treatment for urinary  urgency, frequency, urge incontinence, and retention. It is a small device that is implanted under the  skin of the upper buttocks. It works by gently stimulating the sacral nerves to help the bladder function  more normally.  Percutaneous Nerve Evaluation:  For the PNE, a temporary wire will be placed along side the third sacral nerve in your lower back. This  nerve controls bladder function. The wire is the size of a thick hair and will carry gentle electrical  pulses to the nerve. The wire will be inserted by myself Dr Pichardo using a local anesthetic. I  will know that the lead is in the correct position when you feel a tapping, pulsing, vibrating or tingling  sensation in the vaginal or rectal area. Once the wire is in

## 2024-12-12 LAB
BACTERIA UR CULT: ABNORMAL
BACTERIA UR CULT: ABNORMAL
ORGANISM: ABNORMAL

## 2024-12-16 ENCOUNTER — PROCEDURE VISIT (OUTPATIENT)
Dept: OBGYN CLINIC | Age: 69
End: 2024-12-16
Payer: MEDICARE

## 2024-12-16 VITALS — BODY MASS INDEX: 26.26 KG/M2 | HEIGHT: 64 IN

## 2024-12-16 DIAGNOSIS — N39.41 URGE INCONTINENCE: Primary | ICD-10-CM

## 2024-12-16 PROCEDURE — 64561 IMPLANT NEUROELECTRODES: CPT | Performed by: OBSTETRICS & GYNECOLOGY

## 2024-12-16 RX ORDER — CEPHALEXIN 500 MG/1
500 CAPSULE ORAL 4 TIMES DAILY
Qty: 28 CAPSULE | Refills: 0 | Status: SHIPPED | OUTPATIENT
Start: 2024-12-16

## 2024-12-18 ENCOUNTER — PROCEDURE VISIT (OUTPATIENT)
Dept: OBGYN CLINIC | Age: 69
End: 2024-12-18
Payer: MEDICARE

## 2024-12-18 VITALS
WEIGHT: 155 LBS | BODY MASS INDEX: 26.46 KG/M2 | HEIGHT: 64 IN | DIASTOLIC BLOOD PRESSURE: 68 MMHG | SYSTOLIC BLOOD PRESSURE: 120 MMHG | HEART RATE: 81 BPM

## 2024-12-18 DIAGNOSIS — N39.41 URGE INCONTINENCE: ICD-10-CM

## 2024-12-18 PROCEDURE — 99214 OFFICE O/P EST MOD 30 MIN: CPT | Performed by: OBSTETRICS & GYNECOLOGY

## 2024-12-18 PROCEDURE — 3017F COLORECTAL CA SCREEN DOC REV: CPT | Performed by: OBSTETRICS & GYNECOLOGY

## 2024-12-18 PROCEDURE — 1123F ACP DISCUSS/DSCN MKR DOCD: CPT | Performed by: OBSTETRICS & GYNECOLOGY

## 2024-12-18 PROCEDURE — 1036F TOBACCO NON-USER: CPT | Performed by: OBSTETRICS & GYNECOLOGY

## 2024-12-18 PROCEDURE — 1090F PRES/ABSN URINE INCON ASSESS: CPT | Performed by: OBSTETRICS & GYNECOLOGY

## 2024-12-18 PROCEDURE — G8400 PT W/DXA NO RESULTS DOC: HCPCS | Performed by: OBSTETRICS & GYNECOLOGY

## 2024-12-18 PROCEDURE — G8482 FLU IMMUNIZE ORDER/ADMIN: HCPCS | Performed by: OBSTETRICS & GYNECOLOGY

## 2024-12-18 PROCEDURE — 1159F MED LIST DOCD IN RCRD: CPT | Performed by: OBSTETRICS & GYNECOLOGY

## 2024-12-18 PROCEDURE — G8419 CALC BMI OUT NRM PARAM NOF/U: HCPCS | Performed by: OBSTETRICS & GYNECOLOGY

## 2024-12-18 PROCEDURE — G8427 DOCREV CUR MEDS BY ELIG CLIN: HCPCS | Performed by: OBSTETRICS & GYNECOLOGY

## 2024-12-18 PROCEDURE — 0509F URINE INCON PLAN DOCD: CPT | Performed by: OBSTETRICS & GYNECOLOGY

## 2024-12-20 RX ORDER — ACETAMINOPHEN 325 MG/1
1000 TABLET ORAL ONCE
OUTPATIENT
Start: 2024-12-20 | End: 2024-12-20

## 2024-12-20 RX ORDER — SODIUM CHLORIDE, SODIUM LACTATE, POTASSIUM CHLORIDE, CALCIUM CHLORIDE 600; 310; 30; 20 MG/100ML; MG/100ML; MG/100ML; MG/100ML
INJECTION, SOLUTION INTRAVENOUS CONTINUOUS
OUTPATIENT
Start: 2024-12-20

## 2024-12-20 RX ORDER — SODIUM CHLORIDE 9 MG/ML
INJECTION, SOLUTION INTRAVENOUS PRN
OUTPATIENT
Start: 2024-12-20

## 2024-12-20 RX ORDER — SODIUM CHLORIDE 0.9 % (FLUSH) 0.9 %
5-40 SYRINGE (ML) INJECTION EVERY 12 HOURS SCHEDULED
OUTPATIENT
Start: 2024-12-20

## 2024-12-20 RX ORDER — SODIUM CHLORIDE 0.9 % (FLUSH) 0.9 %
5-40 SYRINGE (ML) INJECTION PRN
OUTPATIENT
Start: 2024-12-20

## 2024-12-27 NOTE — PROGRESS NOTES
Cystoscopy procedure :   Preoperative diagnosis: Microscopic hematuria, urgency  Postop diagnosis: Same  Procedure : diagnostic cystoscopy  Surgeon: Dr. Pichardo  Anesthesia: None  Findings: Normal bladder mucosa, both ureteral orifices seen  Disposition: Patient tolerated the procedure well.  Patient was stable at the end of the procedure.    Operative technique: Patient in lithotomy position, urethra was scrubbed 3 times with Betadine, the Endo C disposable hysteroscope device was then introduced after applying gel to the tip, and advanced through the urethra with my assistant injecting normal saline continuously through the tip through a 60 cc syringe to be reloaded as needed.  Upon entry into the bladder adequate bladder filling was noted, and visualization was optimal.  I examined all bladder walls sequentially, no lesions or any abnormalities, abnormal vascularity or abnormal plaques were seen on bladder wall.  Both ureteral orifice ease were also observed.  At that point the procedure was complete.  The Endo C device was withdrawn.  Patient tolerated procedure well.  Multiple pictures were taken and will be scanned could be seen in epic.    Plan: see other note     Perla Pichardo M.D., F.A.C.O.G

## 2024-12-27 NOTE — PROGRESS NOTES
Office procedure note :     Preop DX: refractory urge incontinence     Postop diagnosis : same     Anesthesia : 1% lidocaine with epinephrine     EBL : none     Surgeon : Perla Pichardo MD     Percutaneous nerve stimulation procedure     After procedure explained, verbal consent obtained, patient was prepped and draped for procedure. Patient laying flat on stomach. A measuring tape used and 11 cm from coccyx was measured and marked.   2 points were then marked in each side from the midline, 2 cm above and lateral to the 11 cm midline point marked earlier. 5 cc of local anaesthetic was then injected at these points and along a line at a 60 degrees angle directed towards patients legs , along the imaginary line of insertion to the sacral bone.   The spinal needle provided with the interstim kit was then used and introduced at the right marked point at a 60 degree angle and advanced slowly till it hit resistence from sacral bone. More local anaethetic was injected at that point. The needle was withdrawn a little calibrated and reinserted 2 to 3 times until a spongy sensation was felt as the needle was introduced, with no bony resistence, at that point the needle was assumed to have entered the S3 sacral foramen. The interstim electrode was then hooked to needle and the patient did report fluttering sensation in rectal or vaginal areas or both, confirming probable proper placement of needle. Same steps were carried out on the left side. The wires provided were then introduced through the hollow of the spiral needles introduced earlier. At that point procedure was complete. Patient tolerated procedure.    Perla Pichardo M.D., FZOËOPepeG

## 2025-01-06 ENCOUNTER — HOSPITAL ENCOUNTER (OUTPATIENT)
Dept: PREADMISSION TESTING | Age: 70
Discharge: HOME | End: 2025-01-10
Payer: MEDICARE

## 2025-01-06 VITALS
TEMPERATURE: 98.3 F | BODY MASS INDEX: 24.46 KG/M2 | HEIGHT: 66 IN | OXYGEN SATURATION: 98 % | RESPIRATION RATE: 16 BRPM | DIASTOLIC BLOOD PRESSURE: 69 MMHG | HEART RATE: 66 BPM | SYSTOLIC BLOOD PRESSURE: 118 MMHG | WEIGHT: 152.2 LBS

## 2025-01-09 ENCOUNTER — ANESTHESIA (OUTPATIENT)
Dept: OPERATING ROOM | Age: 70
End: 2025-01-09
Payer: MEDICARE

## 2025-01-09 ENCOUNTER — APPOINTMENT (OUTPATIENT)
Dept: GENERAL RADIOLOGY | Age: 70
End: 2025-01-09
Attending: OBSTETRICS & GYNECOLOGY
Payer: MEDICARE

## 2025-01-09 ENCOUNTER — ANESTHESIA EVENT (OUTPATIENT)
Dept: OPERATING ROOM | Age: 70
End: 2025-01-09
Payer: MEDICARE

## 2025-01-09 ENCOUNTER — HOSPITAL ENCOUNTER (OUTPATIENT)
Age: 70
Setting detail: OUTPATIENT SURGERY
Discharge: HOME | End: 2025-01-09
Attending: OBSTETRICS & GYNECOLOGY | Admitting: OBSTETRICS & GYNECOLOGY
Payer: MEDICARE

## 2025-01-09 VITALS
DIASTOLIC BLOOD PRESSURE: 50 MMHG | HEIGHT: 64 IN | OXYGEN SATURATION: 100 % | TEMPERATURE: 97.4 F | WEIGHT: 149 LBS | HEART RATE: 65 BPM | SYSTOLIC BLOOD PRESSURE: 93 MMHG | BODY MASS INDEX: 25.44 KG/M2 | RESPIRATION RATE: 16 BRPM

## 2025-01-09 DIAGNOSIS — R52 PAIN: ICD-10-CM

## 2025-01-09 DIAGNOSIS — G89.18 POSTOPERATIVE PAIN: Primary | ICD-10-CM

## 2025-01-09 PROCEDURE — 2580000003 HC RX 258: Performed by: OBSTETRICS & GYNECOLOGY

## 2025-01-09 PROCEDURE — C1778 LEAD, NEUROSTIMULATOR: HCPCS | Performed by: OBSTETRICS & GYNECOLOGY

## 2025-01-09 PROCEDURE — 3700000001 HC ADD 15 MINUTES (ANESTHESIA): Performed by: OBSTETRICS & GYNECOLOGY

## 2025-01-09 PROCEDURE — 3600000004 HC SURGERY LEVEL 4 BASE: Performed by: OBSTETRICS & GYNECOLOGY

## 2025-01-09 PROCEDURE — C1787 PATIENT PROGR, NEUROSTIM: HCPCS | Performed by: OBSTETRICS & GYNECOLOGY

## 2025-01-09 PROCEDURE — C1883 ADAPT/EXT, PACING/NEURO LEAD: HCPCS | Performed by: OBSTETRICS & GYNECOLOGY

## 2025-01-09 PROCEDURE — 6360000002 HC RX W HCPCS: Performed by: OBSTETRICS & GYNECOLOGY

## 2025-01-09 PROCEDURE — C1889 IMPLANT/INSERT DEVICE, NOC: HCPCS | Performed by: OBSTETRICS & GYNECOLOGY

## 2025-01-09 PROCEDURE — 2500000003 HC RX 250 WO HCPCS: Performed by: OBSTETRICS & GYNECOLOGY

## 2025-01-09 PROCEDURE — 2709999900 HC NON-CHARGEABLE SUPPLY: Performed by: OBSTETRICS & GYNECOLOGY

## 2025-01-09 PROCEDURE — C1767 GENERATOR, NEURO NON-RECHARG: HCPCS | Performed by: OBSTETRICS & GYNECOLOGY

## 2025-01-09 PROCEDURE — 7100000010 HC PHASE II RECOVERY - FIRST 15 MIN: Performed by: OBSTETRICS & GYNECOLOGY

## 2025-01-09 PROCEDURE — 6360000002 HC RX W HCPCS

## 2025-01-09 PROCEDURE — 3600000014 HC SURGERY LEVEL 4 ADDTL 15MIN: Performed by: OBSTETRICS & GYNECOLOGY

## 2025-01-09 PROCEDURE — 3700000000 HC ANESTHESIA ATTENDED CARE: Performed by: OBSTETRICS & GYNECOLOGY

## 2025-01-09 PROCEDURE — C1897 LEAD, NEUROSTIM TEST KIT: HCPCS | Performed by: OBSTETRICS & GYNECOLOGY

## 2025-01-09 PROCEDURE — 6370000000 HC RX 637 (ALT 250 FOR IP): Performed by: OBSTETRICS & GYNECOLOGY

## 2025-01-09 PROCEDURE — 7100000011 HC PHASE II RECOVERY - ADDTL 15 MIN: Performed by: OBSTETRICS & GYNECOLOGY

## 2025-01-09 DEVICE — LEAD NERVE STIM 4.32 MM INTERSTIM SURESCAN: Type: IMPLANTABLE DEVICE | Site: SACRUM | Status: FUNCTIONAL

## 2025-01-09 DEVICE — NEUROSTIMULATOR INTERSTIM X RECHRGE FREE TORQ WRNCH PROD: Type: IMPLANTABLE DEVICE | Site: SACRUM | Status: FUNCTIONAL

## 2025-01-09 DEVICE — ENVELOPE PLSE GENRTR M W2.7XL2.5IN NEURO ANTIBACT ABSRB: Type: IMPLANTABLE DEVICE | Site: SACRUM | Status: FUNCTIONAL

## 2025-01-09 RX ORDER — NALOXONE HYDROCHLORIDE 0.4 MG/ML
INJECTION, SOLUTION INTRAMUSCULAR; INTRAVENOUS; SUBCUTANEOUS PRN
Status: DISCONTINUED | OUTPATIENT
Start: 2025-01-09 | End: 2025-01-09 | Stop reason: HOSPADM

## 2025-01-09 RX ORDER — SODIUM CHLORIDE 9 MG/ML
INJECTION, SOLUTION INTRAVENOUS PRN
Status: CANCELLED | OUTPATIENT
Start: 2025-01-09

## 2025-01-09 RX ORDER — FAMOTIDINE 20 MG/1
20 TABLET, FILM COATED ORAL 2 TIMES DAILY
Status: CANCELLED | OUTPATIENT
Start: 2025-01-09

## 2025-01-09 RX ORDER — SODIUM CHLORIDE 0.9 % (FLUSH) 0.9 %
5-40 SYRINGE (ML) INJECTION PRN
Status: CANCELLED | OUTPATIENT
Start: 2025-01-09

## 2025-01-09 RX ORDER — PROPOFOL 10 MG/ML
INJECTION, EMULSION INTRAVENOUS
Status: DISCONTINUED | OUTPATIENT
Start: 2025-01-09 | End: 2025-01-09 | Stop reason: SDUPTHER

## 2025-01-09 RX ORDER — SODIUM CHLORIDE 0.9 % (FLUSH) 0.9 %
5-40 SYRINGE (ML) INJECTION EVERY 12 HOURS SCHEDULED
Status: DISCONTINUED | OUTPATIENT
Start: 2025-01-09 | End: 2025-01-09 | Stop reason: HOSPADM

## 2025-01-09 RX ORDER — FENTANYL CITRATE 50 UG/ML
INJECTION, SOLUTION INTRAMUSCULAR; INTRAVENOUS
Status: DISCONTINUED | OUTPATIENT
Start: 2025-01-09 | End: 2025-01-09 | Stop reason: SDUPTHER

## 2025-01-09 RX ORDER — SODIUM CHLORIDE, SODIUM LACTATE, POTASSIUM CHLORIDE, CALCIUM CHLORIDE 600; 310; 30; 20 MG/100ML; MG/100ML; MG/100ML; MG/100ML
INJECTION, SOLUTION INTRAVENOUS CONTINUOUS
Status: DISCONTINUED | OUTPATIENT
Start: 2025-01-09 | End: 2025-01-09 | Stop reason: HOSPADM

## 2025-01-09 RX ORDER — IBUPROFEN 800 MG/1
800 TABLET, FILM COATED ORAL EVERY 8 HOURS PRN
Qty: 60 TABLET | Refills: 1 | Status: SHIPPED | OUTPATIENT
Start: 2025-01-09

## 2025-01-09 RX ORDER — SODIUM CHLORIDE 9 MG/ML
INJECTION, SOLUTION INTRAVENOUS PRN
Status: DISCONTINUED | OUTPATIENT
Start: 2025-01-09 | End: 2025-01-09 | Stop reason: HOSPADM

## 2025-01-09 RX ORDER — ONDANSETRON 4 MG/1
4 TABLET, ORALLY DISINTEGRATING ORAL EVERY 8 HOURS PRN
Status: CANCELLED | OUTPATIENT
Start: 2025-01-09

## 2025-01-09 RX ORDER — FENTANYL CITRATE 0.05 MG/ML
50 INJECTION, SOLUTION INTRAMUSCULAR; INTRAVENOUS EVERY 10 MIN PRN
Status: DISCONTINUED | OUTPATIENT
Start: 2025-01-09 | End: 2025-01-09 | Stop reason: HOSPADM

## 2025-01-09 RX ORDER — SODIUM CHLORIDE 0.9 % (FLUSH) 0.9 %
5-40 SYRINGE (ML) INJECTION PRN
Status: DISCONTINUED | OUTPATIENT
Start: 2025-01-09 | End: 2025-01-09 | Stop reason: HOSPADM

## 2025-01-09 RX ORDER — OXYCODONE HYDROCHLORIDE 5 MG/1
5 TABLET ORAL EVERY 4 HOURS PRN
Status: CANCELLED | OUTPATIENT
Start: 2025-01-09

## 2025-01-09 RX ORDER — ONDANSETRON 2 MG/ML
4 INJECTION INTRAMUSCULAR; INTRAVENOUS EVERY 6 HOURS PRN
Status: CANCELLED | OUTPATIENT
Start: 2025-01-09

## 2025-01-09 RX ORDER — LIDOCAINE HYDROCHLORIDE 10 MG/ML
INJECTION, SOLUTION EPIDURAL; INFILTRATION; INTRACAUDAL; PERINEURAL
Status: DISCONTINUED | OUTPATIENT
Start: 2025-01-09 | End: 2025-01-09 | Stop reason: SDUPTHER

## 2025-01-09 RX ORDER — ACETAMINOPHEN 500 MG
1000 TABLET ORAL ONCE
Status: COMPLETED | OUTPATIENT
Start: 2025-01-09 | End: 2025-01-09

## 2025-01-09 RX ORDER — DIPHENHYDRAMINE HYDROCHLORIDE 50 MG/ML
12.5 INJECTION INTRAMUSCULAR; INTRAVENOUS
Status: DISCONTINUED | OUTPATIENT
Start: 2025-01-09 | End: 2025-01-09 | Stop reason: HOSPADM

## 2025-01-09 RX ORDER — SODIUM CHLORIDE 0.9 % (FLUSH) 0.9 %
5-40 SYRINGE (ML) INJECTION EVERY 12 HOURS SCHEDULED
Status: CANCELLED | OUTPATIENT
Start: 2025-01-09

## 2025-01-09 RX ORDER — ONDANSETRON 2 MG/ML
4 INJECTION INTRAMUSCULAR; INTRAVENOUS
Status: DISCONTINUED | OUTPATIENT
Start: 2025-01-09 | End: 2025-01-09 | Stop reason: HOSPADM

## 2025-01-09 RX ORDER — OXYCODONE HYDROCHLORIDE 5 MG/1
10 TABLET ORAL EVERY 4 HOURS PRN
Status: CANCELLED | OUTPATIENT
Start: 2025-01-09

## 2025-01-09 RX ORDER — OXYCODONE AND ACETAMINOPHEN 5; 325 MG/1; MG/1
2 TABLET ORAL NIGHTLY PRN
Qty: 10 TABLET | Refills: 0 | Status: SHIPPED | OUTPATIENT
Start: 2025-01-09 | End: 2025-01-14

## 2025-01-09 RX ORDER — KETOROLAC TROMETHAMINE 30 MG/ML
30 INJECTION, SOLUTION INTRAMUSCULAR; INTRAVENOUS EVERY 6 HOURS
Status: CANCELLED | OUTPATIENT
Start: 2025-01-09 | End: 2025-01-11

## 2025-01-09 RX ORDER — MIDAZOLAM HYDROCHLORIDE 1 MG/ML
INJECTION, SOLUTION INTRAMUSCULAR; INTRAVENOUS
Status: DISCONTINUED | OUTPATIENT
Start: 2025-01-09 | End: 2025-01-09 | Stop reason: SDUPTHER

## 2025-01-09 RX ORDER — ACETAMINOPHEN 500 MG
1000 TABLET ORAL EVERY 8 HOURS PRN
Status: CANCELLED | OUTPATIENT
Start: 2025-01-09

## 2025-01-09 RX ORDER — ACETAMINOPHEN 500 MG
1000 TABLET ORAL EVERY 6 HOURS PRN
Qty: 60 TABLET | Refills: 0 | Status: SHIPPED | OUTPATIENT
Start: 2025-01-09

## 2025-01-09 RX ORDER — OXYCODONE HYDROCHLORIDE 5 MG/1
5 TABLET ORAL
Status: DISCONTINUED | OUTPATIENT
Start: 2025-01-09 | End: 2025-01-09 | Stop reason: HOSPADM

## 2025-01-09 RX ORDER — PAROXETINE 20 MG/1
20 TABLET, FILM COATED ORAL EVERY MORNING
COMMUNITY

## 2025-01-09 RX ORDER — HYDROMORPHONE HYDROCHLORIDE 1 MG/ML
1 INJECTION, SOLUTION INTRAMUSCULAR; INTRAVENOUS; SUBCUTANEOUS
Status: CANCELLED | OUTPATIENT
Start: 2025-01-09

## 2025-01-09 RX ORDER — LIDOCAINE HYDROCHLORIDE AND EPINEPHRINE 10; 10 MG/ML; UG/ML
INJECTION, SOLUTION INFILTRATION; PERINEURAL PRN
Status: DISCONTINUED | OUTPATIENT
Start: 2025-01-09 | End: 2025-01-09 | Stop reason: HOSPADM

## 2025-01-09 RX ORDER — METOCLOPRAMIDE HYDROCHLORIDE 5 MG/ML
10 INJECTION INTRAMUSCULAR; INTRAVENOUS
Status: DISCONTINUED | OUTPATIENT
Start: 2025-01-09 | End: 2025-01-09 | Stop reason: HOSPADM

## 2025-01-09 RX ADMIN — SODIUM CHLORIDE, SODIUM LACTATE, POTASSIUM CHLORIDE, AND CALCIUM CHLORIDE: .6; .31; .03; .02 INJECTION, SOLUTION INTRAVENOUS at 08:40

## 2025-01-09 RX ADMIN — PROPOFOL 150 MCG/KG/MIN: 10 INJECTION, EMULSION INTRAVENOUS at 10:54

## 2025-01-09 RX ADMIN — PROPOFOL 100 MG: 10 INJECTION, EMULSION INTRAVENOUS at 11:08

## 2025-01-09 RX ADMIN — FENTANYL CITRATE 50 MCG: 50 INJECTION, SOLUTION INTRAMUSCULAR; INTRAVENOUS at 10:53

## 2025-01-09 RX ADMIN — ACETAMINOPHEN 1000 MG: 500 TABLET ORAL at 08:24

## 2025-01-09 RX ADMIN — PROPOFOL 30 MG: 10 INJECTION, EMULSION INTRAVENOUS at 10:56

## 2025-01-09 RX ADMIN — FENTANYL CITRATE 50 MCG: 50 INJECTION, SOLUTION INTRAMUSCULAR; INTRAVENOUS at 11:17

## 2025-01-09 RX ADMIN — MIDAZOLAM HYDROCHLORIDE 2 MG: 1 INJECTION, SOLUTION INTRAMUSCULAR; INTRAVENOUS at 10:53

## 2025-01-09 RX ADMIN — PROPOFOL 65 MG: 10 INJECTION, EMULSION INTRAVENOUS at 11:40

## 2025-01-09 RX ADMIN — PROPOFOL 30 MG: 10 INJECTION, EMULSION INTRAVENOUS at 10:55

## 2025-01-09 RX ADMIN — CEFOXITIN SODIUM 2000 MG: 2 POWDER, FOR SOLUTION INTRAVENOUS at 10:57

## 2025-01-09 RX ADMIN — LIDOCAINE HYDROCHLORIDE 50 MG: 10 INJECTION, SOLUTION EPIDURAL; INFILTRATION; INTRACAUDAL; PERINEURAL at 10:54

## 2025-01-09 ASSESSMENT — PAIN DESCRIPTION - DESCRIPTORS
DESCRIPTORS: ACHING
DESCRIPTORS: ACHING

## 2025-01-09 ASSESSMENT — PAIN DESCRIPTION - LOCATION
LOCATION: HEAD
LOCATION: HEAD

## 2025-01-09 ASSESSMENT — PAIN DESCRIPTION - ORIENTATION
ORIENTATION: INNER
ORIENTATION: INNER

## 2025-01-09 ASSESSMENT — PAIN - FUNCTIONAL ASSESSMENT: PAIN_FUNCTIONAL_ASSESSMENT: ACTIVITIES ARE NOT PREVENTED

## 2025-01-09 ASSESSMENT — PAIN SCALES - GENERAL: PAINLEVEL_OUTOF10: 9

## 2025-01-09 NOTE — ANESTHESIA POSTPROCEDURE EVALUATION
Department of Anesthesiology  Postprocedure Note    Patient: Tina Dwyer  MRN: [id number]  YOB: 1955  Date of evaluation: 1/9/2025    Procedure Summary       Date: 01/09/25 Room / Location: [address]    Anesthesia Start: 1048 Anesthesia Stop: 1155    Procedure: MEDTRONIC STAGE 1 AND STAGE 2 INTERSTIM Diagnosis:       Urge incontinence      (Urge incontinence [N39.41])    Surgeons: Perla Pichardo MD Responsible Provider: Prakash Brian MD    Anesthesia Type: MAC ASA Status: 2            Anesthesia Type: No value filed.    Aj Phase I: Aj Score: 10    Aj Phase II:      Anesthesia Post Evaluation    Patient location during evaluation: bedside  Patient participation: complete - patient participated  Level of consciousness: awake and awake and alert  Airway patency: patent  Nausea & Vomiting: no nausea and no vomiting  Cardiovascular status: hemodynamically stable  Respiratory status: acceptable  Hydration status: stable  Pain management: adequate        No notable events documented.

## 2025-01-09 NOTE — ANESTHESIA PRE PROCEDURE
Department of Anesthesiology  Preprocedure Note       Name:  Tina Dwyer   Age:  69 y.o.  :  1955                                          MRN:  [id number]         Date:  2025      Surgeon: Surgeon(s):  Perla Pichardo MD    Procedure: Procedure(s):  MEDTRONIC STAGE 1 AND STAGE 2 INTERSTIM e-mailed Tere AWARE for 9:30 am  c-arm    Medications prior to admission:   Prior to Admission medications    Medication Sig Start Date End Date Taking? Authorizing Provider   PARoxetine (PAXIL) 20 MG tablet Take 1 tablet by mouth every morning   Yes Donald Crystal MD   darifenacin (ENABLEX) 15 MG extended release tablet Take 1 tablet by mouth daily 24  Yes Perla Pichardo MD   tretinoin (RETIN-A) 0.025 % cream Apply topically nightly. 24  Yes Derik Landa MD   pravastatin (PRAVACHOL) 20 MG tablet Take 1 tablet by mouth daily 24  Yes Sandee Goodman APRN - CNP   coenzyme Q10 100 MG CAPS capsule Take 1 capsule by mouth daily 24  Yes Sandee Goodman APRN - CNP   NURTEC 75 MG TBDP TAKE 1 TABLET BY MOUTH AS NEEDED FOR MIGRAINES. MAX 1 TABLET PER DAILY 24  Yes Marcial Cortés MD   Fremanezumab-vfrm (AJOVY) 225 MG/1.5ML SOAJ INJECT 225 MG( 1 INJECTION) UNDER THE SKIN EVERY 30 DAYS 24  Yes Marcial Cortés MD   topiramate (TOPAMAX) 100 MG tablet Take 2 tablets by mouth 2 times daily 2 tablets twice daily 10/2/24  Yes Marcial Cortés MD   UBRELVY 100 MG TABS TAKE 1 TABLET BY MOUTH DAILY AS NEEDED FOR MIGRAINES 10/2/23  Yes ProviderDonald MD   aspirin 81 MG EC tablet Take 1 tablet by mouth daily   Yes ProviderDonald MD   fezolinetant (VEOZAH) 45 MG TABS Take 1 tablet by mouth daily  Patient not taking: Reported on 12/10/2024 11/20/24   Perla Pichardo MD   lidocaine (XYLOCAINE) 2 % jelly Apply topically to skin of the right chest wall as needed. 10/14/24   Julien Granados MD       Current medications:    Current Facility-Administered Medications   Medication

## 2025-01-09 NOTE — DISCHARGE INSTRUCTIONS
AFTER changing your dressing if instructed by your physician,   Proper handwashing includes wetting your hands with clean water, applying soap, lathering your hands by rubbing them together with soap for 20 seconds, rinsing them with clean water, and drying them with a clean towel. If soap and water is not available, alcohol-based  may be applied by rubbing the hands together and allowing them to dry for 20 seconds.  Special Instructions: __________________________________________________________________      PAIN:  Pain after surgery is normal and should be expected. When you go home, the anesthesia wears off, and you may experience increased discomfort. Your provider will give you specific instructions on what pain medication to take at home. Listed below is additional information on treating pain after surgery:  Pain medication can give you an upset stomach. Unless your provider has instructed you not to eat or drink, the pain medication should be taken with a small amount of food. Eating will decrease the chance of an upset stomach.  Pain medication can take about 20-30 minutes to start working, so do not wait until the pain worsens before taking a dose. Remember, always take over-the-counter and prescription drugs only as directed by your provider.  Unless otherwise instructed by your provider, applying ice on or around your surgical site can be a great way to help minimize pain and swelling after surgery.  Apply ice to the affected area a minimum of 4 times daily for no longer than 15-20 minutes at a time. It is very important to protect your skin by NOT applying ice or ice pack directly to your skin. Always have a towel or pillow case between your skin and the ice. Frozen vegetable packs like peas or corn make great inexpensive alternatives for use as an icepack.    FOLLOW UP CARE - Call your Physician if any of the following occur:  Increased swelling, redness, warmth, hardness around operative

## 2025-01-09 NOTE — H&P
Tina Dwyer is a 69 y.o. female who presents here today for complaints of Follow-up (Lead removal. PNE done 12/16/24.)    Here for lead removal after PNE trial . Mentions urge symptoms relieved 80-90 % . History of no improvement on the mirabegron . She mentions having tried oxybutynin, trospium, darifenacin, and Myrbetriq. She is considered trying Gemtesa but cannot due to cost : 227$ WITH INSURANCE COVERAGE .   Based on improvement with the PNE trial , patient wants to proceed with stage 1 and stage 2 interstim.         Vitals:  /68 (Site: Left Upper Arm, Position: Sitting, Cuff Size: Medium Adult)   Pulse 81   Ht 1.626 m (5' 4\")   Wt 70.3 kg (155 lb)   LMP 09/19/2008   BMI 26.61 kg/m²   Allergies:  Erythromycin, Seasonal, Sporanox [itraconazole], and Macrobid [nitrofurantoin macrocrystal]  Past Medical History        Past Medical History:   Diagnosis Date    Allergic reaction      Arthritis      Basal cell carcinoma (BCC) of chest 07/26/2024    Cancer (HCC)       Cervical, dx by Dr. Yo    Cerebral artery occlusion with cerebral infarction (HCC)       at age 50 / sx as a migraine with slurred speech & visual disturbance    Cerebrovascular disease Age 50    Depression      Diverticulitis      GERD (gastroesophageal reflux disease)      Hearing loss      History of conization of cervix      History of therapeutic radiation      Hx antineoplastic chemo      Migraine      Overactive bladder      Rectal cancer (HCC)      Renal insufficiency      Tinea unguium      Urinary incontinence           Past Surgical History         Past Surgical History:   Procedure Laterality Date    CARDIAC SURGERY         PFO surgery in age 50s    COLONOSCOPY   2018    COLONOSCOPY N/A 05/31/2022     COLONOSCOPY w/biopsies performed by Pattie Lewis MD at [facility]    HEMORRHOID SURGERY   2017    HYSTERECTOMY (CERVIX STATUS UNKNOWN)        HYSTERECTOMY VAGINAL N/A 12/19/2018     TLH WITH BSO performed by Perla HAMILTON

## 2025-01-09 NOTE — PROGRESS NOTES
CLINICAL PHARMACY NOTE: MEDS TO BEDS    Total # of Prescriptions Filled: 4   The following medications were delivered to the patient:  Oxycodone-Apap 5-325 mg Tab  Acetaminophen 500 mg Tab  Ibuprofen 800 mg Tab  Amoxic-Cla Pot 875-125 mg Tab    Additional Documentation:

## 2025-01-09 NOTE — OP NOTE
Postoperative Note  ______________________________________________________________    Patient: Tina Dwyer  YOB: 1955  MRN: [id number]  Date of Procedure: 1/9/2025    Pre-Op Diagnosis: Urge incontinence [N39.41]    Post-Op Diagnosis: Same       Procedure(s):  MEDTRONIC STAGE 1 AND STAGE 2 INTERSTIM    Anesthesia: General    Surgeon(s):  Prela Pichardo MD    Staff:  First Assistant: Annika Nix PA-C     Estimated Blood Loss:minimal    Complications: None    Specimens:   * No specimens in log *    Implants:  Implant Name Type Inv. Item Serial No.  Lot No. LRB No. Used Action   ENVELOPE PLSE GENRTR M W2.7XL2.5IN NEURO ANTIBACT ABSRB - [id number]  ENVELOPE PLSE GENRTR M W2.7XL2.5IN NEURO ANTIBACT ABSRB  [employer] [id number] Left 1 Implanted   LEAD NERVE STIM 4.32 MM INTERSTIM SURESCAN - [id number]  LEAD NERVE STIM 4.32 MM INTERSTIM SURESCAN  [employer] [id number] Left 1 Implanted   NEUROSTIMULATOR INTERSTIM X RECHRGE FREE TORQ WRNCH PROD - [id number]  NEUROSTIMULATOR INTERSTIM X RECHRGE FREE TORQ WRNCH PROD [id number] [employer]  Left 1 Implanted         Drains: * No LDAs found *    Findings: lead and device placed in left buttock , left S3 .       Perla Pichardo MD  Date: 1/9/2025  Time: 11:59 AM     BRIEF HISTORY: lead removal after PNE trial completed. Mentions urge symptoms relieved 80-90 % . History of no improvement on the mirabegron . She mentions having tried oxybutynin, trospium, darifenacin, and Myrbetriq. She is considered trying Gemtesa but cannot due to cost : 227$ WITH INSURANCE COVERAGE .   Based on improvement with the PNE trial , patient wants to proceed with stage 1 and stage 2 interstim. The patient was significantly satisfied with the results and wanted to proceed with stage I and II neuromodulator. Risks of anesthesia, bleeding, infection, pain, MI, DVT, and PE were discussed. Risk of failure of the procedure in the future was

## 2025-01-14 RX ORDER — TOPIRAMATE 100 MG/1
TABLET, FILM COATED ORAL
Qty: 270 TABLET | Refills: 0 | Status: SHIPPED | OUTPATIENT
Start: 2025-01-14

## 2025-01-14 NOTE — TELEPHONE ENCOUNTER
Requesting medication refill. Please approve or deny this request.    Rx requested:  Requested Prescriptions     Pending Prescriptions Disp Refills    topiramate (TOPAMAX) 100 MG tablet [Pharmacy Med Name: TOPIRAMATE TABS 100MG] 270 tablet 3     Sig: TAKE 3 TABLETS DAILY         Last Office Visit:   10/2/2024      Next Visit Date:  Future Appointments   Date Time Provider Department Center   1/21/2025  1:00 PM Perla Pichardo MD [facility]   1/22/2025  9:00 AM SCHEDULE[facility]   2/18/2025 11:15 AM Sandee Goodman APRN - CNP [facility] DEP   4/1/2025  1:30 PM Pattie Lewis MD [facility]   5/12/2025 10:15 AM Sandee Goodman APRN - CNP [facility] DEP   10/1/2025  1:15 PM [facility] -

## 2025-01-21 ENCOUNTER — OFFICE VISIT (OUTPATIENT)
Dept: OBGYN CLINIC | Age: 70
End: 2025-01-21

## 2025-01-21 VITALS
HEIGHT: 64 IN | BODY MASS INDEX: 25.95 KG/M2 | SYSTOLIC BLOOD PRESSURE: 106 MMHG | HEART RATE: 80 BPM | WEIGHT: 152 LBS | DIASTOLIC BLOOD PRESSURE: 68 MMHG

## 2025-01-21 DIAGNOSIS — R30.0 DYSURIA: ICD-10-CM

## 2025-01-21 DIAGNOSIS — Z09 POSTOP CHECK: Primary | ICD-10-CM

## 2025-01-21 LAB
BILIRUB UR QL STRIP: NEGATIVE
CLARITY UR: ABNORMAL
COLOR UR: YELLOW
GLUCOSE UR STRIP-MCNC: NEGATIVE MG/DL
HGB UR QL STRIP: NEGATIVE
KETONES UR STRIP-MCNC: NEGATIVE MG/DL
LEUKOCYTE ESTERASE UR QL STRIP: NEGATIVE
NITRITE UR QL STRIP: NEGATIVE
PH UR STRIP: 6 [PH] (ref 5–9)
PROT UR STRIP-MCNC: ABNORMAL MG/DL
SP GR UR STRIP: 1.02 (ref 1–1.03)
UROBILINOGEN UR STRIP-ACNC: 0.2 E.U./DL

## 2025-01-21 PROCEDURE — 99024 POSTOP FOLLOW-UP VISIT: CPT | Performed by: OBSTETRICS & GYNECOLOGY

## 2025-01-21 RX ORDER — LIDOCAINE 5 G/100G
1 CREAM RECTAL; TOPICAL 2 TIMES DAILY
Qty: 1 EACH | Refills: 2 | Status: SHIPPED | OUTPATIENT
Start: 2025-01-21

## 2025-01-21 SDOH — ECONOMIC STABILITY: FOOD INSECURITY: WITHIN THE PAST 12 MONTHS, YOU WORRIED THAT YOUR FOOD WOULD RUN OUT BEFORE YOU GOT MONEY TO BUY MORE.: NEVER TRUE

## 2025-01-21 SDOH — ECONOMIC STABILITY: FOOD INSECURITY: WITHIN THE PAST 12 MONTHS, THE FOOD YOU BOUGHT JUST DIDN'T LAST AND YOU DIDN'T HAVE MONEY TO GET MORE.: NEVER TRUE

## 2025-01-21 ASSESSMENT — PATIENT HEALTH QUESTIONNAIRE - PHQ9
SUM OF ALL RESPONSES TO PHQ QUESTIONS 1-9: 1
1. LITTLE INTEREST OR PLEASURE IN DOING THINGS: NOT AT ALL
2. FEELING DOWN, DEPRESSED OR HOPELESS: SEVERAL DAYS
SUM OF ALL RESPONSES TO PHQ QUESTIONS 1-9: 1
SUM OF ALL RESPONSES TO PHQ QUESTIONS 1-9: 1
SUM OF ALL RESPONSES TO PHQ9 QUESTIONS 1 & 2: 1
SUM OF ALL RESPONSES TO PHQ QUESTIONS 1-9: 1

## 2025-01-21 NOTE — PROGRESS NOTES
Postop Progress Note    Subjective    presents to the office for postop follow up. 2 weeks .   History of Present Illness  The patient presents for evaluation of urinary urgency, diarrhea, and hemorrhoids.    She reports experiencing urinary urgency, which she describes as a sudden, intense need to urinate. The frequency of these episodes appears to be influenced by her fluid intake, occurring approximately every 30 minutes when she consumes large amounts of fluids and extending to about every 1.5 hours with minimal fluid intake. She is not currently on any prescribed medications. She has a history of urinary incontinence but notes that this has not been an issue recently.    She also reports that she believes the antibiotic prescribed post-surgery caused diarrhea. She discontinued the antibiotic with 3 doses remaining and used Imodium to manage the diarrhea.    She has a history of anal cancer and uses pads for comfort. She has severe hemorrhoids and requests a prescription for RectiCare cream, which she finds effective and applies twice daily.    MEDICATIONS  Current: Imodium       Objective    Vitals:    01/21/25 1304   BP: 106/68   Pulse: 80         General: alert, cooperative and no distress  Incision: healing well  Vag exam: deferred   Buttock exam : incision is intact clean and dry , left buttock .      Assessment  Doing well postoperatively.    Plan  Assessment & Plan  1. Urinary urgency.  The patient's condition is showing significant improvement, with no current complaints of urinary leakage. The program setting was adjusted to 6 at 0.5, from prog # 2 at 0.5 which patient was on , changes made due to residual frequent urination despite NO incontinence . A urine specimen will be collected to rule out the possibility of a urinary tract infection (UTI). If the current program setting does not yield the desired results, a low dose of Myrbetriq 25 mg may be added to her treatment regimen during the next

## 2025-01-22 ENCOUNTER — HOSPITAL ENCOUNTER (OUTPATIENT)
Dept: RADIATION ONCOLOGY | Age: 70
Discharge: HOME | End: 2025-01-22
Payer: MEDICARE

## 2025-01-22 VITALS
HEART RATE: 68 BPM | WEIGHT: 152 LBS | BODY MASS INDEX: 26.09 KG/M2 | OXYGEN SATURATION: 100 % | SYSTOLIC BLOOD PRESSURE: 120 MMHG | RESPIRATION RATE: 18 BRPM | TEMPERATURE: 96.5 F | DIASTOLIC BLOOD PRESSURE: 58 MMHG

## 2025-01-22 DIAGNOSIS — C21.0 ANAL CANCER (HCC): Primary | ICD-10-CM

## 2025-01-22 DIAGNOSIS — C44.519 BASAL CELL CARCINOMA (BCC) OF CHEST: ICD-10-CM

## 2025-01-22 PROCEDURE — 99212 OFFICE O/P EST SF 10 MIN: CPT | Performed by: RADIOLOGY

## 2025-01-22 PROCEDURE — G2211 COMPLEX E/M VISIT ADD ON: HCPCS | Performed by: RADIOLOGY

## 2025-01-22 PROCEDURE — 99214 OFFICE O/P EST MOD 30 MIN: CPT | Performed by: RADIOLOGY

## 2025-01-22 NOTE — PROGRESS NOTES
NURSING ASSESSMENT     Date: 1/22/2025        Patient Name: Tina Dwyer     YOB: 1955      Age:  69 y.o.      MRN: [id number]       Chaperone [] Yes   [x] No      Advance Directives:   Do you currently have completed advance directives (living will)? [x] Yes   [] No         *If yes, please bring us a copy for your records.  *If no, would you like info or assistance in completing advance directives (living will)?   [] Yes   [x] No    Pain Score:   Pain Score (1-10): 8  Pain Location: left buttock  Pain Duration: daily   Pain Management/Control: ibuprofen 800 mg      Is pain affecting your ability to take care of yourself or move throughout your home?                        [] Yes   [x] No    General: Fatigue sometimes  Patient has gained weight [] Yes   [x] No  Patient has lost weight [] Yes   [x] No  How much weight in pounds and over what length of time:     Eyes (Ophthalmic): No Problem     Skin (Dermatological): right chest wall skin is hypopigmented with dark center which pt describes as getting smaller, occasional itching and sensitivity, applies lidocaine gel for relief, has incision left buttock that is healing since bladder stim surgery     ENT: No Problems     Respiratory: No Problems     Cardiovascular: No Problems      Device   [] Yes   [x] No   Copy of Card Obtained [] Yes   [x] No    Gastrointestinal: bowels are moving regularly now after loose stools experienced after bladder stim surgery, has hemorrhoidal discomfort, using recticare cream prn    Genito-Urinary: had bladder stim surgery 1/9/25 with good results, urgency and incontinence are controlled     Breast: No Problems     Musculoskeletal: No Problems    Neurological: continues to have migraine headaches on occasion      Hematological and Lymphatic: No Problems     Endocrine: No Problems     Gyn History: no problems    A 10-point review of systems  has been conducted and pertinent positives have been   recorded. All other

## 2025-01-23 LAB — BACTERIA UR CULT: NORMAL

## 2025-02-04 ENCOUNTER — OFFICE VISIT (OUTPATIENT)
Dept: OBGYN CLINIC | Age: 70
End: 2025-02-04

## 2025-02-04 VITALS
DIASTOLIC BLOOD PRESSURE: 66 MMHG | WEIGHT: 152 LBS | SYSTOLIC BLOOD PRESSURE: 118 MMHG | HEART RATE: 76 BPM | BODY MASS INDEX: 25.95 KG/M2 | HEIGHT: 64 IN

## 2025-02-04 DIAGNOSIS — Z09 POSTOP CHECK: Primary | ICD-10-CM

## 2025-02-04 DIAGNOSIS — R30.0 DYSURIA: ICD-10-CM

## 2025-02-04 LAB
BACTERIA URNS QL MICRO: ABNORMAL /HPF
BILIRUB UR QL STRIP: NEGATIVE
CLARITY UR: CLEAR
COLOR UR: YELLOW
EPI CELLS #/AREA URNS HPF: ABNORMAL /HPF
GLUCOSE UR STRIP-MCNC: NEGATIVE MG/DL
HGB UR QL STRIP: NEGATIVE
KETONES UR STRIP-MCNC: NEGATIVE MG/DL
LEUKOCYTE ESTERASE UR QL STRIP: ABNORMAL
NITRITE UR QL STRIP: NEGATIVE
PH UR STRIP: 7 [PH] (ref 5–9)
PROT UR STRIP-MCNC: ABNORMAL MG/DL
RBC #/AREA URNS HPF: ABNORMAL /HPF (ref 0–2)
SP GR UR STRIP: 1.02 (ref 1–1.03)
UROBILINOGEN UR STRIP-ACNC: 0.2 E.U./DL
WBC #/AREA URNS HPF: ABNORMAL /HPF (ref 0–5)

## 2025-02-04 PROCEDURE — 99024 POSTOP FOLLOW-UP VISIT: CPT | Performed by: OBSTETRICS & GYNECOLOGY

## 2025-02-05 LAB — BACTERIA UR CULT: NORMAL

## 2025-02-05 RX ORDER — SULFAMETHOXAZOLE AND TRIMETHOPRIM 800; 160 MG/1; MG/1
1 TABLET ORAL 2 TIMES DAILY
Qty: 6 TABLET | Refills: 0 | Status: SHIPPED | OUTPATIENT
Start: 2025-02-05 | End: 2025-02-08

## 2025-02-07 NOTE — PROGRESS NOTES
[facility]           Radiation Oncology      [id number] [place], [place] [address]        O: [phone]        F: [phone]       [facility]                   Dr. Julien Granados MD PhD    FOLLOW-UP NOTE     Date of Service: 2025  Patient ID: Tina Dwyer   : 1955  MRN: [id number]   Acct Number: [id number]       NAME:  Tina Dwyer    :  1955 69 y.o. female     PCP: Sandee Goodman APRN - CNP    REFERRING PROVIDER: Leoncio    DIAGNOSIS:  1. Anal cancer (HCC)    2. Basal cell carcinoma (BCC) of chest        STAGING: Cancer Staging   Anal cancer (HCC)  Staging form: Anus, AJCC 8th Edition  - Clinical: Stage IIA (cT2, cN0, cM0) - Signed by Julien Granados MD on 2022    Basal cell carcinoma (BCC) of chest  Staging form: Carcinoma of the Skin, AJCC 6th Edition  - Clinical: Stage I (T1, N0, M0) - Signed by Julien Granados MD on 2024      PRIOR TREATMENT: 5400 cGy in 27 fractions to gross disease in anal/distal rectal area; 4000 cGy in 8 fractions with HDR contact brachytherapy using a 3 cm Nolasco applicator     TIME SINCE TREATMENT:  17 months; 3 months since completion of HDR skin brachytherapy     RECENT HISTORY: Tina Dwyer returns for follow up status post completion of definitive chemoradiation therapy for the above diagnosis.  She returns for follow-up approximately 3 months after completing her most recent round of skin radiation.  She has no complaints regarding her anal cancer history and has had good skin healing with excellent cosmesis since completion of her skin radiation.  She notes some mild hyperpigmentation and occasional itchiness of the skin at the treated site.  She applies lidocaine to the area on occasion.  Per history of anal cancer, she could have a repeat CT abdomen pelvis with IV contrast on 2024 with no evidence of recurrent disease.    Symptomatically she notes some mild fatigue but denies any

## 2025-02-11 NOTE — PROGRESS NOTES
Postop Progress Note    Subjective    presents to the office for postop follow up. 3 weeks .   Device settings changed last visit . Patient satisfied with urge symptom control. C/o of UTI symptoms with burning in urination.     Objective    Vitals:    02/04/25 1358   BP: 118/66   Pulse: 76         General: alert, cooperative and no distress  Incision: healing well  Vag exam: deferred   Buttock incision : checked last visit , healing adequately.     Assessment  Doing well postoperatively.    Plan    Resume normal activity and return as needed   Orders Placed This Encounter    Culture, Urine     Standing Status:   Future     Number of Occurrences:   1     Standing Expiration Date:   2/4/2026    Urinalysis     Standing Status:   Future     Number of Occurrences:   1     Standing Expiration Date:   2/4/2026      Perla Pichardo M.D., F.[email or web address]

## 2025-02-14 ENCOUNTER — OFFICE VISIT (OUTPATIENT)
Dept: FAMILY MEDICINE CLINIC | Age: 70
End: 2025-02-14
Payer: MEDICARE

## 2025-02-14 VITALS
HEIGHT: 64 IN | TEMPERATURE: 98.6 F | OXYGEN SATURATION: 99 % | DIASTOLIC BLOOD PRESSURE: 72 MMHG | HEART RATE: 75 BPM | WEIGHT: 149 LBS | SYSTOLIC BLOOD PRESSURE: 120 MMHG | BODY MASS INDEX: 25.44 KG/M2

## 2025-02-14 DIAGNOSIS — H81.12 BENIGN PAROXYSMAL POSITIONAL VERTIGO OF LEFT EAR: Primary | ICD-10-CM

## 2025-02-14 PROCEDURE — G8427 DOCREV CUR MEDS BY ELIG CLIN: HCPCS | Performed by: NURSE PRACTITIONER

## 2025-02-14 PROCEDURE — 3017F COLORECTAL CA SCREEN DOC REV: CPT | Performed by: NURSE PRACTITIONER

## 2025-02-14 PROCEDURE — 1090F PRES/ABSN URINE INCON ASSESS: CPT | Performed by: NURSE PRACTITIONER

## 2025-02-14 PROCEDURE — 99213 OFFICE O/P EST LOW 20 MIN: CPT | Performed by: NURSE PRACTITIONER

## 2025-02-14 PROCEDURE — 1123F ACP DISCUSS/DSCN MKR DOCD: CPT | Performed by: NURSE PRACTITIONER

## 2025-02-14 PROCEDURE — 1036F TOBACCO NON-USER: CPT | Performed by: NURSE PRACTITIONER

## 2025-02-14 PROCEDURE — G8419 CALC BMI OUT NRM PARAM NOF/U: HCPCS | Performed by: NURSE PRACTITIONER

## 2025-02-14 PROCEDURE — 1126F AMNT PAIN NOTED NONE PRSNT: CPT | Performed by: NURSE PRACTITIONER

## 2025-02-14 PROCEDURE — G8400 PT W/DXA NO RESULTS DOC: HCPCS | Performed by: NURSE PRACTITIONER

## 2025-02-14 RX ORDER — ONDANSETRON 4 MG/1
4 TABLET, ORALLY DISINTEGRATING ORAL 3 TIMES DAILY PRN
Qty: 21 TABLET | Refills: 0 | Status: SHIPPED | OUTPATIENT
Start: 2025-02-14

## 2025-02-14 RX ORDER — MECLIZINE HYDROCHLORIDE 25 MG/1
25 TABLET ORAL 3 TIMES DAILY PRN
Qty: 30 TABLET | Refills: 0 | Status: SHIPPED | OUTPATIENT
Start: 2025-02-14 | End: 2025-02-24

## 2025-02-14 NOTE — PROGRESS NOTES
Date of Visit:  2025  Patient Name: Tina Dwyer   Patient :  1955     CHIEF COMPLAINT:     Tina Dwyer is a 69 y.o. female who presents today for an general visit to be evaluated for the following condition(s):  Chief Complaint   Patient presents with    Dizziness     With nausea x 4 days taking madhu seltzer otc pt states some left ear pain         HISTORY OF PRESENT ILLNESS     I reviewed staff HPI/chief complaint and do agree with above    Subjective:  - Presenting with room spinning sensation and feeling of disequilibrium, ongoing since Tuesday  - Symptoms worse when moving from lying to sitting position or with rapid head movements  - No recent illnesses like cough,  headache nausea/vomiting/diarrhea, ear pain, or sore throat  - Chronic rhinorrhea, managed with Flonase nasal spray and allergy pills   - Experiences headaches with history of chronic migraines however denies any worsening in frequency or characteristics of these headaches.  - Uses Emgality and Ajovy injections for migraine prophylaxis with good effect  - Tried Imitrex for acute migraine treatment which did work well but has not required in some time.                    REVIEW OF SYSTEM      Review of Systems   Constitutional:  Negative for chills, fatigue and fever.   HENT:  Positive for congestion and rhinorrhea. Negative for ear pain, hearing loss, sinus pressure, sinus pain, sore throat and tinnitus.    Respiratory:  Negative for cough, chest tightness and shortness of breath.    Cardiovascular:  Negative for chest pain and palpitations.   Gastrointestinal:  Negative for abdominal pain, constipation, diarrhea, nausea and vomiting.   Musculoskeletal:  Negative for arthralgias and myalgias.   Skin:  Negative for color change and rash.   Neurological:  Positive for dizziness. Negative for weakness, light-headedness, numbness and headaches.   Hematological:  Does not bruise/bleed easily.       REVIEWED INFORMATION

## 2025-02-18 ENCOUNTER — OFFICE VISIT (OUTPATIENT)
Dept: FAMILY MEDICINE CLINIC | Age: 70
End: 2025-02-18

## 2025-02-18 VITALS
WEIGHT: 152.6 LBS | TEMPERATURE: 97.9 F | HEART RATE: 59 BPM | BODY MASS INDEX: 26.05 KG/M2 | DIASTOLIC BLOOD PRESSURE: 86 MMHG | HEIGHT: 64 IN | SYSTOLIC BLOOD PRESSURE: 120 MMHG | OXYGEN SATURATION: 99 %

## 2025-02-18 DIAGNOSIS — J32.1 CHRONIC FRONTAL SINUSITIS: Primary | ICD-10-CM

## 2025-02-18 DIAGNOSIS — E78.5 HYPERLIPIDEMIA, UNSPECIFIED HYPERLIPIDEMIA TYPE: ICD-10-CM

## 2025-02-18 DIAGNOSIS — R42 VERTIGO: ICD-10-CM

## 2025-02-18 RX ORDER — PRAVASTATIN SODIUM 20 MG
20 TABLET ORAL DAILY
Qty: 90 TABLET | Refills: 1 | Status: SHIPPED | OUTPATIENT
Start: 2025-02-18

## 2025-02-18 ASSESSMENT — ENCOUNTER SYMPTOMS
COUGH: 0
SINUS PRESSURE: 1
SINUS PAIN: 1

## 2025-02-18 NOTE — PROGRESS NOTES
resolve the issue by correcting any displacement of inner ear crystals. She is advised to continue with her scheduled vestibular therapy. If the therapy proves ineffective, she should maintain her meclizine regimen and allow time for potential spontaneous resolution. She can gradually reduce her meclizine dosage on days when she is not planning to leave her home, and monitor her response.    2. Chronic sinusitis.  Her sinus issues may be contributing to her vertigo. Chronic sinus infections can lead to persistent, mild headaches and other symptoms. She is advised to continue using Flonase and Zyrtec. A 10-day course of Augmentin will be initiated. If she experiences significant improvement but still has residual pressure or symptoms after 10 days, she should inform us so that the antibiotic course can be extended.    3. Chronic headache.  She has a persistent headache that has not responded to migraine medications, acetaminophen, or ibuprofen. The headache is likely related to her chronic sinusitis. She is advised to continue with Flonase and Zyrtec, and to start the 10-day course of Augmentin. If symptoms persist, further evaluation will be considered.      Side effects, adverse effects of the medication prescribed today, as well as treatment plan/ rationale and result expectations have been discussed with the patient who expresses understanding and desires to proceed.    Close follow up to evaluate treatment results and for coordination of care.  I have reviewed the patient's medical history in detail and updated the computerized patient record.    As always, patient is advised that if symptoms worsen in any way they will proceed to the nearest emergency room.          Sandee Goodman, LISA - CNP

## 2025-02-20 ENCOUNTER — HOSPITAL ENCOUNTER (OUTPATIENT)
Dept: PHYSICAL THERAPY | Age: 70
Setting detail: THERAPIES SERIES
Discharge: HOME | End: 2025-02-20
Payer: MEDICARE

## 2025-02-20 PROCEDURE — 97112 NEUROMUSCULAR REEDUCATION: CPT

## 2025-02-20 PROCEDURE — 97162 PT EVAL MOD COMPLEX 30 MIN: CPT

## 2025-02-20 ASSESSMENT — ENCOUNTER SYMPTOMS
COUGH: 0
RHINORRHEA: 1
SORE THROAT: 0
ABDOMINAL PAIN: 0
DIARRHEA: 0
SINUS PRESSURE: 0
SINUS PAIN: 0
NAUSEA: 0
VOMITING: 0
SHORTNESS OF BREATH: 0
CHEST TIGHTNESS: 0
COLOR CHANGE: 0
CONSTIPATION: 0

## 2025-02-20 NOTE — PLAN OF CARE
Physical Therapy Evaluation/Plan of Care   [facility]  [address]  [facility] [address]  Dept: [phone]  Dept Fax: [phone]  Loc: [phone]    Physical Therapy: Initial Evaluation    General Information    Patient: Tina Dwyer (69 y.o.     female)   Examination Date: 2025   :  1955 ;    Confirmed: Yes MRN: [id number]  CSN: [id number]   Insurance: Payor: MEDICARE / Plan: MEDICARE PART A AND B / Product Type: *No Product type* /   Insurance ID: [id number] - (Medicare)  PT Insurance Information: Medicare Secondary Insurance (if applicable): [employer]    Referring Physician: Gilbert Stapleton APRN - CNP       Visits to Date/Visits Approved: 1 /  (based on medical necessity)    No Show/Cancelled Appts: 0 / 0     Medical Diagnosis: Benign paroxysmal positional vertigo of left ear [H81.12] Benign paroxysmal positional vertigo of left ear (  Diagnosis: Benign paroxysmal positional vertigo of left ear (   Treatment Diagnosis: imbalance, vestibular dysfunction, disequilibrium      SUBJECTIVE:     Onset date:   Onset Date: 25    Subjective/ Mechanism of Injury:   Subjective: Pt initial onset of dizziness 1 week ago with vertigo upon sitting up. Pt states feels \"off\" most of the day. Vertigo symptoms last approximately 1 minute. Pt does admit to some nausea at times. Taking Meclizine 3x/ day. Symptoms have improved with meds. Pt reports some visual disturbance with difficulty focusing. Pt with h/o seasonal allergies and near constant congestion in sinus. Taking Allegra and nasal spray. Pt denies falls, but imbalance.    Description of Dizziness:   [x]  Vertigo/ spinning  []  Lightheadedness   []  Disequilibrium/ off balance   []  Other:     Initial Episode: 25    Recurring Episodes: yes    Hearing Loss: no  Tinnitus: no   Fullness: no    Visual changes: yes Difficulty focusing    Recent Falls:

## 2025-02-25 ENCOUNTER — HOSPITAL ENCOUNTER (OUTPATIENT)
Dept: PHYSICAL THERAPY | Age: 70
Setting detail: THERAPIES SERIES
Discharge: HOME | End: 2025-02-25
Payer: MEDICARE

## 2025-02-25 PROCEDURE — 97112 NEUROMUSCULAR REEDUCATION: CPT

## 2025-02-25 NOTE — PROGRESS NOTES
[facility]  Outpatient Physical Therapy    Treatment Note        Date: 2025  Patient: Tina Dwyer  : 1955   Confirmed: Yes  MRN: [id number]  Referring Provider: Gilbert Stapleton APRN - C*   Secondary Referring Provider (If applicable):     Medical Diagnosis: Benign paroxysmal positional vertigo of left ear [H81.12]    Treatment Diagnosis: imbalance, vestibular dysfunction, disequilibrium    Visit Information:  Insurance: Payor: MEDICARE / Plan: MEDICARE PART A AND B / Product Type: *No Product type* /   PT Visit Information  Onset Date: 25  PT Insurance Information: Medicare  Total # of Visits to Date: 2  Plan of Care/Certification Expiration Date: 25  No Show: 0  Progress Note Due Date: 25  Progress Note Counter:     Subjective Information:  Subjective: Pt reports no sx since evaluation, reports self decreasing medication since evaluation, pt reports no recent headaches  HEP Compliance:  [x] Good [] Fair [] Poor [] Reports not doing due to:    Treatment:  Exercises:  Exercises  Exercise 1: static stand: firm ground/blue foam FT/FA/semi tandem/tandem (EO/EC)  Exercise 3: gait drills: head turns/chin tilts/march 4x15'  Exercise 4: B-D exercises x5 Lt/Rt  Exercise 5: recheck Hallpike (-) Rt/Lt  Exercise 6: * BBQ roll if needed  Exercise 7: cervical AROM x10  Exercise 8: * X1 and X2  Exercise 20: HEP: static balance  Treatment Reasoning  Limitations addressed: Balance, Flexibility, Activity tolerance      *Indicates exercise, modality, or manual techniques to be initiated when appropriate    Objective Measures:           STG 2 Current Status:: Pt denies sx since 2025          Assessment:   Body Structures, Functions, Activity Limitations Requiring Skilled Therapeutic Intervention: Decreased functional mobility , Decreased balance, Vestibular Impairment  Assessment: Pt denies any sx since eval, reporting self decreasing meds to determine effects on

## 2025-02-27 ENCOUNTER — HOSPITAL ENCOUNTER (OUTPATIENT)
Dept: PHYSICAL THERAPY | Age: 70
Setting detail: THERAPIES SERIES
Discharge: HOME | End: 2025-02-27
Payer: MEDICARE

## 2025-02-27 NOTE — PROGRESS NOTES
Therapy                            Cancellation/No-show Note    Date: 2025  Patient: Tina Dwyer (69 y.o. female)  : 1955  MRN:  [id number]  Referring Physician: Gilbert Stapleton APRN - CNP    Medical Diagnosis: Benign paroxysmal positional vertigo of left ear [H81.12] Benign paroxysmal positional vertigo of left ear (    Visit Information:  Insurance: Payor: MEDICARE / Plan: MEDICARE PART A AND B / Product Type: *No Product type* /   Visits to Date: 2   No Show/Cancelled Appts: 0 / 1      For today's appointment patient:  [x]  Cancelled  []  Rescheduled appointment  []  No-show   []  Called pt to remind of next appointment     Reason given by patient:  []  Patient ill  []  Conflicting appointment  []  No transportation    []  Conflict with work  []  No reason given  [x]  Other:  migraine    [x] Pt has future appointments scheduled, no follow up needed  [] Pt requests to be on hold.    Reason:   If > 2 weeks please discuss with therapist.  [] Therapist to call pt for follow up  [] [place] to call pt to reschedule     Signature: Electronically signed by aYmilka Olguin PTA on 25 at 10:14 AM EST

## 2025-03-04 ENCOUNTER — HOSPITAL ENCOUNTER (OUTPATIENT)
Dept: PHYSICAL THERAPY | Age: 70
Setting detail: THERAPIES SERIES
Discharge: HOME | End: 2025-03-04

## 2025-03-04 NOTE — PROGRESS NOTES
Therapy                            Cancellation/No-show Note    Date: 2025  Patient: Tina Dwyer (69 y.o. female)  : 1955  MRN:  [id number]  Referring Physician: Gilbert Stapleton APRN - CNP    Medical Diagnosis: Benign paroxysmal positional vertigo of left ear [H81.12] Benign paroxysmal positional vertigo of left ear (    Visit Information:  Insurance: Payor: MEDICARE / Plan: MEDICARE PART A AND B / Product Type: *No Product type* /   Visits to Date: 2   No Show/Cancelled Appts: 0 / 2      For today's appointment patient:  [x]  Cancelled  []  Rescheduled appointment  []  No-show   []  Called pt to remind of next appointment     Reason given by patient:  []  Patient ill  []  Conflicting appointment  []  No transportation    []  Conflict with work  []  No reason given  [x]  Other:  Cx Feels like she is doing well, does not need more PT     [] Pt has future appointments scheduled, no follow up needed  [x] Pt requests to be on discharged.   Reason:   If > 2 weeks please discuss with therapist.  [] Therapist to call pt for follow up  [] [place] to call pt to reschedule       Signature: Electronically signed by Yamilka Olguin PTA on 3/4/25 at 7:56 AM EST

## 2025-03-13 NOTE — DISCHARGE SUMMARY
[facility]    []  [facility]    [x]  [facility]   [address]    [address]  [place] [address]     [place] [address]  Ph: [phone]     Ph: [phone]  Fax: [phone]     Fax: [phone]    [] Certification  [] Recertification []  Plan of Care  [] Progress Note [x] Discharge    Date: 3/13/2025  Patient Name: Tina Dwyer  : 1955  MRN: [id number]    To:  Gilbert Stapleton APRN - CNP        From: Eliza Bowen PT    Comments: Pt called to request discharge. pt denies symptoms since initial evaluation and treatment. Pt has met STG 1-2 and LTG 1-2. LTG 3-5 not tested due to unexpected discharge. Pt will be discharged per pt request.     Thank you for your referral and the opportunity to treat this patient.  Please contact us with any questions or concerns.    Electronically signed by Eliza Bowen PT on 3/13/25 at 9:58 AM EDT

## 2025-03-18 ENCOUNTER — HOSPITAL ENCOUNTER (OUTPATIENT)
Dept: PHYSICAL THERAPY | Age: 70
Setting detail: THERAPIES SERIES
Discharge: HOME | End: 2025-03-18

## 2025-03-31 ENCOUNTER — OFFICE VISIT (OUTPATIENT)
Dept: OBGYN CLINIC | Age: 70
End: 2025-03-31
Payer: MEDICARE

## 2025-03-31 VITALS
BODY MASS INDEX: 26.46 KG/M2 | DIASTOLIC BLOOD PRESSURE: 82 MMHG | HEART RATE: 72 BPM | SYSTOLIC BLOOD PRESSURE: 120 MMHG | WEIGHT: 155 LBS | HEIGHT: 64 IN

## 2025-03-31 DIAGNOSIS — N39.41 URGE INCONTINENCE: Primary | ICD-10-CM

## 2025-03-31 DIAGNOSIS — N39.41 URGE INCONTINENCE: ICD-10-CM

## 2025-03-31 LAB
BILIRUB UR QL STRIP: NEGATIVE
CLARITY UR: CLEAR
COLOR UR: YELLOW
GLUCOSE UR STRIP-MCNC: NEGATIVE MG/DL
HGB UR QL STRIP: NEGATIVE
KETONES UR STRIP-MCNC: NEGATIVE MG/DL
LEUKOCYTE ESTERASE UR QL STRIP: NEGATIVE
NITRITE UR QL STRIP: NEGATIVE
PH UR STRIP: 6 [PH] (ref 5–9)
PROT UR STRIP-MCNC: NEGATIVE MG/DL
SP GR UR STRIP: 1.01 (ref 1–1.03)
UROBILINOGEN UR STRIP-ACNC: 0.2 E.U./DL

## 2025-03-31 PROCEDURE — G8419 CALC BMI OUT NRM PARAM NOF/U: HCPCS | Performed by: OBSTETRICS & GYNECOLOGY

## 2025-03-31 PROCEDURE — 3017F COLORECTAL CA SCREEN DOC REV: CPT | Performed by: OBSTETRICS & GYNECOLOGY

## 2025-03-31 PROCEDURE — G8427 DOCREV CUR MEDS BY ELIG CLIN: HCPCS | Performed by: OBSTETRICS & GYNECOLOGY

## 2025-03-31 PROCEDURE — 0509F URINE INCON PLAN DOCD: CPT | Performed by: OBSTETRICS & GYNECOLOGY

## 2025-03-31 PROCEDURE — 1123F ACP DISCUSS/DSCN MKR DOCD: CPT | Performed by: OBSTETRICS & GYNECOLOGY

## 2025-03-31 PROCEDURE — 1036F TOBACCO NON-USER: CPT | Performed by: OBSTETRICS & GYNECOLOGY

## 2025-03-31 PROCEDURE — 1090F PRES/ABSN URINE INCON ASSESS: CPT | Performed by: OBSTETRICS & GYNECOLOGY

## 2025-03-31 PROCEDURE — 95971 ALYS SMPL SP/PN NPGT W/PRGRM: CPT | Performed by: OBSTETRICS & GYNECOLOGY

## 2025-03-31 PROCEDURE — 99213 OFFICE O/P EST LOW 20 MIN: CPT | Performed by: OBSTETRICS & GYNECOLOGY

## 2025-03-31 PROCEDURE — G8400 PT W/DXA NO RESULTS DOC: HCPCS | Performed by: OBSTETRICS & GYNECOLOGY

## 2025-03-31 RX ORDER — MIRABEGRON 50 MG/1
50 TABLET, FILM COATED, EXTENDED RELEASE ORAL DAILY
Qty: 30 TABLET | Refills: 0 | Status: SHIPPED | OUTPATIENT
Start: 2025-03-31

## 2025-03-31 RX ORDER — SULFAMETHOXAZOLE AND TRIMETHOPRIM 800; 160 MG/1; MG/1
1 TABLET ORAL 2 TIMES DAILY
Qty: 20 TABLET | Refills: 0 | Status: SHIPPED | OUTPATIENT
Start: 2025-03-31 | End: 2025-04-10

## 2025-04-01 ENCOUNTER — OFFICE VISIT (OUTPATIENT)
Dept: GASTROENTEROLOGY | Age: 70
End: 2025-04-01
Payer: MEDICARE

## 2025-04-01 ENCOUNTER — PREP FOR PROCEDURE (OUTPATIENT)
Dept: GASTROENTEROLOGY | Age: 70
End: 2025-04-01

## 2025-04-01 VITALS — HEART RATE: 64 BPM | WEIGHT: 155 LBS | BODY MASS INDEX: 26.61 KG/M2 | OXYGEN SATURATION: 95 %

## 2025-04-01 DIAGNOSIS — R15.9 INCONTINENCE OF FECES WITH FECAL URGENCY: ICD-10-CM

## 2025-04-01 DIAGNOSIS — R15.2 INCONTINENCE OF FECES WITH FECAL URGENCY: Primary | ICD-10-CM

## 2025-04-01 DIAGNOSIS — R15.2 INCONTINENCE OF FECES WITH FECAL URGENCY: ICD-10-CM

## 2025-04-01 DIAGNOSIS — Z85.048 HISTORY OF RECTAL CANCER: ICD-10-CM

## 2025-04-01 DIAGNOSIS — R15.9 INCONTINENCE OF FECES WITH FECAL URGENCY: Primary | ICD-10-CM

## 2025-04-01 LAB — BACTERIA UR CULT: NORMAL

## 2025-04-01 PROCEDURE — 99213 OFFICE O/P EST LOW 20 MIN: CPT | Performed by: SPECIALIST

## 2025-04-01 PROCEDURE — 1036F TOBACCO NON-USER: CPT | Performed by: SPECIALIST

## 2025-04-01 PROCEDURE — G8419 CALC BMI OUT NRM PARAM NOF/U: HCPCS | Performed by: SPECIALIST

## 2025-04-01 PROCEDURE — 1090F PRES/ABSN URINE INCON ASSESS: CPT | Performed by: SPECIALIST

## 2025-04-01 PROCEDURE — 3017F COLORECTAL CA SCREEN DOC REV: CPT | Performed by: SPECIALIST

## 2025-04-01 PROCEDURE — G8400 PT W/DXA NO RESULTS DOC: HCPCS | Performed by: SPECIALIST

## 2025-04-01 PROCEDURE — 1123F ACP DISCUSS/DSCN MKR DOCD: CPT | Performed by: SPECIALIST

## 2025-04-01 PROCEDURE — G8428 CUR MEDS NOT DOCUMENT: HCPCS | Performed by: SPECIALIST

## 2025-04-01 RX ORDER — SODIUM CHLORIDE 0.9 % (FLUSH) 0.9 %
5-40 SYRINGE (ML) INJECTION PRN
Status: CANCELLED | OUTPATIENT
Start: 2025-04-01

## 2025-04-01 RX ORDER — SODIUM CHLORIDE 9 MG/ML
INJECTION, SOLUTION INTRAVENOUS CONTINUOUS
Status: CANCELLED | OUTPATIENT
Start: 2025-04-01

## 2025-04-01 RX ORDER — SODIUM CHLORIDE 9 MG/ML
INJECTION, SOLUTION INTRAVENOUS PRN
Status: CANCELLED | OUTPATIENT
Start: 2025-04-01

## 2025-04-01 RX ORDER — SODIUM CHLORIDE 0.9 % (FLUSH) 0.9 %
5-40 SYRINGE (ML) INJECTION EVERY 12 HOURS SCHEDULED
Status: CANCELLED | OUTPATIENT
Start: 2025-04-01

## 2025-04-01 RX ORDER — SODIUM, POTASSIUM,MAG SULFATES 17.5-3.13G
SOLUTION, RECONSTITUTED, ORAL ORAL
Qty: 354 ML | Refills: 0 | Status: SHIPPED | OUTPATIENT
Start: 2025-04-01

## 2025-04-01 ASSESSMENT — ENCOUNTER SYMPTOMS
ABDOMINAL PAIN: 0
DIARRHEA: 0
EYES NEGATIVE: 1
GASTROINTESTINAL NEGATIVE: 1
ANAL BLEEDING: 0
RESPIRATORY NEGATIVE: 1
BLOOD IN STOOL: 0
CONSTIPATION: 0
RECTAL PAIN: 0
VOMITING: 0
NAUSEA: 0
ABDOMINAL DISTENTION: 0

## 2025-04-01 NOTE — PROGRESS NOTES
Gastroenterology Clinic Follow up Visit    Tina Dwyer  [id number]  Chief Complaint   Patient presents with    Follow-up       HPI and A/P at last visit summarized below:  Patient is here for follow-up, has a history of rectal cancer status post chemo and radiation.  Patient had a flexible sigmoidoscopy in February 2023 which did not show any significant abnormality and biopsies were negative, since early part of this year has been experiencing urinary and fecal incontinence associated with fecal urgency, no rectal bleeding.  Experience pain in the lower abdomen associated nausea, patient also reports passage of stool when she has flatulence  Review of Systems   Constitutional: Negative.    HENT: Negative.     Eyes: Negative.    Respiratory: Negative.     Cardiovascular: Negative.    Gastrointestinal: Negative.  Negative for abdominal distention, abdominal pain, anal bleeding, blood in stool, constipation, diarrhea, nausea, rectal pain and vomiting.        Fecal urgency and incontinence   Endocrine: Negative.    Genitourinary: Negative.         Urinary incontinence   Musculoskeletal: Negative.    Skin: Negative.    Allergic/Immunologic: Negative for food allergies.   Neurological: Negative.    Hematological: Negative.    Psychiatric/Behavioral: Negative.          Past medical history, past surgical history, medication list, social and familyhistory reviewed    Last menstrual period 09/19/2008, not currently breastfeeding.    Physical Exam  Constitutional:       Appearance: She is well-developed.   HENT:      Head: Normocephalic and atraumatic.   Eyes:      Conjunctiva/sclera: Conjunctivae normal.      Pupils: Pupils are equal, round, and reactive to light.   Cardiovascular:      Rate and Rhythm: Normal rate.   Pulmonary:      Effort: Pulmonary effort is normal.   Abdominal:      General: Bowel sounds are normal.      Palpations: Abdomen is soft.   Musculoskeletal:         General: Normal range of motion.

## 2025-04-07 ENCOUNTER — OFFICE VISIT (OUTPATIENT)
Dept: OBGYN CLINIC | Age: 70
End: 2025-04-07
Payer: MEDICARE

## 2025-04-07 ENCOUNTER — HOSPITAL ENCOUNTER (OUTPATIENT)
Dept: GENERAL RADIOLOGY | Age: 70
Discharge: HOME | End: 2025-04-09
Attending: OBSTETRICS & GYNECOLOGY
Payer: MEDICARE

## 2025-04-07 ENCOUNTER — ANESTHESIA EVENT (OUTPATIENT)
Dept: ENDOSCOPY | Age: 70
End: 2025-04-07
Payer: MEDICARE

## 2025-04-07 VITALS
WEIGHT: 154 LBS | BODY MASS INDEX: 26.29 KG/M2 | SYSTOLIC BLOOD PRESSURE: 106 MMHG | HEART RATE: 92 BPM | HEIGHT: 64 IN | DIASTOLIC BLOOD PRESSURE: 72 MMHG

## 2025-04-07 DIAGNOSIS — N39.41 URGE INCONTINENCE: ICD-10-CM

## 2025-04-07 DIAGNOSIS — M79.18 BUTTOCK PAIN: ICD-10-CM

## 2025-04-07 DIAGNOSIS — N39.0 RECURRENT UTI: Primary | ICD-10-CM

## 2025-04-07 PROCEDURE — G8419 CALC BMI OUT NRM PARAM NOF/U: HCPCS | Performed by: OBSTETRICS & GYNECOLOGY

## 2025-04-07 PROCEDURE — 99214 OFFICE O/P EST MOD 30 MIN: CPT | Performed by: OBSTETRICS & GYNECOLOGY

## 2025-04-07 PROCEDURE — 1090F PRES/ABSN URINE INCON ASSESS: CPT | Performed by: OBSTETRICS & GYNECOLOGY

## 2025-04-07 PROCEDURE — 0509F URINE INCON PLAN DOCD: CPT | Performed by: OBSTETRICS & GYNECOLOGY

## 2025-04-07 PROCEDURE — G8427 DOCREV CUR MEDS BY ELIG CLIN: HCPCS | Performed by: OBSTETRICS & GYNECOLOGY

## 2025-04-07 PROCEDURE — 1159F MED LIST DOCD IN RCRD: CPT | Performed by: OBSTETRICS & GYNECOLOGY

## 2025-04-07 PROCEDURE — 3017F COLORECTAL CA SCREEN DOC REV: CPT | Performed by: OBSTETRICS & GYNECOLOGY

## 2025-04-07 PROCEDURE — 1036F TOBACCO NON-USER: CPT | Performed by: OBSTETRICS & GYNECOLOGY

## 2025-04-07 PROCEDURE — G8400 PT W/DXA NO RESULTS DOC: HCPCS | Performed by: OBSTETRICS & GYNECOLOGY

## 2025-04-07 PROCEDURE — 72220 X-RAY EXAM SACRUM TAILBONE: CPT

## 2025-04-07 PROCEDURE — 1123F ACP DISCUSS/DSCN MKR DOCD: CPT | Performed by: OBSTETRICS & GYNECOLOGY

## 2025-04-07 RX ORDER — MIRABEGRON 50 MG/1
50 TABLET, FILM COATED, EXTENDED RELEASE ORAL DAILY
Qty: 30 TABLET | Refills: 5 | Status: SHIPPED | OUTPATIENT
Start: 2025-04-07

## 2025-04-07 ASSESSMENT — LIFESTYLE VARIABLES: SMOKING_STATUS: 1

## 2025-04-07 NOTE — PROGRESS NOTES
Tina Dwyer is a 69 y.o. female who presents here today for complaints of Follow-up (Myrbetriq, Bactrim.)        History of Present Illness  The patient presents for evaluation of urinary issues and weight management.    A sensation of sitting on an object is reported, which is attributed to the device. Discomfort is experienced when seated for extended periods, and there was an incident where a sharp poke from the device was felt. Initially, no discomfort was experienced from the device, and there is uncertainty if it has shifted. The device feels closer to the surface after showering, whereas previously it seemed deeper. An antibiotic was prescribed to be taken as needed.    Weight loss has been challenging despite adherence to a ketogenic diet. The patient's  has experienced significant weight loss on the same diet. Initially, 40 pounds were lost, but 10 pounds have been regained, and there is difficulty in losing this additional weight. Access to a treadmill at home is not available.         Vitals:  /72 (BP Site: Left Upper Arm, Patient Position: Sitting, BP Cuff Size: Medium Adult)   Pulse 92   Ht 1.626 m (5' 4\")   Wt 69.9 kg (154 lb)   LMP 09/19/2008   BMI 26.43 kg/m²   Allergies:  Erythromycin, Seasonal, Sporanox [itraconazole], and Macrobid [nitrofurantoin macrocrystal]  Past Medical History:   Diagnosis Date    Allergic reaction     Arthritis     Basal cell carcinoma (BCC) of chest 07/26/2024    Cancer (HCC)     Cervical, dx by Dr. Yo    Cerebral artery occlusion with cerebral infarction (HCC)     at age 50 / sx as a migraine with slurred speech & visual disturbance    Cerebrovascular disease Age 50    Depression     Diverticulitis     GERD (gastroesophageal reflux disease)     Hearing loss     History of conization of cervix     History of therapeutic radiation     Hx antineoplastic chemo     Hyperlipidemia     Migraine     Overactive bladder     Rectal cancer (HCC)

## 2025-04-07 NOTE — ANESTHESIA PRE PROCEDURE
II          Dental:          Pulmonary:   (+)           current smoker                           Cardiovascular:                      Neuro/Psych:   (+) CVA:, headaches: migraine headaches            GI/Hepatic/Renal:   (+) renal disease:          Endo/Other:    (+) malignancy/cancer.                 Abdominal:             Vascular:          Other Findings:             Anesthesia Plan      MAC     ASA 3       Induction: intravenous.      Anesthetic plan and risks discussed with patient.      Plan discussed with attending.                    Marta Flanagan, APRN - CRNA   4/7/2025

## 2025-04-08 ENCOUNTER — ANESTHESIA (OUTPATIENT)
Dept: ENDOSCOPY | Age: 70
End: 2025-04-08
Payer: MEDICARE

## 2025-04-08 ENCOUNTER — HOSPITAL ENCOUNTER (OUTPATIENT)
Age: 70
Setting detail: OUTPATIENT SURGERY
Discharge: HOME | End: 2025-04-08
Attending: SPECIALIST | Admitting: SPECIALIST
Payer: MEDICARE

## 2025-04-08 VITALS
HEIGHT: 64 IN | WEIGHT: 150 LBS | TEMPERATURE: 97.8 F | DIASTOLIC BLOOD PRESSURE: 66 MMHG | SYSTOLIC BLOOD PRESSURE: 119 MMHG | BODY MASS INDEX: 25.61 KG/M2 | RESPIRATION RATE: 18 BRPM | HEART RATE: 58 BPM | OXYGEN SATURATION: 97 %

## 2025-04-08 DIAGNOSIS — Z85.048 HISTORY OF RECTAL CANCER: ICD-10-CM

## 2025-04-08 DIAGNOSIS — R15.2 INCONTINENCE OF FECES WITH FECAL URGENCY: ICD-10-CM

## 2025-04-08 DIAGNOSIS — R15.9 INCONTINENCE OF FECES WITH FECAL URGENCY: ICD-10-CM

## 2025-04-08 PROCEDURE — 3700000001 HC ADD 15 MINUTES (ANESTHESIA): Performed by: SPECIALIST

## 2025-04-08 PROCEDURE — 3609027000 HC COLONOSCOPY: Performed by: SPECIALIST

## 2025-04-08 PROCEDURE — 2500000003 HC RX 250 WO HCPCS: Performed by: SPECIALIST

## 2025-04-08 PROCEDURE — 7100000010 HC PHASE II RECOVERY - FIRST 15 MIN: Performed by: SPECIALIST

## 2025-04-08 PROCEDURE — 2709999900 HC NON-CHARGEABLE SUPPLY: Performed by: SPECIALIST

## 2025-04-08 PROCEDURE — 6360000002 HC RX W HCPCS: Performed by: NURSE ANESTHETIST, CERTIFIED REGISTERED

## 2025-04-08 PROCEDURE — 45385 COLONOSCOPY W/LESION REMOVAL: CPT | Performed by: SPECIALIST

## 2025-04-08 PROCEDURE — 88305 TISSUE EXAM BY PATHOLOGIST: CPT

## 2025-04-08 PROCEDURE — 3700000000 HC ANESTHESIA ATTENDED CARE: Performed by: SPECIALIST

## 2025-04-08 PROCEDURE — 7100000011 HC PHASE II RECOVERY - ADDTL 15 MIN: Performed by: SPECIALIST

## 2025-04-08 PROCEDURE — 2580000003 HC RX 258

## 2025-04-08 RX ORDER — SODIUM CHLORIDE 9 MG/ML
INJECTION, SOLUTION INTRAVENOUS PRN
Status: DISCONTINUED | OUTPATIENT
Start: 2025-04-08 | End: 2025-04-08 | Stop reason: HOSPADM

## 2025-04-08 RX ORDER — SODIUM CHLORIDE 9 MG/ML
INJECTION, SOLUTION INTRAVENOUS
Status: COMPLETED
Start: 2025-04-08 | End: 2025-04-08

## 2025-04-08 RX ORDER — SODIUM CHLORIDE 0.9 % (FLUSH) 0.9 %
5-40 SYRINGE (ML) INJECTION PRN
Status: DISCONTINUED | OUTPATIENT
Start: 2025-04-08 | End: 2025-04-08 | Stop reason: HOSPADM

## 2025-04-08 RX ORDER — SODIUM CHLORIDE 9 MG/ML
INJECTION, SOLUTION INTRAVENOUS CONTINUOUS
Status: DISCONTINUED | OUTPATIENT
Start: 2025-04-08 | End: 2025-04-08 | Stop reason: HOSPADM

## 2025-04-08 RX ORDER — SODIUM CHLORIDE 0.9 % (FLUSH) 0.9 %
5-40 SYRINGE (ML) INJECTION EVERY 12 HOURS SCHEDULED
Status: DISCONTINUED | OUTPATIENT
Start: 2025-04-08 | End: 2025-04-08 | Stop reason: HOSPADM

## 2025-04-08 RX ORDER — PROPOFOL 10 MG/ML
INJECTION, EMULSION INTRAVENOUS
Status: DISCONTINUED | OUTPATIENT
Start: 2025-04-08 | End: 2025-04-08 | Stop reason: SDUPTHER

## 2025-04-08 RX ADMIN — SODIUM CHLORIDE: 9 INJECTION, SOLUTION INTRAVENOUS at 08:28

## 2025-04-08 RX ADMIN — PROPOFOL 50 MG: 10 INJECTION, EMULSION INTRAVENOUS at 09:27

## 2025-04-08 RX ADMIN — PROPOFOL 50 MG: 10 INJECTION, EMULSION INTRAVENOUS at 09:17

## 2025-04-08 RX ADMIN — PROPOFOL 50 MG: 10 INJECTION, EMULSION INTRAVENOUS at 09:16

## 2025-04-08 RX ADMIN — PROPOFOL 100 MG: 10 INJECTION, EMULSION INTRAVENOUS at 09:12

## 2025-04-08 RX ADMIN — PROPOFOL 50 MG: 10 INJECTION, EMULSION INTRAVENOUS at 09:31

## 2025-04-08 RX ADMIN — PROPOFOL 50 MG: 10 INJECTION, EMULSION INTRAVENOUS at 09:22

## 2025-04-08 RX ADMIN — SODIUM CHLORIDE: 0.9 INJECTION, SOLUTION INTRAVENOUS at 08:28

## 2025-04-08 ASSESSMENT — PAIN - FUNCTIONAL ASSESSMENT
PAIN_FUNCTIONAL_ASSESSMENT: 0-10
PAIN_FUNCTIONAL_ASSESSMENT: 0-10

## 2025-04-08 ASSESSMENT — PAIN DESCRIPTION - DESCRIPTORS: DESCRIPTORS: DISCOMFORT

## 2025-04-08 NOTE — PROGRESS NOTES
Tina Dwyer is a 69 y.o. female who presents here today for complaints of Incontinence (Urge. Stage 1&2 1/9/25. She states she's tried increasing the intensity but it doesn't seem to be helping.)        History of Present Illness  The patient presents for evaluation of urinary issues.    She reports a lack of sensation from her device, which was adjusted approximately one week ago. The adjustment provided temporary relief, but symptoms have since returned. Intensity adjustments have been made, but she is uncertain about program modifications. A previous program change was made post-procedure due to ineffectiveness. Concern is expressed about recurrent infections, particularly their potential impact on an upcoming vacation planned for the end of May 2025 through June 2025. No medication has been prescribed in conjunction with the device. A daily medication regimen for an infection was exhausted recently. Vaginal estrogen is not being utilized, and pain is experienced during intercourse. Infections typically occur post-intercourse and persist for 3 to 4 days.    SEXUAL HISTORY: Sexually active, experiences pain during intercourse, infections occur post-intercourse.         Vitals:  /82 (BP Site: Left Upper Arm, Patient Position: Sitting, BP Cuff Size: Medium Adult)   Pulse 72   Ht 1.626 m (5' 4\")   Wt 70.3 kg (155 lb)   LMP 09/19/2008   BMI 26.61 kg/m²   Allergies:  Erythromycin, Seasonal, Sporanox [itraconazole], and Macrobid [nitrofurantoin macrocrystal]  Past Medical History:   Diagnosis Date    Allergic reaction     Arthritis     Basal cell carcinoma (BCC) of chest 07/26/2024    Cancer (HCC)     Cervical, dx by Dr. Yo    Cerebral artery occlusion with cerebral infarction (HCC)     at age 50 / sx as a migraine with slurred speech & visual disturbance    Cerebrovascular disease Age 50    Depression     Diverticulitis     GERD (gastroesophageal reflux disease)     Hearing loss     History of

## 2025-04-08 NOTE — H&P
Comment: rarely       Vital Signs:   Vitals:    04/08/25 0809   BP: 135/63   Pulse: 67   Resp: 18   Temp: 97.8 °F (36.6 °C)   SpO2: 99%        Physical Exam:  Cardiac:  [x]WNL  []Comments:  Pulmonary:  [x]WNL   []Comments:   Neuro/Mental Status:  [x]WNL  []Comments:  Abdominal:  [x]WNL    []Comments:  Other:   []WNL  []Comments:    Informed Consent:  The risks and benefits of the procedure have been discussed with either the patient or if they cannot consent, their representative.    Assessment:  Patient examined and appropriate for planned sedation and procedure.     Plan:  Proceed with planned sedation and procedure as above.    Pattie Lewis MD  9:06 AM

## 2025-04-08 NOTE — ANESTHESIA POSTPROCEDURE EVALUATION
Department of Anesthesiology  Postprocedure Note    Patient: Tina Dwyer  MRN: [id number]  YOB: 1955  Date of evaluation: 4/8/2025    Procedure Summary       Date: 04/08/25 Room / Location: [facility] OR 01 / [facility]    Anesthesia Start: 0910 Anesthesia Stop:     Procedure: COLONOSCOPY WITH POLYPECTOMY Diagnosis:       Incontinence of feces with fecal urgency      History of rectal cancer      (Incontinence of feces with fecal urgency [R15.9, R15.2])      (History of rectal cancer [Z85.048])    Surgeons: Pattie Lewis MD Responsible Provider: Marta Flanagan APRN - CRNA    Anesthesia Type: MAC ASA Status: 3            Anesthesia Type: No value filed.    Aj Phase I: Aj Score: 10    Aj Phase II:      Anesthesia Post Evaluation    Patient location during evaluation: PACU  Patient participation: complete - patient cannot participate  Level of consciousness: awake  Pain score: 0  Airway patency: patent  Nausea & Vomiting: no vomiting and no nausea  Cardiovascular status: blood pressure returned to baseline  Respiratory status: acceptable  Hydration status: stable  Pain management: adequate        No notable events documented.

## 2025-04-09 ENCOUNTER — HOSPITAL ENCOUNTER (OUTPATIENT)
Dept: CT IMAGING | Age: 70
Discharge: HOME | End: 2025-04-11
Attending: RADIOLOGY
Payer: MEDICARE

## 2025-04-09 DIAGNOSIS — C21.0 ANAL CANCER (HCC): ICD-10-CM

## 2025-04-09 LAB
PERFORMED ON: ABNORMAL
POC CREATININE: 1.1 MG/DL (ref 0.6–1.2)
POC SAMPLE TYPE: ABNORMAL

## 2025-04-09 PROCEDURE — 6360000004 HC RX CONTRAST MEDICATION: Performed by: RADIOLOGY

## 2025-04-09 PROCEDURE — 74177 CT ABD & PELVIS W/CONTRAST: CPT

## 2025-04-09 RX ORDER — IOPAMIDOL 755 MG/ML
75 INJECTION, SOLUTION INTRAVASCULAR
Status: COMPLETED | OUTPATIENT
Start: 2025-04-09 | End: 2025-04-09

## 2025-04-09 RX ORDER — IOPAMIDOL 755 MG/ML
18 INJECTION, SOLUTION INTRAVASCULAR
Status: COMPLETED | OUTPATIENT
Start: 2025-04-09 | End: 2025-04-09

## 2025-04-09 RX ADMIN — IOPAMIDOL 75 ML: 755 INJECTION, SOLUTION INTRAVENOUS at 13:20

## 2025-04-09 RX ADMIN — IOPAMIDOL 18 ML: 755 INJECTION, SOLUTION INTRAVENOUS at 13:20

## 2025-04-10 ENCOUNTER — RESULTS FOLLOW-UP (OUTPATIENT)
Dept: GASTROENTEROLOGY | Age: 70
End: 2025-04-10

## 2025-04-16 ENCOUNTER — TELEPHONE (OUTPATIENT)
Dept: GASTROENTEROLOGY | Age: 70
End: 2025-04-16

## 2025-04-16 NOTE — TELEPHONE ENCOUNTER
Patient called stating after she did the prep for her colonoscopy she got hemorrhoids. Patient wants to know if you can something to her pharmacy for the hemorrhoids. She stated the over the counter medicine does not help.

## 2025-04-18 RX ORDER — TOPIRAMATE 100 MG/1
TABLET, FILM COATED ORAL
Qty: 270 TABLET | Refills: 0 | Status: SHIPPED | OUTPATIENT
Start: 2025-04-18

## 2025-04-18 NOTE — TELEPHONE ENCOUNTER
Requesting medication refill. Please approve or deny this request.    Rx requested:  Requested Prescriptions     Pending Prescriptions Disp Refills    topiramate (TOPAMAX) 100 MG tablet 270 tablet 0     Sig: TAKE 3 TABLETS DAILY         Last Office Visit:   10/2/2024      Next Visit Date:  Future Appointments   Date Time Provider Department Center   4/28/2025  3:15 PM Pattie Lewis MD [place] [facility] [place]   5/12/2025 10:15 AM Sandee Goodman, LISA - CNP [facility] ECC DEP   8/19/2025  1:00 PM SCHEDULE, MLO[facility] RAD ONC NURSE [facility]   10/1/2025  1:15 PM Marcial Cortés MD [place] [facility] Neurology -   11/13/2025 10:30 AM Sandee Goodman, LISA - [facility] DEP               Last refill 1/14/25. Please approve or deny.

## 2025-04-28 ENCOUNTER — OFFICE VISIT (OUTPATIENT)
Dept: GASTROENTEROLOGY | Age: 70
End: 2025-04-28
Payer: MEDICARE

## 2025-04-28 VITALS — WEIGHT: 157 LBS | OXYGEN SATURATION: 95 % | BODY MASS INDEX: 26.95 KG/M2 | HEART RATE: 70 BPM

## 2025-04-28 DIAGNOSIS — Z85.048 HISTORY OF RECTAL CANCER: Primary | ICD-10-CM

## 2025-04-28 DIAGNOSIS — D12.5 ADENOMATOUS POLYP OF SIGMOID COLON: ICD-10-CM

## 2025-04-28 PROCEDURE — 3017F COLORECTAL CA SCREEN DOC REV: CPT | Performed by: SPECIALIST

## 2025-04-28 PROCEDURE — 99212 OFFICE O/P EST SF 10 MIN: CPT | Performed by: SPECIALIST

## 2025-04-28 PROCEDURE — 1123F ACP DISCUSS/DSCN MKR DOCD: CPT | Performed by: SPECIALIST

## 2025-04-28 PROCEDURE — 1090F PRES/ABSN URINE INCON ASSESS: CPT | Performed by: SPECIALIST

## 2025-04-28 PROCEDURE — 1036F TOBACCO NON-USER: CPT | Performed by: SPECIALIST

## 2025-04-28 PROCEDURE — 1159F MED LIST DOCD IN RCRD: CPT | Performed by: SPECIALIST

## 2025-04-28 PROCEDURE — G8427 DOCREV CUR MEDS BY ELIG CLIN: HCPCS | Performed by: SPECIALIST

## 2025-04-28 PROCEDURE — G8419 CALC BMI OUT NRM PARAM NOF/U: HCPCS | Performed by: SPECIALIST

## 2025-04-28 PROCEDURE — G8400 PT W/DXA NO RESULTS DOC: HCPCS | Performed by: SPECIALIST

## 2025-04-28 ASSESSMENT — ENCOUNTER SYMPTOMS
CONSTIPATION: 0
EYES NEGATIVE: 1
RECTAL PAIN: 0
NAUSEA: 0
DIARRHEA: 0
ANAL BLEEDING: 0
VOMITING: 0
ABDOMINAL PAIN: 0
ABDOMINAL DISTENTION: 0
BLOOD IN STOOL: 0
RESPIRATORY NEGATIVE: 1
GASTROINTESTINAL NEGATIVE: 1

## 2025-04-28 NOTE — PROGRESS NOTES
Gastroenterology Clinic Follow up Visit    Tina Dwyer  [id number]  Chief Complaint   Patient presents with    Follow-up     FU after colonoscopy       HPI and A/P at last visit summarized below:  Patient is here for follow-up.  Colonoscopy showed 2 polyps in the descending colon which were tubular adenoma and mucosal fold and also has changes of mild radiation proctitis.  Reports occasional diarrhea and fecal incontinence when patient passed rectal gas and has been taking Imodium once or twice a week.  Review of Systems   Constitutional: Negative.    HENT: Negative.     Eyes: Negative.    Respiratory: Negative.     Cardiovascular: Negative.    Gastrointestinal: Negative.  Negative for abdominal distention, abdominal pain, anal bleeding, blood in stool, constipation, diarrhea, nausea, rectal pain and vomiting.        Occasional diarrhea and fecal incontinence   Endocrine: Negative.    Genitourinary: Negative.    Musculoskeletal: Negative.    Skin: Negative.    Allergic/Immunologic: Negative for food allergies.   Neurological: Negative.    Hematological: Negative.    Psychiatric/Behavioral: Negative.          Past medical history, past surgical history, medication list, social and familyhistory reviewed    Pulse 70, weight 71.2 kg (157 lb), last menstrual period 09/19/2008, SpO2 95%, not currently breastfeeding.    Physical Exam  Constitutional:       Appearance: She is well-developed.   HENT:      Head: Normocephalic and atraumatic.   Eyes:      Conjunctiva/sclera: Conjunctivae normal.      Pupils: Pupils are equal, round, and reactive to light.   Cardiovascular:      Rate and Rhythm: Normal rate.   Pulmonary:      Effort: Pulmonary effort is normal.   Abdominal:      General: Bowel sounds are normal.      Palpations: Abdomen is soft.   Musculoskeletal:         General: Normal range of motion.      Cervical back: Normal range of motion.   Skin:     General: Skin is warm.   Neurological:      Mental Status: She

## 2025-05-08 ENCOUNTER — TELEPHONE (OUTPATIENT)
Dept: OBGYN CLINIC | Age: 70
End: 2025-05-08

## 2025-05-08 DIAGNOSIS — N39.41 URGE INCONTINENCE: Primary | ICD-10-CM

## 2025-05-08 NOTE — TELEPHONE ENCOUNTER
River 50mg- pt would like you to call it in to her [facility] to [facility]. [facility] in [place].

## 2025-05-09 RX ORDER — MIRABEGRON 50 MG/1
50 TABLET, FILM COATED, EXTENDED RELEASE ORAL DAILY
Qty: 30 TABLET | Refills: 5 | Status: SHIPPED | OUTPATIENT
Start: 2025-05-09

## 2025-05-12 ENCOUNTER — OFFICE VISIT (OUTPATIENT)
Dept: FAMILY MEDICINE CLINIC | Age: 70
End: 2025-05-12
Payer: MEDICARE

## 2025-05-12 VITALS
HEIGHT: 64 IN | SYSTOLIC BLOOD PRESSURE: 100 MMHG | OXYGEN SATURATION: 95 % | WEIGHT: 156 LBS | BODY MASS INDEX: 26.63 KG/M2 | HEART RATE: 75 BPM | TEMPERATURE: 98.7 F | DIASTOLIC BLOOD PRESSURE: 62 MMHG

## 2025-05-12 DIAGNOSIS — E78.5 HYPERLIPIDEMIA, UNSPECIFIED HYPERLIPIDEMIA TYPE: Primary | ICD-10-CM

## 2025-05-12 DIAGNOSIS — E53.8 FOLIC ACID DEFICIENCY: ICD-10-CM

## 2025-05-12 DIAGNOSIS — M81.0 AGE RELATED OSTEOPOROSIS, UNSPECIFIED PATHOLOGICAL FRACTURE PRESENCE: ICD-10-CM

## 2025-05-12 DIAGNOSIS — D64.9 ANEMIA, UNSPECIFIED TYPE: ICD-10-CM

## 2025-05-12 DIAGNOSIS — N28.9 DECREASED RENAL FUNCTION: ICD-10-CM

## 2025-05-12 DIAGNOSIS — G43.909 MIGRAINE WITHOUT STATUS MIGRAINOSUS, NOT INTRACTABLE, UNSPECIFIED MIGRAINE TYPE: ICD-10-CM

## 2025-05-12 DIAGNOSIS — Z12.31 BREAST CANCER SCREENING BY MAMMOGRAM: ICD-10-CM

## 2025-05-12 PROCEDURE — 1126F AMNT PAIN NOTED NONE PRSNT: CPT | Performed by: NURSE PRACTITIONER

## 2025-05-12 PROCEDURE — 99214 OFFICE O/P EST MOD 30 MIN: CPT | Performed by: NURSE PRACTITIONER

## 2025-05-12 PROCEDURE — 1036F TOBACCO NON-USER: CPT | Performed by: NURSE PRACTITIONER

## 2025-05-12 PROCEDURE — 1160F RVW MEDS BY RX/DR IN RCRD: CPT | Performed by: NURSE PRACTITIONER

## 2025-05-12 PROCEDURE — 3017F COLORECTAL CA SCREEN DOC REV: CPT | Performed by: NURSE PRACTITIONER

## 2025-05-12 PROCEDURE — G8400 PT W/DXA NO RESULTS DOC: HCPCS | Performed by: NURSE PRACTITIONER

## 2025-05-12 PROCEDURE — G8427 DOCREV CUR MEDS BY ELIG CLIN: HCPCS | Performed by: NURSE PRACTITIONER

## 2025-05-12 PROCEDURE — 1159F MED LIST DOCD IN RCRD: CPT | Performed by: NURSE PRACTITIONER

## 2025-05-12 PROCEDURE — G8419 CALC BMI OUT NRM PARAM NOF/U: HCPCS | Performed by: NURSE PRACTITIONER

## 2025-05-12 PROCEDURE — 1123F ACP DISCUSS/DSCN MKR DOCD: CPT | Performed by: NURSE PRACTITIONER

## 2025-05-12 PROCEDURE — 1090F PRES/ABSN URINE INCON ASSESS: CPT | Performed by: NURSE PRACTITIONER

## 2025-05-12 ASSESSMENT — ENCOUNTER SYMPTOMS
COUGH: 0
SHORTNESS OF BREATH: 0

## 2025-05-12 NOTE — PROGRESS NOTES
regular rhythm.      Pulses: Normal pulses.      Heart sounds: Normal heart sounds.   Pulmonary:      Effort: Pulmonary effort is normal.      Breath sounds: Normal breath sounds.   Abdominal:      Palpations: Abdomen is soft.   Musculoskeletal:      Cervical back: Normal range of motion and neck supple.   Skin:     General: Skin is warm.   Neurological:      General: No focal deficit present.      Mental Status: She is alert and oriented to person, place, and time. Mental status is at baseline.   Psychiatric:         Mood and Affect: Mood normal.         Behavior: Behavior normal.         Thought Content: Thought content normal.         Judgment: Judgment normal.            Assessment & Plan   Diagnosis Orders   1. Hyperlipidemia, unspecified hyperlipidemia type  Lipid Panel    Comprehensive Metabolic Panel      2. Breast cancer screening by mammogram  KATARINA DIGITAL SCREEN W OR WO CAD BILATERAL      3. Age related osteoporosis, unspecified pathological fracture presence  DEXA BONE DENSITY AXIAL SKELETON      4. Migraine without status migrainosus, not intractable, unspecified migraine type        5. Decreased renal function        6. Folic acid deficiency  Vitamin B12 & Folate      7. Anemia, unspecified type  CBC    Iron and TIBC          Orders Placed This Encounter   Procedures    KATARINA DIGITAL SCREEN W OR WO CAD BILATERAL     Standing Status:   Future     Expected Date:   5/23/2025     Expiration Date:   7/12/2026    DEXA BONE DENSITY AXIAL SKELETON           Standing Status:   Future     Expected Date:   5/12/2025     Expiration Date:   5/12/2026    Lipid Panel     Standing Status:   Future     Expected Date:   5/12/2025     Expiration Date:   5/12/2026    Comprehensive Metabolic Panel     Standing Status:   Future     Expected Date:   5/12/2025     Expiration Date:   5/12/2026    CBC     Standing Status:   Future     Expected Date:   5/12/2025     Expiration Date:   5/12/2026    Iron and TIBC     Standing Status:  Stage 11: Additional Anesthesia Type: 1% lidocaine with epinephrine

## 2025-05-14 DIAGNOSIS — E78.5 HYPERLIPIDEMIA, UNSPECIFIED HYPERLIPIDEMIA TYPE: ICD-10-CM

## 2025-05-14 DIAGNOSIS — E53.8 FOLIC ACID DEFICIENCY: ICD-10-CM

## 2025-05-14 DIAGNOSIS — D64.9 ANEMIA, UNSPECIFIED TYPE: ICD-10-CM

## 2025-05-14 LAB
ALBUMIN SERPL-MCNC: 4.4 G/DL (ref 3.5–4.6)
ALP SERPL-CCNC: 97 U/L (ref 40–130)
ALT SERPL-CCNC: 30 U/L (ref 0–33)
ANION GAP SERPL CALCULATED.3IONS-SCNC: 10 MEQ/L (ref 9–15)
AST SERPL-CCNC: 30 U/L (ref 0–35)
BILIRUB SERPL-MCNC: 0.3 MG/DL (ref 0.2–0.7)
BUN SERPL-MCNC: 24 MG/DL (ref 8–23)
CALCIUM SERPL-MCNC: 9.1 MG/DL (ref 8.5–9.9)
CHLORIDE SERPL-SCNC: 107 MEQ/L (ref 95–107)
CHOLEST SERPL-MCNC: 234 MG/DL (ref 0–199)
CO2 SERPL-SCNC: 24 MEQ/L (ref 20–31)
CREAT SERPL-MCNC: 1.22 MG/DL (ref 0.5–0.9)
ERYTHROCYTE [DISTWIDTH] IN BLOOD BY AUTOMATED COUNT: 13.7 % (ref 11.5–14.5)
GLOBULIN SER CALC-MCNC: 2.5 G/DL (ref 2.3–3.5)
GLUCOSE SERPL-MCNC: 91 MG/DL (ref 70–99)
HCT VFR BLD AUTO: 38.6 % (ref 37–47)
HDLC SERPL-MCNC: 90 MG/DL (ref 40–59)
HGB BLD-MCNC: 12.6 G/DL (ref 12–16)
LDLC SERPL CALC-MCNC: 133 MG/DL (ref 0–129)
MCH RBC QN AUTO: 33.1 PG (ref 27–31.3)
MCHC RBC AUTO-ENTMCNC: 32.6 % (ref 33–37)
MCV RBC AUTO: 101.3 FL (ref 79.4–94.8)
PLATELET # BLD AUTO: 230 K/UL (ref 130–400)
POTASSIUM SERPL-SCNC: 4.2 MEQ/L (ref 3.4–4.9)
PROT SERPL-MCNC: 6.9 G/DL (ref 6.3–8)
RBC # BLD AUTO: 3.81 M/UL (ref 4.2–5.4)
SODIUM SERPL-SCNC: 141 MEQ/L (ref 135–144)
TRIGL SERPL-MCNC: 53 MG/DL (ref 0–150)
WBC # BLD AUTO: 3.6 K/UL (ref 4.8–10.8)

## 2025-05-15 ENCOUNTER — RESULTS FOLLOW-UP (OUTPATIENT)
Dept: FAMILY MEDICINE CLINIC | Age: 70
End: 2025-05-15

## 2025-05-15 LAB
FOLATE: 8.5 NG/ML (ref 4.8–24.2)
IRON % SATURATION: 32 % (ref 20–55)
IRON: 91 UG/DL (ref 37–145)
TOTAL IRON BINDING CAPACITY: 282 UG/DL (ref 250–450)
UNSATURATED IRON BINDING CAPACITY: 191 UG/DL (ref 112–347)
VITAMIN B-12: 227 PG/ML (ref 232–1245)

## 2025-05-19 DIAGNOSIS — E78.5 HYPERLIPIDEMIA, UNSPECIFIED HYPERLIPIDEMIA TYPE: ICD-10-CM

## 2025-05-20 RX ORDER — UBIDECARENONE 30 MG
1 CAPSULE ORAL DAILY
Qty: 90 CAPSULE | Refills: 0 | Status: SHIPPED | OUTPATIENT
Start: 2025-05-20

## 2025-05-20 NOTE — TELEPHONE ENCOUNTER
Comments:     Last Office Visit (last PCP visit):   5/12/2025    Next Visit Date:  Future Appointments   Date Time Provider Department Center   6/16/2025  8:40 AM [facility]   6/16/2025  9:20 AM [facility] MAMMOGRAPHY ROOM 1 [facility]   8/19/2025  1:00 PM SCHEDULE, MLOZ RAD ONC NURSE [facility] ONC [facility]   10/1/2025  1:15 PM Marcial Cortés MD [place] [facility] Neu[facility] -   11/13/2025 10:30 AM Sandee Goodman, APRN - CNP [facility] ECC DEP       **If hasn't been seen in over a year OR hasn't followed up according to last diabetes/ADHD visit, make appointment for patient before sending refill to provider.    Rx requested:  Requested Prescriptions     Pending Prescriptions Disp Refills    coenzyme Q-10 100 MG capsule [Pharmacy Med Name: Coenzyme Q-10 Oral Capsule 100 MG] 90 capsule 0     Sig: TAKE ONE CAPSULE BY MOUTH DAILY

## 2025-05-27 DIAGNOSIS — N28.9 DECREASED RENAL FUNCTION: Primary | ICD-10-CM

## 2025-06-03 NOTE — TELEPHONE ENCOUNTER
Pharmacy is  requesting medication refill. Please approve or deny this request.    Rx requested:  Requested Prescriptions     Pending Prescriptions Disp Refills    AJOVY 225 MG/1.5ML SOAJ [Pharmacy Med Name: AJOVY 225MG/1.5ML PF AUT INJ 1.5ML] 1.5 mL 6     Sig: INJECT 225 MG( 1 INJECTION) UNDER THE SKIN EVERY 30 DAYS         Last Office Visit:   10/2/2024      Next Visit Date:  Future Appointments   Date Time Provider Department Center   6/16/2025  8:40 AM [facility]   6/16/2025  9:20 AM [facility] MAMMOGRAPHY ROOM 1 [facility]   8/19/2025  1:00 PM SCHEDULE, MAIRA RAD ONC NURSE [facility]C [facility] [place]   10/1/2025  1:15 PM Marcial Cortés MD [place] [facility] Neu[facility] -   11/13/2025 10:30 AM [facility]

## 2025-06-04 RX ORDER — FREMANEZUMAB-VFRM 225 MG/1.5ML
INJECTION SUBCUTANEOUS
Qty: 1.5 ML | Refills: 6 | Status: SHIPPED | OUTPATIENT
Start: 2025-06-04

## 2025-06-12 ENCOUNTER — TELEPHONE (OUTPATIENT)
Dept: OBGYN CLINIC | Age: 70
End: 2025-06-12

## 2025-06-12 RX ORDER — PAROXETINE 20 MG/1
20 TABLET, FILM COATED ORAL DAILY
Qty: 90 TABLET | Refills: 1 | Status: SHIPPED | OUTPATIENT
Start: 2025-06-12

## 2025-06-12 NOTE — TELEPHONE ENCOUNTER
Patient called this afternoon requesting refills of Paroxetine to be sent to [facility]. She was advised that there was a message from October that she wanted to stop taking it and she said she spoke with Dr. Pichardo at her last visit and she told him she wanted to resume taking it. 90 days with 1 refill sent to pharmacy.

## 2025-06-16 ENCOUNTER — RESULTS FOLLOW-UP (OUTPATIENT)
Dept: FAMILY MEDICINE CLINIC | Age: 70
End: 2025-06-16

## 2025-06-16 ENCOUNTER — HOSPITAL ENCOUNTER (OUTPATIENT)
Dept: WOMENS IMAGING | Age: 70
Discharge: HOME | End: 2025-06-18
Payer: MEDICARE

## 2025-06-16 VITALS — BODY MASS INDEX: 26.76 KG/M2 | HEIGHT: 64 IN

## 2025-06-16 DIAGNOSIS — M81.0 AGE RELATED OSTEOPOROSIS, UNSPECIFIED PATHOLOGICAL FRACTURE PRESENCE: ICD-10-CM

## 2025-06-16 DIAGNOSIS — Z12.31 BREAST CANCER SCREENING BY MAMMOGRAM: ICD-10-CM

## 2025-06-16 PROCEDURE — 77080 DXA BONE DENSITY AXIAL: CPT

## 2025-06-16 PROCEDURE — 77063 BREAST TOMOSYNTHESIS BI: CPT

## 2025-06-26 ENCOUNTER — OFFICE VISIT (OUTPATIENT)
Dept: FAMILY MEDICINE CLINIC | Age: 70
End: 2025-06-26

## 2025-06-26 VITALS
SYSTOLIC BLOOD PRESSURE: 120 MMHG | OXYGEN SATURATION: 98 % | TEMPERATURE: 98.5 F | DIASTOLIC BLOOD PRESSURE: 74 MMHG | BODY MASS INDEX: 27.31 KG/M2 | HEART RATE: 66 BPM | HEIGHT: 64 IN | WEIGHT: 160 LBS

## 2025-06-26 DIAGNOSIS — H66.002 NON-RECURRENT ACUTE SUPPURATIVE OTITIS MEDIA OF LEFT EAR WITHOUT SPONTANEOUS RUPTURE OF TYMPANIC MEMBRANE: Primary | ICD-10-CM

## 2025-06-26 ASSESSMENT — ENCOUNTER SYMPTOMS
SORE THROAT: 0
COUGH: 0
SHORTNESS OF BREATH: 0
RHINORRHEA: 0
WHEEZING: 0

## 2025-06-26 NOTE — PROGRESS NOTES
Subjective:      Patient ID: Tina Dwyer is a 69 y.o. female who presents today for:  Chief Complaint   Patient presents with    Ear Pain     Left x 1 day taking tylenol otc        Ear Pain   There is pain in the left ear. This is a new problem. The current episode started yesterday. The problem occurs constantly. The problem has been unchanged. There has been no fever. The pain is severe. Pertinent negatives include no coughing, ear discharge, headaches, neck pain, rhinorrhea or sore throat. She has tried acetaminophen for the symptoms. The treatment provided mild relief. Her past medical history is significant for hearing loss.       Past Medical History:   Diagnosis Date    Allergic reaction     Arthritis     Basal cell carcinoma (BCC) of chest 07/26/2024    Cancer (HCC)     Cervical, dx by Dr. Yo    Cerebral artery occlusion with cerebral infarction (HCC)     at age 50 / sx as a migraine with slurred speech & visual disturbance    Cerebrovascular disease Age 50    Depression     Diverticulitis     GERD (gastroesophageal reflux disease)     Hearing loss     History of conization of cervix     History of therapeutic radiation     Hx antineoplastic chemo     Hyperlipidemia     Migraine     Overactive bladder     Rectal cancer (HCC)     Renal insufficiency     Tinea unguium     Urinary incontinence      Past Surgical History:   Procedure Laterality Date    CARDIAC SURGERY      PFO surgery in age 50s    COLONOSCOPY  2018    COLONOSCOPY N/A 05/31/2022    COLONOSCOPY w/biopsies performed by Pattie Lewis MD at [facility]    COLONOSCOPY N/A 4/8/2025    COLONOSCOPY WITH POLYPECTOMY performed by Pattie Lewis MD at [facility]    HEMORRHOID SURGERY  2017    HYSTERECTOMY (CERVIX STATUS UNKNOWN)      HYSTERECTOMY VAGINAL N/A 12/19/2018    TLH WITH BSO performed by Perla Pichardo MD at [facility] OR    HYSTERECTOMY, TOTAL ABDOMINAL (CERVIX REMOVED)      LEEP      OVARY REMOVAL      MT COLONOSCOPY FLX DX

## 2025-07-25 DIAGNOSIS — L73.8 SEBACEOUS HYPERPLASIA: ICD-10-CM

## 2025-07-25 NOTE — TELEPHONE ENCOUNTER
Comments:     Last Office Visit (last PCP visit):   11/18/2024    Next Visit Date:  Future Appointments   Date Time Provider Department Center   8/19/2025  1:00 PM SCHEDULE, MAIRA GARCIA ONC NURSE [facility]   10/1/2025  1:15 PM Marcial Cortés MD [place] [facility] Neu[facility] -   11/13/2025 10:30 AM Sandee Goodman, APRN - CNP [facility] ECC DEP       **If hasn't been seen in over a year OR hasn't followed up according to last diabetes/ADHD visit, make appointment for patient before sending refill to provider.    Rx requested:  Requested Prescriptions     Pending Prescriptions Disp Refills    tretinoin (RETIN-A) 0.025 % cream [Pharmacy Med Name: Tretinoin External Cream 0.025 %] 20 g 0     Sig: APPLY TOPICALLY NIGHTLY

## 2025-07-27 RX ORDER — TRETINOIN 0.25 MG/G
CREAM TOPICAL NIGHTLY
Qty: 20 G | Refills: 0 | Status: SHIPPED | OUTPATIENT
Start: 2025-07-27

## 2025-08-19 ENCOUNTER — HOSPITAL ENCOUNTER (OUTPATIENT)
Dept: RADIATION ONCOLOGY | Age: 70
Discharge: HOME | End: 2025-08-19
Payer: MEDICARE

## 2025-08-19 VITALS
BODY MASS INDEX: 27.12 KG/M2 | HEART RATE: 66 BPM | RESPIRATION RATE: 18 BRPM | TEMPERATURE: 96.9 F | OXYGEN SATURATION: 97 % | SYSTOLIC BLOOD PRESSURE: 122 MMHG | WEIGHT: 158 LBS | DIASTOLIC BLOOD PRESSURE: 79 MMHG

## 2025-08-19 DIAGNOSIS — C21.0 ANAL CANCER (HCC): Primary | ICD-10-CM

## 2025-08-19 PROCEDURE — G2211 COMPLEX E/M VISIT ADD ON: HCPCS | Performed by: RADIOLOGY

## 2025-08-19 PROCEDURE — 99212 OFFICE O/P EST SF 10 MIN: CPT | Performed by: RADIOLOGY

## 2025-08-19 PROCEDURE — 99214 OFFICE O/P EST MOD 30 MIN: CPT | Performed by: RADIOLOGY

## 2025-08-19 RX ORDER — LIDOCAINE 5 G/100G
1 CREAM RECTAL; TOPICAL 2 TIMES DAILY
Qty: 1 EACH | Refills: 2 | Status: SHIPPED | OUTPATIENT
Start: 2025-08-19

## 2025-08-21 DIAGNOSIS — E78.5 HYPERLIPIDEMIA, UNSPECIFIED HYPERLIPIDEMIA TYPE: ICD-10-CM

## 2025-08-21 RX ORDER — PRAVASTATIN SODIUM 20 MG
20 TABLET ORAL DAILY
Qty: 90 TABLET | Refills: 0 | Status: SHIPPED | OUTPATIENT
Start: 2025-08-21

## 2025-08-21 RX ORDER — UBIDECARENONE 30 MG
1 CAPSULE ORAL DAILY
Qty: 90 CAPSULE | Refills: 0 | Status: SHIPPED | OUTPATIENT
Start: 2025-08-21

## 2025-08-26 ENCOUNTER — OFFICE VISIT (OUTPATIENT)
Age: 70
End: 2025-08-26
Payer: MEDICARE

## 2025-08-26 VITALS
TEMPERATURE: 98.3 F | OXYGEN SATURATION: 96 % | BODY MASS INDEX: 26.98 KG/M2 | HEIGHT: 64 IN | WEIGHT: 158 LBS | HEART RATE: 75 BPM

## 2025-08-26 DIAGNOSIS — C21.0 ANAL CANCER (HCC): Primary | ICD-10-CM

## 2025-08-26 PROCEDURE — G8399 PT W/DXA RESULTS DOCUMENT: HCPCS | Performed by: COLON & RECTAL SURGERY

## 2025-08-26 PROCEDURE — G8419 CALC BMI OUT NRM PARAM NOF/U: HCPCS | Performed by: COLON & RECTAL SURGERY

## 2025-08-26 PROCEDURE — 1160F RVW MEDS BY RX/DR IN RCRD: CPT | Performed by: COLON & RECTAL SURGERY

## 2025-08-26 PROCEDURE — 3017F COLORECTAL CA SCREEN DOC REV: CPT | Performed by: COLON & RECTAL SURGERY

## 2025-08-26 PROCEDURE — 1123F ACP DISCUSS/DSCN MKR DOCD: CPT | Performed by: COLON & RECTAL SURGERY

## 2025-08-26 PROCEDURE — 1159F MED LIST DOCD IN RCRD: CPT | Performed by: COLON & RECTAL SURGERY

## 2025-08-26 PROCEDURE — G8427 DOCREV CUR MEDS BY ELIG CLIN: HCPCS | Performed by: COLON & RECTAL SURGERY

## 2025-08-26 PROCEDURE — 99213 OFFICE O/P EST LOW 20 MIN: CPT | Performed by: COLON & RECTAL SURGERY

## 2025-08-26 PROCEDURE — 46600 DIAGNOSTIC ANOSCOPY SPX: CPT | Performed by: COLON & RECTAL SURGERY

## 2025-08-26 PROCEDURE — 1036F TOBACCO NON-USER: CPT | Performed by: COLON & RECTAL SURGERY

## 2025-08-26 PROCEDURE — 99212 OFFICE O/P EST SF 10 MIN: CPT | Performed by: COLON & RECTAL SURGERY

## 2025-08-26 PROCEDURE — 1090F PRES/ABSN URINE INCON ASSESS: CPT | Performed by: COLON & RECTAL SURGERY

## 2025-08-26 RX ORDER — HYDROCORTISONE ACETATE 25 MG/1
25 SUPPOSITORY RECTAL 2 TIMES DAILY PRN
Qty: 20 SUPPOSITORY | Refills: 1 | Status: SHIPPED | OUTPATIENT
Start: 2025-08-26

## 2025-08-26 ASSESSMENT — ENCOUNTER SYMPTOMS
RECTAL PAIN: 1
COLOR CHANGE: 0
ABDOMINAL PAIN: 0
CHEST TIGHTNESS: 0
CONSTIPATION: 0
SHORTNESS OF BREATH: 0
DIARRHEA: 0
VOMITING: 0

## (undated) DEVICE — TUBING, SUCTION, 1/4" X 10', STRAIGHT: Brand: MEDLINE

## (undated) DEVICE — SUTURE MCRYL SZ 4-0 L27IN ABSRB UD L19MM PS-2 1/2 CIR PRIM Y426H

## (undated) DEVICE — DBD-PACK,LITHOTOMY,PK II,AURORA: Brand: MEDLINE

## (undated) DEVICE — LABEL MED MINI W/ MARKER

## (undated) DEVICE — ELECTRODE ES L W2CM D0.8CM SHFT L12CM CONN L3/32IN TUNGSTEN

## (undated) DEVICE — KIT ARMOR C DRP COLLAPSIBLE AND SELF EXP TOP CVR FOR FLUOROSCOPIC

## (undated) DEVICE — SYRINGE MED 10ML TRNSLUC BRL PLUNG BLK MRK POLYPR CTRL

## (undated) DEVICE — TUBING INSUF 03UM FLTR W LUERLOCK CPC CONN

## (undated) DEVICE — ELECTROSURGICAL PENCIL BUTTON SWITCH E-Z CLEAN COATED BLADE ELECTRODE 10 FT (3 M) CORD HOLSTER: Brand: MEGADYNE

## (undated) DEVICE — COVER,TABLE,44X90,STERILE: Brand: MEDLINE

## (undated) DEVICE — COVER LT HNDL BLU PLAS

## (undated) DEVICE — MATTRESS OVERLAY EGGCRATE 72X33IN FOAM PAD

## (undated) DEVICE — WARMER LAPSCP BST 2 STG STRL DISP HEAT BLU

## (undated) DEVICE — E-Z CLEAN, NON-STICK, PTFE COATED, ELECTROSURGICAL BLADE ELECTRODE, 6.5 INCH (16.5 CM): Brand: MEGADYNE

## (undated) DEVICE — GOWN,AURORA,NONREINFORCED,LARGE: Brand: MEDLINE

## (undated) DEVICE — TROCAR ENDOSCP L100MM DIA5MM BLDELSS STBL SL OBT RADLUC

## (undated) DEVICE — INTENDED FOR TISSUE SEPARATION, AND OTHER PROCEDURES THAT REQUIRE A SHARP SURGICAL BLADE TO PUNCTURE OR CUT.: Brand: BARD-PARKER ® CARBON RIB-BACK BLADES

## (undated) DEVICE — SUTURE VCRL SZ 0 L36IN ABSRB UD L36MM CT-1 1/2 CIR J946H

## (undated) DEVICE — SINGLE PORT MANIFOLD: Brand: NEPTUNE 2

## (undated) DEVICE — PACK,SET UP,DRAPE: Brand: MEDLINE

## (undated) DEVICE — CHLORAPREP 26ML ORANGE

## (undated) DEVICE — TRAY PREP DRY W/ PREM GLV 2 APPL 6 SPNG 2 UNDPD 1 OVERWRAP

## (undated) DEVICE — SHEARS ENDOSCP L36CM DIA5MM ULTRASONIC CRV TIP HARM

## (undated) DEVICE — SPONGE,LAP,18"X18",DLX,XR,ST,5/PK,40/PK: Brand: MEDLINE

## (undated) DEVICE — DRESSING HYDROFIBER AQUACEL AG ADVANTAGE 3.5X6 IN

## (undated) DEVICE — NEEDLE INSUF L120MM ULT VERES ENDOPATH

## (undated) DEVICE — VCARE MEDIUM, UTERINE MANIPULATOR, VAGINAL-CERVICAL-AHLUWALIA'S-RETRACTOR-ELEVATOR: Brand: VCARE

## (undated) DEVICE — CURETTE SUCT ENDOMET SELF CONTAINED FLX PIPELLE

## (undated) DEVICE — MEDI-VAC YANKAUER SUCTION HANDLE W/BULBOUS TIP: Brand: CARDINAL HEALTH

## (undated) DEVICE — Device

## (undated) DEVICE — TRAP POLYP BALEEN

## (undated) DEVICE — FORCEPS BX L240CM JAW DIA2.8MM L CAP W/ NDL MIC MESH TOOTH

## (undated) DEVICE — COUNTER NDL 40 COUNT HLD 70 FOAM BLK ADH W/ MAG

## (undated) DEVICE — TOTAL TRAY, DB, 100% SILI FOLEY, 16FR 10: Brand: MEDLINE

## (undated) DEVICE — GLOVE SURG SZ 85 L12IN FNGR THK94MIL TRNSLUC YEL LTX

## (undated) DEVICE — LITHOTOMY DRAPE WITH FLUID CONTROL POUCH: Brand: CONVERTORS

## (undated) DEVICE — DRAPE, LAVH, STERILE: Brand: MEDLINE

## (undated) DEVICE — PAD,SANITARY,11 IN,MAXI,W/WINGS,N-STRL: Brand: MEDLINE

## (undated) DEVICE — SYRINGE MED 10ML LUERLOCK TIP W/O SFTY DISP

## (undated) DEVICE — SWAB PROCTO 16IN

## (undated) DEVICE — TUBE EVACUATOR SMOKE ST

## (undated) DEVICE — LINER PAD CONTOUR SUPER PEACH 7X14IN

## (undated) DEVICE — BRUSH ENDO CLN L90.5IN SHTH DIA1.7MM BRIST DIA5-7MM 2-6MM

## (undated) DEVICE — 1010 S-DRAPE TOWEL DRAPE 10/BX: Brand: STERI-DRAPE™

## (undated) DEVICE — GOWN,SIRUS,POLYRNF,BRTHSLV,XLN/XL,20/CS: Brand: MEDLINE

## (undated) DEVICE — CYSTO/BLADDER IRRIGATION SET, REGULATING CLAMP

## (undated) DEVICE — KIT,ANTI FOG,W/SPONGE & FLUID,SOFT PACK: Brand: MEDLINE

## (undated) DEVICE — GLOVE SURG SZ 9 THK91MIL LTX FREE SYN POLYISOPRENE ANTI

## (undated) DEVICE — TROCAR ENDOSCP L100MM DIA12MM BLDELSS OBT RADLUC STBL SL

## (undated) DEVICE — TUBE SET 96 MM 64 MM H2O PERISTALTIC STD AUX CHANNEL

## (undated) DEVICE — SUTURE VICRYL SZ 2-0 L36IN ABSRB UD L36MM CT-1 1/2 CIR J945H

## (undated) DEVICE — SET ENDOSCP SEAL HYSTEROSCOPE RIG OUTFLO CHN DISP MYOSURE

## (undated) DEVICE — GAUZE,SPONGE,4"X4",16PLY,XRAY,STRL,LF: Brand: MEDLINE

## (undated) DEVICE — SWABSTICK MEDICATED 10% POVIDONE IOD PVP SGL ANTISEP SAT

## (undated) DEVICE — HYPODERMIC SAFETY NEEDLE: Brand: MAGELLAN

## (undated) DEVICE — PATIENT RETURN ELECTRODE, SINGLE-USE, CONTACT QUALITY MONITORING, ADULT, WITH 9FT CORD, FOR PATIENTS WEIGING OVER 33LBS. (15KG): Brand: MEGADYNE

## (undated) DEVICE — GENERAL MINOR: Brand: MEDLINE INDUSTRIES, INC.

## (undated) DEVICE — NEEDLE NERVE STIM FORAMEN 5 IN INTERSTIM

## (undated) DEVICE — KIT CANSTR VAC TANTEM TB FOR AQUILEX FLD CTRL SYS

## (undated) DEVICE — TUBING IRRIGATION 140/160/180/190 SER GI ENDOSCP SMARTCAP

## (undated) DEVICE — GLOVE ORANGE PI 7   MSG9070

## (undated) DEVICE — ENDO CARRY-ON PROCEDURE KIT: Brand: ENDO CARRY-ON PROCEDURE KIT

## (undated) DEVICE — JELLY,LUBE,STERILE,FLIP TOP,TUBE,2-OZ: Brand: MEDLINE

## (undated) DEVICE — ELECTRODE PT RET AD L9FT HI MOIST COND ADH HYDRGEL CORDED

## (undated) DEVICE — 4-PORT MANIFOLD: Brand: NEPTUNE 2

## (undated) DEVICE — LUBRICANT SURG JELLY ST BACTER TUBE 4.25OZ

## (undated) DEVICE — PROGRAMMER SMART COMM W/HANDSET F/SACRAL NRVE STIMULATORS

## (undated) DEVICE — NEUROSTIMULATOR EXT SM LTWT SGL BTTN H2O RESIST WIRELESS

## (undated) DEVICE — SNARE ENDOSCP POLYP 2.4 MM 240 CM 10 MM 2.8 MM CAPTIVATOR

## (undated) DEVICE — PAD,NON-ADHERENT,3X8,STERILE,LF,1/PK: Brand: MEDLINE

## (undated) DEVICE — SONY PRINTER PAPER

## (undated) DEVICE — SUTURE MONOCRYL SZ 4-0 L27IN ABSRB UD L19MM PS-2 1/2 CIR PRIM Y426H

## (undated) DEVICE — GOWN,AURORA,NONRNF,XL,30/CS: Brand: MEDLINE

## (undated) DEVICE — CATHETER,URETHRAL,VINYL,FEMALE,6",14FR: Brand: MEDLINE

## (undated) DEVICE — LIQUIBAND RAPID ADHESIVE 36/CS 0.8ML: Brand: MEDLINE

## (undated) DEVICE — STIMULATOR NERVE 4.32 MM PERC EXTN KT INTERSTIM QUAD

## (undated) DEVICE — GLOVE ORANGE PI 8 1/2   MSG9085

## (undated) DEVICE — SET FLD CTRL SYS INFLO AND OUTFLO TB AQUILEX

## (undated) DEVICE — Device: Brand: ENDO SMARTCAP

## (undated) DEVICE — NEEDLE HYPO 18GA L1.5IN PNK POLYPR HUB S STL REG BVL STR